# Patient Record
Sex: FEMALE | Race: WHITE | NOT HISPANIC OR LATINO | URBAN - METROPOLITAN AREA
[De-identification: names, ages, dates, MRNs, and addresses within clinical notes are randomized per-mention and may not be internally consistent; named-entity substitution may affect disease eponyms.]

---

## 2017-01-16 ENCOUNTER — EMERGENCY (EMERGENCY)
Facility: HOSPITAL | Age: 82
LOS: 0 days | Discharge: HOME | End: 2017-01-17

## 2017-06-27 DIAGNOSIS — Z88.1 ALLERGY STATUS TO OTHER ANTIBIOTIC AGENTS STATUS: ICD-10-CM

## 2017-06-27 DIAGNOSIS — Z88.0 ALLERGY STATUS TO PENICILLIN: ICD-10-CM

## 2017-06-27 DIAGNOSIS — R10.31 RIGHT LOWER QUADRANT PAIN: ICD-10-CM

## 2017-06-27 DIAGNOSIS — Z88.8 ALLERGY STATUS TO OTHER DRUGS, MEDICAMENTS AND BIOLOGICAL SUBSTANCES STATUS: ICD-10-CM

## 2017-06-27 DIAGNOSIS — Z88.6 ALLERGY STATUS TO ANALGESIC AGENT: ICD-10-CM

## 2017-06-27 DIAGNOSIS — K59.00 CONSTIPATION, UNSPECIFIED: ICD-10-CM

## 2017-06-27 DIAGNOSIS — I10 ESSENTIAL (PRIMARY) HYPERTENSION: ICD-10-CM

## 2017-06-27 DIAGNOSIS — Z88.5 ALLERGY STATUS TO NARCOTIC AGENT: ICD-10-CM

## 2017-07-31 ENCOUNTER — INPATIENT (INPATIENT)
Facility: HOSPITAL | Age: 82
LOS: 1 days | Discharge: HOME | End: 2017-08-02
Attending: INTERNAL MEDICINE | Admitting: INTERNAL MEDICINE

## 2017-07-31 DIAGNOSIS — K56.609 UNSPECIFIED INTESTINAL OBSTRUCTION, UNSPECIFIED AS TO PARTIAL VERSUS COMPLETE OBSTRUCTION: ICD-10-CM

## 2017-07-31 DIAGNOSIS — R11.2 NAUSEA WITH VOMITING, UNSPECIFIED: ICD-10-CM

## 2017-07-31 DIAGNOSIS — R10.9 UNSPECIFIED ABDOMINAL PAIN: ICD-10-CM

## 2017-07-31 DIAGNOSIS — I10 ESSENTIAL (PRIMARY) HYPERTENSION: ICD-10-CM

## 2017-07-31 DIAGNOSIS — K57.92 DIVERTICULITIS OF INTESTINE, PART UNSPECIFIED, WITHOUT PERFORATION OR ABSCESS WITHOUT BLEEDING: ICD-10-CM

## 2017-08-04 DIAGNOSIS — K44.9 DIAPHRAGMATIC HERNIA WITHOUT OBSTRUCTION OR GANGRENE: ICD-10-CM

## 2017-08-04 DIAGNOSIS — K29.70 GASTRITIS, UNSPECIFIED, WITHOUT BLEEDING: ICD-10-CM

## 2017-08-04 DIAGNOSIS — I10 ESSENTIAL (PRIMARY) HYPERTENSION: ICD-10-CM

## 2017-08-04 DIAGNOSIS — R10.9 UNSPECIFIED ABDOMINAL PAIN: ICD-10-CM

## 2018-04-02 ENCOUNTER — EMERGENCY (EMERGENCY)
Facility: HOSPITAL | Age: 83
LOS: 0 days | Discharge: HOME | End: 2018-04-02
Attending: EMERGENCY MEDICINE

## 2018-04-02 VITALS
TEMPERATURE: 98 F | SYSTOLIC BLOOD PRESSURE: 177 MMHG | DIASTOLIC BLOOD PRESSURE: 84 MMHG | HEART RATE: 78 BPM | RESPIRATION RATE: 18 BRPM | OXYGEN SATURATION: 95 %

## 2018-04-02 VITALS
WEIGHT: 113.98 LBS | HEIGHT: 60 IN | RESPIRATION RATE: 18 BRPM | HEART RATE: 76 BPM | DIASTOLIC BLOOD PRESSURE: 58 MMHG | SYSTOLIC BLOOD PRESSURE: 123 MMHG | TEMPERATURE: 98 F | OXYGEN SATURATION: 96 %

## 2018-04-02 DIAGNOSIS — R10.9 UNSPECIFIED ABDOMINAL PAIN: ICD-10-CM

## 2018-04-02 DIAGNOSIS — Z88.0 ALLERGY STATUS TO PENICILLIN: ICD-10-CM

## 2018-04-02 DIAGNOSIS — Z88.1 ALLERGY STATUS TO OTHER ANTIBIOTIC AGENTS STATUS: ICD-10-CM

## 2018-04-02 DIAGNOSIS — Z88.5 ALLERGY STATUS TO NARCOTIC AGENT: ICD-10-CM

## 2018-04-02 DIAGNOSIS — I10 ESSENTIAL (PRIMARY) HYPERTENSION: ICD-10-CM

## 2018-04-02 DIAGNOSIS — Z88.6 ALLERGY STATUS TO ANALGESIC AGENT: ICD-10-CM

## 2018-04-02 DIAGNOSIS — R11.10 VOMITING, UNSPECIFIED: ICD-10-CM

## 2018-04-02 DIAGNOSIS — K59.00 CONSTIPATION, UNSPECIFIED: ICD-10-CM

## 2018-04-02 DIAGNOSIS — Z79.899 OTHER LONG TERM (CURRENT) DRUG THERAPY: ICD-10-CM

## 2018-04-02 DIAGNOSIS — Z88.8 ALLERGY STATUS TO OTHER DRUGS, MEDICAMENTS AND BIOLOGICAL SUBSTANCES: ICD-10-CM

## 2018-04-02 LAB
ALBUMIN SERPL ELPH-MCNC: 4.2 G/DL — SIGNIFICANT CHANGE UP (ref 3.5–5.2)
ALP SERPL-CCNC: 191 U/L — HIGH (ref 30–115)
ALT FLD-CCNC: 17 U/L — SIGNIFICANT CHANGE UP (ref 0–41)
ANION GAP SERPL CALC-SCNC: 20 MMOL/L — HIGH (ref 7–14)
APTT BLD: 28.6 SEC — SIGNIFICANT CHANGE UP (ref 27–39.2)
AST SERPL-CCNC: 33 U/L — SIGNIFICANT CHANGE UP (ref 0–41)
BASE EXCESS BLDV CALC-SCNC: -1.3 MMOL/L — SIGNIFICANT CHANGE UP (ref -2–2)
BASOPHILS # BLD AUTO: 0.05 K/UL — SIGNIFICANT CHANGE UP (ref 0–0.2)
BASOPHILS NFR BLD AUTO: 0.4 % — SIGNIFICANT CHANGE UP (ref 0–1)
BILIRUB DIRECT SERPL-MCNC: <0.2 MG/DL — SIGNIFICANT CHANGE UP (ref 0–0.2)
BILIRUB INDIRECT FLD-MCNC: >0.5 MG/DL — SIGNIFICANT CHANGE UP (ref 0.2–1.2)
BILIRUB SERPL-MCNC: 0.7 MG/DL — SIGNIFICANT CHANGE UP (ref 0.2–1.2)
BUN SERPL-MCNC: 29 MG/DL — HIGH (ref 10–20)
CA-I SERPL-SCNC: 1.22 MMOL/L — SIGNIFICANT CHANGE UP (ref 1.12–1.3)
CALCIUM SERPL-MCNC: 10.6 MG/DL — HIGH (ref 8.5–10.1)
CHLORIDE SERPL-SCNC: 98 MMOL/L — SIGNIFICANT CHANGE UP (ref 98–110)
CK SERPL-CCNC: 61 U/L — SIGNIFICANT CHANGE UP (ref 0–225)
CO2 SERPL-SCNC: 21 MMOL/L — SIGNIFICANT CHANGE UP (ref 17–32)
CREAT SERPL-MCNC: 1.4 MG/DL — SIGNIFICANT CHANGE UP (ref 0.7–1.5)
EOSINOPHIL # BLD AUTO: 0.06 K/UL — SIGNIFICANT CHANGE UP (ref 0–0.7)
EOSINOPHIL NFR BLD AUTO: 0.5 % — SIGNIFICANT CHANGE UP (ref 0–8)
GAS PNL BLDV: 142 MMOL/L — SIGNIFICANT CHANGE UP (ref 136–145)
GAS PNL BLDV: SIGNIFICANT CHANGE UP
GLUCOSE SERPL-MCNC: 176 MG/DL — HIGH (ref 70–99)
HCO3 BLDV-SCNC: 24 MMOL/L — SIGNIFICANT CHANGE UP (ref 22–29)
HCT VFR BLD CALC: 47.4 % — HIGH (ref 37–47)
HCT VFR BLDA CALC: 49.4 % — HIGH (ref 34–44)
HGB BLD CALC-MCNC: 16.1 G/DL — SIGNIFICANT CHANGE UP (ref 14–18)
HGB BLD-MCNC: 15.9 G/DL — SIGNIFICANT CHANGE UP (ref 12–16)
HOROWITZ INDEX BLDV+IHG-RTO: 21 — SIGNIFICANT CHANGE UP
IMM GRANULOCYTES NFR BLD AUTO: 0.3 % — SIGNIFICANT CHANGE UP (ref 0.1–0.3)
INR BLD: 1.04 RATIO — SIGNIFICANT CHANGE UP (ref 0.65–1.3)
LACTATE BLDV-MCNC: 1.9 MMOL/L — HIGH (ref 0.5–1.6)
LACTATE SERPL-SCNC: 3.6 MMOL/L — HIGH (ref 0.5–2.2)
LIDOCAIN IGE QN: 31 U/L — SIGNIFICANT CHANGE UP (ref 7–60)
LYMPHOCYTES # BLD AUTO: 1.3 K/UL — SIGNIFICANT CHANGE UP (ref 1.2–3.4)
LYMPHOCYTES # BLD AUTO: 11.6 % — LOW (ref 20.5–51.1)
MCHC RBC-ENTMCNC: 27 PG — SIGNIFICANT CHANGE UP (ref 27–31)
MCHC RBC-ENTMCNC: 33.5 G/DL — SIGNIFICANT CHANGE UP (ref 32–37)
MCV RBC AUTO: 80.5 FL — LOW (ref 81–99)
MONOCYTES # BLD AUTO: 0.49 K/UL — SIGNIFICANT CHANGE UP (ref 0.1–0.6)
MONOCYTES NFR BLD AUTO: 4.4 % — SIGNIFICANT CHANGE UP (ref 1.7–9.3)
NEUTROPHILS # BLD AUTO: 9.27 K/UL — HIGH (ref 1.4–6.5)
NEUTROPHILS NFR BLD AUTO: 82.8 % — HIGH (ref 42.2–75.2)
NRBC # BLD: 0 /100 WBCS — SIGNIFICANT CHANGE UP (ref 0–0)
PCO2 BLDV: 42 MMHG — SIGNIFICANT CHANGE UP (ref 41–51)
PH BLDV: 7.37 — SIGNIFICANT CHANGE UP (ref 7.26–7.43)
PLATELET # BLD AUTO: 213 K/UL — SIGNIFICANT CHANGE UP (ref 130–400)
PO2 BLDV: 29 MMHG — SIGNIFICANT CHANGE UP (ref 20–40)
POTASSIUM BLDV-SCNC: 3.4 MMOL/L — SIGNIFICANT CHANGE UP (ref 3.3–5.6)
POTASSIUM SERPL-MCNC: 4.5 MMOL/L — SIGNIFICANT CHANGE UP (ref 3.5–5)
POTASSIUM SERPL-SCNC: 4.5 MMOL/L — SIGNIFICANT CHANGE UP (ref 3.5–5)
PROT SERPL-MCNC: 6.8 G/DL — SIGNIFICANT CHANGE UP (ref 6–8)
PROTHROM AB SERPL-ACNC: 11.3 SEC — SIGNIFICANT CHANGE UP (ref 9.95–12.87)
RBC # BLD: 5.89 M/UL — HIGH (ref 4.2–5.4)
RBC # FLD: 14 % — SIGNIFICANT CHANGE UP (ref 11.5–14.5)
SAO2 % BLDV: 54 % — SIGNIFICANT CHANGE UP
SODIUM SERPL-SCNC: 139 MMOL/L — SIGNIFICANT CHANGE UP (ref 135–146)
TROPONIN T SERPL-MCNC: <0.01 NG/ML — SIGNIFICANT CHANGE UP
WBC # BLD: 11.2 K/UL — HIGH (ref 4.8–10.8)
WBC # FLD AUTO: 11.2 K/UL — HIGH (ref 4.8–10.8)

## 2018-04-02 RX ORDER — SODIUM CHLORIDE 9 MG/ML
1000 INJECTION INTRAMUSCULAR; INTRAVENOUS; SUBCUTANEOUS
Qty: 0 | Refills: 0 | Status: DISCONTINUED | OUTPATIENT
Start: 2018-04-02 | End: 2018-04-02

## 2018-04-02 RX ORDER — MORPHINE SULFATE 50 MG/1
4 CAPSULE, EXTENDED RELEASE ORAL ONCE
Qty: 0 | Refills: 0 | Status: DISCONTINUED | OUTPATIENT
Start: 2018-04-02 | End: 2018-04-02

## 2018-04-02 RX ORDER — SODIUM CHLORIDE 9 MG/ML
1000 INJECTION INTRAMUSCULAR; INTRAVENOUS; SUBCUTANEOUS ONCE
Qty: 0 | Refills: 0 | Status: COMPLETED | OUTPATIENT
Start: 2018-04-02 | End: 2018-04-02

## 2018-04-02 RX ORDER — FAMOTIDINE 10 MG/ML
20 INJECTION INTRAVENOUS ONCE
Qty: 0 | Refills: 0 | Status: COMPLETED | OUTPATIENT
Start: 2018-04-02 | End: 2018-04-02

## 2018-04-02 RX ORDER — SODIUM CHLORIDE 9 MG/ML
3 INJECTION INTRAMUSCULAR; INTRAVENOUS; SUBCUTANEOUS ONCE
Qty: 0 | Refills: 0 | Status: COMPLETED | OUTPATIENT
Start: 2018-04-02 | End: 2018-04-02

## 2018-04-02 RX ORDER — ONDANSETRON 8 MG/1
4 TABLET, FILM COATED ORAL ONCE
Qty: 0 | Refills: 0 | Status: COMPLETED | OUTPATIENT
Start: 2018-04-02 | End: 2018-04-02

## 2018-04-02 RX ADMIN — ONDANSETRON 4 MILLIGRAM(S): 8 TABLET, FILM COATED ORAL at 14:02

## 2018-04-02 RX ADMIN — MORPHINE SULFATE 4 MILLIGRAM(S): 50 CAPSULE, EXTENDED RELEASE ORAL at 14:02

## 2018-04-02 RX ADMIN — MORPHINE SULFATE 4 MILLIGRAM(S): 50 CAPSULE, EXTENDED RELEASE ORAL at 17:06

## 2018-04-02 RX ADMIN — FAMOTIDINE 20 MILLIGRAM(S): 10 INJECTION INTRAVENOUS at 14:14

## 2018-04-02 RX ADMIN — SODIUM CHLORIDE 125 MILLILITER(S): 9 INJECTION INTRAMUSCULAR; INTRAVENOUS; SUBCUTANEOUS at 14:01

## 2018-04-02 RX ADMIN — SODIUM CHLORIDE 3 MILLILITER(S): 9 INJECTION INTRAMUSCULAR; INTRAVENOUS; SUBCUTANEOUS at 14:04

## 2018-04-02 RX ADMIN — SODIUM CHLORIDE 1000 MILLILITER(S): 9 INJECTION INTRAMUSCULAR; INTRAVENOUS; SUBCUTANEOUS at 18:45

## 2018-04-02 NOTE — ED PROVIDER NOTE - MEDICAL DECISION MAKING DETAILS
Results reviewed and d/w patient and family in detail.  Pt is feeling much better. Lactic acid improved with hydration.   No further vomiting in ED.  Daughter spoke with Dr Amanda mathis who prescribed Glycerin suppository and Mag citrate.   Shared decision making among pt, spouse and children and everyone is comfortable taking pt home.  Pt prefers to go home and will report any worsening symptoms or concerns to family. She will return to ED if needed.  Otherwise will follow up with her GI Dr and PMD.  Pt will drink plenty of fluids post CT with contrast.  They verbalize understanding of p;an

## 2018-04-02 NOTE — ED PROVIDER NOTE - OBJECTIVE STATEMENT
89 y/o female BIBA c/o "I have abdominal pain and I have been vomiting since this morning. I have been constipated for a couple days. I took milk of magnesia." no fever/ chills/ weakness/ urinary symptoms

## 2018-04-02 NOTE — ED PROVIDER NOTE - PROGRESS NOTE DETAILS
GFR noted, Pt elderly with abd pain and vomiting, Lactic acid noted , will obtain CT scan with IV contrast.  Benefits out weigh risks Patient feeling better. patient and family want to go home. Daughter states has magnesium citrate and laxatives. Will follow-up with SIRIA Patel

## 2018-04-02 NOTE — ED PROVIDER NOTE - ATTENDING CONTRIBUTION TO CARE
89 yo F pmhx noted presents with family with c/o abd pain today and vomiting since this am.  Spouse states that she has been retching.  Last BM 2-3 days ago, no fever or chills.  On exam pt is retching, AA O x 3, Lungs CTA b/L, abd isosft + BS, diffusely tender, no rebound no guarding, no edema

## 2019-09-15 ENCOUNTER — INPATIENT (INPATIENT)
Facility: HOSPITAL | Age: 84
LOS: 1 days | Discharge: HOME HEALTH | End: 2019-09-17
Attending: INTERNAL MEDICINE | Admitting: INTERNAL MEDICINE
Payer: MEDICARE

## 2019-09-15 VITALS
TEMPERATURE: 98 F | OXYGEN SATURATION: 99 % | RESPIRATION RATE: 18 BRPM | HEART RATE: 80 BPM | HEIGHT: 62 IN | DIASTOLIC BLOOD PRESSURE: 63 MMHG | WEIGHT: 111.99 LBS | SYSTOLIC BLOOD PRESSURE: 144 MMHG

## 2019-09-15 DIAGNOSIS — G45.9 TRANSIENT CEREBRAL ISCHEMIC ATTACK, UNSPECIFIED: ICD-10-CM

## 2019-09-15 DIAGNOSIS — K57.92 DIVERTICULITIS OF INTESTINE, PART UNSPECIFIED, WITHOUT PERFORATION OR ABSCESS WITHOUT BLEEDING: ICD-10-CM

## 2019-09-15 DIAGNOSIS — I10 ESSENTIAL (PRIMARY) HYPERTENSION: ICD-10-CM

## 2019-09-15 DIAGNOSIS — D64.9 ANEMIA, UNSPECIFIED: ICD-10-CM

## 2019-09-15 PROBLEM — K59.00 CONSTIPATION, UNSPECIFIED: Chronic | Status: ACTIVE | Noted: 2018-04-02

## 2019-09-15 LAB
ALBUMIN SERPL ELPH-MCNC: 4.1 G/DL — SIGNIFICANT CHANGE UP (ref 3.5–5.2)
ALP SERPL-CCNC: 66 U/L — SIGNIFICANT CHANGE UP (ref 30–115)
ALT FLD-CCNC: 10 U/L — SIGNIFICANT CHANGE UP (ref 0–41)
ANION GAP SERPL CALC-SCNC: 13 MMOL/L — SIGNIFICANT CHANGE UP (ref 7–14)
AST SERPL-CCNC: 26 U/L — SIGNIFICANT CHANGE UP (ref 0–41)
BASOPHILS # BLD AUTO: 0.02 K/UL — SIGNIFICANT CHANGE UP (ref 0–0.2)
BASOPHILS NFR BLD AUTO: 0.4 % — SIGNIFICANT CHANGE UP (ref 0–1)
BILIRUB SERPL-MCNC: 3 MG/DL — HIGH (ref 0.2–1.2)
BLD GP AB SCN SERPL QL: SIGNIFICANT CHANGE UP
BUN SERPL-MCNC: 31 MG/DL — HIGH (ref 10–20)
CALCIUM SERPL-MCNC: 9.9 MG/DL — SIGNIFICANT CHANGE UP (ref 8.5–10.1)
CHLORIDE SERPL-SCNC: 107 MMOL/L — SIGNIFICANT CHANGE UP (ref 98–110)
CO2 SERPL-SCNC: 21 MMOL/L — SIGNIFICANT CHANGE UP (ref 17–32)
CREAT SERPL-MCNC: 1.4 MG/DL — SIGNIFICANT CHANGE UP (ref 0.7–1.5)
EOSINOPHIL # BLD AUTO: 0.13 K/UL — SIGNIFICANT CHANGE UP (ref 0–0.7)
EOSINOPHIL NFR BLD AUTO: 2.5 % — SIGNIFICANT CHANGE UP (ref 0–8)
GLUCOSE SERPL-MCNC: 134 MG/DL — HIGH (ref 70–99)
HCT VFR BLD CALC: 18.3 % — LOW (ref 37–47)
HGB BLD-MCNC: 5.5 G/DL — CRITICAL LOW (ref 12–16)
IMM GRANULOCYTES NFR BLD AUTO: 0.6 % — HIGH (ref 0.1–0.3)
LYMPHOCYTES # BLD AUTO: 1.08 K/UL — LOW (ref 1.2–3.4)
LYMPHOCYTES # BLD AUTO: 20.7 % — SIGNIFICANT CHANGE UP (ref 20.5–51.1)
MAGNESIUM SERPL-MCNC: 2.3 MG/DL — SIGNIFICANT CHANGE UP (ref 1.8–2.4)
MCHC RBC-ENTMCNC: 30.1 G/DL — LOW (ref 32–37)
MCHC RBC-ENTMCNC: 39 PG — HIGH (ref 27–31)
MCV RBC AUTO: 129.8 FL — HIGH (ref 81–99)
MONOCYTES # BLD AUTO: 0.31 K/UL — SIGNIFICANT CHANGE UP (ref 0.1–0.6)
MONOCYTES NFR BLD AUTO: 5.9 % — SIGNIFICANT CHANGE UP (ref 1.7–9.3)
NEUTROPHILS # BLD AUTO: 3.66 K/UL — SIGNIFICANT CHANGE UP (ref 1.4–6.5)
NEUTROPHILS NFR BLD AUTO: 69.9 % — SIGNIFICANT CHANGE UP (ref 42.2–75.2)
NRBC # BLD: 0 /100 WBCS — SIGNIFICANT CHANGE UP (ref 0–0)
PLATELET # BLD AUTO: 176 K/UL — SIGNIFICANT CHANGE UP (ref 130–400)
POTASSIUM SERPL-MCNC: 3.8 MMOL/L — SIGNIFICANT CHANGE UP (ref 3.5–5)
POTASSIUM SERPL-SCNC: 3.8 MMOL/L — SIGNIFICANT CHANGE UP (ref 3.5–5)
PROT SERPL-MCNC: 6 G/DL — SIGNIFICANT CHANGE UP (ref 6–8)
RBC # BLD: 1.41 M/UL — LOW (ref 4.2–5.4)
RBC # FLD: 18.5 % — HIGH (ref 11.5–14.5)
SODIUM SERPL-SCNC: 141 MMOL/L — SIGNIFICANT CHANGE UP (ref 135–146)
TROPONIN T SERPL-MCNC: <0.01 NG/ML — SIGNIFICANT CHANGE UP
WBC # BLD: 5.23 K/UL — SIGNIFICANT CHANGE UP (ref 4.8–10.8)
WBC # FLD AUTO: 5.23 K/UL — SIGNIFICANT CHANGE UP (ref 4.8–10.8)

## 2019-09-15 PROCEDURE — 70450 CT HEAD/BRAIN W/O DYE: CPT | Mod: 26

## 2019-09-15 PROCEDURE — 99285 EMERGENCY DEPT VISIT HI MDM: CPT

## 2019-09-15 RX ORDER — HYDROCHLOROTHIAZIDE 25 MG
25 TABLET ORAL DAILY
Refills: 0 | Status: DISCONTINUED | OUTPATIENT
Start: 2019-09-15 | End: 2019-09-17

## 2019-09-15 RX ORDER — NIFEDIPINE 30 MG
30 TABLET, EXTENDED RELEASE 24 HR ORAL DAILY
Refills: 0 | Status: DISCONTINUED | OUTPATIENT
Start: 2019-09-15 | End: 2019-09-17

## 2019-09-15 RX ORDER — SENNA PLUS 8.6 MG/1
2 TABLET ORAL AT BEDTIME
Refills: 0 | Status: DISCONTINUED | OUTPATIENT
Start: 2019-09-15 | End: 2019-09-17

## 2019-09-15 RX ORDER — SODIUM CHLORIDE 9 MG/ML
1000 INJECTION INTRAMUSCULAR; INTRAVENOUS; SUBCUTANEOUS
Refills: 0 | Status: DISCONTINUED | OUTPATIENT
Start: 2019-09-15 | End: 2019-09-17

## 2019-09-15 RX ORDER — DOCUSATE SODIUM 100 MG
100 CAPSULE ORAL
Refills: 0 | Status: DISCONTINUED | OUTPATIENT
Start: 2019-09-15 | End: 2019-09-17

## 2019-09-15 RX ORDER — ONDANSETRON 8 MG/1
4 TABLET, FILM COATED ORAL EVERY 6 HOURS
Refills: 0 | Status: DISCONTINUED | OUTPATIENT
Start: 2019-09-15 | End: 2019-09-17

## 2019-09-15 RX ORDER — SODIUM CHLORIDE 9 MG/ML
1000 INJECTION INTRAMUSCULAR; INTRAVENOUS; SUBCUTANEOUS ONCE
Refills: 0 | Status: COMPLETED | OUTPATIENT
Start: 2019-09-15 | End: 2019-09-15

## 2019-09-15 RX ADMIN — SODIUM CHLORIDE 1000 MILLILITER(S): 9 INJECTION INTRAMUSCULAR; INTRAVENOUS; SUBCUTANEOUS at 15:12

## 2019-09-15 NOTE — ED PROVIDER NOTE - OBJECTIVE STATEMENT
89 year old female with pmhx of HTN, HLD, and diverticulitis, presents with unsteady ambulation x 3 days. Pt feels off balance while walking. Pt denies room spinning sensation, LOC, HA, visual changes, weakness, CP, SOB, abdominal pain, or N/V/D. no recent fall or trauma.

## 2019-09-15 NOTE — H&P ADULT - NSHPLABSRESULTS_GEN_ALL_CORE
5.5    5.23  )-----------( 176      ( 15 Sep 2019 15:20 )             18.3     09-15    141  |  107  |  31<H>  ----------------------------<  134<H>  3.8   |  21  |  1.4    Ca    9.9      15 Sep 2019 15:20  Mg     2.3     09-15    TPro  6.0  /  Alb  4.1  /  TBili  3.0<H>  /  DBili  x   /  AST  26  /  ALT  10  /  AlkPhos  66  09-15      CAPILLARY BLOOD GLUCOSE        CARDIAC MARKERS ( 15 Sep 2019 15:20 )  x     / <0.01 ng/mL / x     / x     / x

## 2019-09-15 NOTE — ED PROVIDER NOTE - PROGRESS NOTE DETAILS
discussed Hb results with pt, no history of anemia, dark or bloody stools. consent form signed and scanned in.. will admit for anemia work up HPI-As noted above, interviewed the patient myself & agreed w/ findings, additionally: 89yo F h/o HTN,. HLD and Diverticulitis presents feeling "off balance" for the last 3 days stating that whe feel "wobbly" with walking. Pt denies any KATZ, nausea, vomiting. ROS-As noted above, additionally: (+): off balance (-): fever, chills, n/v, cp, sob, palpitations, diaphoresis, cough, ha/dizziness, neck pain, abd pain, diarrhea, constipation, melena/brbpr, urinary symptoms, numbness/tingling, edema, calf pain/swelling/erythema, sick contacts, recent travel, trauma or rash. VS-I have reviewed the initial VS.  PE-As noted above, additionally: Gen:a&o, nad Eyes:perrl, eomi Ent:moist membranes, nl voice, (-) stridor Neck:from, supple, trachea midline, (-) c-spine tender/step-offs/lymphadenopathy/meningismus Cv: s1,s2, rrr, (-) murmur/JVD Chest:ctab, speaking full sentences, (-) wheezing/rales/rhonchi/ retractions, Abd:soft, nl BS, (-)tender, (-)distended/guarding/rebound/CVA tenderness Ext:FROM on all ext, (2+) pulses, (-) edema Integumentary appeared pale, warm and dry, (-)rash Neuro: Oriented x3, CN 2-12 grossly intact motor/sensory LABS/IMAGING-pending  . Reviewed labs Hgb 5.7 likely etiology of gait instability, obtain consent and admit  MDM- 89yo F h/o HTN,. HLD and Diverticulitis presents feeling "off balance" for the last 3 days stating that whe feel "wobbly" with walking.Vitals wnl. Physical- unremarkable. Previous records obtained and reviewed, noted to have _. EKG ordered, documented above. For imaging we ordered a CXR, and my preliminary read did not reveal, a ptx, infiltrates, or free air. Labs ordered and noted to be within normal limits. We treated the patient with _. We also consulted _. After the workup the decision was made admit/discharge the patient. Extensive discussion had with the patient/family. HPI-As noted above, interviewed the patient myself & agreed w/ findings, additionally: 89yo F h/o HTN,. HLD and Diverticulitis presents feeling "off balance" for the last 3 days stating that whe feel "wobbly" with walking. Pt denies any KATZ, nausea, vomiting. ROS-As noted above, additionally: (+): off balance (-): fever, chills, n/v, cp, sob, palpitations, diaphoresis, cough, ha/dizziness, neck pain, abd pain, diarrhea, constipation, melena/brbpr, urinary symptoms, numbness/tingling, edema, calf pain/swelling/erythema, sick contacts, recent travel, trauma or rash. VS-I have reviewed the initial VS.  PE-As noted above, additionally: Gen:a&o, nad Eyes:perrl, eomi Ent:moist membranes, nl voice, (-) stridor Neck:from, supple, trachea midline, (-) c-spine tender/step-offs/lymphadenopathy/meningismus Cv: s1,s2, rrr, (-) murmur/JVD Chest:ctab, speaking full sentences, (-) wheezing/rales/rhonchi/ retractions, Abd:soft, nl BS, (-)tender, (-)distended/guarding/rebound/CVA tenderness Ext:FROM on all ext, (2+) pulses, (-) edema Integumentary appeared pale, warm and dry, (-)rash Neuro: Oriented x3, CN 2-12 grossly intact motor/sensory, pt on gait noted to have gait instability.  LABS/IMAGING-pending dr yadav and medicine PA aware

## 2019-09-15 NOTE — ED PROVIDER NOTE - CLINICAL SUMMARY MEDICAL DECISION MAKING FREE TEXT BOX
89yo F h/o HTN,. HLD and Diverticulitis presents feeling "off balance" for the last 3 days stating that they feel "wobbly" with walking. Vitals wnl. Physical- unstable gait. Previous records obtained and reviewed, noted to have htn. EKG ordered, documented above. Due to the concerned of an intracranial etiology, the decision was made to obtain a CTH- negative for ICH. Reviewed labs Hgb 5.7 likely etiology of gait instability, obtain consent and admit. We treated the patient with 2 Units PRBC. No current evidence of GI Bleed.   After the workup the decision was made admit the patient for transfusion, GI LOTT and Neuro LOTT. Extensive discussion had with the patient/family.

## 2019-09-15 NOTE — H&P ADULT - PROBLEM SELECTOR PLAN 1
-will transfuse 2 units PRBCs  -continue to trend CBC  -continue NPO w/ gentle IV fluid hydration for now  -consider GI c/s  -continue to monitor for s/sx of bleeding

## 2019-09-15 NOTE — H&P ADULT - HISTORY OF PRESENT ILLNESS
89yo F w/ hx of TIA, HTN, diverticulitis, p/w gait imbalance and found w/ acute anemia. Pt lives w/ , reports she began experiencing unsteady gait approximately 5 days PTA. Pt also states she has been experiencing intermittent "throbbing" in her b/l temples for the last several days. Pt dropped a plate at home this AM but denies any gross unilateral weakness. Also denies any recent falls, LOC, numbness, tingling, difficulty speaking, or other associated sx. Pt presented to ED for these complaints and found w/ decreased Hgb (to 5.5). Reports mild abdominal pain yesterday (relieved w/ BM) but denies any melena, hematochezia, hematuria, n/v/d, fevers, chills, or other associated sx. KEN by ED staff revealed brown stool (see other notes). NCHCT w/ no acute pathology.

## 2019-09-15 NOTE — H&P ADULT - NSHPPHYSICALEXAM_GEN_ALL_CORE
PHYSICAL EXAM:    General: elderly, AAOx3, NAD    HEENT: NCAT, no JVD    Thorax: CTA b/l    Heart: normal S1/S2, rrr, no m/r/g    GI: BS normoactive, s/nt/nd    Extremities: no c/c/e, wwp, 2+ b/l UE/LE distal pulses    Neurologic: no gross focal deficits

## 2019-09-15 NOTE — H&P ADULT - PROBLEM SELECTOR PLAN 2
-currently w/ no gross focal deficits  -consider neuro c/s and PT evaluation  -consider brain MRI  -continue serial neuro checks

## 2019-09-15 NOTE — ED ADULT NURSE NOTE - NSIMPLEMENTINTERV_GEN_ALL_ED
Implemented All Fall with Harm Risk Interventions:  [place] to call system. Call bell, personal items and telephone within reach. Instruct patient to call for assistance. Room bathroom lighting operational. Non-slip footwear when patient is off stretcher. Physically safe environment: no spills, clutter or unnecessary equipment. Stretcher in lowest position, wheels locked, appropriate side rails in place. Provide visual cue, wrist band, yellow gown, etc. Monitor gait and stability. Monitor for mental status changes and reorient to person, place, and time. Review medications for side effects contributing to fall risk. Reinforce activity limits and safety measures with patient and family. Provide visual clues: red socks.

## 2019-09-15 NOTE — H&P ADULT - NSHPSOCIALHISTORY_GEN_ALL_CORE
, retired, lives w/ . Drinks small glass of liqueur (anisette) "occasionally". Denies any prior tobacco or illicit drug use.

## 2019-09-15 NOTE — ED PROVIDER NOTE - PHYSICAL EXAMINATION
CONST: Well appearing in NAD  EYES: PERRL, EOMI, Sclera and conjunctiva clear.   ENT: No nasal discharge. TM's clear B/L without drainage. Oropharynx normal appearing  NECK: Non-tender, no meningeal signs. normal ROM. supple   CARD: Normal S1 S2; Normal rate and rhythm  RESP: Equal BS B/L, No wheezes, rhonchi or rales. No distress  GI: Soft, non-tender, non-distended. no cva tenderness. normal BS  RECTAL: brown stool, no blood noted  MS: Normal ROM in all extremities. No midline spinal tenderness. pulses 2 +. no calf tenderness or swelling  SKIN: Warm, dry, no acute rashes. Good turgor  NEURO: A&Ox3, No focal deficits. Strength 5/5 with no sensory deficits. Finger to nose intact. Negative pronator drift

## 2019-09-15 NOTE — H&P ADULT - NSICDXPASTMEDICALHX_GEN_ALL_CORE_FT
PAST MEDICAL HISTORY:  Constipation     Diverticulitis     Hypertension     TIA (transient ischemic attack)

## 2019-09-16 LAB
ALBUMIN SERPL ELPH-MCNC: 3.9 G/DL — SIGNIFICANT CHANGE UP (ref 3.5–5.2)
ALLERGY+IMMUNOLOGY DIAG STUDY NOTE: SIGNIFICANT CHANGE UP
ALP SERPL-CCNC: 62 U/L — SIGNIFICANT CHANGE UP (ref 30–115)
ALT FLD-CCNC: 9 U/L — SIGNIFICANT CHANGE UP (ref 0–41)
ANION GAP SERPL CALC-SCNC: 11 MMOL/L — SIGNIFICANT CHANGE UP (ref 7–14)
APTT BLD: 28.3 SEC — SIGNIFICANT CHANGE UP (ref 27–39.2)
AST SERPL-CCNC: 20 U/L — SIGNIFICANT CHANGE UP (ref 0–41)
BASOPHILS # BLD AUTO: 0.01 K/UL — SIGNIFICANT CHANGE UP (ref 0–0.2)
BASOPHILS # BLD AUTO: 0.03 K/UL — SIGNIFICANT CHANGE UP (ref 0–0.2)
BASOPHILS NFR BLD AUTO: 0.2 % — SIGNIFICANT CHANGE UP (ref 0–1)
BASOPHILS NFR BLD AUTO: 0.6 % — SIGNIFICANT CHANGE UP (ref 0–1)
BILIRUB DIRECT SERPL-MCNC: 0.5 MG/DL — HIGH (ref 0–0.2)
BILIRUB INDIRECT FLD-MCNC: 3 MG/DL — HIGH (ref 0.2–1.2)
BILIRUB SERPL-MCNC: 3 MG/DL — HIGH (ref 0.2–1.2)
BILIRUB SERPL-MCNC: 3.5 MG/DL — HIGH (ref 0.2–1.2)
BUN SERPL-MCNC: 28 MG/DL — HIGH (ref 10–20)
CALCIUM SERPL-MCNC: 9.4 MG/DL — SIGNIFICANT CHANGE UP (ref 8.5–10.1)
CHLORIDE SERPL-SCNC: 110 MMOL/L — SIGNIFICANT CHANGE UP (ref 98–110)
CO2 SERPL-SCNC: 21 MMOL/L — SIGNIFICANT CHANGE UP (ref 17–32)
CREAT SERPL-MCNC: 1.3 MG/DL — SIGNIFICANT CHANGE UP (ref 0.7–1.5)
DAT IGG-SP REAG RBC-IMP: ABNORMAL
DIR ANTIGLOB POLYSPECIFIC INTERPRETATION: ABNORMAL
EOSINOPHIL # BLD AUTO: 0.21 K/UL — SIGNIFICANT CHANGE UP (ref 0–0.7)
EOSINOPHIL # BLD AUTO: 0.23 K/UL — SIGNIFICANT CHANGE UP (ref 0–0.7)
EOSINOPHIL NFR BLD AUTO: 4.4 % — SIGNIFICANT CHANGE UP (ref 0–8)
EOSINOPHIL NFR BLD AUTO: 5 % — SIGNIFICANT CHANGE UP (ref 0–8)
GLUCOSE SERPL-MCNC: 89 MG/DL — SIGNIFICANT CHANGE UP (ref 70–99)
HCT VFR BLD CALC: 16.4 % — LOW (ref 37–47)
HCT VFR BLD CALC: 26.3 % — LOW (ref 37–47)
HGB BLD-MCNC: 4.8 G/DL — CRITICAL LOW (ref 12–16)
HGB BLD-MCNC: 8.4 G/DL — LOW (ref 12–16)
IAT COMP-SP REAG SERPL QL: SIGNIFICANT CHANGE UP
IMM GRANULOCYTES NFR BLD AUTO: 0.7 % — HIGH (ref 0.1–0.3)
IMM GRANULOCYTES NFR BLD AUTO: 0.8 % — HIGH (ref 0.1–0.3)
INR BLD: 1.13 RATIO — SIGNIFICANT CHANGE UP (ref 0.65–1.3)
LDH SERPL L TO P-CCNC: 415 — HIGH (ref 50–242)
LYMPHOCYTES # BLD AUTO: 1.03 K/UL — LOW (ref 1.2–3.4)
LYMPHOCYTES # BLD AUTO: 1.12 K/UL — LOW (ref 1.2–3.4)
LYMPHOCYTES # BLD AUTO: 21.5 % — SIGNIFICANT CHANGE UP (ref 20.5–51.1)
LYMPHOCYTES # BLD AUTO: 24.4 % — SIGNIFICANT CHANGE UP (ref 20.5–51.1)
MCHC RBC-ENTMCNC: 29.3 G/DL — LOW (ref 32–37)
MCHC RBC-ENTMCNC: 31.9 G/DL — LOW (ref 32–37)
MCHC RBC-ENTMCNC: 35 PG — HIGH (ref 27–31)
MCHC RBC-ENTMCNC: 38.1 PG — HIGH (ref 27–31)
MCV RBC AUTO: 109.6 FL — HIGH (ref 81–99)
MCV RBC AUTO: 130.2 FL — HIGH (ref 81–99)
MONOCYTES # BLD AUTO: 0.29 K/UL — SIGNIFICANT CHANGE UP (ref 0.1–0.6)
MONOCYTES # BLD AUTO: 0.3 K/UL — SIGNIFICANT CHANGE UP (ref 0.1–0.6)
MONOCYTES NFR BLD AUTO: 5.6 % — SIGNIFICANT CHANGE UP (ref 1.7–9.3)
MONOCYTES NFR BLD AUTO: 7.1 % — SIGNIFICANT CHANGE UP (ref 1.7–9.3)
NEUTROPHILS # BLD AUTO: 2.64 K/UL — SIGNIFICANT CHANGE UP (ref 1.4–6.5)
NEUTROPHILS # BLD AUTO: 3.49 K/UL — SIGNIFICANT CHANGE UP (ref 1.4–6.5)
NEUTROPHILS NFR BLD AUTO: 62.6 % — SIGNIFICANT CHANGE UP (ref 42.2–75.2)
NEUTROPHILS NFR BLD AUTO: 67.1 % — SIGNIFICANT CHANGE UP (ref 42.2–75.2)
NRBC # BLD: 0 /100 WBCS — SIGNIFICANT CHANGE UP (ref 0–0)
NRBC # BLD: 0 /100 WBCS — SIGNIFICANT CHANGE UP (ref 0–0)
PLATELET # BLD AUTO: 137 K/UL — SIGNIFICANT CHANGE UP (ref 130–400)
PLATELET # BLD AUTO: 152 K/UL — SIGNIFICANT CHANGE UP (ref 130–400)
POTASSIUM SERPL-MCNC: 3.8 MMOL/L — SIGNIFICANT CHANGE UP (ref 3.5–5)
POTASSIUM SERPL-SCNC: 3.8 MMOL/L — SIGNIFICANT CHANGE UP (ref 3.5–5)
PROT SERPL-MCNC: 5.4 G/DL — LOW (ref 6–8)
PROTHROM AB SERPL-ACNC: 13 SEC — HIGH (ref 9.95–12.87)
RBC # BLD: 1.26 M/UL — LOW (ref 4.2–5.4)
RBC # BLD: 2.39 M/UL — LOW (ref 4.2–5.4)
RBC # BLD: 2.4 M/UL — LOW (ref 4.2–5.4)
RBC # FLD: 16.5 % — HIGH (ref 11.5–14.5)
RBC # FLD: SIGNIFICANT CHANGE UP % (ref 11.5–14.5)
RETICS #: 386.7 K/UL — HIGH (ref 25–125)
RETICS/RBC NFR: 16.2 % — HIGH (ref 0.5–1.5)
SODIUM SERPL-SCNC: 142 MMOL/L — SIGNIFICANT CHANGE UP (ref 135–146)
WBC # BLD: 4.22 K/UL — LOW (ref 4.8–10.8)
WBC # BLD: 5.2 K/UL — SIGNIFICANT CHANGE UP (ref 4.8–10.8)
WBC # FLD AUTO: 4.22 K/UL — LOW (ref 4.8–10.8)
WBC # FLD AUTO: 5.2 K/UL — SIGNIFICANT CHANGE UP (ref 4.8–10.8)

## 2019-09-16 PROCEDURE — 99222 1ST HOSP IP/OBS MODERATE 55: CPT

## 2019-09-16 RX ADMIN — Medication 25 MILLIGRAM(S): at 07:23

## 2019-09-16 RX ADMIN — Medication 100 MILLIGRAM(S): at 17:16

## 2019-09-16 RX ADMIN — Medication 30 MILLIGRAM(S): at 07:23

## 2019-09-16 RX ADMIN — SENNA PLUS 2 TABLET(S): 8.6 TABLET ORAL at 21:22

## 2019-09-16 RX ADMIN — Medication 100 MILLIGRAM(S): at 07:22

## 2019-09-16 RX ADMIN — SODIUM CHLORIDE 75 MILLILITER(S): 9 INJECTION INTRAMUSCULAR; INTRAVENOUS; SUBCUTANEOUS at 16:51

## 2019-09-16 NOTE — CONSULT NOTE ADULT - ASSESSMENT
IMPRESSION: Rehab of 89 y/o  f  rehab  for  debility  gd    PRECAUTIONS: [  ] Cardiac  [  ] Respiratory  [  ] Seizures [  ] Contact Isolation  [  ] Droplet Isolation  [ FALL ] Other    Weight Bearing Status:     RECOMMENDATION:    Out of Bed to Chair     DVT/Decubiti Prophylaxis    REHAB PLAN:     [  xx ] Bedside P/T 3-5 times a week   [   ]   Bedside O/T  2-3 times a week             [   ] No Rehab Therapy Indicated                   [   ]  Speech Therapy   Conditioning/ROM                                    ADL  Bed Mobility                                               Conditioning/ROM  Transfers                                                     Bed Mobility  Sitting /Standing Balance                         Transfers                                        Gait Training                                               Sitting/Standing Balance  Stair Training [   ]Applicable                    Home equipment Eval                                                                        Splinting  [   ] Only      GOALS:   ADL   [ x ]   Independent                    Transfers  [x   ] Independent                          Ambulation  [x   ] Independent     [   x ] With device                            [ x  ]  CG                                                         [   x]  CG                                                                  [   ] CG                            [    ] Min A                                                   [   ] Min A                                                              [   ] Min  A          DISCHARGE PLAN:   [   ]  Good candidate for Intensive Rehabilitation/Hospital based-4A [facility]                                             Will tolerate 3hrs Intensive Rehab Daily                                       [    ]  Short Term Rehab in Skilled Nursing Facility                                       [ xx  ]  Home with Outpatient or VN services d/w  ptn  and dtr  both  agree                                           [    ]  Possible Candidate for Intensive Hospital based Rehab

## 2019-09-16 NOTE — CONSULT NOTE ADULT - SUBJECTIVE AND OBJECTIVE BOX
pt seen/examined.  records reviewed    89yo F is seen for  acute anemia. Pt lives w/ , reports she began experiencing unsteady gait approximately 5 days PTA. Pt also states she has been experiencing intermittent "throbbing" in her b/l temples for the last several days. Pt dropped a plate at home this AM but denies any gross unilateral weakness. Also denies any recent falls, LOC, numbness, tingling, difficulty speaking, or other associated sx. Pt presented to ED for these complaints and found w/ decreased Hgb (to 5.5). Reports mild abdominal pain yesterday (relieved w/ BM) but denies any melena, hematochezia, hematuria, n/v/d, fevers, chills, or other associated sx. KEN by ED staff revealed brown stool (see other notes). NCHCT w/ no acute pathology    PMH:  TIA, HTN, diverticulitis, p/w gait imbalance     EXAM  NAD  VSS  PEPITO  RRR  CTA B/L  no organomegaly  no edema

## 2019-09-16 NOTE — CONSULT NOTE ADULT - SUBJECTIVE AND OBJECTIVE BOX
Chief Complaint: Patient is a 89y old  Female who presents with a chief complaint of gait imbalance, anemia (16 Sep 2019 10:55)      HPI:  89yo F w/ hx of TIA, HTN, diverticulitis, p/w gait imbalance and found w/ acute anemia. Pt lives w/ , reports she began experiencing unsteady gait approximately 5 days PTA. Pt also states she has been experiencing intermittent "throbbing" in her b/l temples for the last several days. Pt dropped a plate at home this AM but denies any gross unilateral weakness. Also denies any recent falls, LOC, numbness, tingling, difficulty speaking, or other associated sx. Pt presented to ED for these complaints and found w/ decreased Hgb (to 5.5). Reports mild abdominal pain yesterday (relieved w/ BM) but denies any melena, hematochezia, hematuria, n/v/d, fevers, chills, or other associated sx. KEN by ED staff revealed brown stool (see other notes). NCHCT w/ no acute pathology. (15 Sep 2019 18:48)      Medications:  docusate sodium 100 milliGRAM(s) Oral two times a day  hydrochlorothiazide 25 milliGRAM(s) Oral daily  NIFEdipine XL 30 milliGRAM(s) Oral daily  ondansetron Injectable 4 milliGRAM(s) IV Push every 6 hours PRN  senna 2 Tablet(s) Oral at bedtime  sodium chloride 0.9%. 1000 milliLiter(s) IV Continuous <Continuous>      PMHX/PSHX:  TIA (transient ischemic attack)  Diverticulitis  Constipation  Hypertension  No significant past surgical history      Family history:      Social History:     Allergies:  aspirin (Unknown)  Cipro (Unknown)  codeine (Unknown)  Diovan (Unknown)  Flagyl (Unknown)  Librax (Unknown)  metronidazole (Unknown)  penicillin (Unknown)  Prevacid (Unknown)  trimethobenzamide (Unknown)      PHYSICAL EXAM:   Vital Signs:  Vital Signs Last 24 Hrs  T(C): 35.7 (16 Sep 2019 09:30), Max: 36.8 (15 Sep 2019 14:28)  T(F): 96.2 (16 Sep 2019 09:30), Max: 98.2 (15 Sep 2019 14:28)  HR: 79 (16 Sep 2019 09:30) (70 - 80)  BP: 113/72 (16 Sep 2019 09:30) (113/72 - 145/89)  BP(mean): --  RR: 18 (16 Sep 2019 09:30) (18 - 18)  SpO2: 98% (15 Sep 2019 17:49) (98% - 99%)  Daily Height in cm: 152.4 (15 Sep 2019 20:19)    Daily     T(C): 35.7 (09-16-19 @ 09:30), Max: 36.8 (09-15-19 @ 14:28)  HR: 79 (09-16-19 @ 09:30) (70 - 80)  BP: 113/72 (09-16-19 @ 09:30) (113/72 - 145/89)  RR: 18 (09-16-19 @ 09:30) (18 - 18)  SpO2: 98% (09-15-19 @ 17:49) (98% - 99%)    GENERAL:  Appears stated age, well-groomed, well-nourished, no distress  HEENT:  Conjunctivae clear and pink, no thyromegaly, nodules, adenopathy, no JVD, sclera -anicteric  CHEST:  Full & symmetric excursion, no increased effort, breath sounds clear  HEART:  Regular rhythm, S1, S2, no murmur/rub/S3/S4, no abdominal bruit, no edema  ABDOMEN:  Soft, non-tender, non-distended, normoactive bowel sounds,  no masses ,no hepato-splenomegaly, no signs of chronic liver disease  EXTEREMITIES:  no cyanosis,clubbing or edema  SKIN:  No rash/erythema/ecchymoses/petechiae/wounds/abscess/warm/dry  NEURO:  Alert, oriented, no asterixis, no tremor, no encephalopathy    LABS:                        4.8    4.22  )-----------( 137      ( 16 Sep 2019 06:12 )             16.4     09-16    142  |  110  |  28<H>  ----------------------------<  89  3.8   |  21  |  1.3    Ca    9.4      16 Sep 2019 06:12  Mg     2.3     09-15    TPro  5.4<L>  /  Alb  3.9  /  TBili  3.0<H>  /  DBili  x   /  AST  20  /  ALT  9   /  AlkPhos  62  09-16    LIVER FUNCTIONS - ( 16 Sep 2019 06:12 )  Alb: 3.9 g/dL / Pro: 5.4 g/dL / ALK PHOS: 62 U/L / ALT: 9 U/L / AST: 20 U/L / GGT: x           PT/INR - ( 16 Sep 2019 06:12 )   PT: 13.00 sec;   INR: 1.13 ratio         PTT - ( 16 Sep 2019 06:12 )  PTT:28.3 sec        Imaging:      Assessment and Plan:

## 2019-09-16 NOTE — PHYSICAL THERAPY INITIAL EVALUATION ADULT - SPECIFY REASON(S)
Chart reviewed. Patient hemoglobin / hematocrit is 4.8 / 16.4. Not appropriate for PT at this time. Will follow up and complete PT IE when indicated.

## 2019-09-16 NOTE — CONSULT NOTE ADULT - ASSESSMENT
89 yo with macrocytic anemia  guaiac negative  plan  replace b12 and folate  no gi intervention at this time

## 2019-09-16 NOTE — CONSULT NOTE ADULT - SUBJECTIVE AND OBJECTIVE BOX
HPI: 89 y o F w/ hx of TIA, HTN, diverticulitis, p/w gait imbalance and found w/ acute anemia. Pt lives w/ , reports she began experiencing unsteady gait approximately 5 days PTA. Pt also states she has been experiencing intermittent "throbbing" in her b/l temples for the last several days. Pt dropped a plate at home this AM but denies any gross unilateral weakness. Also denies any recent falls, LOC, numbness, tingling, difficulty speaking, or other associated sx. Pt presented to ED for these complaints and found w/ decreased Hgb (to 5.5). Reports mild abdominal pain yesterday (relieved w/ BM) but denies any melena, hematochezia, hematuria, n/v/d, fevers, chills, or other associated sx. KEN by ED staff revealed brown stool (see other notes). NCHCT w/ no acute pathology. ptn  is  sp  bld  tf  2  units  .    PTN  REFERRED TO ACUTE  REHAB  FOR  EVAL AND  TX   PAST MEDICAL & SURGICAL HISTORY:  TIA (transient ischemic attack)  Diverticulitis  Constipation  Hypertension  No significant past surgical history      Hospital Course:    TODAY'S SUBJECTIVE & REVIEW OF SYMPTOMS:     Constitutional WNL   Cardio WNL   Resp WNL   GI WNL  Heme WNL  Endo WNL  Skin WNL  MSK WNL  Neuro WNL  Cognitive WNL  Psych WNL      MEDICATIONS  (STANDING):  docusate sodium 100 milliGRAM(s) Oral two times a day  hydrochlorothiazide 25 milliGRAM(s) Oral daily  NIFEdipine XL 30 milliGRAM(s) Oral daily  senna 2 Tablet(s) Oral at bedtime  sodium chloride 0.9%. 1000 milliLiter(s) (75 mL/Hr) IV Continuous <Continuous>    MEDICATIONS  (PRN):  ondansetron Injectable 4 milliGRAM(s) IV Push every 6 hours PRN Nausea      FAMILY HISTORY:      Allergies    aspirin (Unknown)  Cipro (Unknown)  codeine (Unknown)  Diovan (Unknown)  Flagyl (Unknown)  Librax (Unknown)  metronidazole (Unknown)  penicillin (Unknown)  Prevacid (Unknown)  trimethobenzamide (Unknown)    Intolerances        SOCIAL HISTORY:    [  ] Etoh  [  ] Smoking  [  ] Substance abuse     Home Environment:  [ x ] Home Alone  [ x ] Lives with Family  [  ] Home Health Aid    Dwelling:  [  ] Apartment  [x  ] Private House  [  ] Adult Home  [  ] Skilled Nursing Facility      [  ] Short Term  [  ] Long Term  [ x ] Stairs       Elevator [  ]    FUNCTIONAL STATUS PTA: (Check all that apply)  Ambulation: [ x  ]Independent    [  ] Dependent     [  ] Non-Ambulatory  Assistive Device: [  ] SA Cane  [  ]  Q Cane  [  ] Walker  [  ]  Wheelchair  ADL : [ x ] Independent  [  ]  Dependent       Vital Signs Last 24 Hrs  T(C): 35.7 (16 Sep 2019 14:39), Max: 36.7 (15 Sep 2019 18:38)  T(F): 96.3 (16 Sep 2019 14:39), Max: 98 (15 Sep 2019 18:38)  HR: 77 (16 Sep 2019 14:39) (70 - 79)  BP: 134/67 (16 Sep 2019 14:39) (113/72 - 145/89)  BP(mean): --  RR: 18 (16 Sep 2019 14:39) (18 - 18)  SpO2: 98% (15 Sep 2019 17:49) (98% - 98%)      PHYSICAL EXAM: Alert & Oriented X3  GENERAL: NAD, well-groomed, well-developed  HEAD:  Atraumatic, Normocephalic  EYES: EOMI, PERRLA, conjunctiva and sclera clear  NECK: Supple, No JVD, Normal thyroid  CHEST/LUNG: Clear to percussion bilaterally; No rales, rhonchi, wheezing, or rubs  HEART: Regular rate and rhythm; No murmurs, rubs, or gallops  ABDOMEN: Soft, Nontender, Nondistended; Bowel sounds present  EXTREMITIES:  2+ Peripheral Pulses, No clubbing, cyanosis, or edema    NERVOUS SYSTEM:  Cranial Nerves 2-12 intact [ x ] Abnormal  [  ]  ROM: WFL all extremities [  x]  Abnormal [  ]  Motor Strength: WFL all extremities  [x  ]  Abnormal [  ]  Sensation: intact to light touch [ x ] Abnormal [  ]  Reflexes: Symmetric [ x ]  Abnormal [  ]    FUNCTIONAL STATUS:  Bed Mobility: Independent [  ]  Supervision [  ]  Needs Assistance [  ]  N/A [ x ]  Transfers: Independent [  ]  Supervision [  ]  Needs Assistance [  ]  N/A [ x ]   Ambulation: Independent [  ]  Supervision [  ]  Needs Assistance [x  ]  N/A [  ]  ADL: Independent [  ] Requires Assistance [  ] N/A [x  ]  SEE PT/OT IE NOTES    LABS:                        4.8    4.22  )-----------( 137      ( 16 Sep 2019 06:12 )             16.4     09-16    142  |  110  |  28<H>  ----------------------------<  89  3.8   |  21  |  1.3    Ca    9.4      16 Sep 2019 06:12  Mg     2.3     09-15    TPro  5.4<L>  /  Alb  3.9  /  TBili  3.0<H>  /  DBili  x   /  AST  20  /  ALT  9   /  AlkPhos  62  09-16    PT/INR - ( 16 Sep 2019 06:12 )   PT: 13.00 sec;   INR: 1.13 ratio         PTT - ( 16 Sep 2019 06:12 )  PTT:28.3 sec      RADIOLOGY & ADDITIONAL STUDIES:    Assesment:

## 2019-09-16 NOTE — CONSULT NOTE ADULT - ATTENDING COMMENTS
IMPRESSION:   89 yo woman with acute onset of severe sympotmatic macrocytic anemia and leukopenia. Suspect B12/Folate deficiency.    obtain STAT!  - B12 level  folate level  MMA, homocystine  retics, Direct and indirect bilirubin, LDH, haptoglobin, zacarias test   iron, tibc, ferritin,   intrinsic factor ab  parietal cell ab  peripheral blood smear    the management will depend on the diagnosis  only after labs are obtained, start replacement of b12 and folate:  B12 1000mcg IM daily x5-7, f/b weekly x5-7, monthly for life  folate ORAL daily 1mg  will follow results

## 2019-09-16 NOTE — CONSULT NOTE ADULT - SUBJECTIVE AND OBJECTIVE BOX
Neurology Consultation note    Name  RITA RAMOS    HPI:  89yo F w/ hx of TIA, HTN, diverticulitis, p/w gait imbalance and found w/ acute anemia. Pt lives w/ , reports she began experiencing unsteady gait approximately 5 days PTA. Pt also states she has been experiencing intermittent "throbbing" in her b/l temples for the last several days. Pt dropped a plate at home this AM but denies any gross unilateral weakness. Also denies any recent falls, LOC, numbness, tingling, difficulty speaking, or other associated sx. Pt presented to ED for these complaints and found w/ decreased Hgb (to 5.5). Reports mild abdominal pain yesterday (relieved w/ BM) but denies any melena, hematochezia, hematuria, n/v/d, fevers, chills, or other associated sx. KEN by ED staff revealed brown stool (see other notes). NCHCT w/ no acute pathology. (15 Sep 2019 18:48)      Interval History:    patient is a 89 yo female with hx as above who presents with unsteady gait. patient profoundly anemic on adm  denies vertigo  states she needed support to walk due to generalized but non focal weakness  she does report a mild r sided ha as well but is now asymptomatic      Vital Signs Last 24 Hrs  T(C): 35.7 (16 Sep 2019 09:30), Max: 36.8 (15 Sep 2019 14:28)  T(F): 96.2 (16 Sep 2019 09:30), Max: 98.2 (15 Sep 2019 14:28)  HR: 79 (16 Sep 2019 09:30) (70 - 80)  BP: 113/72 (16 Sep 2019 09:30) (113/72 - 145/89)  BP(mean): --  RR: 18 (16 Sep 2019 09:30) (18 - 18)  SpO2: 98% (15 Sep 2019 17:49) (98% - 99%)    Neurological Exam:   Mental status: Awake, alert and oriented x3.  Recent and remote memory intact.  Naming, repetition and comprehension intact.  Attention/concentration intact.  No dysarthria, no aphasia.  Fund of knowledge appropriate.    Cranial nerves: Pupils equally round and reactive to light, visual fields full, no nystagmus, extraocular muscles intact, V1 through V3 intact bilaterally and symmetric, face symmetric, hearing intact to finger rub, palate elevation symmetric, tongue was midline.  Motor:  MRC grading 5/5 b/l UE/LE.   strength 5/5.  Normal tone and bulk.  No abnormal movements.    Sensation: Intact to light touch, proprioception, and pinprick.   Coordination: No dysmetria on finger-to-nose and heel-to-shin.  No dysdiadokinesia.  Reflexes: 2+ in bilateral UE/LE, downgoing toes bilaterally. (-) Lindsay.      Medications  docusate sodium 100 milliGRAM(s) Oral two times a day  hydrochlorothiazide 25 milliGRAM(s) Oral daily  NIFEdipine XL 30 milliGRAM(s) Oral daily  ondansetron Injectable 4 milliGRAM(s) IV Push every 6 hours PRN  senna 2 Tablet(s) Oral at bedtime  sodium chloride 0.9%. 1000 milliLiter(s) IV Continuous <Continuous>      Lab  09-16    142  |  110  |  28<H>  ----------------------------<  89  3.8   |  21  |  1.3    Ca    9.4      16 Sep 2019 06:12  Mg     2.3     09-15    TPro  5.4<L>  /  Alb  3.9  /  TBili  3.0<H>  /  DBili  x   /  AST  20  /  ALT  9   /  AlkPhos  62  09-16                          4.8    4.22  )-----------( 137      ( 16 Sep 2019 06:12 )             16.4     LIVER FUNCTIONS - ( 16 Sep 2019 06:12 )  Alb: 3.9 g/dL / Pro: 5.4 g/dL / ALK PHOS: 62 U/L / ALT: 9 U/L / AST: 20 U/L / GGT: x             2.3        Radiology  CT Head No Cont:   EXAM:  CT BRAIN            PROCEDURE DATE:  09/15/2019            INTERPRETATION:  Clinical History / Reason for exam: Dizziness.    TECHNIQUE: Contiguous axial CT images were obtained from the base of the   skull to the vertex without administration of intravenous contrast.   Coronal and sagittal reformatted images were constructed.    COMPARISON:None      FINDINGS:    The ventricles and cortical sulci are appropriate for the patient's   stated age.    Gray-white matter differentiation is preserved.    There are scattered patchy hypodensities throughout the hemispheric white   matter which are nonspecific and without mass effect, compatible with   chronic microvascular ischemic changes.    There is no evidence for intracranial hemorrhage, significant   space-occupying process, or recent territorial infarction.    Small left frontal lipoma (series 2/10).The calvarium is intact.   Visualized paranasal sinuses and mastoids are well-aerated.      IMPRESSION:    No CT evidence for acute intracranial pathology.               Assessment:  89 yo female with unsteady gait in setting of profound anemia  non focal exam at present  ha was reported mild and now is asymptomatic   Plan:  do not suspect neuro etiology   generalized weakness 2/2 anemia likely contributed to gait imbalance  if unsteadiness persists after correction of anemia, will reassess  no further w/u at this time

## 2019-09-17 ENCOUNTER — TRANSCRIPTION ENCOUNTER (OUTPATIENT)
Age: 84
End: 2019-09-17

## 2019-09-17 VITALS
DIASTOLIC BLOOD PRESSURE: 63 MMHG | HEART RATE: 63 BPM | SYSTOLIC BLOOD PRESSURE: 139 MMHG | RESPIRATION RATE: 18 BRPM | TEMPERATURE: 97 F

## 2019-09-17 LAB
ANION GAP SERPL CALC-SCNC: 11 MMOL/L — SIGNIFICANT CHANGE UP (ref 7–14)
BILIRUB DIRECT SERPL-MCNC: 0.5 MG/DL — HIGH (ref 0–0.2)
BILIRUB INDIRECT FLD-MCNC: 2.4 MG/DL — HIGH (ref 0.2–1.2)
BILIRUB SERPL-MCNC: 2.9 MG/DL — HIGH (ref 0.2–1.2)
BUN SERPL-MCNC: 19 MG/DL — SIGNIFICANT CHANGE UP (ref 10–20)
CALCIUM SERPL-MCNC: 9.4 MG/DL — SIGNIFICANT CHANGE UP (ref 8.5–10.1)
CHLORIDE SERPL-SCNC: 110 MMOL/L — SIGNIFICANT CHANGE UP (ref 98–110)
CO2 SERPL-SCNC: 21 MMOL/L — SIGNIFICANT CHANGE UP (ref 17–32)
CREAT SERPL-MCNC: 1.1 MG/DL — SIGNIFICANT CHANGE UP (ref 0.7–1.5)
DAT IGG-SP REAG RBC-IMP: ABNORMAL
DIR ANTIGLOB POLYSPECIFIC INTERPRETATION: ABNORMAL
FERRITIN SERPL-MCNC: 361 NG/ML — HIGH (ref 15–150)
FERRITIN SERPL-MCNC: 371 NG/ML — HIGH (ref 15–150)
FOLATE SERPL-MCNC: 4.9 NG/ML — SIGNIFICANT CHANGE UP
FOLATE SERPL-MCNC: 8.3 NG/ML — SIGNIFICANT CHANGE UP
GLUCOSE SERPL-MCNC: 95 MG/DL — SIGNIFICANT CHANGE UP (ref 70–99)
HAPTOGLOB SERPL-MCNC: <20 MG/DL — LOW (ref 34–200)
HAPTOGLOB SERPL-MCNC: <20 MG/DL — LOW (ref 34–200)
HCT VFR BLD CALC: 24.4 % — LOW (ref 37–47)
HCYS SERPL-MCNC: 23.7 UMOL/L — HIGH
HCYS SERPL-MCNC: 27.5 UMOL/L — HIGH
HGB BLD-MCNC: 7.9 G/DL — LOW (ref 12–16)
IAT COMP-SP REAG SERPL QL: SIGNIFICANT CHANGE UP
IF BLOCK AB SER-ACNC: SIGNIFICANT CHANGE UP
IRON SATN MFR SERPL: 154 UG/DL — SIGNIFICANT CHANGE UP (ref 30–160)
IRON SATN MFR SERPL: 42 % — SIGNIFICANT CHANGE UP (ref 15–50)
IRON SATN MFR SERPL: 59 % — HIGH (ref 14–50)
IRON SATN MFR SERPL: 96 UG/DL — SIGNIFICANT CHANGE UP (ref 35–150)
LDH SERPL L TO P-CCNC: 355 — HIGH (ref 50–242)
MCHC RBC-ENTMCNC: 32.4 G/DL — SIGNIFICANT CHANGE UP (ref 32–37)
MCHC RBC-ENTMCNC: 35 PG — HIGH (ref 27–31)
MCV RBC AUTO: 108 FL — HIGH (ref 81–99)
NRBC # BLD: 0 /100 WBCS — SIGNIFICANT CHANGE UP (ref 0–0)
PCA AB SER-ACNC: SIGNIFICANT CHANGE UP
PLATELET # BLD AUTO: 147 K/UL — SIGNIFICANT CHANGE UP (ref 130–400)
POTASSIUM SERPL-MCNC: 3.7 MMOL/L — SIGNIFICANT CHANGE UP (ref 3.5–5)
POTASSIUM SERPL-SCNC: 3.7 MMOL/L — SIGNIFICANT CHANGE UP (ref 3.5–5)
RBC # BLD: 2.26 M/UL — LOW (ref 4.2–5.4)
RBC # BLD: 2.26 M/UL — LOW (ref 4.2–5.4)
RBC # FLD: SIGNIFICANT CHANGE UP % (ref 11.5–14.5)
RETICS #: 372.4 K/UL — HIGH (ref 25–125)
RETICS/RBC NFR: 16.5 % — HIGH (ref 0.5–1.5)
SODIUM SERPL-SCNC: 142 MMOL/L — SIGNIFICANT CHANGE UP (ref 135–146)
TIBC SERPL-MCNC: 228 UG/DL — SIGNIFICANT CHANGE UP (ref 220–430)
TIBC SERPL-MCNC: 259 UG/DL — SIGNIFICANT CHANGE UP (ref 220–430)
UIBC SERPL-MCNC: 105 UG/DL — LOW (ref 110–370)
UIBC SERPL-MCNC: 132 UG/DL — SIGNIFICANT CHANGE UP (ref 110–370)
VIT B12 SERPL-MCNC: 160 PG/ML — LOW (ref 232–1245)
VIT B12 SERPL-MCNC: 195 PG/ML — LOW (ref 232–1245)
WBC # BLD: 4.37 K/UL — LOW (ref 4.8–10.8)
WBC # FLD AUTO: 4.37 K/UL — LOW (ref 4.8–10.8)

## 2019-09-17 PROCEDURE — 99231 SBSQ HOSP IP/OBS SF/LOW 25: CPT

## 2019-09-17 RX ORDER — PREGABALIN 225 MG/1
1000 CAPSULE ORAL
Qty: 10000 | Refills: 0
Start: 2019-09-17 | End: 2019-10-26

## 2019-09-17 RX ORDER — PREGABALIN 225 MG/1
1000 CAPSULE ORAL DAILY
Refills: 0 | Status: DISCONTINUED | OUTPATIENT
Start: 2019-09-17 | End: 2019-09-17

## 2019-09-17 RX ORDER — PREGABALIN 225 MG/1
1000 CAPSULE ORAL
Qty: 14 | Refills: 0
Start: 2019-09-17 | End: 2019-09-30

## 2019-09-17 RX ADMIN — Medication 30 MILLIGRAM(S): at 05:36

## 2019-09-17 RX ADMIN — Medication 100 MILLIGRAM(S): at 05:36

## 2019-09-17 RX ADMIN — SODIUM CHLORIDE 75 MILLILITER(S): 9 INJECTION INTRAMUSCULAR; INTRAVENOUS; SUBCUTANEOUS at 04:32

## 2019-09-17 RX ADMIN — Medication 25 MILLIGRAM(S): at 05:36

## 2019-09-17 RX ADMIN — PREGABALIN 1000 MICROGRAM(S): 225 CAPSULE ORAL at 11:47

## 2019-09-17 NOTE — DISCHARGE NOTE NURSING/CASE MANAGEMENT/SOCIAL WORK - NSDCPEPTSTRK_GEN_ALL_CORE
Risk factors for stroke/Stroke support groups for patients, families, and friends/Need for follow up after discharge/Stroke education booklet/Stroke warning signs and symptoms/Signs and symptoms of stroke/Prescribed medications/Call 911 for stroke

## 2019-09-17 NOTE — PHYSICAL THERAPY INITIAL EVALUATION ADULT - MANUAL MUSCLE TESTING RESULTS, REHAB EVAL
[facility], 4TH FLR  [address] [facility] [id number]      2019  Nhung Mckeon : 1963 MRN: [id number]      Time Session Began: 12:30PM Time Session Ended: 1:30PM    Session Type: 86303 (group psychotherapy)    Others Present: other group members present    Intervention: Cognitive Behavioral Supportive    Suicide/Homicide/Violence Ideation: No     Medications:  We do not review medications during group therapy sessions.    Patient/Family Education Provided: Yes Patient/Family Displays Understanding: Yes      Chief complaint: Bipolar II Disorder, Generalized Anxiety Disorder  Pt has a long and documented history of having both episodes of major depression as well as episodes of hypomania. She typically has depressed episodes much more than hypomania.   Pt experiences chronic, excessive anxiety and worry occurring more days than not, finds it difficult to control this worry, and it causes significant distress and impairment in daily functioning.  This anxiety is associated with feelings of restlessness, being easily fatigued, difficulty concentrating, irritability, muscle tension, and sleep disturbances.    DATA (D): Pt present for group therapy session.   Depression, anxiety, irritability, concentration, sleep, and sleep have all been poor; depression and anxiety have been very high.***     ***    ACTION (A): The group discussed ***    the specific stressors verbalized by group members.  Discussed where group members utilized appropriate therapeutic strategies or could have used strategies.      boundary setting and the difficulties that arise when boundaries are not set or are violated. Discussed healthy boundaries versus (vs) unhealthy boundaries, and implementing healthy boundary setting in their own lives.    safety and the importance of feeling safe physically, emotionally, and spiritually.    specific distress tolerance  skills and how to implement in their lives.    emotional regulation skills and how to implement in their lives.    and practiced mindfulness skills.    the PTSD symptoms of hypervigilance and how it affects their lives.    how prior traumas can lead to affect personality development and the development of mental health symptoms.    strategies to improve chronic illness management.    identifying losses associated with chronic illnesses and identifying healthy coping strategies for coping with those losses.    identifying maladaptive thoughts and behaviors which lead to/exacerbate depression and/or anxiety. Discussed creating healthy challenges to those thoughts, and healthy alternate behaviors.    Discussed use of therapeutic strategies including CBT, DBT skills, boundary setting, and self-care.    RESULT (R):   Patient was alert, oriented, and goal-directed.***    Pt was receptive to feedback from others, and was appropriate in the group setting. ***    Pt engaged in some***frequent cross talking and had to be asked to stop a few times. Pt did not appear to be doing so on purpose*** and was agreeable and apologized for doing so.     Pt continues to have difficulties at times in being able to appropriately articulate her thoughts/story well enough for others to be able to fully understand what she is trying to convey. Pt was able to respond to clarifying questions.      Pt continues to have difficulties in completing and/or articulating full thoughts.  She will start conveying information, but often does not have the words or ability to fully verbalize those thoughts appropriately so others can fully understand.     Pt will change topics or time frame without giving appropriate verbal or non-verbal cues to others.     Pt's thought process is tangential and circumstantial at times.    Pt had difficulties remaining alert due to fatigue, but was otherwise oriented and goal-directed.    Pt was fairly quiet during most  of the group. Pt interjected a few times and appeared appropriately engaged.    Pt engaged in some***frequent cross talking and had to be asked to stop a few times. She did not appear to be doing so on purpose, and was agreeable and apologized for doing so.     Pt allowed other group members to talk at the beginning of group, but once it was her turn she dominated the conversation.     Pt interjected frequently and tended to dominate the conversation. Pt had difficulties allowing others to fully verbalize their information/story without interjecting and changing the focus of the conversation.  Pt was receptive to being asked to allow others to talk more, and did not appear to be trying to dominate the conversation on purpose.    Pt was receptive to feedback from others.    Pt was otherwise*** appropriate in the group setting.     Pt was not receptive to signals that the group was over, and continued to talk and try to keep others after group.***      PLAN (P): Continue group therapy.    Need for Community Resources Assessed: Yes    Resources Needed: No       Diagnosis: ***  1. Bipolar II disorder (CMS/[facility])    2. KULWANT (generalized anxiety disorder)        Treatment Plan: Unchanged    Discharge Plan: Strategies Discussed to Maintain Gains    Next Appointment: *** week***    Rain Sanchez, PHD       bilateral lower extremity strength 4/5 throughout, assessed in sitting

## 2019-09-17 NOTE — DISCHARGE NOTE PROVIDER - NSDCFUADDAPPT_GEN_ALL_CORE_FT
F/u with PMD as soon as she picks up the Vitamin b12 vials  F/u with Hematologist in 1 month F/u with PMD in 1-2 weeks   F/u with Hematologist in 1 month

## 2019-09-17 NOTE — PROGRESS NOTE ADULT - SUBJECTIVE AND OBJECTIVE BOX
89yo F w/ hx of TIA, HTN, diverticulitis, p/w gait imbalance and found w/ acute anemia. Pt lives w/ , reports she began experiencing unsteady gait approximately 5 days PTA. Pt also states she has been experiencing intermittent "throbbing" in her b/l temples for the last several days. Pt dropped a plate at home this AM but denies any gross unilateral weakness. Also denies any recent falls, LOC, numbness, tingling, difficulty speaking, or other associated sx. Pt presented to ED for these complaints and found w/ decreased Hgb (to 5.5). Reports mild abdominal pain yesterday (relieved w/ BM) but denies any melena, hematochezia, hematuria, n/v/d, fevers, chills, or other associated sx. KEN by ED staff revealed brown stool (see other notes). NCHCT w/ no acute pathology.     PAST MEDICAL & SURGICAL HISTORY:  TIA (transient ischemic attack)  Diverticulitis  Constipation  Hypertension  No significant past surgical history    MEDICATIONS  (STANDING):  docusate sodium 100 milliGRAM(s) Oral two times a day  hydrochlorothiazide 25 milliGRAM(s) Oral daily  NIFEdipine XL 30 milliGRAM(s) Oral daily  senna 2 Tablet(s) Oral at bedtime  sodium chloride 0.9%. 1000 milliLiter(s) (75 mL/Hr) IV Continuous <Continuous>    MEDICATIONS  (PRN):  ondansetron Injectable 4 milliGRAM(s) IV Push every 6 hours PRN Nausea    Vital Signs Last 24 Hrs  T(C): 35.9 (17 Sep 2019 05:20), Max: 36.4 (16 Sep 2019 21:37)  T(F): 96.6 (17 Sep 2019 05:20), Max: 97.5 (16 Sep 2019 21:37)  HR: 65 (17 Sep 2019 05:20) (65 - 79)  BP: 123/58 (17 Sep 2019 05:20) (113/72 - 140/62)  BP(mean): --  RR: 18 (17 Sep 2019 05:20) (18 - 18)  SpO2: 95% (16 Sep 2019 14:39) (95% - 95%)    PHYSICAL EXAM:    General: elderly, AAOx3, NAD  HEENT: NCAT, no JVD  Thorax: CTA b/l  Heart: normal S1/S2, rrr, no m/r/g  GI: BS normoactive, s/nt/nd  Extremities: no c/c/e, wwp, 2+ b/l UE/LE distal pulses  Neurologic: no gross focal deficits                          8.4    5.20  )-----------( 152      ( 16 Sep 2019 19:00 )             26.3     09-16    142  |  110  |  28<H>  ----------------------------<  89  3.8   |  21  |  1.3    Ca    9.4      16 Sep 2019 06:12  Mg     2.3     09-15    TPro  x   /  Alb  x   /  TBili  3.5<H>  /  DBili  0.5<H>  /  AST  x   /  ALT  x   /  AlkPhos  x   09-16                       EXAM:  CT BRAIN            PROCEDURE DATE:  09/15/2019            INTERPRETATION:  Clinical History / Reason for exam: Dizziness.    TECHNIQUE: Contiguous axial CT images were obtained from the base of the   skull to the vertex without administration of intravenous contrast.   Coronal and sagittal reformatted images were constructed.    COMPARISON:None      FINDINGS:    The ventricles and cortical sulci are appropriate for the patient's   stated age.    Gray-white matter differentiation is preserved.    There are scattered patchy hypodensities throughout the hemispheric white   matter which are nonspecific and without mass effect, compatible with   chronic microvascular ischemic changes.    There is no evidence for intracranial hemorrhage, significant   space-occupying process, or recent territorial infarction.    Small left frontal lipoma (series 2/10).The calvarium is intact.   Visualized paranasal sinuses and mastoids are well-aerated.      IMPRESSION:    No CT evidence for acute intracranial pathology.               NAYA MELENDEZ M.D., RESIDENT RADIOLOGIST  This document has been electronically signed.  DASHAWN MATHIAS M.D., ATTENDING RADIOLOGIST  This document has been electronically signed. Sep 15 2019  7:33PM
89yo F w/ hx of TIA, HTN, diverticulitis, p/w gait imbalance and found w/ acute anemia. Pt lives w/ , reports she began experiencing unsteady gait approximately 5 days PTA. Pt also states she has been experiencing intermittent "throbbing" in her b/l temples for the last several days. Pt dropped a plate at home this AM but denies any gross unilateral weakness. Also denies any recent falls, LOC, numbness, tingling, difficulty speaking, or other associated sx. Pt presented to ED for these complaints and found w/ decreased Hgb (to 5.5). Reports mild abdominal pain yesterday (relieved w/ BM) but denies any melena, hematochezia, hematuria, n/v/d, fevers, chills, or other associated sx. KEN by ED staff revealed brown stool (see other notes). NCHCT w/ no acute pathology.     PAST MEDICAL & SURGICAL HISTORY:  TIA (transient ischemic attack)  Diverticulitis  Constipation  Hypertension  No significant past surgical history    MEDICATIONS  (STANDING):  docusate sodium 100 milliGRAM(s) Oral two times a day  hydrochlorothiazide 25 milliGRAM(s) Oral daily  NIFEdipine XL 30 milliGRAM(s) Oral daily  senna 2 Tablet(s) Oral at bedtime  sodium chloride 0.9%. 1000 milliLiter(s) (75 mL/Hr) IV Continuous <Continuous>    MEDICATIONS  (PRN):  ondansetron Injectable 4 milliGRAM(s) IV Push every 6 hours PRN Nausea    Vital Signs Last 24 Hrs  T(C): 35.8 (16 Sep 2019 05:31), Max: 36.8 (15 Sep 2019 14:28)  T(F): 96.5 (16 Sep 2019 05:31), Max: 98.2 (15 Sep 2019 14:28)  HR: 75 (16 Sep 2019 05:31) (70 - 80)  BP: 124/61 (16 Sep 2019 05:31) (124/61 - 145/89)  BP(mean): --  RR: 18 (16 Sep 2019 05:31) (18 - 18)  SpO2: 98% (15 Sep 2019 17:49) (98% - 99%)    PHYSICAL EXAM:    General: elderly, AAOx3, NAD  HEENT: NCAT, no JVD  Thorax: CTA b/l  Heart: normal S1/S2, rrr, no m/r/g  GI: BS normoactive, s/nt/nd  Extremities: no c/c/e, wwp, 2+ b/l UE/LE distal pulses  Neurologic: no gross focal deficits                          5.5    5.23  )-----------( 176      ( 15 Sep 2019 15:20 )             18.3   09-15    141  |  107  |  31<H>  ----------------------------<  134<H>  3.8   |  21  |  1.4    Ca    9.9      15 Sep 2019 15:20  Mg     2.3     09-15    TPro  6.0  /  Alb  4.1  /  TBili  3.0<H>  /  DBili  x   /  AST  26  /  ALT  10  /  AlkPhos  66  09-15    EXAM:  CT BRAIN            PROCEDURE DATE:  09/15/2019            INTERPRETATION:  Clinical History / Reason for exam: Dizziness.    TECHNIQUE: Contiguous axial CT images were obtained from the base of the   skull to the vertex without administration of intravenous contrast.   Coronal and sagittal reformatted images were constructed.    COMPARISON:None      FINDINGS:    The ventricles and cortical sulci are appropriate for the patient's   stated age.    Gray-white matter differentiation is preserved.    There are scattered patchy hypodensities throughout the hemispheric white   matter which are nonspecific and without mass effect, compatible with   chronic microvascular ischemic changes.    There is no evidence for intracranial hemorrhage, significant   space-occupying process, or recent territorial infarction.    Small left frontal lipoma (series 2/10).The calvarium is intact.   Visualized paranasal sinuses and mastoids are well-aerated.      IMPRESSION:    No CT evidence for acute intracranial pathology.               NAYA MELENDEZ M.D., RESIDENT RADIOLOGIST  This document has been electronically signed.  DASHAWN MATHIAS M.D., ATTENDING RADIOLOGIST  This document has been electronically signed. Sep 15 2019  7:33PM

## 2019-09-17 NOTE — PROGRESS NOTE ADULT - ASSESSMENT
89 y/o female presents with symptomatic anemia     # Anemia - high MCV ;     - negative gauiac   - will get gI / Hematalogy   - hx of diveticulosus   - transfuse to h/h > 7/21     # Dizziness hx of TIA     - probable secondary to anemia   - ct no acute bleed   - neurology evaluation     # HTN     - continue medications       disposition - probable home - pt pmr evualtioa   advance directives cpr
89 y/o female presents with symptomatic anemia     # Anemia - high MCV ; B12 deficiency    - negative gauiac   - gi and hematology  - replace b12    - hx of diveticulosus   - transfuse to h/h > 7/21     # Dizziness hx of TIA     - probable secondary to anemia   - ct no acute bleed   - neurology evaluation     # HTN     - continue medications       disposition - probable home - pt pmr evaluation   advance directives cpr

## 2019-09-17 NOTE — DISCHARGE NOTE PROVIDER - CARE PROVIDER_API CALL
Gary Santillan)  Internal Medicine  [address], [address]  [place] [address]  Phone: [phone]  Fax: [phone]  Follow Up Time: 1-3 days    Socrates Oconnor ()  Hematology; Internal Medicine; Medical Oncology  [address] [place] [address]  Phone: [phone]  Fax: [phone]  Follow Up Time: 1 month

## 2019-09-17 NOTE — PHYSICAL THERAPY INITIAL EVALUATION ADULT - GENERAL OBSERVATIONS, REHAB EVAL
9:20 - 9:43. Chart reviewed. Patient available to be seen for physical therapy, confirmed with nurse. Patient encountered already out of bed in chair. Denies pain at rest, would like to walk with PT now.

## 2019-09-17 NOTE — PROGRESS NOTE ADULT - ATTENDING COMMENTS
pt seen/examined  records reviewed    exam  nad  sanjeev posada  rrr  cta b/l   no organomegaly    anemia due to b12 deficiency  follow my recommendations on b12/folate replacement and schedule with me outpatient in one month  gi eval

## 2019-09-17 NOTE — PHYSICAL THERAPY INITIAL EVALUATION ADULT - GAIT DEVIATIONS NOTED, PT EVAL
decreased weight-shifting ability/narrow base of support/increased time in double stance/decreased travis/decreased step length

## 2019-09-17 NOTE — DISCHARGE NOTE PROVIDER - PROVIDER TOKENS
PROVIDER:[TOKEN:[[id number]],FOLLOWUP:[1-3 days]],PROVIDER:[TOKEN:[[id number]],FOLLOWUP:[1 month]]

## 2019-09-18 LAB
IF BLOCK AB SER-ACNC: SIGNIFICANT CHANGE UP
PCA AB SER-ACNC: SIGNIFICANT CHANGE UP

## 2019-09-18 PROCEDURE — 86077 PHYS BLOOD BANK SERV XMATCH: CPT

## 2019-09-19 DIAGNOSIS — Z88.0 ALLERGY STATUS TO PENICILLIN: ICD-10-CM

## 2019-09-19 DIAGNOSIS — Z88.5 ALLERGY STATUS TO NARCOTIC AGENT: ICD-10-CM

## 2019-09-19 DIAGNOSIS — Z86.73 PERSONAL HISTORY OF TRANSIENT ISCHEMIC ATTACK (TIA), AND CEREBRAL INFARCTION WITHOUT RESIDUAL DEFICITS: ICD-10-CM

## 2019-09-19 DIAGNOSIS — Z88.6 ALLERGY STATUS TO ANALGESIC AGENT: ICD-10-CM

## 2019-09-19 DIAGNOSIS — D51.3 OTHER DIETARY VITAMIN B12 DEFICIENCY ANEMIA: ICD-10-CM

## 2019-09-19 DIAGNOSIS — I10 ESSENTIAL (PRIMARY) HYPERTENSION: ICD-10-CM

## 2019-09-19 DIAGNOSIS — Z88.1 ALLERGY STATUS TO OTHER ANTIBIOTIC AGENTS STATUS: ICD-10-CM

## 2019-09-19 DIAGNOSIS — K59.00 CONSTIPATION, UNSPECIFIED: ICD-10-CM

## 2019-09-19 DIAGNOSIS — R26.81 UNSTEADINESS ON FEET: ICD-10-CM

## 2019-09-19 DIAGNOSIS — D72.819 DECREASED WHITE BLOOD CELL COUNT, UNSPECIFIED: ICD-10-CM

## 2019-09-19 DIAGNOSIS — E78.5 HYPERLIPIDEMIA, UNSPECIFIED: ICD-10-CM

## 2019-09-20 LAB
METHYLMALONATE SERPL-SCNC: 305 NMOL/L — SIGNIFICANT CHANGE UP (ref 0–378)
METHYLMALONATE SERPL-SCNC: 376 NMOL/L — SIGNIFICANT CHANGE UP (ref 0–378)

## 2019-10-04 ENCOUNTER — INPATIENT (INPATIENT)
Facility: HOSPITAL | Age: 84
LOS: 7 days | Discharge: HOME HEALTH | End: 2019-10-12
Attending: INTERNAL MEDICINE | Admitting: INTERNAL MEDICINE
Payer: MEDICARE

## 2019-10-04 VITALS
RESPIRATION RATE: 18 BRPM | OXYGEN SATURATION: 98 % | HEART RATE: 82 BPM | SYSTOLIC BLOOD PRESSURE: 138 MMHG | DIASTOLIC BLOOD PRESSURE: 58 MMHG | TEMPERATURE: 97 F

## 2019-10-04 LAB
ALBUMIN SERPL ELPH-MCNC: 3.9 G/DL — SIGNIFICANT CHANGE UP (ref 3.5–5.2)
ALP SERPL-CCNC: 66 U/L — SIGNIFICANT CHANGE UP (ref 30–115)
ALT FLD-CCNC: 10 U/L — SIGNIFICANT CHANGE UP (ref 0–41)
ANION GAP SERPL CALC-SCNC: 11 MMOL/L — SIGNIFICANT CHANGE UP (ref 7–14)
AST SERPL-CCNC: 44 U/L — HIGH (ref 0–41)
BASOPHILS # BLD AUTO: 0.01 K/UL — SIGNIFICANT CHANGE UP (ref 0–0.2)
BASOPHILS NFR BLD AUTO: 0.2 % — SIGNIFICANT CHANGE UP (ref 0–1)
BILIRUB SERPL-MCNC: 7.8 MG/DL — HIGH (ref 0.2–1.2)
BLD GP AB SCN SERPL QL: SIGNIFICANT CHANGE UP
BUN SERPL-MCNC: 29 MG/DL — HIGH (ref 10–20)
CALCIUM SERPL-MCNC: 9.5 MG/DL — SIGNIFICANT CHANGE UP (ref 8.5–10.1)
CHLORIDE SERPL-SCNC: 108 MMOL/L — SIGNIFICANT CHANGE UP (ref 98–110)
CO2 SERPL-SCNC: 21 MMOL/L — SIGNIFICANT CHANGE UP (ref 17–32)
CREAT SERPL-MCNC: 1.1 MG/DL — SIGNIFICANT CHANGE UP (ref 0.7–1.5)
DAT IGG-SP REAG RBC-IMP: ABNORMAL
DIR ANTIGLOB POLYSPECIFIC INTERPRETATION: ABNORMAL
EOSINOPHIL # BLD AUTO: 0.02 K/UL — SIGNIFICANT CHANGE UP (ref 0–0.7)
EOSINOPHIL NFR BLD AUTO: 0.3 % — SIGNIFICANT CHANGE UP (ref 0–8)
GLUCOSE SERPL-MCNC: 143 MG/DL — HIGH (ref 70–99)
HCT VFR BLD CALC: 16.9 % — LOW (ref 37–47)
HGB BLD-MCNC: 4.7 G/DL — CRITICAL LOW (ref 12–16)
IAT COMP-SP REAG SERPL QL: SIGNIFICANT CHANGE UP
IMM GRANULOCYTES NFR BLD AUTO: 1 % — HIGH (ref 0.1–0.3)
LACTATE SERPL-SCNC: 2.5 MMOL/L — HIGH (ref 0.5–2.2)
LIDOCAIN IGE QN: 19 U/L — SIGNIFICANT CHANGE UP (ref 7–60)
LYMPHOCYTES # BLD AUTO: 0.93 K/UL — LOW (ref 1.2–3.4)
LYMPHOCYTES # BLD AUTO: 15.8 % — LOW (ref 20.5–51.1)
MCHC RBC-ENTMCNC: 27.8 G/DL — LOW (ref 32–37)
MCHC RBC-ENTMCNC: 37 PG — HIGH (ref 27–31)
MCV RBC AUTO: 133.1 FL — HIGH (ref 81–99)
MONOCYTES # BLD AUTO: 0.46 K/UL — SIGNIFICANT CHANGE UP (ref 0.1–0.6)
MONOCYTES NFR BLD AUTO: 7.8 % — SIGNIFICANT CHANGE UP (ref 1.7–9.3)
NEUTROPHILS # BLD AUTO: 4.4 K/UL — SIGNIFICANT CHANGE UP (ref 1.4–6.5)
NEUTROPHILS NFR BLD AUTO: 74.9 % — SIGNIFICANT CHANGE UP (ref 42.2–75.2)
NRBC # BLD: 0 /100 WBCS — SIGNIFICANT CHANGE UP (ref 0–0)
PLATELET # BLD AUTO: 146 K/UL — SIGNIFICANT CHANGE UP (ref 130–400)
POTASSIUM SERPL-MCNC: 4.9 MMOL/L — SIGNIFICANT CHANGE UP (ref 3.5–5)
POTASSIUM SERPL-SCNC: 4.9 MMOL/L — SIGNIFICANT CHANGE UP (ref 3.5–5)
PROT SERPL-MCNC: 5.5 G/DL — LOW (ref 6–8)
RBC # BLD: 1.27 M/UL — LOW (ref 4.2–5.4)
RBC # FLD: 18.4 % — HIGH (ref 11.5–14.5)
SODIUM SERPL-SCNC: 140 MMOL/L — SIGNIFICANT CHANGE UP (ref 135–146)
WBC # BLD: 5.88 K/UL — SIGNIFICANT CHANGE UP (ref 4.8–10.8)
WBC # FLD AUTO: 5.88 K/UL — SIGNIFICANT CHANGE UP (ref 4.8–10.8)

## 2019-10-04 PROCEDURE — 74177 CT ABD & PELVIS W/CONTRAST: CPT | Mod: 26

## 2019-10-04 PROCEDURE — 99285 EMERGENCY DEPT VISIT HI MDM: CPT

## 2019-10-04 NOTE — ED PROVIDER NOTE - PHYSICAL EXAMINATION
CONSTITUTIONAL: Well-developed; well-nourished; in no acute distress.   SKIN: warm, dry  HEAD: Normocephalic; atraumatic.  EYES: no conjunctival injection.   ENT: No nasal discharge; airway clear.  NECK: Supple; non tender.  CARD: S1, S2 normal; no murmurs, gallops, or rubs. Regular rate and rhythm.   RESP: No wheezes, rales or rhonchi.  ABD: Soft, LLQ, RLQ tenderness to palpation nd  EXT: Normal ROM.  No clubbing, cyanosis or edema.   NEURO: Alert, oriented, grossly unremarkable  PSYCH: Cooperative, appropriate.

## 2019-10-04 NOTE — ED PROVIDER NOTE - NS ED ROS FT
Eyes:  No visual changes, eye pain or discharge.  ENMT:  No hearing changes, pain, no sore throat or runny nose, no difficulty swallowing  Cardiac:  No chest pain, SOB or edema. No chest pain with exertion.  Respiratory:  No cough or respiratory distress. No hemoptysis. No history of asthma or RAD.  GI:  +abdominal pain +nausea  :  No dysuria, frequency or burning.  MS:  No myalgia, muscle weakness, joint pain or back pain.  Neuro:  No headache or weakness.  No LOC.  Skin:  No skin rash.   Endocrine: No history of thyroid disease or diabetes.

## 2019-10-04 NOTE — ED PROVIDER NOTE - CARE PLAN
Principal Discharge DX:	Abdominal pain Principal Discharge DX:	Abdominal pain  Secondary Diagnosis:	Anemia Principal Discharge DX:	Severe anemia  Secondary Diagnosis:	Painless jaundice

## 2019-10-04 NOTE — ED PROVIDER NOTE - OBJECTIVE STATEMENT
89y F pmh HTN, diverticulosis, anemia presenting with LLQ abdominal pain x1 day. Acute onset. Intermittent. Normal bowel movements. Pain is associated with nausea no vomiting. No f/c. No melena, hematochezia, hemoptysis. No cough. Pain radiates to RLQ.

## 2019-10-04 NOTE — ED PROVIDER NOTE - CLINICAL SUMMARY MEDICAL DECISION MAKING FREE TEXT BOX
89y F pmh HTN, diverticulosis, anemia presenting with LLQ abdominal pain x1 day. VS wnl. Pt on physical noted to be jaundiced, with minimal abd pain. Due to the concern for hemolysis, pancreatic ca, we decided to order labs, and obtain a ct abd pelvis. Review of labs noted to have an anemia @ 4.5- consent obtained PRBC 2 units ordered. Due to the concern for malignancy, we admitted to the pt pending US, GI eval, and heme eval.

## 2019-10-04 NOTE — ED ADULT NURSE NOTE - INTERVENTIONS DEFINITIONS
Non-slip footwear when patient is off stretcher/Physically safe environment: no spills, clutter or unnecessary equipment/Instruct patient to call for assistance/[place] to call system/Review medications for side effects contributing to fall risk/Call bell, personal items and telephone within reach/Room bathroom lighting operational/Stretcher in lowest position, wheels locked, appropriate side rails in place

## 2019-10-04 NOTE — ED PROVIDER NOTE - ATTENDING CONTRIBUTION TO CARE
89 year old female, pmhx HTN, diverticulosis, anemia, presenting with 1 day of LLQ abdominal pain described as sharp, radiating to RLQ, no palliative or provocative factors, moderate severity and intermittent in nature associated with nausea but no vomiting. Denies having this before. Otherwise denies fevers, headache, vision changes, weakness/numbness, confusion, URI symptoms, neck pain, chest pain, back pain, dyspnea, cough, palpitations, vomiting, diarrhea, constipation, blood in stool/dark stools, urinary symptoms, vaginal bleeding/discharge, leg swelling, rash, recent travel or sick contacts.    Vital Signs: I have reviewed the initial vital signs.  Constitutional: NAD, well-nourished, appears stated age, no acute distress.  HEENT: Airway patent, moist MM, no erythema/swelling/deformity of oral structures. EOMI, PERRLA.  CV: regular rate, regular rhythm, well-perfused extremities, 2+ b/l DP and radial pulses equal.  Lungs: BCTA, no increased WOB.  ABD: (+) bilateral lower quadrant tenderness, no guarding or rebound, no pulsatile mass, no hernias.   MSK: Neck supple, nontender, nl ROM, no stepoff. Chest nontender. Back nontender in TLS spine or to b/l bony structures or flanks. Ext nontender, nl rom, no deformity.   INTEG: Skin warm, dry, no rash.  NEURO: A&Ox3, normal strength, nl sensation throughout, normal speech.   PSYCH: Calm, cooperative, normal affect and interaction.    Will obtain labs, IVF, Patient will require a CT given tenderness on exam. Will transfer north since [facility] site does not have CT available at this time.

## 2019-10-04 NOTE — ED PROVIDER NOTE - PROGRESS NOTE DETAILS
Pt received from [facility]. 89 yo F with PMHx of diverticulitis (last flare ~2 mo ago), HTN, anemia who presents with intermittent, sharp, mild/moderate, nonradiating LLQ pain since today. No fever, chills, nausea, vomiting, hematochezia, melena. Had workup at [facility] showing hgb 4.7 (hx of anemia 2/2 vit B12 deficiency), lactate 2.5. Ordered CT abd, 2u pRBC. Blood transfusion consent form signed and placed in scanner. Pt declined pain meds at this time. Pt received from [facility]. 89 yo F with PMHx of diverticulitis (last flare ~2 mo ago), HTN, anemia who presents with intermittent, sharp, mild/moderate, nonradiating LLQ pain since today. No fever, chills, nausea, vomiting, hematochezia, melena. Had workup at [facility] showing hgb 4.7 (hx of anemia 2/2 vit B12 deficiency), lactate 2.5. On exam, has LLQ tenderness to palpation and conjunctival pallor. Hemodynamically stable. Ordered CT abd, 2u pRBC. Blood transfusion consent form signed and placed in scanner. Pt declined pain meds at this time.

## 2019-10-04 NOTE — ED ADULT NURSE NOTE - ED STAT RN HANDOFF DETAILS
teport given transferred to [facility] via ambulace stretcher in stable conditiion

## 2019-10-05 PROBLEM — G45.9 TRANSIENT CEREBRAL ISCHEMIC ATTACK, UNSPECIFIED: Chronic | Status: ACTIVE | Noted: 2019-09-15

## 2019-10-05 PROBLEM — K57.92 DIVERTICULITIS OF INTESTINE, PART UNSPECIFIED, WITHOUT PERFORATION OR ABSCESS WITHOUT BLEEDING: Chronic | Status: ACTIVE | Noted: 2019-09-15

## 2019-10-05 LAB
ALBUMIN SERPL ELPH-MCNC: 4.1 G/DL — SIGNIFICANT CHANGE UP (ref 3.5–5.2)
ALP SERPL-CCNC: 72 U/L — SIGNIFICANT CHANGE UP (ref 30–115)
ALT FLD-CCNC: 11 U/L — SIGNIFICANT CHANGE UP (ref 0–41)
ANION GAP SERPL CALC-SCNC: 17 MMOL/L — HIGH (ref 7–14)
APPEARANCE UR: CLEAR — SIGNIFICANT CHANGE UP
APTT BLD: 27.5 SEC — SIGNIFICANT CHANGE UP (ref 27–39.2)
AST SERPL-CCNC: 51 U/L — HIGH (ref 0–41)
BASOPHILS # BLD AUTO: 0.02 K/UL — SIGNIFICANT CHANGE UP (ref 0–0.2)
BASOPHILS NFR BLD AUTO: 0.3 % — SIGNIFICANT CHANGE UP (ref 0–1)
BILIRUB DIRECT SERPL-MCNC: 1.1 MG/DL — HIGH (ref 0–0.2)
BILIRUB INDIRECT FLD-MCNC: 5.8 MG/DL — HIGH (ref 0.2–1.2)
BILIRUB INDIRECT FLD-MCNC: 5.8 MG/DL — HIGH (ref 0.2–1.2)
BILIRUB SERPL-MCNC: 6.9 MG/DL — HIGH (ref 0.2–1.2)
BILIRUB SERPL-MCNC: 6.9 MG/DL — HIGH (ref 0.2–1.2)
BILIRUB UR-MCNC: NEGATIVE — SIGNIFICANT CHANGE UP
BUN SERPL-MCNC: 29 MG/DL — HIGH (ref 10–20)
CALCIUM SERPL-MCNC: 9.6 MG/DL — SIGNIFICANT CHANGE UP (ref 8.5–10.1)
CHLORIDE SERPL-SCNC: 103 MMOL/L — SIGNIFICANT CHANGE UP (ref 98–110)
CO2 SERPL-SCNC: 17 MMOL/L — SIGNIFICANT CHANGE UP (ref 17–32)
COLOR SPEC: YELLOW — SIGNIFICANT CHANGE UP
CREAT SERPL-MCNC: 1.3 MG/DL — SIGNIFICANT CHANGE UP (ref 0.7–1.5)
DIFF PNL FLD: ABNORMAL
EOSINOPHIL # BLD AUTO: 0 K/UL — SIGNIFICANT CHANGE UP (ref 0–0.7)
EOSINOPHIL # BLD AUTO: 0.01 K/UL — SIGNIFICANT CHANGE UP (ref 0–0.7)
EOSINOPHIL # BLD AUTO: 0.01 K/UL — SIGNIFICANT CHANGE UP (ref 0–0.7)
EOSINOPHIL NFR BLD AUTO: 0 % — SIGNIFICANT CHANGE UP (ref 0–8)
EOSINOPHIL NFR BLD AUTO: 0.1 % — SIGNIFICANT CHANGE UP (ref 0–8)
EOSINOPHIL NFR BLD AUTO: 0.2 % — SIGNIFICANT CHANGE UP (ref 0–8)
GGT SERPL-CCNC: 9 U/L — SIGNIFICANT CHANGE UP (ref 1–40)
GLUCOSE SERPL-MCNC: 158 MG/DL — HIGH (ref 70–99)
GLUCOSE UR QL: NEGATIVE — SIGNIFICANT CHANGE UP
HAPTOGLOB SERPL-MCNC: <20 MG/DL — LOW (ref 34–200)
HAV IGM SER-ACNC: SIGNIFICANT CHANGE UP
HBV CORE AB SER-ACNC: SIGNIFICANT CHANGE UP
HBV CORE IGM SER-ACNC: SIGNIFICANT CHANGE UP
HBV CORE IGM SER-ACNC: SIGNIFICANT CHANGE UP
HBV SURFACE AB SER-ACNC: SIGNIFICANT CHANGE UP
HBV SURFACE AG SER-ACNC: SIGNIFICANT CHANGE UP
HBV SURFACE AG SER-ACNC: SIGNIFICANT CHANGE UP
HCT VFR BLD CALC: 21.3 % — LOW (ref 37–47)
HCT VFR BLD CALC: 22.3 % — LOW (ref 37–47)
HCT VFR BLD CALC: 22.5 % — LOW (ref 37–47)
HCV AB S/CO SERPL IA: 0.07 S/CO — SIGNIFICANT CHANGE UP (ref 0–0.99)
HCV AB SERPL-IMP: SIGNIFICANT CHANGE UP
HGB BLD-MCNC: 6.2 G/DL — CRITICAL LOW (ref 12–16)
HGB BLD-MCNC: 6.6 G/DL — CRITICAL LOW (ref 12–16)
HGB BLD-MCNC: 6.7 G/DL — CRITICAL LOW (ref 12–16)
IMM GRANULOCYTES NFR BLD AUTO: 1.2 % — HIGH (ref 0.1–0.3)
IMM GRANULOCYTES NFR BLD AUTO: 1.5 % — HIGH (ref 0.1–0.3)
IMM GRANULOCYTES NFR BLD AUTO: 1.6 % — HIGH (ref 0.1–0.3)
KETONES UR-MCNC: NEGATIVE — SIGNIFICANT CHANGE UP
LDH SERPL L TO P-CCNC: 784 — HIGH (ref 50–242)
LDH SERPL L TO P-CCNC: 927 — HIGH (ref 50–242)
LDH SERPL L TO P-CCNC: 938 — HIGH (ref 50–242)
LEUKOCYTE ESTERASE UR-ACNC: ABNORMAL
LYMPHOCYTES # BLD AUTO: 0.74 K/UL — LOW (ref 1.2–3.4)
LYMPHOCYTES # BLD AUTO: 0.86 K/UL — LOW (ref 1.2–3.4)
LYMPHOCYTES # BLD AUTO: 0.98 K/UL — LOW (ref 1.2–3.4)
LYMPHOCYTES # BLD AUTO: 11.9 % — LOW (ref 20.5–51.1)
LYMPHOCYTES # BLD AUTO: 13.5 % — LOW (ref 20.5–51.1)
LYMPHOCYTES # BLD AUTO: 14.3 % — LOW (ref 20.5–51.1)
MCHC RBC-ENTMCNC: 29.1 G/DL — LOW (ref 32–37)
MCHC RBC-ENTMCNC: 29.3 G/DL — LOW (ref 32–37)
MCHC RBC-ENTMCNC: 30 G/DL — LOW (ref 32–37)
MCHC RBC-ENTMCNC: 35 PG — HIGH (ref 27–31)
MCHC RBC-ENTMCNC: 35.5 PG — HIGH (ref 27–31)
MCHC RBC-ENTMCNC: 36 PG — HIGH (ref 27–31)
MCV RBC AUTO: 119.9 FL — HIGH (ref 81–99)
MCV RBC AUTO: 120.3 FL — HIGH (ref 81–99)
MCV RBC AUTO: 121 FL — HIGH (ref 81–99)
MONOCYTES # BLD AUTO: 0.13 K/UL — SIGNIFICANT CHANGE UP (ref 0.1–0.6)
MONOCYTES # BLD AUTO: 0.26 K/UL — SIGNIFICANT CHANGE UP (ref 0.1–0.6)
MONOCYTES # BLD AUTO: 0.32 K/UL — SIGNIFICANT CHANGE UP (ref 0.1–0.6)
MONOCYTES NFR BLD AUTO: 2.1 % — SIGNIFICANT CHANGE UP (ref 1.7–9.3)
MONOCYTES NFR BLD AUTO: 3.6 % — SIGNIFICANT CHANGE UP (ref 1.7–9.3)
MONOCYTES NFR BLD AUTO: 5.3 % — SIGNIFICANT CHANGE UP (ref 1.7–9.3)
NEUTROPHILS # BLD AUTO: 4.71 K/UL — SIGNIFICANT CHANGE UP (ref 1.4–6.5)
NEUTROPHILS # BLD AUTO: 5.2 K/UL — SIGNIFICANT CHANGE UP (ref 1.4–6.5)
NEUTROPHILS # BLD AUTO: 5.89 K/UL — SIGNIFICANT CHANGE UP (ref 1.4–6.5)
NEUTROPHILS NFR BLD AUTO: 78.6 % — HIGH (ref 42.2–75.2)
NEUTROPHILS NFR BLD AUTO: 81.3 % — HIGH (ref 42.2–75.2)
NEUTROPHILS NFR BLD AUTO: 83.9 % — HIGH (ref 42.2–75.2)
NITRITE UR-MCNC: NEGATIVE — SIGNIFICANT CHANGE UP
NRBC # BLD: 0 /100 WBCS — SIGNIFICANT CHANGE UP (ref 0–0)
PH UR: 7 — SIGNIFICANT CHANGE UP (ref 5–8)
PLATELET # BLD AUTO: 148 K/UL — SIGNIFICANT CHANGE UP (ref 130–400)
PLATELET # BLD AUTO: 148 K/UL — SIGNIFICANT CHANGE UP (ref 130–400)
PLATELET # BLD AUTO: 154 K/UL — SIGNIFICANT CHANGE UP (ref 130–400)
POTASSIUM SERPL-MCNC: 4.4 MMOL/L — SIGNIFICANT CHANGE UP (ref 3.5–5)
POTASSIUM SERPL-SCNC: 4.4 MMOL/L — SIGNIFICANT CHANGE UP (ref 3.5–5)
PROT SERPL-MCNC: 5.8 G/DL — LOW (ref 6–8)
PROT UR-MCNC: ABNORMAL
RBC # BLD: 1.77 M/UL — LOW (ref 4.2–5.4)
RBC # BLD: 1.86 M/UL — LOW (ref 4.2–5.4)
RBC # FLD: 23 % — HIGH (ref 11.5–14.5)
RBC # FLD: 23.1 % — HIGH (ref 11.5–14.5)
RBC # FLD: 23.3 % — HIGH (ref 11.5–14.5)
RETICS #: 461.1 K/UL — HIGH (ref 25–125)
RETICS/RBC NFR: 24.8 % — HIGH (ref 0.5–1.5)
SODIUM SERPL-SCNC: 137 MMOL/L — SIGNIFICANT CHANGE UP (ref 135–146)
SP GR SPEC: 1.03 — HIGH (ref 1.01–1.02)
UROBILINOGEN FLD QL: ABNORMAL
WBC # BLD: 6 K/UL — SIGNIFICANT CHANGE UP (ref 4.8–10.8)
WBC # BLD: 6.2 K/UL — SIGNIFICANT CHANGE UP (ref 4.8–10.8)
WBC # BLD: 7.25 K/UL — SIGNIFICANT CHANGE UP (ref 4.8–10.8)
WBC # FLD AUTO: 6 K/UL — SIGNIFICANT CHANGE UP (ref 4.8–10.8)
WBC # FLD AUTO: 6.2 K/UL — SIGNIFICANT CHANGE UP (ref 4.8–10.8)
WBC # FLD AUTO: 7.25 K/UL — SIGNIFICANT CHANGE UP (ref 4.8–10.8)

## 2019-10-05 PROCEDURE — 99222 1ST HOSP IP/OBS MODERATE 55: CPT

## 2019-10-05 PROCEDURE — 76705 ECHO EXAM OF ABDOMEN: CPT | Mod: 26

## 2019-10-05 RX ORDER — TRIAMTERENE/HYDROCHLOROTHIAZID 75 MG-50MG
1 TABLET ORAL DAILY
Refills: 0 | Status: DISCONTINUED | OUTPATIENT
Start: 2019-10-05 | End: 2019-10-12

## 2019-10-05 RX ORDER — PREGABALIN 225 MG/1
1000 CAPSULE ORAL DAILY
Refills: 0 | Status: DISCONTINUED | OUTPATIENT
Start: 2019-10-06 | End: 2019-10-12

## 2019-10-05 RX ORDER — CHLORHEXIDINE GLUCONATE 213 G/1000ML
1 SOLUTION TOPICAL
Refills: 0 | Status: DISCONTINUED | OUTPATIENT
Start: 2019-10-05 | End: 2019-10-12

## 2019-10-05 RX ORDER — ENOXAPARIN SODIUM 100 MG/ML
30 INJECTION SUBCUTANEOUS DAILY
Refills: 0 | Status: DISCONTINUED | OUTPATIENT
Start: 2019-10-05 | End: 2019-10-07

## 2019-10-05 RX ORDER — FOLIC ACID 0.8 MG
1 TABLET ORAL DAILY
Refills: 0 | Status: DISCONTINUED | OUTPATIENT
Start: 2019-10-05 | End: 2019-10-12

## 2019-10-05 RX ORDER — NIFEDIPINE 30 MG
30 TABLET, EXTENDED RELEASE 24 HR ORAL DAILY
Refills: 0 | Status: DISCONTINUED | OUTPATIENT
Start: 2019-10-05 | End: 2019-10-12

## 2019-10-05 RX ADMIN — Medication 1 MILLIGRAM(S): at 13:01

## 2019-10-05 RX ADMIN — Medication 30 MILLIGRAM(S): at 06:02

## 2019-10-05 RX ADMIN — Medication 50 MILLIGRAM(S): at 06:02

## 2019-10-05 RX ADMIN — Medication 1 CAPSULE(S): at 06:03

## 2019-10-05 NOTE — H&P ADULT - NSHPLABSRESULTS_GEN_ALL_CORE
4.7    5.88  )-----------( 146      ( 04 Oct 2019 18:20 )             16.9       10-04    140  |  108  |  29<H>  ----------------------------<  143<H>  4.9   |  21  |  1.1    Ca    9.5      04 Oct 2019 18:20    TPro  x   /  Alb  x   /  TBili  x   /  DBili  1.1<H>  /  AST  x   /  ALT  x   /  AlkPhos  x   10-05                      Lactate Trend  10-04 @ 18:20 Lactate:2.5             CAPILLARY BLOOD GLUCOSE            CT Abdomen and Pelvis w/ IV Cont (10.04.19 @ 22:30) >    No CT evidence of acute intra-abdominal pathology.    Partially imaged right-sided pleural effusion.    Additional Findings/Recommendations After Attending Radiologist Review:    Mild splenomegaly, new.    CT Abdomen and Pelvis w/ IV Cont (10.04.19 @ 22:30) >      No CT evidence of acute intra-abdominal pathology.    Partially imaged right-sided pleural effusion.    Additional Findings/Recommendations After Attending Radiologist Review:    Mild splenomegaly, new.

## 2019-10-05 NOTE — PROGRESS NOTE ADULT - ASSESSMENT
89 year old female with history of HTN, diverticulosis, and B12 deficiency presents with anemia.     # Hemolytic anemia  - follow repeat labs   - Hepatitis panel   - prednisone 1 mg/ kg/ day   - folic acid daily   - continue / transfuse to > 7   - peripheral smear   - hematology evaluation noted     # HTN   - c/w home medications     # Diverticulosis   - avoid constipation,     # DASH diet   # full code

## 2019-10-05 NOTE — ED ADULT NURSE REASSESSMENT NOTE - NS ED NURSE REASSESS COMMENT FT1
MD at the bedside recommended to restart transfusion. patient to received 1st and 2nd transfusion plus steroids at 5 am.

## 2019-10-05 NOTE — H&P ADULT - HISTORY OF PRESENT ILLNESS
89 year old female with history of HTN, diverticulosis, and B12 deficiency presents with anemia.   patient has been having weakness, loss of appetite, headache worsening for 5 days.   last month she was admitted to [facility] for similar presentation, found to be anemia, received transfusion and discharged on B12 injections.   was doing relatively well until 5 days when she stared to have similar symptoms again with jaundice this time.   denies chest pain, nausea, vomiting. reports on/off LLQ for the last month.   Blood work from last admission showed hemolytic picture ( low haptoglobin, elevated LDH .. )   In ED patient was hemodynamically stable, received one unit and had unremarkable CT abdomin

## 2019-10-05 NOTE — H&P ADULT - ASSESSMENT
89 year old female with history of HTN, diverticulosis, and B12 deficiency presents with anemia.     # Hemolytic anemia  - repeat labs in AM ( reticulocytes, LDH, haptoglobin, bili, CBC )   - Hepatitis panel   - prednisone 1 mg/ kg/ day   - folic acid daily   - s/p 1 PRBC, will order another PRBC  - peripheral smear   - hematology evaluation     # HTN   - c/w home medications     # Diverticulosis   - avoid constipation,     # DASH diet   # full code

## 2019-10-05 NOTE — H&P ADULT - NSHPPHYSICALEXAM_GEN_ALL_CORE
GENERAL: jaundice, looks tired   HEAD:  Atraumatic, Normocephalic  ENT: Moist mucous membranes  CHEST/LUNG: Clear to auscultation bilaterally;   HEART: Regular rate and rhythm; No murmurs, rubs, or gallops  ABDOMEN: Soft, Nontender, Nondistended.   EXTREMITIES:  brisk capillary refill. No clubbing, cyanosis, or edema  NERVOUS SYSTEM:  Alert & Oriented X3, speech clear. No deficits   MSK: limited due to patient condition

## 2019-10-05 NOTE — CONSULT NOTE ADULT - SUBJECTIVE AND OBJECTIVE BOX
Patient is a 89y old  Female who presents with a chief complaint of anemia (05 Oct 2019 02:06)      HPI:  89 year old female with history of HTN, diverticulosis, and B12 deficiency presents with anemia.   patient has been having weakness, loss of appetite, headache worsening for 5 days.   last month she was admitted to [facility] for similar presentation, found to be anemia, received transfusion and discharged on B12 injections.   was doing relatively well until 5 days when she stared to have similar symptoms again with jaundice this time.   denies chest pain, nausea, vomiting. reports on/off LLQ for the last month.   Blood work from last admission showed hemolytic picture ( low haptoglobin, elevated LDH .. )   In ED patient was hemodynamically stable, received one unit and had unremarkable CT abdomin (05 Oct 2019 02:06)         PAST MEDICAL & SURGICAL HISTORY:  TIA (transient ischemic attack)  Diverticulitis  Constipation  Hypertension  No significant past surgical history      SOCIAL HISTORY:    FAMILY HISTORY:  No family history of cancer    Allergies    aspirin (Unknown)  Cipro (Unknown)  codeine (Unknown)  dicyclomine (Unknown)  Diovan (Unknown)  Flagyl (Unknown)  Librax (Unknown)  metronidazole (Unknown)  penicillin (Unknown)  Prevacid (Unknown)  trimethobenzamide (Unknown)    Intolerances      ROS:  Negative except for:        Height (cm): 147.3 (10-05-19 @ 04:45)  Weight (kg): 51.8 (10-05-19 @ 04:45)  BMI (kg/m2): 23.9 (10-05-19 @ 04:45)  BSA (m2): 1.44 (10-05-19 @ 04:45)      HOME MEDICATIONS:  NIFEdipine 30 mg oral tablet, extended release: 1 tab(s) orally once a day (15 Sep 2019 14:31)  ondansetron 8 mg oral tablet: 1 tab(s) orally 3 times a day, As Needed (15 Sep 2019 14:31)  triamterene-hydrochlorothiazide 37.5 mg- 25 mg oral capsule: 1 cap(s) orally once a day (15 Sep 2019 14:31)      Vital Signs Last 24 Hrs  T(C): 35.9 (05 Oct 2019 04:45), Max: 36.8 (05 Oct 2019 01:45)  T(F): 96.6 (05 Oct 2019 04:45), Max: 98.2 (05 Oct 2019 01:45)  HR: 84 (05 Oct 2019 04:45) (82 - 99)  BP: 148/65 (05 Oct 2019 04:45) (113/53 - 148/67)  BP(mean): --  RR: 20 (05 Oct 2019 04:45) (18 - 20)  SpO2: 92% (05 Oct 2019 05:03) (92% - 98%)    PHYSICAL EXAM  General: adult in NAD  HEENT: clear oropharynx, anicteric sclera, pink conjunctiva  Neck: supple  CV: normal S1/S2 with no murmur rubs or gallops  Lungs: positive air movement b/l ant lungs,clear to auscultation, no wheezes, no rales  Abdomen: soft non-tender non-distended, no hepatosplenomegaly  Ext: no clubbing cyanosis or edema  Skin: no rashes and no petechiae  Neuro: alert and oriented X 4, no focal deficits    MEDICATIONS  (STANDING):  chlorhexidine 4% Liquid 1 Application(s) Topical <User Schedule>  folic acid  Oral Tab/Cap - Peds 1 milliGRAM(s) Oral daily  NIFEdipine XL 30 milliGRAM(s) Oral daily  predniSONE   Tablet 50 milliGRAM(s) Oral daily  triamterene 37.5 mG/hydrochlorothiazide 25 mG Capsule 1 Capsule(s) Oral daily    MEDICATIONS  (PRN):      LABS:                          4.7    5.88  )-----------( 146      ( 04 Oct 2019 18:20 )             16.9         Mean Cell Volume : 133.1 fL  Mean Cell Hemoglobin : 37.0 pg  Mean Cell Hemoglobin Concentration : 27.8 g/dL  Auto Neutrophil # : 4.40 K/uL  Auto Lymphocyte # : 0.93 K/uL  Auto Monocyte # : 0.46 K/uL  Auto Eosinophil # : 0.02 K/uL  Auto Basophil # : 0.01 K/uL  Auto Neutrophil % : 74.9 %  Auto Lymphocyte % : 15.8 %  Auto Monocyte % : 7.8 %  Auto Eosinophil % : 0.3 %  Auto Basophil % : 0.2 %      Serial CBC's  10-04 @ 18:20  Hct-16.9 / Hgb-4.7 / Plat-146 / RBC-1.27 / WBC-5.88      10-04    140  |  108  |  29<H>  ----------------------------<  143<H>  4.9   |  21  |  1.1    Ca    9.5      04 Oct 2019 18:20    TPro  x   /  Alb  x   /  TBili  x   /  DBili  1.1<H>  /  AST  x   /  ALT  x   /  AlkPhos  x   10-05                                  BLOOD SMEAR INTERPRETATION:       RADIOLOGY & ADDITIONAL STUDIES: Patient is a 89y old  Female who presents with a chief complaint of anemia (05 Oct 2019 02:06)      HPI:  89 year old female with history of HTN, diverticulosis, and B12 deficiency presents with anemia.   patient has been having weakness, loss of appetite, headache worsening for 5 days.   last month she was admitted to [facility] for similar presentation, found to be anemia, received transfusion and discharged on B12 injections.   was doing relatively well until 5 days when she stared to have similar symptoms again with jaundice this time.   denies chest pain, nausea, vomiting. reports on/off LLQ for the last month.   Blood work from last admission showed hemolytic picture ( low haptoglobin, elevated LDH .. )   In ED patient was hemodynamically stable, received one unit and had unremarkable CT abdomin (05 Oct 2019 02:06)         PAST MEDICAL & SURGICAL HISTORY:  TIA (transient ischemic attack)  Diverticulitis  Constipation  Hypertension  No significant past surgical history          FAMILY HISTORY:  No family history of cancer    Allergies    aspirin (Unknown)  Cipro (Unknown)  codeine (Unknown)  dicyclomine (Unknown)  Diovan (Unknown)  Flagyl (Unknown)  Librax (Unknown)  metronidazole (Unknown)  penicillin (Unknown)  Prevacid (Unknown)  trimethobenzamide (Unknown)    Intolerances    Height (cm): 147.3 (10-05-19 @ 04:45)  Weight (kg): 51.8 (10-05-19 @ 04:45)  BMI (kg/m2): 23.9 (10-05-19 @ 04:45)  BSA (m2): 1.44 (10-05-19 @ 04:45)      HOME MEDICATIONS:  NIFEdipine 30 mg oral tablet, extended release: 1 tab(s) orally once a day (15 Sep 2019 14:31)  ondansetron 8 mg oral tablet: 1 tab(s) orally 3 times a day, As Needed (15 Sep 2019 14:31)  triamterene-hydrochlorothiazide 37.5 mg- 25 mg oral capsule: 1 cap(s) orally once a day (15 Sep 2019 14:31)      Vital Signs Last 24 Hrs  T(C): 35.9 (05 Oct 2019 04:45), Max: 36.8 (05 Oct 2019 01:45)  T(F): 96.6 (05 Oct 2019 04:45), Max: 98.2 (05 Oct 2019 01:45)  HR: 84 (05 Oct 2019 04:45) (82 - 99)  BP: 148/65 (05 Oct 2019 04:45) (113/53 - 148/67)  BP(mean): --  RR: 20 (05 Oct 2019 04:45) (18 - 20)  SpO2: 92% (05 Oct 2019 05:03) (92% - 98%)    PHYSICAL EXAM  General: adult in NAD  HEENT: clear oropharynx, icteric sclera, pale conjunctiva  Neck: supple  CV: normal S1/S2   Lungs: positive air movement b/l   Abdomen: soft non-tender non-distended  Ext: no clubbing cyanosis or edema  Skin: no rashes and no petechiae  Neuro: alert and oriented X 4, no focal deficits    MEDICATIONS  (STANDING):  chlorhexidine 4% Liquid 1 Application(s) Topical <User Schedule>  folic acid  Oral Tab/Cap - Peds 1 milliGRAM(s) Oral daily  NIFEdipine XL 30 milliGRAM(s) Oral daily  predniSONE   Tablet 50 milliGRAM(s) Oral daily  triamterene 37.5 mG/hydrochlorothiazide 25 mG Capsule 1 Capsule(s) Oral daily    MEDICATIONS  (PRN):      LABS:                          4.7    5.88  )-----------( 146      ( 04 Oct 2019 18:20 )             16.9         Mean Cell Volume : 133.1 fL  Mean Cell Hemoglobin : 37.0 pg  Mean Cell Hemoglobin Concentration : 27.8 g/dL  Auto Neutrophil # : 4.40 K/uL  Auto Lymphocyte # : 0.93 K/uL  Auto Monocyte # : 0.46 K/uL  Auto Eosinophil # : 0.02 K/uL  Auto Basophil # : 0.01 K/uL  Auto Neutrophil % : 74.9 %  Auto Lymphocyte % : 15.8 %  Auto Monocyte % : 7.8 %  Auto Eosinophil % : 0.3 %  Auto Basophil % : 0.2 %      Serial CBC's  10-04 @ 18:20  Hct-16.9 / Hgb-4.7 / Plat-146 / RBC-1.27 / WBC-5.88      10-04    140  |  108  |  29<H>  ----------------------------<  143<H>  4.9   |  21  |  1.1    Ca    9.5      04 Oct 2019 18:20    TPro  x   /  Alb  x   /  TBili  x   /  DBili  1.1<H>  /  AST  x   /  ALT  x   /  AlkPhos  x   10-05      RADIOLOGY & ADDITIONAL STUDIES:  < from: US Abdomen Limited (10.05.19 @ 01:41) >  IMPRESSION:    Essentially unremarkable right upper quadrant ultrasound.  Nonvisualization of the pancreas.        < end of copied text >    < from: CT Abdomen and Pelvis w/ IV Cont (10.04.19 @ 22:30) >    IMPRESSION:     No CT evidence of acute intra-abdominal pathology.    Partially imaged right-sided pleural effusion.    Additional Findings/Recommendations After Attending Radiologist Review:      < end of copied text >

## 2019-10-05 NOTE — CONSULT NOTE ADULT - ATTENDING COMMENTS
She was seen and ermined. Her daughter was at bedside. She was started on Prednisone today she is not even sure if she wants to take it since she has allergies and that she is 89 years old. I explained that we can treat her condition and put it into remission. She agreed to continue with steroids but was not intrusted in Rituxan at this point, I explained that this is fine and we can save this for second line, but data is now suggesting  to use it first line. Her CT abd/plvis has no evidence of malignancy. She has no B symptoms.    Plan  #AIHA zacarias was positive to polyspecific IgG ( +4 on manual SHELLY)  and she was not on any new meds.  -her B12 was low but MMA was normal and retic is high thus hemolysis is not likely due to ineffective erythropoiesis and she did not respond to B12 injections.  -continue with Prednisone 1 mg/kg (50 mg started today) she is not sure if she even wants this, we had a long talk, daughter tried as well.  -c/w folic acid 1mg daily  and can use Vitamin B12 2000 mcg SL to prevent worsening deficiency can recheck B12 as well to see how much progress she made with IM  -check CBC, Retic, LDH , Creatinine and Bili daily, 80% of patients respond to steroids within 1-2 weeks, she can go home once hgb is going up ideally if staying over 8 and trending up  -keep hgb >6 mg/dl, she responds to transfusion  =her MCV is elevated due to Reticulocytosis ( large cells)   -check hep B serology in case we need Rituxan ( she is not intrusted at this moment)  -check Flow cytometry peripheral to r/o secondary causes, check for Lymphoproliferative disease  hold of CT chest for now      DVT: PPX. Patient with AIHA have in increase in DVT risk, we will start Lovenox 30 mg CS daily will switch to hep SC if GFR goes under 20

## 2019-10-05 NOTE — ED ADULT NURSE REASSESSMENT NOTE - NS ED NURSE REASSESS COMMENT FT1
1st unit of prbc started. VS wnl. patient tolerating procedure well. patient with bed assigned to the floor, RN to call and give report to inpatient RN  plan of care reviewed with patient and relative.

## 2019-10-05 NOTE — PROGRESS NOTE ADULT - SUBJECTIVE AND OBJECTIVE BOX
89 year old female with history of HTN, diverticulosis, and B12 deficiency presents with anemia.   patient has been having weakness, loss of appetite, headache worsening for 5 days.   last month she was admitted to [facility] for similar presentation, found to be anemia, received transfusion and discharged on B12 injections.   was doing relatively well until 5 days when she stared to have similar symptoms again with jaundice this time.   denies chest pain, nausea, vomiting. reports on/off LLQ for the last month.   Blood work from last admission showed hemolytic picture ( low haptoglobin, elevated LDH .. )   In ED patient was hemodynamically stable, received one unit and had unremarkable CT abdomin     PAST MEDICAL & SURGICAL HISTORY:  TIA (transient ischemic attack)  Diverticulitis  Constipation  Hypertension  No significant past surgical history    MEDICATIONS  (STANDING):  chlorhexidine 4% Liquid 1 Application(s) Topical <User Schedule>  folic acid  Oral Tab/Cap - Peds 1 milliGRAM(s) Oral daily  NIFEdipine XL 30 milliGRAM(s) Oral daily  predniSONE   Tablet 50 milliGRAM(s) Oral daily  triamterene 37.5 mG/hydrochlorothiazide 25 mG Capsule 1 Capsule(s) Oral daily    MEDICATIONS  (PRN):    Vital Signs Last 24 Hrs  T(C): 35.9 (05 Oct 2019 04:45), Max: 36.8 (05 Oct 2019 01:45)  T(F): 96.6 (05 Oct 2019 04:45), Max: 98.2 (05 Oct 2019 01:45)  HR: 84 (05 Oct 2019 04:45) (82 - 99)  BP: 148/65 (05 Oct 2019 04:45) (113/53 - 148/67)  BP(mean): --  RR: 20 (05 Oct 2019 04:45) (18 - 20)  SpO2: 92% (05 Oct 2019 05:03) (92% - 98%)    Physical Exam:    GENERAL: jaundice, thin elderly female   HEAD:  Atraumatic, Normocephalic  ENT: Moist mucous membranes  CHEST/LUNG: Clear to auscultation bilaterally;   HEART: Regular rate and rhythm; No murmurs, rubs, or gallops  ABDOMEN: Soft, Nontender, Nondistended.   EXTREMITIES:  brisk capillary refill. No clubbing, cyanosis, or edema  NERVOUS SYSTEM:  Alert & Oriented X3, speech clear. No deficits                           4.7    5.88  )-----------( 146      ( 04 Oct 2019 18:20 )             16.9   10-04    140  |  108  |  29<H>  ----------------------------<  143<H>  4.9   |  21  |  1.1    Ca    9.5      04 Oct 2019 18:20    TPro  x   /  Alb  x   /  TBili  x   /  DBili  1.1<H>  /  AST  x   /  ALT  x   /  AlkPhos  x   10-05    Bilirubin Direct, Serum (10.05.19 @ 01:00)    Bilirubin Direct, Serum: 1.1 mg/dL    Gamma Glutamyl Transferase, Serum (10.05.19 @ 01:00)    Gamma Glutamyl Transferase, Serum: 9 U/L    Lactate Dehydrogenase, Serum (10.05.19 @ 01:00)    Lactate Dehydrogenase, Serum: 784    Direct Lorna Profile (10.04.19 @ 20:10)    Dir Antiglob Polyspecific Interpretation: POS: 10/04/2019 23:17  PATRICK  TYPE:(C=Critical, N=Notification, A=Abnormal) C  TESTS: _DAT  DATE/TIME CALLED: _[id number]  CALLED TO: _Dr Lewis  READ BACK (2 Patient Identifiers)(Y/N): _Y  READ BACK VALUES (Y/N): _Y  CALLED BY: _AT    Bilirubin Total, Serum: 7.8 mg/dL (10.04.19 @ 18:20)       EXAM:  US ABDOMEN LIMITED            PROCEDURE DATE:  10/05/2019            INTERPRETATION:  CLINICAL HISTORY: Jaundice.    COMPARISON: None.    PROCEDURE: Ultrasound of the right upper quadrant was performed.    FINDINGS:    LIVER:  Normal in contour and echogenicity measuring 14.3 cm in length,   containing multiple cysts the largest measuring up to approximately 2.2 x   2.4 x 2.3 cm.    GALLBLADDER/BILIARY TREE:  No evidence of cholelithiasis. No wall   thickening or pericholecystic fluid.  Negative sonographic Cameron's sign.   No intrahepatic biliary ductal dilatation. The common bile duct measures   7 mm, which is normal or age.     PANCREAS: Obscured by overlying bowel gas.    KIDNEY:  Right kidney measures 9.2 cm in length, with a mid pole cyst   measuring up to 0.8 cm. No hydronephrosis, calculi or solid mass.    AORTA/IVC:  Visualized proximal portions unremarkable.    ASCITES:  None.    IMPRESSION:    Essentially unremarkable right upper quadrant ultrasound.  Nonvisualization of the pancreas.              ARUN LOTT M.D., RESIDENT RADIOLOGIST  This document has been electronically signed.  CHRISTINA LEES M.D., ATTENDING RADIOLOGIST  This document has been electronically signed. Oct  5 2019  7:17AM        EXAM:  CT ABDOMEN AND PELVIS IC            PROCEDURE DATE:  10/04/2019            INTERPRETATION:  CLINICAL STATEMENT: Left lower quadrant abdominal pain.      TECHNIQUE: Contiguous axial CT images were obtained from the lower chest   to the pubic symphysis following administration of 100cc Optiray 320   intravenous contrast.  Oral contrast was not administered.  Reformatted   images in the coronal and sagittal planes were acquired.    COMPARISON CT: CT of the abdomen and pelvis dated 4/2/2018.      FINDINGS:    LOWER CHEST: Partially imaged right sided pleural effusion. Bilateral   subsegmental and dependent atelectasis also seen.    HEPATOBILIARY: Right hepatic cyst and additional subcentimeter hepatic   hypodensities, too small to further characterize.    SPLEEN: Unremarkable.    PANCREAS: Unchanged pancreatic tail hypodense lesion, likely a side   branch IPMN.    ADRENAL GLANDS: Unremarkable.    KIDNEYS: Symmetric renal enhancement. No hydronephrosis. Bilateral renal   cysts and additional subcentimeter bilateral hypodensities, too small to   further characterize.    ABDOMINOPELVIC NODES: No lymphadenopathy.    PELVIC ORGANS: Unremarkable.    PERITONEUM/MESENTERY/BOWEL: Sigmoid diverticulosis without definitive   evidence of diverticulitis. No bowel obstruction or pneumoperitoneum.   Large hiatal hernia.    BONES/SOFT TISSUES: No acute osseous abnormality. Degenerative changes of   the spine. Small fat-containing umbilical hernia and bilateral inguinal   hernias.    OTHER: Aortic atherosclerosis.      IMPRESSION:     No CT evidence of acute intra-abdominal pathology.    Partially imaged right-sided pleural effusion.    Additional Findings/Recommendations After Attending Radiologist Review:    Mild splenomegaly, new.              ARUN LOTT M.D., RESIDENT RADIOLOGIST  This document has been electronically signed.  IRON DUARTE M.D., ATTENDING RADIOLOGIST  This document has been electronically signed. Oct  4 2019 11:47PM

## 2019-10-05 NOTE — ED ADULT NURSE REASSESSMENT NOTE - NS ED NURSE REASSESS COMMENT FT1
MD Baljinder Ritchie at the bedside. MD wants transfusion to be stopped as per Hem Consult.  Patient's daughter at the bedside made aware of Hem recommendations. no s/s of transfusion reaction. VS WNL.

## 2019-10-05 NOTE — CONSULT NOTE ADULT - ASSESSMENT
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 Keep hb >6    Start prednisone 1mg/kg daily with folic acid daily    Discussed rituximab as first line treatment along with steroids for prevention of future recurrences - patient is reluctant to try rituximab given her extensive history of allergic reactions . Check hepatitis panel in case she changes her mind  - Will send flow cytometry to rule out underlying Lymphoproliferative disease  - Monitor Hb level daily . Response is expected between 1-3 weeks with a target hb level of 10. Patient can be discharged home when a steady increase of hemoglobin occurs.       # Low Vitamin B12 level with normal MMA : on vitamin b12 IM twice weekly at home     Switch to daily sublingual vitamin b12    # DVT PPx : can use lovenox since patient has autoimmune hemolytic anemia and not bleeding.                      There is high risk of VTE with AIHA    Case discussed with attending

## 2019-10-05 NOTE — ED ADULT NURSE REASSESSMENT NOTE - NS ED NURSE REASSESS COMMENT FT1
1st unit of PRBC completed. patient tolerated transfusion well. no s/s of transfusion reaction. VS wnl.

## 2019-10-05 NOTE — PROGRESS NOTE ADULT - SUBJECTIVE AND OBJECTIVE BOX
LENGTH OF HOSPITAL STAY: 1d    CHIEF COMPLAINT:   Patient is a 89y old  Female who presents with a chief complaint of anemia (05 Oct 2019 08:20)      HISTORY OF PRESENTING ILLNESS:    HPI:  89 year old female with history of HTN, diverticulosis, and B12 deficiency presents with anemia.   patient has been having weakness, loss of appetite, headache worsening for 5 days.   last month she was admitted to [facility] for similar presentation, found to be anemia, received transfusion and discharged on B12 injections.   was doing relatively well until 5 days when she stared to have similar symptoms again with jaundice this time.   denies chest pain, nausea, vomiting. reports on/off LLQ for the last month.   Blood work from last admission showed hemolytic picture ( low haptoglobin, elevated LDH .. )   In ED patient was hemodynamically stable, received one unit and had unremarkable CT abdomin (05 Oct 2019 02:06)    PAST MEDICAL & SURGICAL HISTORY  PAST MEDICAL & SURGICAL HISTORY:  TIA (transient ischemic attack)  Diverticulitis  Constipation  Hypertension  No significant past surgical history    SOCIAL HISTORY:    ALLERGIES:  aspirin (Unknown)  Cipro (Unknown)  codeine (Unknown)  dicyclomine (Unknown)  Diovan (Unknown)  Flagyl (Unknown)  Librax (Unknown)  metronidazole (Unknown)  penicillin (Unknown)  Prevacid (Unknown)  trimethobenzamide (Unknown)    MEDICATIONS:  STANDING MEDICATIONS  chlorhexidine 4% Liquid 1 Application(s) Topical <User Schedule>  folic acid  Oral Tab/Cap - Peds 1 milliGRAM(s) Oral daily  NIFEdipine XL 30 milliGRAM(s) Oral daily  predniSONE   Tablet 50 milliGRAM(s) Oral daily  triamterene 37.5 mG/hydrochlorothiazide 25 mG Capsule 1 Capsule(s) Oral daily    PRN MEDICATIONS    VITALS:   T(F): 96.6  HR: 84  BP: 148/65  RR: 20  SpO2: 92%    LABS:                        6.7    7.25  )-----------( 148      ( 05 Oct 2019 09:13 )             22.3     10-05    137  |  103  |  29<H>  ----------------------------<  158<H>  4.4   |  17  |  1.3    Ca    9.6      05 Oct 2019 09:13    TPro  5.8<L>  /  Alb  4.1  /  TBili  6.9<H>  /  DBili  1.1<H>  /  AST  51<H>  /  ALT  11  /  AlkPhos  72  10-    PTT - ( 05 Oct 2019 09:13 )  PTT:27.5 sec  Urinalysis Basic - ( 05 Oct 2019 11:40 )    Color: Yellow / Appearance: Clear / S.030 / pH: x  Gluc: x / Ketone: Negative  / Bili: Negative / Urobili: 3 mg/dL   Blood: x / Protein: 30 mg/dL / Nitrite: Negative   Leuk Esterase: Small / RBC: 5 /HPF / WBC 10 /HPF   Sq Epi: x / Non Sq Epi: 1 /HPF / Bacteria: Negative        Lactate, Blood: 2.5 mmol/L <H> (10-04-19 @ 18:20)          RADIOLOGY:    PHYSICAL EXAM:  GEN: No acute distress  HEENT: AT, NC, PERRLA  LUNGS: Clear to auscultation bilaterally   HEART: S1/S2 present. RRR.   ABD: Soft, non-tender, non-distended. Bowel sounds present  EXT: edema, cyanosis, clubbing absent  NEURO: AAOX3, loss of proprioception on negative

## 2019-10-05 NOTE — PROGRESS NOTE ADULT - ASSESSMENT
89 year old female with history of HTN, diverticulosis, and B12 deficiency presents with anemia.     # Hemolytic anemia  - Hb- 6.7 mg/dl ( wont transfuse since it will make the anemia worse- discussed with attending)  - Hepatitis panel pending  - prednisone 1 mg/ kg/ day   - folic acid daily   - peripheral smear   - hematology evaluation noted   - LDH raised (trend) , haptoglobin decreased, hyperbilirubinemia ( indirect)   - consult for oncology placed  - Sent message to Dr. Amezcua about patient, will wait for her further recommendations  - Vit B12 levels ordered, patient has been getting Vit B 12 shots since her d/c from hospital, plus the corrected retic count is also sufficient    # HTN   - c/w home medications     # Diverticulosis   - avoid constipation,     # DASH diet   # full code

## 2019-10-06 LAB
ALBUMIN SERPL ELPH-MCNC: 4 G/DL — SIGNIFICANT CHANGE UP (ref 3.5–5.2)
ALP SERPL-CCNC: 65 U/L — SIGNIFICANT CHANGE UP (ref 30–115)
ALT FLD-CCNC: 12 U/L — SIGNIFICANT CHANGE UP (ref 0–41)
ANION GAP SERPL CALC-SCNC: 13 MMOL/L — SIGNIFICANT CHANGE UP (ref 7–14)
AST SERPL-CCNC: 46 U/L — HIGH (ref 0–41)
BASOPHILS # BLD AUTO: 0.02 K/UL — SIGNIFICANT CHANGE UP (ref 0–0.2)
BASOPHILS # BLD AUTO: 0.02 K/UL — SIGNIFICANT CHANGE UP (ref 0–0.2)
BASOPHILS NFR BLD AUTO: 0.3 % — SIGNIFICANT CHANGE UP (ref 0–1)
BASOPHILS NFR BLD AUTO: 0.3 % — SIGNIFICANT CHANGE UP (ref 0–1)
BILIRUB DIRECT SERPL-MCNC: 0.8 MG/DL — HIGH (ref 0–0.2)
BILIRUB INDIRECT FLD-MCNC: 3.5 MG/DL — HIGH (ref 0.2–1.2)
BILIRUB SERPL-MCNC: 4.3 MG/DL — HIGH (ref 0.2–1.2)
BUN SERPL-MCNC: 31 MG/DL — HIGH (ref 10–20)
CALCIUM SERPL-MCNC: 9.5 MG/DL — SIGNIFICANT CHANGE UP (ref 8.5–10.1)
CHLORIDE SERPL-SCNC: 106 MMOL/L — SIGNIFICANT CHANGE UP (ref 98–110)
CO2 SERPL-SCNC: 19 MMOL/L — SIGNIFICANT CHANGE UP (ref 17–32)
CREAT SERPL-MCNC: 1.3 MG/DL — SIGNIFICANT CHANGE UP (ref 0.7–1.5)
EOSINOPHIL # BLD AUTO: 0.01 K/UL — SIGNIFICANT CHANGE UP (ref 0–0.7)
EOSINOPHIL # BLD AUTO: 0.01 K/UL — SIGNIFICANT CHANGE UP (ref 0–0.7)
EOSINOPHIL NFR BLD AUTO: 0.2 % — SIGNIFICANT CHANGE UP (ref 0–8)
EOSINOPHIL NFR BLD AUTO: 0.2 % — SIGNIFICANT CHANGE UP (ref 0–8)
GLUCOSE SERPL-MCNC: 142 MG/DL — HIGH (ref 70–99)
HAPTOGLOB SERPL-MCNC: <20 MG/DL — LOW (ref 34–200)
HCT VFR BLD CALC: 19.7 % — LOW (ref 37–47)
HCT VFR BLD CALC: 26.4 % — LOW (ref 37–47)
HGB BLD-MCNC: 5.9 G/DL — CRITICAL LOW (ref 12–16)
HGB BLD-MCNC: 8 G/DL — LOW (ref 12–16)
IMM GRANULOCYTES NFR BLD AUTO: 1.2 % — HIGH (ref 0.1–0.3)
IMM GRANULOCYTES NFR BLD AUTO: 2.4 % — HIGH (ref 0.1–0.3)
LYMPHOCYTES # BLD AUTO: 0.5 K/UL — LOW (ref 1.2–3.4)
LYMPHOCYTES # BLD AUTO: 1.2 K/UL — SIGNIFICANT CHANGE UP (ref 1.2–3.4)
LYMPHOCYTES # BLD AUTO: 20.3 % — LOW (ref 20.5–51.1)
LYMPHOCYTES # BLD AUTO: 7.9 % — LOW (ref 20.5–51.1)
MAGNESIUM SERPL-MCNC: 2.3 MG/DL — SIGNIFICANT CHANGE UP (ref 1.8–2.4)
MCHC RBC-ENTMCNC: 29.9 G/DL — LOW (ref 32–37)
MCHC RBC-ENTMCNC: 30.3 G/DL — LOW (ref 32–37)
MCHC RBC-ENTMCNC: 33.2 PG — HIGH (ref 27–31)
MCHC RBC-ENTMCNC: 36.2 PG — HIGH (ref 27–31)
MCV RBC AUTO: 109.5 FL — HIGH (ref 81–99)
MCV RBC AUTO: 120.9 FL — HIGH (ref 81–99)
MONOCYTES # BLD AUTO: 0.2 K/UL — SIGNIFICANT CHANGE UP (ref 0.1–0.6)
MONOCYTES # BLD AUTO: 0.49 K/UL — SIGNIFICANT CHANGE UP (ref 0.1–0.6)
MONOCYTES NFR BLD AUTO: 3.2 % — SIGNIFICANT CHANGE UP (ref 1.7–9.3)
MONOCYTES NFR BLD AUTO: 8.3 % — SIGNIFICANT CHANGE UP (ref 1.7–9.3)
NEUTROPHILS # BLD AUTO: 4.13 K/UL — SIGNIFICANT CHANGE UP (ref 1.4–6.5)
NEUTROPHILS # BLD AUTO: 5.42 K/UL — SIGNIFICANT CHANGE UP (ref 1.4–6.5)
NEUTROPHILS NFR BLD AUTO: 69.7 % — SIGNIFICANT CHANGE UP (ref 42.2–75.2)
NEUTROPHILS NFR BLD AUTO: 86 % — HIGH (ref 42.2–75.2)
NRBC # BLD: 0 /100 WBCS — SIGNIFICANT CHANGE UP (ref 0–0)
NRBC # BLD: 0 /100 WBCS — SIGNIFICANT CHANGE UP (ref 0–0)
PLATELET # BLD AUTO: 146 K/UL — SIGNIFICANT CHANGE UP (ref 130–400)
PLATELET # BLD AUTO: 146 K/UL — SIGNIFICANT CHANGE UP (ref 130–400)
POTASSIUM SERPL-MCNC: 4.5 MMOL/L — SIGNIFICANT CHANGE UP (ref 3.5–5)
POTASSIUM SERPL-SCNC: 4.5 MMOL/L — SIGNIFICANT CHANGE UP (ref 3.5–5)
PROT SERPL-MCNC: 5.7 G/DL — LOW (ref 6–8)
RBC # BLD: 1.63 M/UL — LOW (ref 4.2–5.4)
RBC # BLD: 2.41 M/UL — LOW (ref 4.2–5.4)
RBC # BLD: 2.41 M/UL — LOW (ref 4.2–5.4)
RBC # FLD: 23.7 % — HIGH (ref 11.5–14.5)
RBC # FLD: 27.2 % — HIGH (ref 11.5–14.5)
RETICS #: 477.2 K/UL — HIGH (ref 25–125)
RETICS/RBC NFR: 19.8 % — HIGH (ref 0.5–1.5)
SODIUM SERPL-SCNC: 138 MMOL/L — SIGNIFICANT CHANGE UP (ref 135–146)
WBC # BLD: 5.92 K/UL — SIGNIFICANT CHANGE UP (ref 4.8–10.8)
WBC # BLD: 6.3 K/UL — SIGNIFICANT CHANGE UP (ref 4.8–10.8)
WBC # FLD AUTO: 5.92 K/UL — SIGNIFICANT CHANGE UP (ref 4.8–10.8)
WBC # FLD AUTO: 6.3 K/UL — SIGNIFICANT CHANGE UP (ref 4.8–10.8)

## 2019-10-06 PROCEDURE — 99232 SBSQ HOSP IP/OBS MODERATE 35: CPT

## 2019-10-06 PROCEDURE — 88189 FLOWCYTOMETRY/READ 16 & >: CPT

## 2019-10-06 RX ADMIN — Medication 1 MILLIGRAM(S): at 13:35

## 2019-10-06 RX ADMIN — Medication 50 MILLIGRAM(S): at 07:15

## 2019-10-06 RX ADMIN — PREGABALIN 1000 MICROGRAM(S): 225 CAPSULE ORAL at 13:35

## 2019-10-06 RX ADMIN — Medication 30 MILLIGRAM(S): at 07:15

## 2019-10-06 RX ADMIN — CHLORHEXIDINE GLUCONATE 1 APPLICATION(S): 213 SOLUTION TOPICAL at 07:15

## 2019-10-06 RX ADMIN — Medication 1 CAPSULE(S): at 07:17

## 2019-10-06 RX ADMIN — ENOXAPARIN SODIUM 30 MILLIGRAM(S): 100 INJECTION SUBCUTANEOUS at 13:35

## 2019-10-06 NOTE — PROGRESS NOTE ADULT - ATTENDING COMMENTS
Her hgb wend down bellow 6. She had a PRBC.    #AIHA   Please check CBC only daily,    Please transfuse blood slowly over 3-4 hours . Would oreder one unit at at time and re-evaluate unless she is unstable     She is doing well on prednisone 50 mg so will increase to 75 mg ( 1.5 mg/gk daily) starting tomorrow, today is day 2 of steroids. She is not intrusted in Rituxan for now, we spoke about this again.  C/w folic acid and B12 SL.  May take 1-2 weeks to see a response.     Check CBC, Reitc, LDH, CMP daily. Keep hgb >6 gm/dl    DVT ppx Lovenox    Monitor sugars

## 2019-10-06 NOTE — PROGRESS NOTE ADULT - ASSESSMENT
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 Keep hb >6. Overnight hb 5.9 , 1 PRBC transfused    Start prednisone 1mg/kg daily with folic acid daily    Discussed rituximab as first line treatment along with steroids for prevention of future recurrences - patient is reluctant to try rituximab given her extensive history of allergic reactions . Hepatitis panel negative   - Will send flow cytometry to rule out underlying Lymphoproliferative disease  - Monitor Hb level daily . Response is expected between 1-3 weeks with a target hb level of 10. Patient can be discharged home when a steady increase of hemoglobin occurs.       # Low Vitamin B12 level with normal MMA : on vitamin b12 IM twice weekly at home     Switched to daily sublingual vitamin b12    # DVT PPx : can use lovenox since patient has autoimmune hemolytic anemia and not bleeding.                      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 Keep hb >6. Overnight hb 5.9 , 1 PRBC transfused    Start prednisone 1mg/kg daily with folic acid daily    Discussed rituximab as first line treatment along with steroids for prevention of future recurrences - patient is reluctant to try rituximab given her extensive history of allergic reactions . Hepatitis panel negative   - Will send flow cytometry to rule out underlying Lymphoproliferative disease  - Monitor Hb level daily . Response is expected between 1-3 weeks with a target hb level of 10. Patient can be discharged home when a steady increase of hemoglobin occurs.       # Low Vitamin B12 level with normal MMA : on vitamin b12 IM twice weekly at home     Switched to daily sublingual vitamin b12. will recheck vitamin B12  level to evaulate response to IM treatment    # DVT PPx : can use lovenox since patient has autoimmune hemolytic anemia and not bleeding.                      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 Keep hb >6. Overnight hb 5.9 , 1 PRBC transfused    Started on prednisone 1mg/kg daily with folic acid daily    Discussed rituximab as first line treatment along with steroids for prevention of future recurrences - patient is reluctant to try rituximab given her extensive history of allergic reactions . Hepatitis panel negative   - Will send flow cytometry to rule out underlying Lymphoproliferative disease  - Monitor Hb level daily . Response is expected between 1-3 weeks with a target hb level of 10. Patient can be discharged home when a steady increase of hemoglobin occurs.       # Low Vitamin B12 level with normal MMA : on vitamin b12 IM twice weekly at home     Switched to daily PO vitamin b12. will recheck vitamin B12  level to evaulate response to IM treatment    # DVT PPx : can use lovenox since patient has autoimmune hemolytic anemia and not bleeding.                      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 Keep hb >6. Overnight hb 5.9 , 1 PRBC transfused    Started on prednisone 1mg/kg daily with folic acid daily. will increase to prednisone 1.5mg/kg daily    Discussed rituximab as first line treatment along with steroids for prevention of future recurrences - patient is reluctant to try rituximab given her extensive history of allergic reactions . Hepatitis panel negative   - Will send flow cytometry to rule out underlying Lymphoproliferative disease today  - Monitor Hb level daily . Response is expected between 1-2 weeks with a target hb level of 10. Patient can be discharged home when a steady increase of hemoglobin occurs.       # Low Vitamin B12 level with normal MMA : on vitamin b12 IM twice weekly at home     Switched to daily PO vitamin b12. will recheck vitamin B12  level to evaluateresponse to IM treatment    # DVT PPx : can use lovenox since patient has autoimmune hemolytic anemia and not bleeding.                      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 Keep hb >6. Overnight hb 5.9 , 1 PRBC transfused    Started on prednisone 1mg/kg daily with folic acid daily. will increase to prednisone 1.5mg/kg daily    Discussed rituximab as first line treatment along with steroids for prevention of future recurrences - patient is reluctant to try rituximab given her extensive history of allergic reactions . Hepatitis panel negative   - Will send flow cytometry to rule out underlying Lymphoproliferative disease today  - Monitor Hb level daily . Response is expected between 1-2 weeks with a target hb level of 10. Patient can be discharged home when a steady increase of hemoglobin occurs.       # Low Vitamin B12 level with normal MMA : on vitamin b12 IM twice weekly at home     Switched to daily PO vitamin b12. will recheck vitamin B12  level to evaluateresponse to IM treatment    # DVT PPx : can use lovenox since patient has autoimmune hemolytic anemia and not bleeding.                      There is high risk of VTE with AIHA    Labs once daily , including cbcd, bmp/hepatic panel , LDH and retic count  Transfuse to keep hb >6 . Run transfusion very slowly to avoid overt hemolysis  Case discussed with attending

## 2019-10-06 NOTE — PROGRESS NOTE ADULT - ASSESSMENT
89 year old female with history of HTN, diverticulosis, and B12 deficiency presents with anemia.         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 Keep hb >6. Overnight hb 5.9 , 1 PRBC transfused    Start prednisone 1mg/kg daily with folic acid daily    Discussed rituximab as first line treatment along with steroids for prevention of future recurrences - patient is reluctant to try rituximab given her extensive history of allergic reactions . Hepatitis panel negative   - Will send flow cytometry to rule out underlying Lymphoproliferative disease  - Monitor Hb level daily . Response is expected between 1-3 weeks with a target hb level of 10. Patient can be discharged home when a steady increase of hemoglobin occurs.       # Low Vitamin B12 level with normal MMA : on vitamin b12 IM twice weekly at home     Switched to daily sublingual vitamin b12. will recheck vitamin B12  level to evaulate response to IM treatment    # HTN   - c/w home medications     # Diverticulosis   - avoid constipation,     # DASH diet   # full code

## 2019-10-06 NOTE — PROGRESS NOTE ADULT - SUBJECTIVE AND OBJECTIVE BOX
Patient is a 89y old  Female who presents with a chief complaint of anemia (05 Oct 2019 12:50)      Subjective:      Vital Signs Last 24 Hrs  T(C): 35.6 (06 Oct 2019 04:46), Max: 36.2 (05 Oct 2019 19:50)  T(F): 96 (06 Oct 2019 04:46), Max: 97.2 (05 Oct 2019 19:50)  HR: 71 (06 Oct 2019 04:46) (71 - 83)  BP: 167/80 (06 Oct 2019 04:46) (127/56 - 167/80)  BP(mean): --  RR: 20 (06 Oct 2019 04:46) (20 - 20)  SpO2: 97% (06 Oct 2019 00:49) (97% - 97%)    PHYSICAL EXAM  General: adult in NAD  HEENT: clear oropharynx, anicteric sclera, pink conjunctiva  Neck: supple  CV: normal S1/S2 with no murmur rubs or gallops  Lungs: positive air movement b/l ant lungs,clear to auscultation, no wheezes, no rales  Abdomen: soft non-tender non-distended, no hepatosplenomegaly  Ext: no clubbing cyanosis or edema  Skin: no rashes and no petechiae  Neuro: alert and oriented X 4, no focal deficits    MEDICATIONS  (STANDING):  chlorhexidine 4% Liquid 1 Application(s) Topical <User Schedule>  cyanocobalamin 1000 MICROGram(s) Oral daily  enoxaparin Injectable 30 milliGRAM(s) SubCutaneous daily  folic acid  Oral Tab/Cap - Peds 1 milliGRAM(s) Oral daily  NIFEdipine XL 30 milliGRAM(s) Oral daily  predniSONE   Tablet 50 milliGRAM(s) Oral daily  triamterene 37.5 mG/hydrochlorothiazide 25 mG Capsule 1 Capsule(s) Oral daily    MEDICATIONS  (PRN):      LABS:                          5.9    5.92  )-----------( 146      ( 06 Oct 2019 00:57 )             19.7         Mean Cell Volume : 120.9 fL  Mean Cell Hemoglobin : 36.2 pg  Mean Cell Hemoglobin Concentration : 29.9 g/dL  Auto Neutrophil # : 4.13 K/uL  Auto Lymphocyte # : 1.20 K/uL  Auto Monocyte # : 0.49 K/uL  Auto Eosinophil # : 0.01 K/uL  Auto Basophil # : 0.02 K/uL  Auto Neutrophil % : 69.7 %  Auto Lymphocyte % : 20.3 %  Auto Monocyte % : 8.3 %  Auto Eosinophil % : 0.2 %  Auto Basophil % : 0.3 %      Serial CBC's  10-06 @ 00:57  Hct-19.7 / Hgb-5.9 / Plat-146 / RBC-1.63 / WBC-5.92  Serial CBC's  10-05 @ 17:26  Hct-21.3 / Hgb-6.2 / Plat-154 / RBC-1.77 / WBC-6.00  Serial CBC's  10-05 @ 11:59  Hct-22.5 / Hgb-6.6 / Plat-148 / RBC-1.86 / WBC-6.20  Serial CBC's  10-05 @ 09:13  Hct-22.3 / Hgb-6.7 / Plat-148 / RBC-1.86 / WBC-7.25  Serial CBC's  10-04 @ 18:20  Hct-16.9 / Hgb-4.7 / Plat-146 / RBC-1.27 / WBC-5.88      10-05    137  |  103  |  29<H>  ----------------------------<  158<H>  4.4   |  17  |  1.3    Ca    9.6      05 Oct 2019 09:13    TPro  5.8<L>  /  Alb  4.1  /  TBili  6.9<H>  /  DBili  1.1<H>  /  AST  51<H>  /  ALT  11  /  AlkPhos  72  10-05      PTT - ( 05 Oct 2019 09:13 )  PTT:27.5 sec    Reticulocyte Percent: 24.8 % (10-05 @ 09:13)              Urinalysis Basic - ( 05 Oct 2019 11:40 )    Color: Yellow / Appearance: Clear / S.030 / pH: x  Gluc: x / Ketone: Negative  / Bili: Negative / Urobili: 3 mg/dL   Blood: x / Protein: 30 mg/dL / Nitrite: Negative   Leuk Esterase: Small / RBC: 5 /HPF / WBC 10 /HPF   Sq Epi: x / Non Sq Epi: 1 /HPF / Bacteria: Negative      RADIOLOGY & ADDITIONAL STUDIES:  < from: US Abdomen Limited (10.05.19 @ 01:41) >  IMPRESSION:    Essentially unremarkable right upper quadrant ultrasound.  Nonvisualization of the pancreas.      < end of copied text >    < from: CT Abdomen and Pelvis w/ IV Cont (10.04.19 @ 22:30) >    IMPRESSION:     No CT evidence of acute intra-abdominal pathology.    Partially imaged right-sided pleural effusion.    Additional Findings/Recommendations After Attending Radiologist Review:      < end of copied text > Patient is a 89y old  Female who presents with a chief complaint of anemia (05 Oct 2019 12:50)      Subjective: The patient has no complaints. Her hemoglobin dropped to 5.9 , and she received 1 PRBC overnight      Vital Signs Last 24 Hrs  T(C): 35.6 (06 Oct 2019 04:46), Max: 36.2 (05 Oct 2019 19:50)  T(F): 96 (06 Oct 2019 04:46), Max: 97.2 (05 Oct 2019 19:50)  HR: 71 (06 Oct 2019 04:46) (71 - 83)  BP: 167/80 (06 Oct 2019 04:46) (127/56 - 167/80)  BP(mean): --  RR: 20 (06 Oct 2019 04:46) (20 - 20)  SpO2: 97% (06 Oct 2019 00:49) (97% - 97%)    PHYSICAL EXAM  General: adult in NAD  HEENT: clear oropharynx, icteric sclera, pale conjunctiva  Neck: supple  CV: normal S1/S2   Lungs: positive air movement b/l   Abdomen: soft non-tender  Ext: no clubbing cyanosis or edema  Skin: no rashes and no petechiae  Neuro: alert and oriented X 4, no focal deficits    MEDICATIONS  (STANDING):  chlorhexidine 4% Liquid 1 Application(s) Topical <User Schedule>  cyanocobalamin 1000 MICROGram(s) Oral daily  enoxaparin Injectable 30 milliGRAM(s) SubCutaneous daily  folic acid  Oral Tab/Cap - Peds 1 milliGRAM(s) Oral daily  NIFEdipine XL 30 milliGRAM(s) Oral daily  predniSONE   Tablet 50 milliGRAM(s) Oral daily  triamterene 37.5 mG/hydrochlorothiazide 25 mG Capsule 1 Capsule(s) Oral daily    MEDICATIONS  (PRN):      LABS:                          5.9    5.92  )-----------( 146      ( 06 Oct 2019 00:57 )             19.7         Mean Cell Volume : 120.9 fL  Mean Cell Hemoglobin : 36.2 pg  Mean Cell Hemoglobin Concentration : 29.9 g/dL  Auto Neutrophil # : 4.13 K/uL  Auto Lymphocyte # : 1.20 K/uL  Auto Monocyte # : 0.49 K/uL  Auto Eosinophil # : 0.01 K/uL  Auto Basophil # : 0.02 K/uL  Auto Neutrophil % : 69.7 %  Auto Lymphocyte % : 20.3 %  Auto Monocyte % : 8.3 %  Auto Eosinophil % : 0.2 %  Auto Basophil % : 0.3 %      Serial CBC's  10-06 @ 00:57  Hct-19.7 / Hgb-5.9 / Plat-146 / RBC-1.63 / WBC-5.92  Serial CBC's  10-05 @ 17:26  Hct-21.3 / Hgb-6.2 / Plat-154 / RBC-1.77 / WBC-6.00  Serial CBC's  10-05 @ 11:59  Hct-22.5 / Hgb-6.6 / Plat-148 / RBC-1.86 / WBC-6.20  Serial CBC's  10-05 @ 09:13  Hct-22.3 / Hgb-6.7 / Plat-148 / RBC-1.86 / WBC-7.25  Serial CBC's  10-04 @ 18:20  Hct-16.9 / Hgb-4.7 / Plat-146 / RBC-1.27 / WBC-5.88      10-05    137  |  103  |  29<H>  ----------------------------<  158<H>  4.4   |  17  |  1.3    Ca    9.6      05 Oct 2019 09:13    TPro  5.8<L>  /  Alb  4.1  /  TBili  6.9<H>  /  DBili  1.1<H>  /  AST  51<H>  /  ALT  11  /  AlkPhos  72  10-05      PTT - ( 05 Oct 2019 09:13 )  PTT:27.5 sec    Reticulocyte Percent: 24.8 % (10-05 @ 09:13)              Urinalysis Basic - ( 05 Oct 2019 11:40 )    Color: Yellow / Appearance: Clear / S.030 / pH: x  Gluc: x / Ketone: Negative  / Bili: Negative / Urobili: 3 mg/dL   Blood: x / Protein: 30 mg/dL / Nitrite: Negative   Leuk Esterase: Small / RBC: 5 /HPF / WBC 10 /HPF   Sq Epi: x / Non Sq Epi: 1 /HPF / Bacteria: Negative      RADIOLOGY & ADDITIONAL STUDIES:  < from: US Abdomen Limited (10.05.19 @ 01:41) >  IMPRESSION:    Essentially unremarkable right upper quadrant ultrasound.  Nonvisualization of the pancreas.      < end of copied text >    < from: CT Abdomen and Pelvis w/ IV Cont (10.04.19 @ 22:30) >    IMPRESSION:     No CT evidence of acute intra-abdominal pathology.    Partially imaged right-sided pleural effusion.    Additional Findings/Recommendations After Attending Radiologist Review:      < end of copied text >

## 2019-10-06 NOTE — PROGRESS NOTE ADULT - SUBJECTIVE AND OBJECTIVE BOX
RITA RAMOS 89y Female  MRN#: [id number]         SUBJECTIVE  Patient is a 89y old Female who presents with a chief complaint of anemia (06 Oct 2019 08:45)  Currently admitted to medicine with the primary diagnosis of Severe anemia    reports no abdominal or chest pain    OBJECTIVE  PAST MEDICAL & SURGICAL HISTORY  TIA (transient ischemic attack)  Diverticulitis  Constipation  Hypertension  No significant past surgical history    ALLERGIES:  aspirin (Unknown)  Cipro (Unknown)  codeine (Unknown)  dicyclomine (Unknown)  Diovan (Unknown)  Flagyl (Unknown)  Librax (Unknown)  metronidazole (Unknown)  penicillin (Unknown)  Prevacid (Unknown)  trimethobenzamide (Unknown)    MEDICATIONS:  STANDING MEDICATIONS  chlorhexidine 4% Liquid 1 Application(s) Topical <User Schedule>  cyanocobalamin 1000 MICROGram(s) Oral daily  enoxaparin Injectable 30 milliGRAM(s) SubCutaneous daily  folic acid  Oral Tab/Cap - Peds 1 milliGRAM(s) Oral daily  NIFEdipine XL 30 milliGRAM(s) Oral daily  predniSONE   Tablet 50 milliGRAM(s) Oral daily  triamterene 37.5 mG/hydrochlorothiazide 25 mG Capsule 1 Capsule(s) Oral daily    PRN MEDICATIONS      VITAL SIGNS: Last 24 Hours  T(C): 36.6 (06 Oct 2019 08:00), Max: 36.6 (06 Oct 2019 08:00)  T(F): 97.8 (06 Oct 2019 08:00), Max: 97.8 (06 Oct 2019 08:00)  HR: 80 (06 Oct 2019 08:00) (71 - 83)  BP: 138/78 (06 Oct 2019 08:00) (127/56 - 167/80)  BP(mean): --  RR: 18 (06 Oct 2019 08:00) (18 - 20)  SpO2: 97% (06 Oct 2019 00:49) (97% - 97%)    LABS:                        5.9    5.92  )-----------( 146      ( 06 Oct 2019 00:57 )             19.7     10-05    137  |  103  |  29<H>  ----------------------------<  158<H>  4.4   |  17  |  1.3    Ca    9.6      05 Oct 2019 09:13    TPro  5.8<L>  /  Alb  4.1  /  TBili  6.9<H>  /  DBili  1.1<H>  /  AST  51<H>  /  ALT  11  /  AlkPhos  72  10-05    PTT - ( 05 Oct 2019 09:13 )  PTT:27.5 sec  Urinalysis Basic - ( 05 Oct 2019 11:40 )    Color: Yellow / Appearance: Clear / S.030 / pH: x  Gluc: x / Ketone: Negative  / Bili: Negative / Urobili: 3 mg/dL   Blood: x / Protein: 30 mg/dL / Nitrite: Negative   Leuk Esterase: Small / RBC: 5 /HPF / WBC 10 /HPF   Sq Epi: x / Non Sq Epi: 1 /HPF / Bacteria: Negative                RADIOLOGY:      PHYSICAL EXAM:  GEN: No acute distress  HEENT: AT, NC, PERRLA  LUNGS: Clear to auscultation bilaterally   HEART: S1/S2 present. RRR.   ABD: Soft, non-tender, non-distended. Bowel sounds present  EXT: edema, cyanosis, clubbing absent  NEURO: AAOX3, loss of proprioception on negative      ASSESSMENT & PLAN    89 year old female with history of HTN, diverticulosis, and B12 deficiency presents with anemia.     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 Keep hb >6. Overnight hb 5.9 , 1 PRBC transfused. f/u labs today    Started on prednisone 1mg/kg daily with folic acid daily    Oncology discussed rituximab as first line treatment along with steroids for prevention of future recurrences - patient is reluctant to try rituximab given her extensive history of allergic reactions . Hepatitis panel negative   - Will send flow cytometry to rule out underlying Lymphoproliferative disease  - Monitor Hb level daily . Response is expected between 1-3 weeks with a target hb level of 10. Patient can be discharged home when a steady increase of hemoglobin occurs.       # Low Vitamin B12 level with normal MMA : on vitamin b12 IM twice weekly at home     Switched to daily PO vitamin b12. will recheck vitamin B12  level to evaulate response to IM treatment    # DVT PPx : can use lovenox since patient has autoimmune hemolytic anemia and not bleeding.                      There is high risk of VTE with AIHA    Labs once daily , including cbcd, bmp/hepatic panel , LDH and retic count  # HTN   - c/w home medications     # Diverticulosis   - avoid constipation,     # DASH diet   # full code

## 2019-10-07 ENCOUNTER — TRANSCRIPTION ENCOUNTER (OUTPATIENT)
Age: 84
End: 2019-10-07

## 2019-10-07 LAB
ALBUMIN SERPL ELPH-MCNC: 3.9 G/DL — SIGNIFICANT CHANGE UP (ref 3.5–5.2)
ALP SERPL-CCNC: 63 U/L — SIGNIFICANT CHANGE UP (ref 30–115)
ALT FLD-CCNC: 12 U/L — SIGNIFICANT CHANGE UP (ref 0–41)
ANION GAP SERPL CALC-SCNC: 14 MMOL/L — SIGNIFICANT CHANGE UP (ref 7–14)
AST SERPL-CCNC: 34 U/L — SIGNIFICANT CHANGE UP (ref 0–41)
BASOPHILS # BLD AUTO: 0.01 K/UL — SIGNIFICANT CHANGE UP (ref 0–0.2)
BASOPHILS NFR BLD AUTO: 0.2 % — SIGNIFICANT CHANGE UP (ref 0–1)
BILIRUB SERPL-MCNC: 4.1 MG/DL — HIGH (ref 0.2–1.2)
BUN SERPL-MCNC: 32 MG/DL — HIGH (ref 10–20)
CALCIUM SERPL-MCNC: 9.6 MG/DL — SIGNIFICANT CHANGE UP (ref 8.5–10.1)
CHLORIDE SERPL-SCNC: 106 MMOL/L — SIGNIFICANT CHANGE UP (ref 98–110)
CO2 SERPL-SCNC: 21 MMOL/L — SIGNIFICANT CHANGE UP (ref 17–32)
CREAT SERPL-MCNC: 1.3 MG/DL — SIGNIFICANT CHANGE UP (ref 0.7–1.5)
EOSINOPHIL # BLD AUTO: 0.05 K/UL — SIGNIFICANT CHANGE UP (ref 0–0.7)
EOSINOPHIL NFR BLD AUTO: 0.9 % — SIGNIFICANT CHANGE UP (ref 0–8)
GLUCOSE SERPL-MCNC: 91 MG/DL — SIGNIFICANT CHANGE UP (ref 70–99)
HCT VFR BLD CALC: 27.6 % — LOW (ref 37–47)
HCV RNA FLD QL NAA+PROBE: SIGNIFICANT CHANGE UP
HCV RNA SPEC QL PROBE+SIG AMP: SIGNIFICANT CHANGE UP
HGB BLD-MCNC: 8.3 G/DL — LOW (ref 12–16)
IMM GRANULOCYTES NFR BLD AUTO: 1.4 % — HIGH (ref 0.1–0.3)
LDH SERPL L TO P-CCNC: 884 — HIGH (ref 50–242)
LYMPHOCYTES # BLD AUTO: 1 K/UL — LOW (ref 1.2–3.4)
LYMPHOCYTES # BLD AUTO: 17 % — LOW (ref 20.5–51.1)
MAGNESIUM SERPL-MCNC: 2.4 MG/DL — SIGNIFICANT CHANGE UP (ref 1.8–2.4)
MCHC RBC-ENTMCNC: 30.1 G/DL — LOW (ref 32–37)
MCHC RBC-ENTMCNC: 33.2 PG — HIGH (ref 27–31)
MCV RBC AUTO: 110.4 FL — HIGH (ref 81–99)
MONOCYTES # BLD AUTO: 0.45 K/UL — SIGNIFICANT CHANGE UP (ref 0.1–0.6)
MONOCYTES NFR BLD AUTO: 7.7 % — SIGNIFICANT CHANGE UP (ref 1.7–9.3)
NEUTROPHILS # BLD AUTO: 4.28 K/UL — SIGNIFICANT CHANGE UP (ref 1.4–6.5)
NEUTROPHILS NFR BLD AUTO: 72.8 % — SIGNIFICANT CHANGE UP (ref 42.2–75.2)
NRBC # BLD: 0 /100 WBCS — SIGNIFICANT CHANGE UP (ref 0–0)
PLATELET # BLD AUTO: 133 K/UL — SIGNIFICANT CHANGE UP (ref 130–400)
POTASSIUM SERPL-MCNC: 4.4 MMOL/L — SIGNIFICANT CHANGE UP (ref 3.5–5)
POTASSIUM SERPL-SCNC: 4.4 MMOL/L — SIGNIFICANT CHANGE UP (ref 3.5–5)
PROT SERPL-MCNC: 5.7 G/DL — LOW (ref 6–8)
RBC # BLD: 2.5 M/UL — LOW (ref 4.2–5.4)
RBC # BLD: 2.5 M/UL — LOW (ref 4.2–5.4)
RBC # FLD: 27 % — HIGH (ref 11.5–14.5)
RETICS #: 540.5 K/UL — HIGH (ref 25–125)
RETICS/RBC NFR: 21.6 % — HIGH (ref 0.5–1.5)
SODIUM SERPL-SCNC: 141 MMOL/L — SIGNIFICANT CHANGE UP (ref 135–146)
VIT B12 SERPL-MCNC: 1424 PG/ML — HIGH (ref 232–1245)
WBC # BLD: 5.87 K/UL — SIGNIFICANT CHANGE UP (ref 4.8–10.8)
WBC # FLD AUTO: 5.87 K/UL — SIGNIFICANT CHANGE UP (ref 4.8–10.8)

## 2019-10-07 RX ORDER — ENOXAPARIN SODIUM 100 MG/ML
40 INJECTION SUBCUTANEOUS DAILY
Refills: 0 | Status: DISCONTINUED | OUTPATIENT
Start: 2019-10-07 | End: 2019-10-09

## 2019-10-07 RX ORDER — PANTOPRAZOLE SODIUM 20 MG/1
40 TABLET, DELAYED RELEASE ORAL
Refills: 0 | Status: DISCONTINUED | OUTPATIENT
Start: 2019-10-07 | End: 2019-10-07

## 2019-10-07 RX ADMIN — Medication 80 MILLIGRAM(S): at 06:45

## 2019-10-07 RX ADMIN — Medication 1 MILLIGRAM(S): at 11:17

## 2019-10-07 RX ADMIN — Medication 30 MILLIGRAM(S): at 06:45

## 2019-10-07 RX ADMIN — ENOXAPARIN SODIUM 40 MILLIGRAM(S): 100 INJECTION SUBCUTANEOUS at 11:21

## 2019-10-07 RX ADMIN — CHLORHEXIDINE GLUCONATE 1 APPLICATION(S): 213 SOLUTION TOPICAL at 06:46

## 2019-10-07 RX ADMIN — Medication 1 CAPSULE(S): at 06:47

## 2019-10-07 RX ADMIN — PREGABALIN 1000 MICROGRAM(S): 225 CAPSULE ORAL at 11:17

## 2019-10-07 NOTE — PROGRESS NOTE ADULT - SUBJECTIVE AND OBJECTIVE BOX
**this is a preliminary note and will be edited**     Hospital Day:  3d    Subjective:    Patient is a 89y old  Female who presents with a chief complaint of anemia (06 Oct 2019 10:52)      There were no acute overnight events. The patient was seen and examined at the bedside. No complaints. Denies fever, chills, headache, dizziness, chest pain, palpitations, shortness of breath, abdominal pain, nausea, vomiting, diarrhea.    Past Medical Hx:   TIA (transient ischemic attack)  Diverticulitis  Constipation  Hypertension    Past Sx:  No significant past surgical history    Allergies:  aspirin (Unknown)  Cipro (Unknown)  codeine (Unknown)  dicyclomine (Unknown)  Diovan (Unknown)  Flagyl (Unknown)  Librax (Unknown)  metronidazole (Unknown)  penicillin (Unknown)  Prevacid (Unknown)  trimethobenzamide (Unknown)    Current Meds:   Standng Meds:  chlorhexidine 4% Liquid 1 Application(s) Topical <User Schedule>  cyanocobalamin 1000 MICROGram(s) Oral daily  enoxaparin Injectable 30 milliGRAM(s) SubCutaneous daily  folic acid  Oral Tab/Cap - Peds 1 milliGRAM(s) Oral daily  NIFEdipine XL 30 milliGRAM(s) Oral daily  predniSONE   Tablet 80 milliGRAM(s) Oral daily  triamterene 37.5 mG/hydrochlorothiazide 25 mG Capsule 1 Capsule(s) Oral daily    PRN Meds:    HOME MEDICATIONS:  NIFEdipine 30 mg oral tablet, extended release: 1 tab(s) orally once a day  ondansetron 8 mg oral tablet: 1 tab(s) orally 3 times a day, As Needed  triamterene-hydrochlorothiazide 37.5 mg- 25 mg oral capsule: 1 cap(s) orally once a day      Vital Signs:   T(F): 95.6 (10-07-19 @ 04:46), Max: 97.8 (10-06-19 @ 08:00)  HR: 63 (10-07-19 @ 04:46) (63 - 80)  BP: 146/68 (10-07-19 @ 04:46) (126/94 - 149/66)  RR: 20 (10-07-19 @ 04:46) (18 - 20)  SpO2: 96% (10-06-19 @ 22:24) (96% - 96%)        Physical Exam:   General: Patient resting comfortably in bed, NAD  HEENT: NCAT, conjunctiva clear, sclera white, PEERLA, EOMI, moist mucous membranes, normal orophaynx  Neck: No masses, no JVD, no cervical lymphadenopathy  Respiratory: good inspiratory effort; lungs clear to auscultation bilaterally  CV: Normal rate, regular rhythm, normal S1/S2, no rubs, gallops, murmurs  GI: abdomen soft, non-tender, non-distended, no masses, normal bowel sounds  Extremities: Well-perfused, no clubbing, no cyanosis, no lower extremity edema bilaterally  Skin: warm and dry  Neurology: AAOx3, mentating appropriately, follows commands, nonfocal    Labs:                         8.0    6.30  )-----------( 146      ( 06 Oct 2019 11:08 )             26.4     Neutophil% 86.0, Lymphocyte% 7.9, Monocyte% 3.2, Bands% 2.4 10-06-19 @ 11:08    06 Oct 2019 11:08    138    |  106    |  31     ----------------------------<  142    4.5     |  19     |  1.3      Ca    9.5        06 Oct 2019 11:08  Mg     2.3       06 Oct 2019 11:08    TPro  5.7    /  Alb  4.0    /  TBili  4.3    /  DBili  0.8    /  AST  46     /  ALT  12     /  AlkPhos  65     06 Oct 2019 11:08        Amylase --, Lipase 19, 10-04-19 @ 18:20              Urinalysis Basic - ( 05 Oct 2019 11:40 )    Color: Yellow / Appearance: Clear / S.030 / pH: x  Gluc: x / Ketone: Negative  / Bili: Negative / Urobili: 3 mg/dL   Blood: x / Protein: 30 mg/dL / Nitrite: Negative   Leuk Esterase: Small / RBC: 5 /HPF / WBC 10 /HPF   Sq Epi: x / Non Sq Epi: 1 /HPF / Bacteria: Negative            Radiology: Hospital Day:  3d    Subjective:    Patient is a 89y old  Female who presents with a chief complaint of anemia (06 Oct 2019 10:52)    There were no acute overnight events. The patient was seen and examined at the bedside. No complaints. Denies fever, chills, headache, dizziness, chest pain, palpitations, shortness of breath, abdominal pain, nausea, vomiting, diarrhea.    Past Medical Hx:   TIA (transient ischemic attack)  Diverticulitis  Constipation  Hypertension    Past Sx:  No significant past surgical history    Allergies:  aspirin (Unknown)  Cipro (Unknown)  codeine (Unknown)  dicyclomine (Unknown)  Diovan (Unknown)  Flagyl (Unknown)  Librax (Unknown)  metronidazole (Unknown)  penicillin (Unknown)  Prevacid (Unknown)  trimethobenzamide (Unknown)    Current Meds:   Stand Meds:  chlorhexidine 4% Liquid 1 Application(s) Topical <User Schedule>  cyanocobalamin 1000 MICROGram(s) Oral daily  enoxaparin Injectable 30 milliGRAM(s) SubCutaneous daily  folic acid  Oral Tab/Cap - Peds 1 milliGRAM(s) Oral daily  NIFEdipine XL 30 milliGRAM(s) Oral daily  predniSONE   Tablet 80 milliGRAM(s) Oral daily  triamterene 37.5 mG/hydrochlorothiazide 25 mG Capsule 1 Capsule(s) Oral daily    PRN Meds:    HOME MEDICATIONS:  NIFEdipine 30 mg oral tablet, extended release: 1 tab(s) orally once a day  ondansetron 8 mg oral tablet: 1 tab(s) orally 3 times a day, As Needed  triamterene-hydrochlorothiazide 37.5 mg- 25 mg oral capsule: 1 cap(s) orally once a day      Vital Signs:   T(F): 95.6 (10-07-19 @ 04:46), Max: 97.8 (10-06-19 @ 08:00)  HR: 63 (10-07-19 @ 04:46) (63 - 80)  BP: 146/68 (10-07-19 @ 04:46) (126/94 - 149/66)  RR: 20 (10-07-19 @ 04:46) (18 - 20)  SpO2: 96% (10-06-19 @ 22:24) (96% - 96%)        Physical Exam:   General: Patient resting comfortably in bed, NAD  HEENT: NCAT, conjunctiva clear, sclera white, PEERLA, EOMI, moist mucous membranes, normal orophaynx  Neck: No masses, no JVD, no cervical lymphadenopathy  Respiratory: good inspiratory effort; lungs clear to auscultation bilaterally  CV: Normal rate, regular rhythm, normal S1/S2, no rubs, gallops, murmurs  GI: abdomen soft, non-tender, non-distended, no masses, normal bowel sounds  Extremities: Well-perfused, no clubbing, no cyanosis, no lower extremity edema bilaterally  Skin: warm and dry  Neurology: AAOx3, mentating appropriately, follows commands, nonfocal    Labs:                         8.0    6.30  )-----------( 146      ( 06 Oct 2019 11:08 )             26.4     Neutophil% 86.0, Lymphocyte% 7.9, Monocyte% 3.2, Bands% 2.4 10-06-19 @ 11:08    06 Oct 2019 11:08    138    |  106    |  31     ----------------------------<  142    4.5     |  19     |  1.3      Ca    9.5        06 Oct 2019 11:08  Mg     2.3       06 Oct 2019 11:08    TPro  5.7    /  Alb  4.0    /  TBili  4.3    /  DBili  0.8    /  AST  46     /  ALT  12     /  AlkPhos  65     06 Oct 2019 11:08        Amylase --, Lipase 19, 10-04-19 @ 18:20              Urinalysis Basic - ( 05 Oct 2019 11:40 )    Color: Yellow / Appearance: Clear / S.030 / pH: x  Gluc: x / Ketone: Negative  / Bili: Negative / Urobili: 3 mg/dL   Blood: x / Protein: 30 mg/dL / Nitrite: Negative   Leuk Esterase: Small / RBC: 5 /HPF / WBC 10 /HPF   Sq Epi: x / Non Sq Epi: 1 /HPF / Bacteria: Negative

## 2019-10-07 NOTE — PROGRESS NOTE ADULT - ASSESSMENT
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 Keep hb >6.    Started on prednisone 1mg/kg daily with folic acid daily.   Increased to prednisone 1.5mg/kg daily- on prednisone 80mg now - Today is day 3 of steroids     Discussed rituximab as first line treatment along with steroids for prevention of future recurrences - patient is reluctant to try rituximab given her extensive history of allergic reactions . Hepatitis panel negative     f/u  flow cytometry to rule out underlying Lymphoproliferative disease   - Monitor Hb level daily .   Response is expected between 1-2 weeks with a target hb level of 10. Patient can be discharged home when a steady increase of hemoglobin occurs.       # Low Vitamin B12 level with normal MMA : on vitamin b12 IM twice weekly at home     Switched to daily PO vitamin b12.   will recheck vitamin B12  level to evaluate response to IM treatment    # DVT PPx : can use lovenox since patient has autoimmune hemolytic anemia and not bleeding.                      There is high risk of VTE with AIHA    Labs once daily , including cbc, bmp/hepatic panel , LDH and retic count  Transfuse to keep hb >6 . Run transfusion very slowly to avoid overt hemolysis

## 2019-10-07 NOTE — DIETITIAN INITIAL EVALUATION ADULT. - PHYSICAL APPEARANCE
BMI 23.9 IBW: 43.2 kg UBW: 114# Denies unintentional weight loss. BS 19 skin intact. Observed moderate muscle wasting to temporalis however suspect this is age-related./well nourished

## 2019-10-07 NOTE — DIETITIAN INITIAL EVALUATION ADULT. - ENERGY NEEDS
estimated calorie needs: 990  - 1073 kcals/day (MSJ x 1.2 - 1.3 AF)  estimated protein needs: 52 - 64 gms/day (1.0 - 1.2 gm/kg)  estimated fluid needs: 1ml/kcal or per LIP

## 2019-10-07 NOTE — PROGRESS NOTE ADULT - SUBJECTIVE AND OBJECTIVE BOX
89 year old female with history of HTN, diverticulosis, and B12 deficiency presents with anemia.   patient has been having weakness, loss of appetite, headache worsening for 5 days.   last month she was admitted to [facility] for similar presentation, found to be anemic, received transfusion and discharged on B12 injections.   was doing relatively well until 5 days when she stared to have similar symptoms and noted to  be jaundiced. The pt denies chest pain, nausea, vomiting but  reports on/off LLQ for the last month.   Blood work from last admission showed hemolytic picture ( low haptoglobin, elevated LDH .. )   In ED patient was hemodynamically stable, received one unit and had unremarkable CT abdomin.  The pt is being admitted for hemolytic anemia.     PAST MEDICAL & SURGICAL HISTORY:     HTN, ASHD  TIA (transient ischemic attack)  Diverticulosis, Diverticulitis  Chronic Constipation  OA, DDD, DJD, kyphosis, Osteoporosis  No significant past surgical history        Vital Signs Last 24 Hrs    T(F): 96   HR: 71   BP: 167/80     RR: 20   SpO2: 97%    Physical Exam:    GENERAL: jaundice, thin elderly female   HEAD:  Atraumatic, Normocephalic  ENT: Moist mucous membranes  CHEST/LUNG: Clear to auscultation bilaterally;   HEART: Regular rate and rhythm; No murmurs, rubs, or gallops  ABDOMEN: Soft, Nontender, Nondistended.   EXTREMITIES:  brisk capillary refill. No clubbing, cyanosis, or edema  NERVOUS SYSTEM:  Alert & Oriented X3, speech clear. No deficits                           5.9    5.92  )-----------( 146      ( 06 Oct 2019 00:57 )             19.7     10-05    137  |  103  |  29<H>  ----------------------------<  158<H>  4.4   |  17  |  1.3    Ca    9.6      05 Oct 2019 09:13    TPro  5.8<L>  /  Alb  4.1  /  TBili  6.9<H>  /  DBili  1.1<H>  /  AST  51<H>  /  ALT  11  /  AlkPhos  72  10-05                             Bilirubin Direct, Serum (10.05.19 @ 01:00)    Bilirubin Direct, Serum: 1.1 mg/dL    Gamma Glutamyl Transferase, Serum (10.05.19 @ 01:00)    Gamma Glutamyl Transferase, Serum: 9 U/L    Lactate Dehydrogenase, Serum (10.05.19 @ 01:00)    Lactate Dehydrogenase, Serum: 784    Direct Lorna Profile (10.04.19 @ 20:10)    Dir Antiglob Polyspecific Interpretation: POS: 10/04/2019 23:17  AVILLAFRAN  TYPE:(C=Critical, N=Notification, A=Abnormal) C  TESTS: _DAT  DATE/TIME CALLED: _[id number]  CALLED TO: _Dr Lewis  READ BACK (2 Patient Identifiers)(Y/N): _Y  READ BACK VALUES (Y/N): _Y  CALLED BY: _AT    Bilirubin Total, Serum: 7.8 mg/dL (10.04.19 @ 18:20)       EXAM:  US ABDOMEN LIMITED            PROCEDURE DATE:  10/05/2019            INTERPRETATION:  CLINICAL HISTORY: Jaundice.    COMPARISON: None.    PROCEDURE: Ultrasound of the right upper quadrant was performed.    FINDINGS:    LIVER:  Normal in contour and echogenicity measuring 14.3 cm in length,   containing multiple cysts the largest measuring up to approximately 2.2 x   2.4 x 2.3 cm.    GALLBLADDER/BILIARY TREE:  No evidence of cholelithiasis. No wall   thickening or pericholecystic fluid.  Negative sonographic Cameron's sign.   No intrahepatic biliary ductal dilatation. The common bile duct measures   7 mm, which is normal or age.     PANCREAS: Obscured by overlying bowel gas.    KIDNEY:  Right kidney measures 9.2 cm in length, with a mid pole cyst   measuring up to 0.8 cm. No hydronephrosis, calculi or solid mass.    AORTA/IVC:  Visualized proximal portions unremarkable.    ASCITES:  None.    IMPRESSION:    Essentially unremarkable right upper quadrant ultrasound.  Nonvisualization of the pancreas.              ARUN LOTT M.D., RESIDENT RADIOLOGIST  This document has been electronically signed.  CHRISTINA LEES M.D., ATTENDING RADIOLOGIST  This document has been electronically signed. Oct  5 2019  7:17AM        EXAM:  CT ABDOMEN AND PELVIS IC            PROCEDURE DATE:  10/04/2019            INTERPRETATION:  CLINICAL STATEMENT: Left lower quadrant abdominal pain.      TECHNIQUE: Contiguous axial CT images were obtained from the lower chest   to the pubic symphysis following administration of 100cc Optiray 320   intravenous contrast.  Oral contrast was not administered.  Reformatted   images in the coronal and sagittal planes were acquired.    COMPARISON CT: CT of the abdomen and pelvis dated 4/2/2018.      FINDINGS:    LOWER CHEST: Partially imaged right sided pleural effusion. Bilateral   subsegmental and dependent atelectasis also seen.    HEPATOBILIARY: Right hepatic cyst and additional subcentimeter hepatic   hypodensities, too small to further characterize.    SPLEEN: Unremarkable.    PANCREAS: Unchanged pancreatic tail hypodense lesion, likely a side   branch IPMN.    ADRENAL GLANDS: Unremarkable.    KIDNEYS: Symmetric renal enhancement. No hydronephrosis. Bilateral renal   cysts and additional subcentimeter bilateral hypodensities, too small to   further characterize.    ABDOMINOPELVIC NODES: No lymphadenopathy.    PELVIC ORGANS: Unremarkable.    PERITONEUM/MESENTERY/BOWEL: Sigmoid diverticulosis without definitive   evidence of diverticulitis. No bowel obstruction or pneumoperitoneum.   Large hiatal hernia.    BONES/SOFT TISSUES: No acute osseous abnormality. Degenerative changes of   the spine. Small fat-containing umbilical hernia and bilateral inguinal   hernias.    OTHER: Aortic atherosclerosis.      IMPRESSION:     No CT evidence of acute intra-abdominal pathology.    Partially imaged right-sided pleural effusion.    Additional Findings/Recommendations After Attending Radiologist Review:    Mild splenomegaly, new.              ARUN LOTT M.D., RESIDENT RADIOLOGIST  This document has been electronically signed.  IRON DUARTE M.D., ATTENDING RADIOLOGIST  This document has been electronically signed. Oct  4 2019 11:47PM 89 year old female with history of HTN, diverticulosis, and B12 deficiency presents with anemia.   patient has been having weakness, loss of appetite, headache worsening for 5 days.   last month she was admitted to [facility] for similar presentation, found to be anemic, received transfusion and discharged on B12 injections.   was doing relatively well until 5 days when she stared to have similar symptoms and noted to  be jaundiced. The pt denies chest pain, nausea, vomiting but  reports on/off LLQ for the last month.   Blood work from last admission showed hemolytic picture ( low haptoglobin, elevated LDH .. )   In ED patient was hemodynamically stable, received one unit and had unremarkable CT abdomin.  The pt is being admitted for hemolytic anemia.     PAST MEDICAL & SURGICAL HISTORY:     HTN, ASHD  TIA (transient ischemic attack)  Diverticulosis, Diverticulitis  Chronic Constipation  OA, DDD, DJD, kyphosis, Osteoporosis  No significant past surgical history    Meds;    enoxaparin 40mg q 24  pantoprazole 40mg q 24  prednisone 80mg q 24  vit B12 1000mc po q 24  folic acid 1mg q 24  nifedipine XL 30gmg q24  triamterene-HCTZ 37.5/25mg q 24        Vital Signs Last 24 Hrs    T(F): 96.3   HR: 74   BP: 124/56    RR: 19   SpO2: 97%    Physical Exam:    GENERAL: jaundice, thin elderly female   HEAD:  Atraumatic, Normocephalic  ENT: Moist mucous membranes  CHEST/LUNG: Clear to auscultation bilaterally;   HEART: Regular rate and rhythm; No murmurs, rubs, or gallops  ABDOMEN: Soft, Nontender, Nondistended.   EXTREMITIES:  brisk capillary refill. No clubbing, cyanosis, or edema  NERVOUS SYSTEM:  Alert & Oriented X3, speech clear. No deficits                           8.3    5.8  )-----------( 133     , retic %  21.6, absolute retic 540             27       141  |  106  |  32  ----------------------------<  91  4.1   |  21  |  1.3    Ca    9.6          TPro  5.8,   /  Alb  4.1, 3.9  /  TBili  6.9, 4.1 /  DBili  1.1<H>  /  AST  51<H>  /  ALT  11  /  AlkPhos  72  10-05                             Bilirubin Direct, Serum (10.05.19 @ 01:00)    Bilirubin Direct, Serum: 1.1 mg/dL    Gamma Glutamyl Transferase, Serum (10.05.19 @ 01:00)    Gamma Glutamyl Transferase, Serum: 9 U/L    Lactate Dehydrogenase, Serum (10.05.19 @ 01:00)    Lactate Dehydrogenase, Serum: 784    Direct Lorna Profile (10.04.19 @ 20:10)    Dir Antiglob Polyspecific Interpretation: POS: 10/04/2019 23:17  AVILLAFRAN  T    Bilirubin Total, Serum: 7.8 mg/dL (10.04.19 @ 18:20)       EXAM:  US ABDOMEN LIMITED            PROCEDURE DATE:  10/05/2019            INTERPRETATION:  CLINICAL HISTORY: Jaundice.    COMPARISON: None.    PROCEDURE: Ultrasound of the right upper quadrant was performed.    FINDINGS:    LIVER:  Normal in contour and echogenicity measuring 14.3 cm in length,   containing multiple cysts the largest measuring up to approximately 2.2 x   2.4 x 2.3 cm.    GALLBLADDER/BILIARY TREE:  No evidence of cholelithiasis. No wall   thickening or pericholecystic fluid.  Negative sonographic Cameron's sign.   No intrahepatic biliary ductal dilatation. The common bile duct measures   7 mm, which is normal or age.     PANCREAS: Obscured by overlying bowel gas.    KIDNEY:  Right kidney measures 9.2 cm in length, with a mid pole cyst   measuring up to 0.8 cm. No hydronephrosis, calculi or solid mass.    AORTA/IVC:  Visualized proximal portions unremarkable.    ASCITES:  None.    IMPRESSION:    Essentially unremarkable right upper quadrant ultrasound.  Nonvisualization of the pancreas.              ARUN LOTT M.D., RESIDENT RADIOLOGIST  This document has been electronically signed.  CHRISTINA LEES M.D., ATTENDING RADIOLOGIST  This document has been electronically signed. Oct  5 2019  7:17AM        EXAM:  CT ABDOMEN AND PELVIS IC            PROCEDURE DATE:  10/04/2019            INTERPRETATION:  CLINICAL STATEMENT: Left lower quadrant abdominal pain.      TECHNIQUE: Contiguous axial CT images were obtained from the lower chest   to the pubic symphysis following administration of 100cc Optiray 320   intravenous contrast.  Oral contrast was not administered.  Reformatted   images in the coronal and sagittal planes were acquired.    COMPARISON CT: CT of the abdomen and pelvis dated 4/2/2018.      FINDINGS:    LOWER CHEST: Partially imaged right sided pleural effusion. Bilateral   subsegmental and dependent atelectasis also seen.    HEPATOBILIARY: Right hepatic cyst and additional subcentimeter hepatic   hypodensities, too small to further characterize.    SPLEEN: Unremarkable.    PANCREAS: Unchanged pancreatic tail hypodense lesion, likely a side   branch IPMN.    ADRENAL GLANDS: Unremarkable.    KIDNEYS: Symmetric renal enhancement. No hydronephrosis. Bilateral renal   cysts and additional subcentimeter bilateral hypodensities, too small to   further characterize.    ABDOMINOPELVIC NODES: No lymphadenopathy.    PELVIC ORGANS: Unremarkable.    PERITONEUM/MESENTERY/BOWEL: Sigmoid diverticulosis without definitive   evidence of diverticulitis. No bowel obstruction or pneumoperitoneum.   Large hiatal hernia.    BONES/SOFT TISSUES: No acute osseous abnormality. Degenerative changes of   the spine. Small fat-containing umbilical hernia and bilateral inguinal   hernias.    OTHER: Aortic atherosclerosis.      IMPRESSION:     No CT evidence of acute intra-abdominal pathology.    Partially imaged right-sided pleural effusion.    Additional Findings/Recommendations After Attending Radiologist Review:    Mild splenomegaly, new.              ARUN LOTT M.D., RESIDENT RADIOLOGIST  This document has been electronically signed.  IRON DUARTE M.D., ATTENDING RADIOLOGIST  This document has been electronically signed. Oct  4 2019 11:47PM

## 2019-10-07 NOTE — PROGRESS NOTE ADULT - SUBJECTIVE AND OBJECTIVE BOX
Patient is a 89y old  Female who presents with a chief complaint of anemia (07 Oct 2019 06:50)      Subjective:      Vital Signs Last 24 Hrs  T(C): 35.3 (07 Oct 2019 04:46), Max: 36.3 (06 Oct 2019 20:02)  T(F): 95.6 (07 Oct 2019 04:46), Max: 97.4 (06 Oct 2019 20:02)  HR: 63 (07 Oct 2019 04:46) (63 - 71)  BP: 146/68 (07 Oct 2019 04:46) (126/94 - 149/66)  BP(mean): --  RR: 20 (07 Oct 2019 04:46) (20 - 20)  SpO2: 96% (06 Oct 2019 22:24) (96% - 96%)    PHYSICAL EXAM  General: adult in NAD  HEENT: clear oropharynx, icteric sclera, pale conjunctiva  Neck: supple  CV: normal S1/S2   Lungs: positive air movement b/l   Abdomen: soft non-tender  Ext: no clubbing cyanosis or edema  Skin: no rashes and no petechiae  Neuro: alert and oriented X 4, no focal deficits    MEDICATIONS  (STANDING):  chlorhexidine 4% Liquid 1 Application(s) Topical <User Schedule>  cyanocobalamin 1000 MICROGram(s) Oral daily  enoxaparin Injectable 30 milliGRAM(s) SubCutaneous daily  folic acid  Oral Tab/Cap - Peds 1 milliGRAM(s) Oral daily  NIFEdipine XL 30 milliGRAM(s) Oral daily  predniSONE   Tablet 80 milliGRAM(s) Oral daily  triamterene 37.5 mG/hydrochlorothiazide 25 mG Capsule 1 Capsule(s) Oral daily    MEDICATIONS  (PRN):      LABS:                          8.3    5.87  )-----------( 133      ( 07 Oct 2019 06:18 )             27.6         Mean Cell Volume : 110.4 fL  Mean Cell Hemoglobin : 33.2 pg  Mean Cell Hemoglobin Concentration : 30.1 g/dL  Auto Neutrophil # : 4.28 K/uL  Auto Lymphocyte # : 1.00 K/uL  Auto Monocyte # : 0.45 K/uL  Auto Eosinophil # : 0.05 K/uL  Auto Basophil # : 0.01 K/uL  Auto Neutrophil % : 72.8 %  Auto Lymphocyte % : 17.0 %  Auto Monocyte % : 7.7 %  Auto Eosinophil % : 0.9 %  Auto Basophil % : 0.2 %      Serial CBC's  10-07 @ 06:18  Hct-27.6 / Hgb-8.3 / Plat-133 / RBC-2.50 / WBC-5.87  Serial CBC's  10-06 @ 11:08  Hct-26.4 / Hgb-8.0 / Plat-146 / RBC-2.41 / WBC-6.30  Serial CBC's  10-06 @ 00:57  Hct-19.7 / Hgb-5.9 / Plat-146 / RBC-1.63 / WBC-5.92  Serial CBC's  10-05 @ 17:26  Hct-21.3 / Hgb-6.2 / Plat-154 / RBC-1.77 / WBC-6.00  Serial CBC's  10-05 @ 11:59  Hct-22.5 / Hgb-6.6 / Plat-148 / RBC-1.86 / WBC-6.20  Serial CBC's  10-05 @ 09:13  Hct-22.3 / Hgb-6.7 / Plat-148 / RBC-1.86 / WBC-7.25  Serial CBC's  10-04 @ 18:20  Hct-16.9 / Hgb-4.7 / Plat-146 / RBC-1.27 / WBC-5.88      10-07    141  |  106  |  32<H>  ----------------------------<  91  4.4   |  21  |  1.3    Ca    9.6      07 Oct 2019 06:18  Mg     2.4     10-07    TPro  5.7<L>  /  Alb  3.9  /  TBili  4.1<H>  /  DBili  x   /  AST  34  /  ALT  12  /  AlkPhos  63  10-07      PTT - ( 05 Oct 2019 09:13 )  PTT:27.5 sec    Reticulocyte Percent: 21.6 % (10-07 @ 06:18)  Reticulocyte Percent: 19.8 % (10-06 @ 11:08)  Reticulocyte Percent: 24.8 % (10-05 @ 09:13)              Urinalysis Basic - ( 05 Oct 2019 11:40 )    Color: Yellow / Appearance: Clear / S.030 / pH: x  Gluc: x / Ketone: Negative  / Bili: Negative / Urobili: 3 mg/dL   Blood: x / Protein: 30 mg/dL / Nitrite: Negative   Leuk Esterase: Small / RBC: 5 /HPF / WBC 10 /HPF   Sq Epi: x / Non Sq Epi: 1 /HPF / Bacteria: Negative                BLOOD SMEAR INTERPRETATION:       RADIOLOGY & ADDITIONAL STUDIES: Patient is a 89y old  Female who presents with a chief complaint of anemia (07 Oct 2019 06:50)      Subjective: She is dong well. She had no complaints. She wants to go home.       Vital Signs Last 24 Hrs  T(C): 35.3 (07 Oct 2019 04:46), Max: 36.3 (06 Oct 2019 20:02)  T(F): 95.6 (07 Oct 2019 04:46), Max: 97.4 (06 Oct 2019 20:02)  HR: 63 (07 Oct 2019 04:46) (63 - 71)  BP: 146/68 (07 Oct 2019 04:46) (126/94 - 149/66)  BP(mean): --  RR: 20 (07 Oct 2019 04:46) (20 - 20)  SpO2: 96% (06 Oct 2019 22:24) (96% - 96%)    PHYSICAL EXAM  General: adult in NAD  HEENT: clear oropharynx, icteric sclera, pale conjunctiva  Neck: supple  CV: normal S1/S2   Lungs: positive air movement b/l   Abdomen: soft non-tender  Ext: no clubbing cyanosis or edema  Skin: no rashes and no petechiae  Neuro: alert and oriented X 4, no focal deficits    MEDICATIONS  (STANDING):  chlorhexidine 4% Liquid 1 Application(s) Topical <User Schedule>  cyanocobalamin 1000 MICROGram(s) Oral daily  enoxaparin Injectable 30 milliGRAM(s) SubCutaneous daily  folic acid  Oral Tab/Cap - Peds 1 milliGRAM(s) Oral daily  NIFEdipine XL 30 milliGRAM(s) Oral daily  predniSONE   Tablet 80 milliGRAM(s) Oral daily  triamterene 37.5 mG/hydrochlorothiazide 25 mG Capsule 1 Capsule(s) Oral daily    MEDICATIONS  (PRN):      LABS:                          8.3    5.87  )-----------( 133      ( 07 Oct 2019 06:18 )             27.6         Mean Cell Volume : 110.4 fL  Mean Cell Hemoglobin : 33.2 pg  Mean Cell Hemoglobin Concentration : 30.1 g/dL  Auto Neutrophil # : 4.28 K/uL  Auto Lymphocyte # : 1.00 K/uL  Auto Monocyte # : 0.45 K/uL  Auto Eosinophil # : 0.05 K/uL  Auto Basophil # : 0.01 K/uL  Auto Neutrophil % : 72.8 %  Auto Lymphocyte % : 17.0 %  Auto Monocyte % : 7.7 %  Auto Eosinophil % : 0.9 %  Auto Basophil % : 0.2 %      Serial CBC's  10-07 @ 06:18  Hct-27.6 / Hgb-8.3 / Plat-133 / RBC-2.50 / WBC-5.87  Serial CBC's  10-06 @ 11:08  Hct-26.4 / Hgb-8.0 / Plat-146 / RBC-2.41 / WBC-6.30  Serial CBC's  10-06 @ 00:57  Hct-19.7 / Hgb-5.9 / Plat-146 / RBC-1.63 / WBC-5.92  Serial CBC's  10-05 @ 17:26  Hct-21.3 / Hgb-6.2 / Plat-154 / RBC-1.77 / WBC-6.00  Serial CBC's  10-05 @ 11:59  Hct-22.5 / Hgb-6.6 / Plat-148 / RBC-1.86 / WBC-6.20  Serial CBC's  10-05 @ 09:13  Hct-22.3 / Hgb-6.7 / Plat-148 / RBC-1.86 / WBC-7.25  Serial CBC's  10-04 @ 18:20  Hct-16.9 / Hgb-4.7 / Plat-146 / RBC-1.27 / WBC-5.88      10-07    141  |  106  |  32<H>  ----------------------------<  91  4.4   |  21  |  1.3    Ca    9.6      07 Oct 2019 06:18  Mg     2.4     10-07    TPro  5.7<L>  /  Alb  3.9  /  TBili  4.1<H>  /  DBili  x   /  AST  34  /  ALT  12  /  AlkPhos  63  10-07      PTT - ( 05 Oct 2019 09:13 )  PTT:27.5 sec    Reticulocyte Percent: 21.6 % (10-07 @ 06:18)  Reticulocyte Percent: 19.8 % (10-06 @ 11:08)  Reticulocyte Percent: 24.8 % (10-05 @ 09:13)              Urinalysis Basic - ( 05 Oct 2019 11:40 )    Color: Yellow / Appearance: Clear / S.030 / pH: x  Gluc: x / Ketone: Negative  / Bili: Negative / Urobili: 3 mg/dL   Blood: x / Protein: 30 mg/dL / Nitrite: Negative   Leuk Esterase: Small / RBC: 5 /HPF / WBC 10 /HPF   Sq Epi: x / Non Sq Epi: 1 /HPF / Bacteria: Negative                BLOOD SMEAR INTERPRETATION:       RADIOLOGY & ADDITIONAL STUDIES:

## 2019-10-07 NOTE — PROGRESS NOTE ADULT - ASSESSMENT
**this is a preliminary note and will be edited**     89 year old female with history of HTN, diverticulosis, and B12 deficiency presents with anemia.     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 Keep hb >6. Overnight hb 5.9 , 1 PRBC transfused. f/u labs today    Started on prednisone 1mg/kg daily with folic acid daily    Oncology discussed rituximab as first line treatment along with steroids for prevention of future recurrences - patient is reluctant to try rituximab given her extensive history of allergic reactions . Hepatitis panel negative   - Will send flow cytometry to rule out underlying Lymphoproliferative disease  - Monitor Hb level daily . Response is expected between 1-3 weeks with a target hb level of 10. Patient can be discharged home when a steady increase of hemoglobin occurs.       # Low Vitamin B12 level with normal MMA : on vitamin b12 IM twice weekly at home     Switched to daily PO vitamin b12. will recheck vitamin B12  level to evaulate response to IM treatment    # DVT PPx : can use lovenox since patient has autoimmune hemolytic anemia and not bleeding.                      There is high risk of VTE with AIHA    Labs once daily , including cbcd, bmp/hepatic panel , LDH and retic count  # HTN   - c/w home medications     # Diverticulosis   - avoid constipation,     # DASH diet   # full code 89 year old female with history of HTN, diverticulosis, and B12 deficiency presents with anemia.     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 f/u labs today  - Started on prednisone 1mg/kg daily with folic acid daily  - Oncology discussed rituximab as first line treatment along with steroids for prevention of future recurrences - patient is reluctant to try rituximab given her extensive history of allergic reactions .   - Hepatitis panel negative   - Will send flow cytometry to rule out underlying Lymphoproliferative disease  - Monitor Hb level daily . Response is expected between 1-3 weeks with a target hb level of 10. Patient can be discharged home when a steady increase of hemoglobin occurs.  - Labs once daily , including cbc,, bmp/hepatic panel, LDH and retic count    # Low Vitamin B12 level with normal MMA : on vitamin b12 IM twice weekly at home  - continue PO B12 (1000mcg daily)  - vitamin B12 level (10/7): 1424    # HTN   - c/w home medications     # Diverticulosis   - avoid constipation    # DVT PPx : can use lovenox since patient has autoimmune hemolytic anemia and not bleeding. There is high risk of VTE with AIHA  # DASH diet  # full code 89 year old female with history of HTN, diverticulosis, and B12 deficiency presents with anemia.     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 post-transfusion Hb: 8.0  - Hb 8.3 today  - Prednisone increased to 1.5 mg/kg daily (80mg daily) with folic acid  - Oncology discussed rituximab as first line treatment along with steroids for prevention of future recurrences - patient is reluctant to try rituximab given her extensive history of allergic reactions .   - Hepatitis panel negative   - Flow cytometry sent; will follow up results  - Monitor Hb level daily . Response is expected between 1-3 weeks with a target hb level of 10. Patient can be discharged home when a steady increase of hemoglobin occurs.  - Transfuse to keep Hb>6; transfuse slowly to avoid hemolysis  - Labs once daily , including cbc, bmp/hepatic panel, LDH and retic count    # Low Vitamin B12 level with normal MMA : on vitamin b12 IM twice weekly at home  - continue PO B12 (1000mcg daily)  - vitamin B12 level (10/7): 1424    # HTN   - c/w home medications     # Diverticulosis   - avoid constipation    #DVT PPx : can use lovenox since patient has autoimmune hemolytic anemia and not bleeding. There is high risk of VTE with AIHA  #GI PPx: on hold due to lansoprazole allergy (patient breaks out into hives)  #DASH diet  #full code

## 2019-10-07 NOTE — DIETITIAN INITIAL EVALUATION ADULT. - OTHER INFO
Pt adm for anemia. No folate or vitamin B12 deficiency in light of high retic count. s/p  2 PRBCs. Low Vitamin B12 level with normal MMA - on supplementation. Diverticulosis.

## 2019-10-07 NOTE — DIETITIAN INITIAL EVALUATION ADULT. - ADD RECOMMEND
Consider Dysphagia 2 mechanical soft, thin liquids, DASH/TLC, high fiber diet. Consider Dysphagia 2 mechanical soft, thin liquids, DASH/TLC, high fiber diet. Spoke w/ MD Diaz

## 2019-10-07 NOTE — PROGRESS NOTE ADULT - ASSESSMENT
89 year old female with history of HTN, ASHD,  diverticulosis, and B12 deficiency presents with hemolytic anemia and jaundice.         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 Keep hb >6. Overnight hb 5.9 , 1 PRBC transfused    Start prednisone 1mg/kg daily with folic acid daily    Discussed rituximab as first line treatment along with steroids for prevention of future recurrences - patient is reluctant to try rituximab given her extensive history of allergic reactions . Hepatitis panel negative   - Will send flow cytometry to rule out underlying Lymphoproliferative disease  - Monitor Hb level daily . Response is expected between 1-3 weeks with a target hb level of 10. Patient can be discharged home when a steady increase of hemoglobin occurs.       # Low Vitamin B12 level with normal MMA : on vitamin b12 IM twice weekly at home     Switched to daily sublingual vitamin b12. will recheck vitamin B12  level to evaulate response to IM treatment    # HTN   - c/w home medications     # Diverticulosis   - avoid constipation,     # DASH diet   # full code 89 year old female with history of HTN, ASHD,  diverticulosis, and B12 deficiency presents with hemolytic anemia and jaundice.         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 Hepatitis panel negative   - Will send flow cytometry to rule out underlying Lymphoproliferative disease  - Monitor Hb level daily . Response is expected between 1-3 weeks with a target hb level of 10. Patient can be discharged home when a steady increase of hemoglobin occurs.         # HTN, ASHD  - c/w home medications     # Diverticulosis   - avoid constipation,     # DASH diet   # full code

## 2019-10-07 NOTE — DIETITIAN INITIAL EVALUATION ADULT. - FACTORS AFF FOOD INTAKE
Pt reports she is not a big eater, 3 meals a day. Denies poor po days before adm. Pt followed regular diet and cooked for herself. Pt is edentulous - she lost her dentures. Pt did not eat chicken on tray because it was too tough. Agreeable to softer diet texture, declines nutrition supplements. Pt reports she ate 2 pancakes this morning. Pt c/o constipation - LBM 10/5.

## 2019-10-07 NOTE — DISCHARGE NOTE NURSING/CASE MANAGEMENT/SOCIAL WORK - PATIENT PORTAL LINK FT
You can access the FollowMyHealth Patient Portal offered by [facility] by registering at the following website: [email or web address]. By joining [employer]’s FollowMyHealth portal, you will also be able to view your health information using other applications (apps) compatible with our system.

## 2019-10-07 NOTE — PROGRESS NOTE ADULT - ATTENDING COMMENTS
She is doing well. Her HgB was 8.3 today. I spoke with her son today in AM and later at bedside.    He tells me once she gets home he does not thinlk she will take any She is doing well. Her HgB was 8.3 today. I spoke with her son today in AM and later at bedside.    He tells me once she gets home he does not think she will take any medication. She never does. We spoke about Rituxan at least to start it an I can finish it on follow up. He will speak to her to see if she agrees.     SHE IS NOT DISCHARGE READY : Her CBC needs to be going up for 2-3 days at least , and we must see a consistent fall in her LDH,  and bili than I will clear her for discharge and we have to make sure she will take her steroids, maybe she will need VNS at home to come daily or Family to see her take her pills.    Her repeat B12 came back over 1000.    #AIHA   Please check CBC only daily,    Please transfuse blood slowly over 3-4 hours if needed. Would oreder one unit at at time and re-evaluate unless she is unstable     She is doing well on prednisone 50 mg so now on 80 mg ( 1.5 mg/gk daily) , today is day 3 of steroids..  C/w folic acid and B12 SL.  May take 1-2 weeks to see a response.  If does not tolerate 80 mg daily will decrease back to 50mg, as soon as I see response will also decrease down to 50 mg.     Can give Pepcid for PUD PPX but Steroids alone is not high risk for PUD.      We will try to convince her to start Rituxan, it will be 375mg/m2 weekly for 4 weeks, we can start inpatient and I will finish as outpatient.  If does not take steroids at home maybe at least Rituxan will help but alone not a treatment modality.     Check CBC, Reitc, LDH, CMP daily. Keep hgb >6 gm/dl    DVT ppx Lovenox    Monitor sugars .     NOT DISCHARGE READY!!

## 2019-10-08 LAB
ALBUMIN SERPL ELPH-MCNC: 3.8 G/DL — SIGNIFICANT CHANGE UP (ref 3.5–5.2)
ALP SERPL-CCNC: 59 U/L — SIGNIFICANT CHANGE UP (ref 30–115)
ALT FLD-CCNC: 10 U/L — SIGNIFICANT CHANGE UP (ref 0–41)
ANION GAP SERPL CALC-SCNC: 13 MMOL/L — SIGNIFICANT CHANGE UP (ref 7–14)
AST SERPL-CCNC: 20 U/L — SIGNIFICANT CHANGE UP (ref 0–41)
BASOPHILS # BLD AUTO: 0.01 K/UL — SIGNIFICANT CHANGE UP (ref 0–0.2)
BASOPHILS NFR BLD AUTO: 0.2 % — SIGNIFICANT CHANGE UP (ref 0–1)
BILIRUB SERPL-MCNC: 2.4 MG/DL — HIGH (ref 0.2–1.2)
BUN SERPL-MCNC: 40 MG/DL — HIGH (ref 10–20)
CALCIUM SERPL-MCNC: 9.1 MG/DL — SIGNIFICANT CHANGE UP (ref 8.5–10.1)
CHLORIDE SERPL-SCNC: 105 MMOL/L — SIGNIFICANT CHANGE UP (ref 98–110)
CO2 SERPL-SCNC: 21 MMOL/L — SIGNIFICANT CHANGE UP (ref 17–32)
CREAT SERPL-MCNC: 1.5 MG/DL — SIGNIFICANT CHANGE UP (ref 0.7–1.5)
EOSINOPHIL # BLD AUTO: 0.04 K/UL — SIGNIFICANT CHANGE UP (ref 0–0.7)
EOSINOPHIL NFR BLD AUTO: 0.7 % — SIGNIFICANT CHANGE UP (ref 0–8)
GLUCOSE SERPL-MCNC: 116 MG/DL — HIGH (ref 70–99)
HCT VFR BLD CALC: 27 % — LOW (ref 37–47)
HGB BLD-MCNC: 8 G/DL — LOW (ref 12–16)
IMM GRANULOCYTES NFR BLD AUTO: 0.9 % — HIGH (ref 0.1–0.3)
LDH SERPL L TO P-CCNC: 752 — HIGH (ref 50–242)
LYMPHOCYTES # BLD AUTO: 1.13 K/UL — LOW (ref 1.2–3.4)
LYMPHOCYTES # BLD AUTO: 21 % — SIGNIFICANT CHANGE UP (ref 20.5–51.1)
MAGNESIUM SERPL-MCNC: 2.5 MG/DL — HIGH (ref 1.8–2.4)
MCHC RBC-ENTMCNC: 29.6 G/DL — LOW (ref 32–37)
MCHC RBC-ENTMCNC: 32.8 PG — HIGH (ref 27–31)
MCV RBC AUTO: 110.7 FL — HIGH (ref 81–99)
MONOCYTES # BLD AUTO: 0.46 K/UL — SIGNIFICANT CHANGE UP (ref 0.1–0.6)
MONOCYTES NFR BLD AUTO: 8.6 % — SIGNIFICANT CHANGE UP (ref 1.7–9.3)
NEUTROPHILS # BLD AUTO: 3.68 K/UL — SIGNIFICANT CHANGE UP (ref 1.4–6.5)
NEUTROPHILS NFR BLD AUTO: 68.6 % — SIGNIFICANT CHANGE UP (ref 42.2–75.2)
NRBC # BLD: 0 /100 WBCS — SIGNIFICANT CHANGE UP (ref 0–0)
PLATELET # BLD AUTO: 143 K/UL — SIGNIFICANT CHANGE UP (ref 130–400)
POTASSIUM SERPL-MCNC: 4.5 MMOL/L — SIGNIFICANT CHANGE UP (ref 3.5–5)
POTASSIUM SERPL-SCNC: 4.5 MMOL/L — SIGNIFICANT CHANGE UP (ref 3.5–5)
PROT SERPL-MCNC: 5.5 G/DL — LOW (ref 6–8)
RBC # BLD: 2.44 M/UL — LOW (ref 4.2–5.4)
RBC # BLD: 2.44 M/UL — LOW (ref 4.2–5.4)
RBC # FLD: 24.8 % — HIGH (ref 11.5–14.5)
RETICS #: 454.1 K/UL — HIGH (ref 25–125)
RETICS/RBC NFR: 18.6 % — HIGH (ref 0.5–1.5)
SODIUM SERPL-SCNC: 139 MMOL/L — SIGNIFICANT CHANGE UP (ref 135–146)
WBC # BLD: 5.37 K/UL — SIGNIFICANT CHANGE UP (ref 4.8–10.8)
WBC # FLD AUTO: 5.37 K/UL — SIGNIFICANT CHANGE UP (ref 4.8–10.8)

## 2019-10-08 PROCEDURE — 99232 SBSQ HOSP IP/OBS MODERATE 35: CPT

## 2019-10-08 RX ORDER — FAMOTIDINE 10 MG/ML
20 INJECTION INTRAVENOUS
Refills: 0 | Status: DISCONTINUED | OUTPATIENT
Start: 2019-10-08 | End: 2019-10-08

## 2019-10-08 RX ORDER — FAMOTIDINE 10 MG/ML
20 INJECTION INTRAVENOUS DAILY
Refills: 0 | Status: DISCONTINUED | OUTPATIENT
Start: 2019-10-08 | End: 2019-10-12

## 2019-10-08 RX ADMIN — Medication 30 MILLIGRAM(S): at 05:29

## 2019-10-08 RX ADMIN — FAMOTIDINE 20 MILLIGRAM(S): 10 INJECTION INTRAVENOUS at 12:37

## 2019-10-08 RX ADMIN — ENOXAPARIN SODIUM 40 MILLIGRAM(S): 100 INJECTION SUBCUTANEOUS at 12:37

## 2019-10-08 RX ADMIN — Medication 1 MILLIGRAM(S): at 12:37

## 2019-10-08 RX ADMIN — Medication 80 MILLIGRAM(S): at 05:29

## 2019-10-08 RX ADMIN — PREGABALIN 1000 MICROGRAM(S): 225 CAPSULE ORAL at 12:37

## 2019-10-08 RX ADMIN — CHLORHEXIDINE GLUCONATE 1 APPLICATION(S): 213 SOLUTION TOPICAL at 05:40

## 2019-10-08 RX ADMIN — Medication 1 CAPSULE(S): at 05:29

## 2019-10-08 NOTE — PROGRESS NOTE ADULT - SUBJECTIVE AND OBJECTIVE BOX
Patient is a 89y old  Female who presents with a chief complaint of anemia (08 Oct 2019 07:17)      Subjective: no new events      Vital Signs Last 24 Hrs  T(C): 36.1 (08 Oct 2019 04:44), Max: 36.5 (07 Oct 2019 14:15)  T(F): 96.9 (08 Oct 2019 04:44), Max: 97.7 (07 Oct 2019 14:15)  HR: 70 (08 Oct 2019 04:44) (70 - 74)  BP: 136/65 (08 Oct 2019 04:44) (121/78 - 136/65)  BP(mean): --  RR: 18 (08 Oct 2019 04:44) (18 - 20)  SpO2: 97% (07 Oct 2019 20:02) (97% - 97%)    PHYSICAL EXAM  General: adult in NAD, elderly and thin built   Neck: supple  CV: normal S1/S2 with no murmur rubs or gallops  Lungs: positive air movement b/l ant lungs,clear to auscultation, no wheezes, no rales  Abdomen: soft non-tender  Ext: no edema  Neuro: alert and oriented X 4    MEDICATIONS  (STANDING):  chlorhexidine 4% Liquid 1 Application(s) Topical <User Schedule>  cyanocobalamin 1000 MICROGram(s) Oral daily  enoxaparin Injectable 40 milliGRAM(s) SubCutaneous daily  famotidine    Tablet 20 milliGRAM(s) Oral two times a day  folic acid  Oral Tab/Cap - Peds 1 milliGRAM(s) Oral daily  NIFEdipine XL 30 milliGRAM(s) Oral daily  predniSONE   Tablet 80 milliGRAM(s) Oral daily  triamterene 37.5 mG/hydrochlorothiazide 25 mG Capsule 1 Capsule(s) Oral daily    MEDICATIONS  (PRN):      LABS:                          8.0    5.37  )-----------( 143      ( 08 Oct 2019 06:53 )             27.0         Mean Cell Volume : 110.7 fL  Mean Cell Hemoglobin : 32.8 pg  Mean Cell Hemoglobin Concentration : 29.6 g/dL  Auto Neutrophil # : 3.68 K/uL  Auto Lymphocyte # : 1.13 K/uL  Auto Monocyte # : 0.46 K/uL  Auto Eosinophil # : 0.04 K/uL  Auto Basophil # : 0.01 K/uL  Auto Neutrophil % : 68.6 %  Auto Lymphocyte % : 21.0 %  Auto Monocyte % : 8.6 %  Auto Eosinophil % : 0.7 %  Auto Basophil % : 0.2 %      Serial CBC's  10-08 @ 06:53  Hct-27.0 / Hgb-8.0 / Plat-143 / RBC-2.44 / WBC-5.37  Serial CBC's  10-07 @ 06:18  Hct-27.6 / Hgb-8.3 / Plat-133 / RBC-2.50 / WBC-5.87  Serial CBC's  10-06 @ 11:08  Hct-26.4 / Hgb-8.0 / Plat-146 / RBC-2.41 / WBC-6.30  Serial CBC's  10-06 @ 00:57  Hct-19.7 / Hgb-5.9 / Plat-146 / RBC-1.63 / WBC-5.92  Serial CBC's  10-05 @ 17:26  Hct-21.3 / Hgb-6.2 / Plat-154 / RBC-1.77 / WBC-6.00  Serial CBC's  10-05 @ 11:59  Hct-22.5 / Hgb-6.6 / Plat-148 / RBC-1.86 / WBC-6.20  Serial CBC's  10-05 @ 09:13  Hct-22.3 / Hgb-6.7 / Plat-148 / RBC-1.86 / WBC-7.25  Serial CBC's  10-04 @ 18:20  Hct-16.9 / Hgb-4.7 / Plat-146 / RBC-1.27 / WBC-5.88      10-08    139  |  105  |  40<H>  ----------------------------<  116<H>  4.5   |  21  |  1.5    Ca    9.1      08 Oct 2019 06:53  Mg     2.5     10-08    TPro  5.5<L>  /  Alb  3.8  /  TBili  2.4<H>  /  DBili  x   /  AST  20  /  ALT  10  /  AlkPhos  59  10-08          Reticulocyte Percent: 18.6 % (10-08 @ 06:53)  Reticulocyte Percent: 21.6 % (10-07 @ 06:18)  Vitamin B12, Serum: 1424 pg/mL (10-07 @ 06:18)  Reticulocyte Percent: 19.8 % (10-06 @ 11:08)  Reticulocyte Percent: 24.8 % (10-05 @ 09:13)                          BLOOD SMEAR INTERPRETATION:       RADIOLOGY & ADDITIONAL STUDIES:

## 2019-10-08 NOTE — PROGRESS NOTE ADULT - ASSESSMENT
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 Keep hb >6.    Started on prednisone 1mg/kg daily with folic acid daily.   Increased to prednisone 1.5mg/kg daily- on prednisone 80mg now - Today is day 4 of steroids and she is tolerating well     Discussed rituximab as first line treatment along with steroids for prevention of future recurrences -Patient is more receptive to it now, but wants a day to think about it  I spoke to the son Mr Epstein as well, and he is going to convince his mom    f/u  flow cytometry to rule out underlying Lymphoproliferative disease   - Monitor Hb level daily - today is 8   Response is expected between 1-2 weeks with a target hb level of 10.  She is not discharge ready yet. Need to monitor CBC daily to make sure she does not drop again  We should see consistent fall in her LDH     # Low Vitamin B12 level with normal MMA : on vitamin b12 IM twice weekly at home     Switched to daily PO vitamin b12.   Repeat [id number]    # DVT PPx : can use lovenox since patient has autoimmune hemolytic anemia and not bleeding.                      There is high risk of VTE with AIHA    Labs once daily , including cbc, bmp/hepatic panel , LDH and retic count  Transfuse to keep hb >6 . Run transfusion very slowly to avoid overt hemolysis

## 2019-10-08 NOTE — PROGRESS NOTE ADULT - SUBJECTIVE AND OBJECTIVE BOX
**this is a preliminary note and will be edited**     Hospital Day:  4d    Subjective:    Patient is a 89y old  Female who presents with a chief complaint of anemia, autoimmune hemolytic anemia. spPRBC transfusion, steroid tx (07 Oct 2019 23:32)      There were no acute overnight events. The patient was seen and examined at the bedside. No complaints. Denies fever, chills, headache, dizziness, chest pain, palpitations, shortness of breath, abdominal pain, nausea, vomiting, diarrhea.    Past Medical Hx:   TIA (transient ischemic attack)  Diverticulitis  Constipation  Hypertension    Past Sx:  No significant past surgical history    Allergies:  aspirin (Unknown)  Cipro (Unknown)  codeine (Unknown)  dicyclomine (Unknown)  Diovan (Unknown)  Flagyl (Unknown)  Librax (Unknown)  metronidazole (Unknown)  penicillin (Unknown)  Prevacid (Unknown)  trimethobenzamide (Unknown)    Current Meds:   Standng Meds:  chlorhexidine 4% Liquid 1 Application(s) Topical <User Schedule>  cyanocobalamin 1000 MICROGram(s) Oral daily  enoxaparin Injectable 40 milliGRAM(s) SubCutaneous daily  folic acid  Oral Tab/Cap - Peds 1 milliGRAM(s) Oral daily  NIFEdipine XL 30 milliGRAM(s) Oral daily  predniSONE   Tablet 80 milliGRAM(s) Oral daily  triamterene 37.5 mG/hydrochlorothiazide 25 mG Capsule 1 Capsule(s) Oral daily    PRN Meds:    HOME MEDICATIONS:  NIFEdipine 30 mg oral tablet, extended release: 1 tab(s) orally once a day  ondansetron 8 mg oral tablet: 1 tab(s) orally 3 times a day, As Needed  triamterene-hydrochlorothiazide 37.5 mg- 25 mg oral capsule: 1 cap(s) orally once a day      Vital Signs:   T(F): 96.9 (10-08-19 @ 04:44), Max: 97.7 (10-07-19 @ 14:15)  HR: 70 (10-08-19 @ 04:44) (70 - 74)  BP: 136/65 (10-08-19 @ 04:44) (121/78 - 136/65)  RR: 18 (10-08-19 @ 04:44) (18 - 20)  SpO2: 97% (10-07-19 @ 20:02) (97% - 97%)        Physical Exam:   General: Patient resting comfortably in bed, NAD  HEENT: NCAT, conjunctiva clear, sclera white, PEERLA, EOMI, moist mucous membranes, normal orophaynx  Neck: No masses, no JVD, no cervical lymphadenopathy  Respiratory: good inspiratory effort; lungs clear to auscultation bilaterally  CV: Normal rate, regular rhythm, normal S1/S2, no rubs, gallops, murmurs  GI: abdomen soft, non-tender, non-distended, no masses, normal bowel sounds  Extremities: Well-perfused, no clubbing, no cyanosis, no lower extremity edema bilaterally  Skin: warm and dry  Neurology: AAOx3, mentating appropriately, follows commands, nonfocal    Labs:                         8.3    5.87  )-----------( 133      ( 07 Oct 2019 06:18 )             27.6       07 Oct 2019 06:18    141    |  106    |  32     ----------------------------<  91     4.4     |  21     |  1.3      Ca    9.6        07 Oct 2019 06:18  Mg     2.4       07 Oct 2019 06:18    TPro  5.7    /  Alb  3.9    /  TBili  4.1    /  DBili  x      /  AST  34     /  ALT  12     /  AlkPhos  63     07 Oct 2019 06:18        Amylase --, Lipase 19, 10 @ 18:20              Urinalysis Basic - ( 05 Oct 2019 11:40 )    Color: Yellow / Appearance: Clear / S.030 / pH: x  Gluc: x / Ketone: Negative  / Bili: Negative / Urobili: 3 mg/dL   Blood: x / Protein: 30 mg/dL / Nitrite: Negative   Leuk Esterase: Small / RBC: 5 /HPF / WBC 10 /HPF   Sq Epi: x / Non Sq Epi: 1 /HPF / Bacteria: Negative Hospital Day:  4d    Subjective:    Patient is a 89y old  Female who presents with a chief complaint of anemia, autoimmune hemolytic anemia. spPRBC transfusion, steroid tx (07 Oct 2019 23:32)    There were no acute overnight events. The patient was seen and examined at the bedside. No complaints. Denies fever, chills, headache, dizziness, chest pain, palpitations, shortness of breath, abdominal pain, nausea, vomiting, diarrhea.    Past Medical Hx:   TIA (transient ischemic attack)  Diverticulitis  Constipation  Hypertension    Past Sx:  No significant past surgical history    Allergies:  aspirin (Unknown)  Cipro (Unknown)  codeine (Unknown)  dicyclomine (Unknown)  Diovan (Unknown)  Flagyl (Unknown)  Librax (Unknown)  metronidazole (Unknown)  penicillin (Unknown)  Prevacid (Unknown)  trimethobenzamide (Unknown)    Current Meds:   Standng Meds:  chlorhexidine 4% Liquid 1 Application(s) Topical <User Schedule>  cyanocobalamin 1000 MICROGram(s) Oral daily  enoxaparin Injectable 40 milliGRAM(s) SubCutaneous daily  folic acid  Oral Tab/Cap - Peds 1 milliGRAM(s) Oral daily  NIFEdipine XL 30 milliGRAM(s) Oral daily  predniSONE   Tablet 80 milliGRAM(s) Oral daily  triamterene 37.5 mG/hydrochlorothiazide 25 mG Capsule 1 Capsule(s) Oral daily    PRN Meds:    HOME MEDICATIONS:  NIFEdipine 30 mg oral tablet, extended release: 1 tab(s) orally once a day  ondansetron 8 mg oral tablet: 1 tab(s) orally 3 times a day, As Needed  triamterene-hydrochlorothiazide 37.5 mg- 25 mg oral capsule: 1 cap(s) orally once a day      Vital Signs:   T(F): 96.9 (10-08-19 @ 04:44), Max: 97.7 (10-07-19 @ 14:15)  HR: 70 (10-08-19 @ 04:44) (70 - 74)  BP: 136/65 (10-08-19 @ 04:44) (121/78 - 136/65)  RR: 18 (10-08-19 @ 04:44) (18 - 20)  SpO2: 97% (10-07-19 @ 20:02) (97% - 97%)        Physical Exam:   General: Patient resting comfortably in bed, NAD  HEENT: NCAT, conjunctiva clear, sclera white, PEERLA, EOMI, moist mucous membranes, normal orophaynx  Neck: No masses, no JVD, no cervical lymphadenopathy  Respiratory: good inspiratory effort; lungs clear to auscultation bilaterally  CV: Normal rate, regular rhythm, normal S1/S2, no rubs, gallops, murmurs  GI: abdomen soft, non-tender, non-distended, no masses, normal bowel sounds  Extremities: Well-perfused, no clubbing, no cyanosis, no lower extremity edema bilaterally  Skin: warm and dry  Neurology: AAOx3, mentating appropriately, follows commands, nonfocal    Labs:                         8.3    5.87  )-----------( 133      ( 07 Oct 2019 06:18 )             27.6       07 Oct 2019 06:18    141    |  106    |  32     ----------------------------<  91     4.4     |  21     |  1.3      Ca    9.6        07 Oct 2019 06:18  Mg     2.4       07 Oct 2019 06:18    TPro  5.7    /  Alb  3.9    /  TBili  4.1    /  DBili  x      /  AST  34     /  ALT  12     /  AlkPhos  63     07 Oct 2019 06:18        Amylase --, Lipase 19, 10-04-19 @ 18:20              Urinalysis Basic - ( 05 Oct 2019 11:40 )    Color: Yellow / Appearance: Clear / S.030 / pH: x  Gluc: x / Ketone: Negative  / Bili: Negative / Urobili: 3 mg/dL   Blood: x / Protein: 30 mg/dL / Nitrite: Negative   Leuk Esterase: Small / RBC: 5 /HPF / WBC 10 /HPF   Sq Epi: x / Non Sq Epi: 1 /HPF / Bacteria: Negative

## 2019-10-08 NOTE — PROGRESS NOTE ADULT - SUBJECTIVE AND OBJECTIVE BOX
89 year old female with history of HTN, diverticulosis, and B12 deficiency presents with anemia. The   patient has been having weakness, loss of appetite, headache worsening for 5 days PTA.    Last month she was admitted to [facility] for similar presentation, found to be anemic, received transfusion and discharged on B12 injections.   Pt was doing relatively well until 5 days when she stared to have similar symptoms and noted to  be jaundiced. The pt denies chest pain, nausea, vomiting but  reports on/off LLQ for the last month.   Blood work from last admission showed hemolytic picture ( low haptoglobin, elevated LDH .. )   In ED patient was hemodynamically stable, received one unit and had unremarkable CT abdomin.  The pt is being admitted for hemolytic anemia.     PAST MEDICAL & SURGICAL HISTORY:     HTN, ASHD  TIA (transient ischemic attack)  Chronic anemia  CKD  Diverticulosis, Diverticulitis  Chronic Constipation  OA, DDD, DJD, kyphosis, Osteoporosis  No significant past surgical history    Meds;    enoxaparin 40mg q 24  pantoprazole 40mg q 24  prednisone 80mg q 24  vit B12 1000mc po q 24  folic acid 1mg q 24  nifedipine XL 30gmg q24  triamterene-HCTZ 37.5/25mg q 24        Vital Signs Last 24 Hrs    T(F): 97.5   HR: 71   BP: 126/59    RR: 18  SpO2: 97%    Physical Exam:    GENERAL: thin, frail, elderly WF, sitting OOB in chair, verbal, comfortable  HEAD:  Atraumatic, Normocephalic, Sclerae less jaundiced  ENT: Moist mucous membranes  CHEST/LUNG: Clear to auscultation bilaterally;   HEART: Regular rate and rhythm; No murmurs, rubs, or gallops  ABDOMEN: Soft, Nontender, Nondistended.   EXTREMITIES:  brisk capillary refill. No clubbing, cyanosis, or edema  NERVOUS SYSTEM:  Alert & Oriented X3, speech clear. No deficits                           8    5  )-----------( 143     , retic %  21.6, absolute retic 540             27       139  |  105  |  40  ----------------------------<  116  4.5   |  21  |  1.5  GFR 30  Ca    9.6          TPro  5.8,   /  Alb  4.1, 3.9  /  TBili  6.9, 4.1, 2.4 /  DBili  1.1<H>  /  AST  51<H>  /  ALT  11  /  AlkPhos  72  10-05                             Bilirubin Direct, Serum (10.05.19 @ 01:00)    Bilirubin Direct, Serum: 1.1 mg/dL    Gamma Glutamyl Transferase, Serum (10.05.19 @ 01:00)    Gamma Glutamyl Transferase, Serum: 9 U/L    Lactate Dehydrogenase, Serum (10.05.19 @ 01:00)    Lactate Dehydrogenase, Serum: 784    Direct Lorna Profile (10.04.19 @ 20:10)    Dir Antiglob Polyspecific Interpretation: POS: 10/04/2019 23:17  AVILLAFRAN  T    Bilirubin Total, Serum: 7.8 mg/dL (10.04.19 @ 18:20)       EXAM:  US ABDOMEN LIMITED            PROCEDURE DATE:  10/05/2019            INTERPRETATION:  CLINICAL HISTORY: Jaundice.    COMPARISON: None.    PROCEDURE: Ultrasound of the right upper quadrant was performed.    FINDINGS:    LIVER:  Normal in contour and echogenicity measuring 14.3 cm in length,   containing multiple cysts the largest measuring up to approximately 2.2 x   2.4 x 2.3 cm.    GALLBLADDER/BILIARY TREE:  No evidence of cholelithiasis. No wall   thickening or pericholecystic fluid.  Negative sonographic Cameron's sign.   No intrahepatic biliary ductal dilatation. The common bile duct measures   7 mm, which is normal or age.     PANCREAS: Obscured by overlying bowel gas.    KIDNEY:  Right kidney measures 9.2 cm in length, with a mid pole cyst   measuring up to 0.8 cm. No hydronephrosis, calculi or solid mass.    AORTA/IVC:  Visualized proximal portions unremarkable.    ASCITES:  None.    IMPRESSION:    Essentially unremarkable right upper quadrant ultrasound.  Nonvisualization of the pancreas.              ARUN LOTT M.D., RESIDENT RADIOLOGIST  This document has been electronically signed.  CHRISTINA LEES M.D., ATTENDING RADIOLOGIST  This document has been electronically signed. Oct  5 2019  7:17AM        EXAM:  CT ABDOMEN AND PELVIS IC            PROCEDURE DATE:  10/04/2019            INTERPRETATION:  CLINICAL STATEMENT: Left lower quadrant abdominal pain.      TECHNIQUE: Contiguous axial CT images were obtained from the lower chest   to the pubic symphysis following administration of 100cc Optiray 320   intravenous contrast.  Oral contrast was not administered.  Reformatted   images in the coronal and sagittal planes were acquired.    COMPARISON CT: CT of the abdomen and pelvis dated 4/2/2018.      FINDINGS:    LOWER CHEST: Partially imaged right sided pleural effusion. Bilateral   subsegmental and dependent atelectasis also seen.    HEPATOBILIARY: Right hepatic cyst and additional subcentimeter hepatic   hypodensities, too small to further characterize.    SPLEEN: Unremarkable.    PANCREAS: Unchanged pancreatic tail hypodense lesion, likely a side   branch IPMN.    ADRENAL GLANDS: Unremarkable.    KIDNEYS: Symmetric renal enhancement. No hydronephrosis. Bilateral renal   cysts and additional subcentimeter bilateral hypodensities, too small to   further characterize.    ABDOMINOPELVIC NODES: No lymphadenopathy.    PELVIC ORGANS: Unremarkable.    PERITONEUM/MESENTERY/BOWEL: Sigmoid diverticulosis without definitive   evidence of diverticulitis. No bowel obstruction or pneumoperitoneum.   Large hiatal hernia.    BONES/SOFT TISSUES: No acute osseous abnormality. Degenerative changes of   the spine. Small fat-containing umbilical hernia and bilateral inguinal   hernias.    OTHER: Aortic atherosclerosis.      IMPRESSION:     No CT evidence of acute intra-abdominal pathology.    Partially imaged right-sided pleural effusion.    Additional Findings/Recommendations After Attending Radiologist Review:    Mild splenomegaly, new.              ARUN LOTT M.D., RESIDENT RADIOLOGIST  This document has been electronically signed.  IRON DUARTE M.D., ATTENDING RADIOLOGIST  This document has been electronically signed. Oct  4 2019 11:47PM

## 2019-10-08 NOTE — PROGRESS NOTE ADULT - ASSESSMENT
**this is a preliminary note and will be edited**     89 year old female with history of HTN, diverticulosis, and B12 deficiency presents with anemia. 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 post-transfusion Hb: 8.0  - Hb 8.3 today  - Prednisone increased to 1.5 mg/kg daily (80mg daily) with folic acid  - Oncology discussed rituximab as first line treatment along with steroids for prevention of future recurrences - patient is reluctant to try rituximab given her extensive history of allergic reactions .   - Hepatitis panel negative   - Flow cytometry sent; will follow up results  - Monitor Hb level daily . Response is expected between 1-3 weeks with a target hb level of 10. Patient can be discharged home when a steady increase of hemoglobin occurs.  - Transfuse to keep Hb>6; transfuse slowly to avoid hemolysis  - Labs once daily , including cbc, bmp/hepatic panel, LDH and retic count    # Low Vitamin B12 level with normal MMA : on vitamin b12 IM twice weekly at home  - continue PO B12 (1000mcg daily)  - vitamin B12 level (10/7): 1424    # HTN   - c/w home medications     # Diverticulosis   - avoid constipation    #DVT PPx : can use lovenox since patient has autoimmune hemolytic anemia and not bleeding. There is high risk of VTE with AIHA  #GI PPx: on hold due to lansoprazole allergy (patient breaks out into hives)  #DASH diet  #full code 89 year old female with history of HTN, diverticulosis, and B12 deficiency presents with anemia. 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 post-transfusion Hb: 8.0  - Hb 8.0 today  - Prednisone increased to 1.5 mg/kg daily (80mg daily) with folic acid  - Oncology discussed rituximab as first line treatment along with steroids for prevention of future recurrences - patient is reluctant to try rituximab given her extensive history of allergic reactions .   - Hepatitis panel negative   - Flow cytometry sent; will follow up results  - Monitor Hb level daily . Response is expected between 1-3 weeks with a target hb level of 10. Patient can be discharged home when a steady increase of hemoglobin occurs.  - Per heme/onc, patient will need monitoring for the next 3-4 days before she can be safely discharged, as her Hb could still drop.  - Transfuse to keep Hb>6; transfuse slowly to avoid hemolysis  - Labs once daily , including cbc, bmp/hepatic panel, LDH and retic count    # Low Vitamin B12 level with normal MMA : on vitamin b12 IM twice weekly at home  - continue PO B12 (1000mcg daily)  - vitamin B12 level (10/7): 1424    # HTN   - c/w home medications     # Diverticulosis   - avoid constipation    #DVT PPx : can use lovenox since patient has autoimmune hemolytic anemia and not bleeding. There is high risk of VTE with AIHA  #GI PPx: on hold due to lansoprazole allergy (patient breaks out into hives)  #DASH diet  #full code 89 year old female with history of HTN, diverticulosis, and B12 deficiency presents with anemia. 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 post-transfusion Hb: 8.0  - Hb 8.0 today  - Prednisone increased to 1.5 mg/kg daily (80mg daily) with folic acid  - Oncology discussed rituximab as first line treatment along with steroids for prevention of future recurrences - patient is reluctant to try rituximab given her extensive history of allergic reactions .   - Hepatitis panel negative   - Flow cytometry sent; will follow up results  - Monitor Hb level daily . Response is expected between 1-3 weeks with a target hb level of 10. Patient can be discharged home when a steady increase of hemoglobin occurs.  - Per heme/onc, patient will need monitoring for the next 3-4 days before she can be safely discharged, as her Hb could still drop.  - Transfuse to keep Hb>6; transfuse slowly to avoid hemolysis  - Labs once daily , including cbc, bmp/hepatic panel, LDH and retic count    # Low Vitamin B12 level with normal MMA : on vitamin b12 IM twice weekly at home  - continue PO B12 (1000mcg daily)  - vitamin B12 level (10/7): 1424    # HTN   - c/w home medications     # Diverticulosis   - avoid constipation    #DVT PPx : can use lovenox since patient has autoimmune hemolytic anemia and not bleeding. There is high risk of VTE with AIHA  #GI PPx: Pepcid; Pt has lansoprazole allergy (patient breaks out into hives)  #DASH diet  #full code

## 2019-10-08 NOTE — PHYSICAL THERAPY INITIAL EVALUATION ADULT - GENERAL OBSERVATIONS, REHAB EVAL
Chart reviewed. Pt seen semi-willson in bed. Pt agreeable for transfers and ambulation. + IV lock in place.

## 2019-10-08 NOTE — PROGRESS NOTE ADULT - ATTENDING COMMENTS
She is doing well. She is tolerating steroids well. Flow Cytometry is negative.  Her hgb is satble, LDH and bili is coming down.    She is declining Rituxan for now, still wants to think about it. Hep B is negative.       #AIHA   Please check CBC only daily,    Please transfuse blood slowly over 3-4 hours if needed. Would orderer one unit at at time and re-evaluate unless she is unstable     She is doing well on prednisone 50 mg so now on 80 mg ( 1.5 mg/gk daily) , today is day 4 of steroids..  C/w folic acid and B12 SL.  May take 1-2 weeks to see a response.  If does not tolerate 80 mg daily will decrease back to 50mg, as soon as I see response will also decrease down to 50 mg. If HgG remains stable, LDH and bili keep falling would decrease down to 50 mg tomorrow please.      On  Pepcid for PUD PPX but Steroids alone is not high risk for PUD.      We will try to convince her to start Rituxan, it will be 375mg/m2 weekly for 4 weeks, we can start inpatient and I will finish as outpatient.  If does not take steroids at home maybe at least Rituxan will help but alone not a treatment modality.     Check CBC, Reitc, LDH, CMP daily. Keep hgb >6 gm/dl    DVT ppx Lovenox    Monitor sugars .     NOT DISCHARGE READY!! Maybe by Friday if numbers are going in the right direction.

## 2019-10-08 NOTE — PROGRESS NOTE ADULT - ASSESSMENT
89 year old female with history of HTN, ASHD,  diverticulosis, and B12 deficiency presents with hemolytic anemia and jaundice. Symptomatic for gen weakness and SOB.        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 Response is expected between 1-3 weeks with a target hb level of 10. Patient can be discharged home when a steady increase of hemoglobin occurs.         # HTN, ASHD  - c/w home medications     # Diverticulosis   - avoid constipation,     # DASH diet   # full code

## 2019-10-09 LAB
ALBUMIN SERPL ELPH-MCNC: 4 G/DL — SIGNIFICANT CHANGE UP (ref 3.5–5.2)
ALBUMIN SERPL ELPH-MCNC: 4.1 G/DL — SIGNIFICANT CHANGE UP (ref 3.5–5.2)
ALP SERPL-CCNC: 63 U/L — SIGNIFICANT CHANGE UP (ref 30–115)
ALP SERPL-CCNC: 63 U/L — SIGNIFICANT CHANGE UP (ref 30–115)
ALT FLD-CCNC: 11 U/L — SIGNIFICANT CHANGE UP (ref 0–41)
ALT FLD-CCNC: 11 U/L — SIGNIFICANT CHANGE UP (ref 0–41)
ANION GAP SERPL CALC-SCNC: 13 MMOL/L — SIGNIFICANT CHANGE UP (ref 7–14)
AST SERPL-CCNC: 18 U/L — SIGNIFICANT CHANGE UP (ref 0–41)
AST SERPL-CCNC: 19 U/L — SIGNIFICANT CHANGE UP (ref 0–41)
BASOPHILS # BLD AUTO: 0.01 K/UL — SIGNIFICANT CHANGE UP (ref 0–0.2)
BASOPHILS NFR BLD AUTO: 0.2 % — SIGNIFICANT CHANGE UP (ref 0–1)
BILIRUB DIRECT SERPL-MCNC: 0.5 MG/DL — HIGH (ref 0–0.2)
BILIRUB INDIRECT FLD-MCNC: 1.3 MG/DL — HIGH (ref 0.2–1.2)
BILIRUB SERPL-MCNC: 1.8 MG/DL — HIGH (ref 0.2–1.2)
BILIRUB SERPL-MCNC: 1.8 MG/DL — HIGH (ref 0.2–1.2)
BUN SERPL-MCNC: 46 MG/DL — HIGH (ref 10–20)
CALCIUM SERPL-MCNC: 9.6 MG/DL — SIGNIFICANT CHANGE UP (ref 8.5–10.1)
CHLORIDE SERPL-SCNC: 106 MMOL/L — SIGNIFICANT CHANGE UP (ref 98–110)
CO2 SERPL-SCNC: 21 MMOL/L — SIGNIFICANT CHANGE UP (ref 17–32)
CREAT SERPL-MCNC: 1.4 MG/DL — SIGNIFICANT CHANGE UP (ref 0.7–1.5)
EOSINOPHIL # BLD AUTO: 0.01 K/UL — SIGNIFICANT CHANGE UP (ref 0–0.7)
EOSINOPHIL NFR BLD AUTO: 0.2 % — SIGNIFICANT CHANGE UP (ref 0–8)
GLUCOSE SERPL-MCNC: 105 MG/DL — HIGH (ref 70–99)
HCT VFR BLD CALC: 28.1 % — LOW (ref 37–47)
HGB BLD-MCNC: 8.6 G/DL — LOW (ref 12–16)
IMM GRANULOCYTES NFR BLD AUTO: 0.8 % — HIGH (ref 0.1–0.3)
LDH SERPL L TO P-CCNC: 736 — HIGH (ref 50–242)
LYMPHOCYTES # BLD AUTO: 1.05 K/UL — LOW (ref 1.2–3.4)
LYMPHOCYTES # BLD AUTO: 17 % — LOW (ref 20.5–51.1)
MAGNESIUM SERPL-MCNC: 2.8 MG/DL — HIGH (ref 1.8–2.4)
MCHC RBC-ENTMCNC: 30.6 G/DL — LOW (ref 32–37)
MCHC RBC-ENTMCNC: 34.1 PG — HIGH (ref 27–31)
MCV RBC AUTO: 111.5 FL — HIGH (ref 81–99)
MONOCYTES # BLD AUTO: 0.42 K/UL — SIGNIFICANT CHANGE UP (ref 0.1–0.6)
MONOCYTES NFR BLD AUTO: 6.8 % — SIGNIFICANT CHANGE UP (ref 1.7–9.3)
NEUTROPHILS # BLD AUTO: 4.62 K/UL — SIGNIFICANT CHANGE UP (ref 1.4–6.5)
NEUTROPHILS NFR BLD AUTO: 75 % — SIGNIFICANT CHANGE UP (ref 42.2–75.2)
NRBC # BLD: 0 /100 WBCS — SIGNIFICANT CHANGE UP (ref 0–0)
PLATELET # BLD AUTO: 148 K/UL — SIGNIFICANT CHANGE UP (ref 130–400)
POTASSIUM SERPL-MCNC: 4.8 MMOL/L — SIGNIFICANT CHANGE UP (ref 3.5–5)
POTASSIUM SERPL-SCNC: 4.8 MMOL/L — SIGNIFICANT CHANGE UP (ref 3.5–5)
PROT SERPL-MCNC: 5.8 G/DL — LOW (ref 6–8)
PROT SERPL-MCNC: 6 G/DL — SIGNIFICANT CHANGE UP (ref 6–8)
RBC # BLD: 2.52 M/UL — LOW (ref 4.2–5.4)
RBC # BLD: 2.52 M/UL — LOW (ref 4.2–5.4)
RBC # FLD: 22.5 % — HIGH (ref 11.5–14.5)
RETICS #: 418.8 K/UL — HIGH (ref 25–125)
RETICS/RBC NFR: 16.6 % — HIGH (ref 0.5–1.5)
SODIUM SERPL-SCNC: 140 MMOL/L — SIGNIFICANT CHANGE UP (ref 135–146)
WBC # BLD: 6.16 K/UL — SIGNIFICANT CHANGE UP (ref 4.8–10.8)
WBC # FLD AUTO: 6.16 K/UL — SIGNIFICANT CHANGE UP (ref 4.8–10.8)

## 2019-10-09 PROCEDURE — 99231 SBSQ HOSP IP/OBS SF/LOW 25: CPT

## 2019-10-09 RX ORDER — HEPARIN SODIUM 5000 [USP'U]/ML
5000 INJECTION INTRAVENOUS; SUBCUTANEOUS EVERY 8 HOURS
Refills: 0 | Status: DISCONTINUED | OUTPATIENT
Start: 2019-10-09 | End: 2019-10-12

## 2019-10-09 RX ADMIN — HEPARIN SODIUM 5000 UNIT(S): 5000 INJECTION INTRAVENOUS; SUBCUTANEOUS at 14:16

## 2019-10-09 RX ADMIN — FAMOTIDINE 20 MILLIGRAM(S): 10 INJECTION INTRAVENOUS at 14:16

## 2019-10-09 RX ADMIN — Medication 30 MILLIGRAM(S): at 06:06

## 2019-10-09 RX ADMIN — Medication 1 CAPSULE(S): at 06:09

## 2019-10-09 RX ADMIN — Medication 80 MILLIGRAM(S): at 06:05

## 2019-10-09 RX ADMIN — Medication 1 MILLIGRAM(S): at 14:16

## 2019-10-09 RX ADMIN — PREGABALIN 1000 MICROGRAM(S): 225 CAPSULE ORAL at 14:16

## 2019-10-09 RX ADMIN — HEPARIN SODIUM 5000 UNIT(S): 5000 INJECTION INTRAVENOUS; SUBCUTANEOUS at 21:06

## 2019-10-09 NOTE — PROGRESS NOTE ADULT - SUBJECTIVE AND OBJECTIVE BOX
**this is a preliminary note and will be edited**     Hospital Day:  5d    Subjective:    Patient is a 89y old  Female who presents with a chief complaint of Hemolytic anemia, jaundice, SOB and gen weakness (08 Oct 2019 23:03)      There were no acute overnight events. The patient was seen and examined at the bedside. No complaints. Denies fever, chills, headache, dizziness, chest pain, palpitations, shortness of breath, abdominal pain, nausea, vomiting, diarrhea.    Past Medical Hx:   TIA (transient ischemic attack)  Diverticulitis  Constipation  Hypertension    Past Sx:  No significant past surgical history    Allergies:  aspirin (Unknown)  Cipro (Unknown)  codeine (Unknown)  dicyclomine (Unknown)  Diovan (Unknown)  Flagyl (Unknown)  Librax (Unknown)  metronidazole (Unknown)  penicillin (Unknown)  Prevacid (Unknown)  trimethobenzamide (Unknown)    Current Meds:   Stand Meds:  chlorhexidine 4% Liquid 1 Application(s) Topical <User Schedule>  cyanocobalamin 1000 MICROGram(s) Oral daily  enoxaparin Injectable 40 milliGRAM(s) SubCutaneous daily  famotidine    Tablet 20 milliGRAM(s) Oral daily  folic acid  Oral Tab/Cap - Peds 1 milliGRAM(s) Oral daily  NIFEdipine XL 30 milliGRAM(s) Oral daily  predniSONE   Tablet 80 milliGRAM(s) Oral daily  triamterene 37.5 mG/hydrochlorothiazide 25 mG Capsule 1 Capsule(s) Oral daily    PRN Meds:    HOME MEDICATIONS:  NIFEdipine 30 mg oral tablet, extended release: 1 tab(s) orally once a day  ondansetron 8 mg oral tablet: 1 tab(s) orally 3 times a day, As Needed  triamterene-hydrochlorothiazide 37.5 mg- 25 mg oral capsule: 1 cap(s) orally once a day      Vital Signs:   T(F): 97 (10-09-19 @ 04:44), Max: 97.5 (10-08-19 @ 21:00)  HR: 65 (10-09-19 @ 04:44) (65 - 71)  BP: 120/59 (10-09-19 @ 04:44) (120/59 - 144/64)  RR: 20 (10-09-19 @ 04:44) (18 - 20)  SpO2: --        Physical Exam:   General: Patient resting comfortably in bed, NAD  HEENT: NCAT, conjunctiva clear, sclera white, PEERLA, EOMI, moist mucous membranes, normal orophaynx  Neck: No masses, no JVD, no cervical lymphadenopathy  Respiratory: good inspiratory effort; lungs clear to auscultation bilaterally  CV: Normal rate, regular rhythm, normal S1/S2, no rubs, gallops, murmurs  GI: abdomen soft, non-tender, non-distended, no masses, normal bowel sounds  Extremities: Well-perfused, no clubbing, no cyanosis, no lower extremity edema bilaterally  Skin: warm and dry  Neurology: AAOx3, mentating appropriately, follows commands, nonfocal    Labs:                         8.0    5.37  )-----------( 143      ( 08 Oct 2019 06:53 )             27.0       08 Oct 2019 06:53    139    |  105    |  40     ----------------------------<  116    4.5     |  21     |  1.5      Ca    9.1        08 Oct 2019 06:53  Mg     2.5       08 Oct 2019 06:53    TPro  5.5    /  Alb  3.8    /  TBili  2.4    /  DBili  x      /  AST  20     /  ALT  10     /  AlkPhos  59     08 Oct 2019 06:53        Amylase --, Lipase 19, 10-04-19 @ 18:20              Urinalysis Basic - ( 05 Oct 2019 11:40 )    Color: Yellow / Appearance: Clear / S.030 / pH: x  Gluc: x / Ketone: Negative  / Bili: Negative / Urobili: 3 mg/dL   Blood: x / Protein: 30 mg/dL / Nitrite: Negative   Leuk Esterase: Small / RBC: 5 /HPF / WBC 10 /HPF   Sq Epi: x / Non Sq Epi: 1 /HPF / Bacteria: Negative Hospital Day:  5d    Subjective:    Patient is a 89y old  Female who presents with a chief complaint of Hemolytic anemia, jaundice, SOB and gen weakness (08 Oct 2019 23:03)    There were no acute overnight events. The patient was seen and examined at the bedside. Patient is feeling well with no complaints. Denies fever, chills, headache, dizziness, chest pain, palpitations, shortness of breath, abdominal pain, nausea, vomiting, diarrhea.    Past Medical Hx:   TIA (transient ischemic attack)  Diverticulitis  Constipation  Hypertension    Past Sx:  No significant past surgical history    Allergies:  aspirin (Unknown)  Cipro (Unknown)  codeine (Unknown)  dicyclomine (Unknown)  Diovan (Unknown)  Flagyl (Unknown)  Librax (Unknown)  metronidazole (Unknown)  penicillin (Unknown)  Prevacid (Unknown)  trimethobenzamide (Unknown)    Current Meds:   Standng Meds:  chlorhexidine 4% Liquid 1 Application(s) Topical <User Schedule>  cyanocobalamin 1000 MICROGram(s) Oral daily  enoxaparin Injectable 40 milliGRAM(s) SubCutaneous daily  famotidine    Tablet 20 milliGRAM(s) Oral daily  folic acid  Oral Tab/Cap - Peds 1 milliGRAM(s) Oral daily  NIFEdipine XL 30 milliGRAM(s) Oral daily  predniSONE   Tablet 80 milliGRAM(s) Oral daily  triamterene 37.5 mG/hydrochlorothiazide 25 mG Capsule 1 Capsule(s) Oral daily    PRN Meds:    HOME MEDICATIONS:  NIFEdipine 30 mg oral tablet, extended release: 1 tab(s) orally once a day  ondansetron 8 mg oral tablet: 1 tab(s) orally 3 times a day, As Needed  triamterene-hydrochlorothiazide 37.5 mg- 25 mg oral capsule: 1 cap(s) orally once a day      Vital Signs:   T(F): 97 (10-09-19 @ 04:44), Max: 97.5 (10-08-19 @ 21:00)  HR: 65 (10-09-19 @ 04:44) (65 - 71)  BP: 120/59 (10-09-19 @ 04:44) (120/59 - 144/64)  RR: 20 (10-09-19 @ 04:44) (18 - 20)  SpO2: --        Physical Exam:   General: Patient resting comfortably in bed, NAD  HEENT: NCAT, conjunctiva clear, sclera white, PEERLA, EOMI, moist mucous membranes, normal orophaynx  Neck: No masses, no JVD, no cervical lymphadenopathy  Respiratory: good inspiratory effort; lungs clear to auscultation bilaterally  CV: Normal rate, regular rhythm, normal S1/S2, no rubs, gallops, murmurs  GI: abdomen soft, non-tender, non-distended, no masses, normal bowel sounds  Extremities: Well-perfused, no clubbing, no cyanosis, no lower extremity edema bilaterally  Skin: warm and dry  Neurology: AAOx3, mentating appropriately, follows commands, nonfocal    Labs:                         8.0    5.37  )-----------( 143      ( 08 Oct 2019 06:53 )             27.0       08 Oct 2019 06:53    139    |  105    |  40     ----------------------------<  116    4.5     |  21     |  1.5      Ca    9.1        08 Oct 2019 06:53  Mg     2.5       08 Oct 2019 06:53    TPro  5.5    /  Alb  3.8    /  TBili  2.4    /  DBili  x      /  AST  20     /  ALT  10     /  AlkPhos  59     08 Oct 2019 06:53        Amylase --, Lipase 19, 10-19 @ 18:20              Urinalysis Basic - ( 05 Oct 2019 11:40 )    Color: Yellow / Appearance: Clear / S.030 / pH: x  Gluc: x / Ketone: Negative  / Bili: Negative / Urobili: 3 mg/dL   Blood: x / Protein: 30 mg/dL / Nitrite: Negative   Leuk Esterase: Small / RBC: 5 /HPF / WBC 10 /HPF   Sq Epi: x / Non Sq Epi: 1 /HPF / Bacteria: Negative

## 2019-10-09 NOTE — PROGRESS NOTE ADULT - ASSESSMENT
**this is a preliminary note and will be edited**     89 year old female with history of HTN, diverticulosis, and B12 deficiency presents with anemia. 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 post-transfusion Hb: 8.0  - Hb 8.3 today  - Prednisone increased to 1.5 mg/kg daily (80mg daily) with folic acid  - Oncology discussed rituximab as first line treatment along with steroids for prevention of future recurrences - patient is reluctant to try rituximab given her extensive history of allergic reactions .   - Hepatitis panel negative   - Flow cytometry sent; will follow up results  - Monitor Hb level daily . Response is expected between 1-3 weeks with a target hb level of 10. Patient can be discharged home when a steady increase of hemoglobin occurs.  - Transfuse to keep Hb>6; transfuse slowly to avoid hemolysis  - Labs once daily , including cbc, bmp/hepatic panel, LDH and retic count    # Low Vitamin B12 level with normal MMA : on vitamin b12 IM twice weekly at home  - continue PO B12 (1000mcg daily)  - vitamin B12 level (10/7): 1424    # HTN   - c/w home medications     # Diverticulosis   - avoid constipation    #DVT PPx : can use lovenox since patient has autoimmune hemolytic anemia and not bleeding. There is high risk of VTE with AIHA  #GI PPx: on hold due to lansoprazole allergy (patient breaks out into hives)  #DASH diet  #full code **this is a preliminary note and will be edited**     89 year old female with history of HTN, diverticulosis, and B12 deficiency presents with anemia. 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 post-transfusion Hb: 8.0  - Hb 8.0 today  - Prednisone increased to 1.5 mg/kg daily (80mg daily) with folic acid  - Oncology discussed rituximab as first line treatment along with steroids for prevention of future recurrences - patient is reluctant to try rituximab given her extensive history of allergic reactions .   - Hepatitis panel negative   - Flow cytometry sent; will follow up results  - Monitor Hb level daily . Response is expected between 1-3 weeks with a target hb level of 10. Patient can be discharged home when a steady increase of hemoglobin occurs.  - Per heme/onc, patient will need monitoring for the next 3-4 days before she can be safely discharged, as her Hb could still drop.  - Transfuse to keep Hb>6; transfuse slowly to avoid hemolysis  - Labs once daily , including cbc, bmp/hepatic panel, LDH and retic count    # Low Vitamin B12 level with normal MMA : on vitamin b12 IM twice weekly at home  - continue PO B12 (1000mcg daily)  - vitamin B12 level (10/7): 1424    # HTN   - c/w home medications     # Diverticulosis   - avoid constipation    #DVT PPx : can use lovenox since patient has autoimmune hemolytic anemia and not bleeding. There is high risk of VTE with AIHA  #GI PPx: Pepcid; Pt has lansoprazole allergy (patient breaks out into hives)  #DASH diet  #full code 89 year old female with history of HTN, diverticulosis, and B12 deficiency presents with anemia. 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 post-transfusion Hb: 8.0  - Hb 8.6 today; has now been stable for 4 days  - Prednisone decreased to 1 mg/kg daily (50mg daily) from 1.5mg/kg daily; now day 5 of steroids; tolerating well  - folic acid daily  - Oncology discussed rituximab as first line treatment along with steroids for prevention of future recurrences - patient is still considering, but has indicated that she may take it tomorrow   - Hepatitis panel negative   - Flow cytometry normal  - Monitor Hb level daily . Response is expected between 1-3 weeks with a target hb level of 10. Patient can be discharged home when a steady increase of hemoglobin occurs.  - Per heme/onc, patient will need monitoring for the next 2-3 days before she can be safely discharged, as her Hb could still drop.  - Transfuse to keep Hb>6; transfuse slowly to avoid hemolysis  - Labs once daily, including cbc, bmp/hepatic panel, LDH and retic count    # Low Vitamin B12 level with normal MMA : on vitamin b12 IM twice weekly at home  - continue PO B12 (1000mcg daily)  - vitamin B12 level (10/7): 1424    # HTN   - c/w home medications     # Diverticulosis   - avoid constipation    #DVT PPx : can use lovenox since patient has autoimmune hemolytic anemia and not bleeding. There is high risk of VTE with AIHA  #GI PPx: Pepcid; Pt has lansoprazole allergy (patient breaks out into hives)  #DASH diet  #full code

## 2019-10-09 NOTE — PROGRESS NOTE ADULT - SUBJECTIVE AND OBJECTIVE BOX
Patient is a 89y old  Female who presents with a chief complaint of anemia (09 Oct 2019 07:14)      Subjective:  No new complaints. She is still undecided about rituxan    Vital Signs Last 24 Hrs  T(C): 36.1 (09 Oct 2019 04:44), Max: 36.4 (08 Oct 2019 21:00)  T(F): 97 (09 Oct 2019 04:44), Max: 97.5 (08 Oct 2019 21:00)  HR: 65 (09 Oct 2019 04:44) (65 - 71)  BP: 120/59 (09 Oct 2019 04:44) (120/59 - 144/64)  BP(mean): --  RR: 20 (09 Oct 2019 04:44) (18 - 20)  SpO2: --    PHYSICAL EXAM  General: adult in NAD  HEENT: clear oropharynx, anicteric sclera, pink conjunctiva  Neck: supple  CV: normal S1/S2 with no murmur rubs or gallops  Lungs: positive air movement b/l ant lungs,clear to auscultation, no wheezes, no rales  Abdomen: soft non-tender  Ext: no clubbing cyanosis or edema  Skin: no rashes and no petechiae  Neuro: alert and oriented X 4, no focal deficits    MEDICATIONS  (STANDING):  chlorhexidine 4% Liquid 1 Application(s) Topical <User Schedule>  cyanocobalamin 1000 MICROGram(s) Oral daily  famotidine    Tablet 20 milliGRAM(s) Oral daily  folic acid  Oral Tab/Cap - Peds 1 milliGRAM(s) Oral daily  heparin  Injectable 5000 Unit(s) SubCutaneous every 8 hours  NIFEdipine XL 30 milliGRAM(s) Oral daily  predniSONE   Tablet 50 milliGRAM(s) Oral daily  triamterene 37.5 mG/hydrochlorothiazide 25 mG Capsule 1 Capsule(s) Oral daily    MEDICATIONS  (PRN):      LABS:                          8.6    6.16  )-----------( 148      ( 09 Oct 2019 06:24 )             28.1         Mean Cell Volume : 111.5 fL  Mean Cell Hemoglobin : 34.1 pg  Mean Cell Hemoglobin Concentration : 30.6 g/dL  Auto Neutrophil # : 4.62 K/uL  Auto Lymphocyte # : 1.05 K/uL  Auto Monocyte # : 0.42 K/uL  Auto Eosinophil # : 0.01 K/uL  Auto Basophil # : 0.01 K/uL  Auto Neutrophil % : 75.0 %  Auto Lymphocyte % : 17.0 %  Auto Monocyte % : 6.8 %  Auto Eosinophil % : 0.2 %  Auto Basophil % : 0.2 %      Serial CBC's  10-09 @ 06:24  Hct-28.1 / Hgb-8.6 / Plat-148 / RBC-2.52 / WBC-6.16  Serial CBC's  10-08 @ 06:53  Hct-27.0 / Hgb-8.0 / Plat-143 / RBC-2.44 / WBC-5.37  Serial CBC's  10-07 @ 06:18  Hct-27.6 / Hgb-8.3 / Plat-133 / RBC-2.50 / WBC-5.87  Serial CBC's  10-06 @ 11:08  Hct-26.4 / Hgb-8.0 / Plat-146 / RBC-2.41 / WBC-6.30  Serial CBC's  10-06 @ 00:57  Hct-19.7 / Hgb-5.9 / Plat-146 / RBC-1.63 / WBC-5.92  Serial CBC's  10-05 @ 17:26  Hct-21.3 / Hgb-6.2 / Plat-154 / RBC-1.77 / WBC-6.00  Serial CBC's  10-05 @ 11:59  Hct-22.5 / Hgb-6.6 / Plat-148 / RBC-1.86 / WBC-6.20      10-09    140  |  106  |  46<H>  ----------------------------<  105<H>  4.8   |  21  |  1.4    Ca    9.6      09 Oct 2019 06:24  Mg     2.8     10-09    TPro  5.8<L>  /  Alb  4.1  /  TBili  1.8<H>  /  DBili  0.5<H>  /  AST  18  /  ALT  11  /  AlkPhos  63  10-09          Reticulocyte Percent: 16.6 % (10-09 @ 06:24)  Reticulocyte Percent: 18.6 % (10-08 @ 06:53)  Reticulocyte Percent: 21.6 % (10-07 @ 06:18)  Vitamin B12, Serum: 1424 pg/mL (10-07 @ 06:18)  Reticulocyte Percent: 19.8 % (10-06 @ 11:08)  Reticulocyte Percent: 24.8 % (10-05 @ 09:13)                          BLOOD SMEAR INTERPRETATION:       RADIOLOGY & ADDITIONAL STUDIES:

## 2019-10-09 NOTE — PROGRESS NOTE ADULT - SUBJECTIVE AND OBJECTIVE BOX
89 year old female with history of HTN, diverticulosis, and B12 deficiency presents with anemia. The   patient has been having weakness, loss of appetite, headache worsening for 5 days PTA.    Last month she was admitted to [facility] for similar presentation, found to be anemic, received transfusion and discharged on B12 injections.   Pt was doing relatively well until 5 days when she stared to have similar symptoms and noted to  be jaundiced. The pt denies chest pain, nausea, vomiting but  reports on/off LLQ for the last month.   Blood work from last admission showed hemolytic pattern( low haptoglobin, elevated LDH .. )   In ED patient was hemodynamically stable, received one unit and had unremarkable CT abdomin.  The pt is being admitted for hemolytic anemia.     PAST MEDICAL & SURGICAL HISTORY:     HTN, ASHD  TIA (transient ischemic attack)  Chronic anemia  CKD  Diverticulosis, Diverticulitis  Chronic Constipation  OA, DDD, DJD, kyphosis, Osteoporosis  No significant past surgical history    Meds;    enoxaparin 40mg q 24  pantoprazole 40mg q 24  prednisone 80mg q 24, dec to 50mg q 24  vit B12 1000mc po q 24  folic acid 1mg q 24  nifedipine XL 30gmg q24  triamterene-HCTZ 37.5/25mg q 24    Overnight events:  pt clinically improved, jaundice and sallow complexion improved, pt v talkative and more energetic    Vital Signs Last 24 Hrs    T(F): 97.9  HR: 71   BP: 114/56    RR: 18  SpO2: 97%    Physical Exam:    GENERAL: thin, frail, elderly WF, chronically ill looking but in NAD  HEAD:  Atraumatic, Normocephalic, Sclerae less jaundiced  ENT: Moist mucous membranes  CHEST/LUNG: Clear to auscultation bilaterally;   HEART: Regular rate and rhythm; No murmurs, rubs, or gallops  ABDOMEN: Soft, Nontender, Nondistended.   EXTREMITIES:  brisk capillary refill. No clubbing, cyanosis, or edema  NERVOUS SYSTEM:  Alert & Oriented X3, speech clear. No deficits                         8.6             6  )-----------( 148     , retic %  21.6, absolute retic 540                      28       140  |  106  |  46  ----------------------------<  105  4.8   |  21  |  1.4  GFR 30, 33  Ca    9.6          TPro  5.8,   /  Alb  4.1, 3.9  /  TBili  6.9, 4.1, 2.4 /  DBili  1.1<H>  /  AST  51<H>  /  ALT  11  /  AlkPhos  72  10-05     LDH   938,  884,  736                        Bilirubin Direct, Serum (10.05.19 @ 01:00)    Bilirubin Direct, Serum: 1.1 mg/dL    Gamma Glutamyl Transferase, Serum (10.05.19 @ 01:00)    Gamma Glutamyl Transferase, Serum: 9 U/L    Lactate Dehydrogenase, Serum (10.05.19 @ 01:00)    Lactate Dehydrogenase, Serum: 784    Direct Lorna Profile (10.04.19 @ 20:10)    Dir Antiglob Polyspecific Interpretation: POS: 10/04/2019 23:17  AVILLAFRAN  T    Bilirubin Total, Serum: 7.8 mg/dL (10.04.19 @ 18:20)       EXAM:  US ABDOMEN LIMITED            PROCEDURE DATE:  10/05/2019            INTERPRETATION:  CLINICAL HISTORY: Jaundice.    COMPARISON: None.    PROCEDURE: Ultrasound of the right upper quadrant was performed.    FINDINGS:    LIVER:  Normal in contour and echogenicity measuring 14.3 cm in length,   containing multiple cysts the largest measuring up to approximately 2.2 x   2.4 x 2.3 cm.    GALLBLADDER/BILIARY TREE:  No evidence of cholelithiasis. No wall   thickening or pericholecystic fluid.  Negative sonographic Cameron's sign.   No intrahepatic biliary ductal dilatation. The common bile duct measures   7 mm, which is normal or age.     PANCREAS: Obscured by overlying bowel gas.    KIDNEY:  Right kidney measures 9.2 cm in length, with a mid pole cyst   measuring up to 0.8 cm. No hydronephrosis, calculi or solid mass.    AORTA/IVC:  Visualized proximal portions unremarkable.    ASCITES:  None.    IMPRESSION:    Essentially unremarkable right upper quadrant ultrasound.  Nonvisualization of the pancreas.              ARUN LOTT M.D., RESIDENT RADIOLOGIST  This document has been electronically signed.  CHRISTINA LEES M.D., ATTENDING RADIOLOGIST  This document has been electronically signed. Oct  5 2019  7:17AM        EXAM:  CT ABDOMEN AND PELVIS IC            PROCEDURE DATE:  10/04/2019            INTERPRETATION:  CLINICAL STATEMENT: Left lower quadrant abdominal pain.      TECHNIQUE: Contiguous axial CT images were obtained from the lower chest   to the pubic symphysis following administration of 100cc Optiray 320   intravenous contrast.  Oral contrast was not administered.  Reformatted   images in the coronal and sagittal planes were acquired.    COMPARISON CT: CT of the abdomen and pelvis dated 4/2/2018.      FINDINGS:    LOWER CHEST: Partially imaged right sided pleural effusion. Bilateral   subsegmental and dependent atelectasis also seen.    HEPATOBILIARY: Right hepatic cyst and additional subcentimeter hepatic   hypodensities, too small to further characterize.    SPLEEN: Unremarkable.    PANCREAS: Unchanged pancreatic tail hypodense lesion, likely a side   branch IPMN.    ADRENAL GLANDS: Unremarkable.    KIDNEYS: Symmetric renal enhancement. No hydronephrosis. Bilateral renal   cysts and additional subcentimeter bilateral hypodensities, too small to   further characterize.    ABDOMINOPELVIC NODES: No lymphadenopathy.    PELVIC ORGANS: Unremarkable.    PERITONEUM/MESENTERY/BOWEL: Sigmoid diverticulosis without definitive   evidence of diverticulitis. No bowel obstruction or pneumoperitoneum.   Large hiatal hernia.    BONES/SOFT TISSUES: No acute osseous abnormality. Degenerative changes of   the spine. Small fat-containing umbilical hernia and bilateral inguinal   hernias.    OTHER: Aortic atherosclerosis.      IMPRESSION:     No CT evidence of acute intra-abdominal pathology.    Partially imaged right-sided pleural effusion.    Additional Findings/Recommendations After Attending Radiologist Review:    Mild splenomegaly, new.              ARUN LOTT M.D., RESIDENT RADIOLOGIST  This document has been electronically signed.  IRON DUARTE M.D., ATTENDING RADIOLOGIST  This document has been electronically signed. Oct  4 2019 11:47PM

## 2019-10-09 NOTE — PROGRESS NOTE ADULT - ASSESSMENT
89 year old female with history of HTN, ASHD,  diverticulosis, and B12 deficiency presents with hemolytic anemia ( H 4.6)  and jaundice. Symptomatic for gen weakness and SOB.        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 Response is expected between 1-3 weeks with a target hb level of 10. Patient can be discharged home when a steady increase of hemoglobin occurse  -over all improvement of  clinical status and lab work with H/H 8.628, LDH trending down      # HTN, ASHD  - c/w home medications     # Diverticulosis   - avoid constipation,     # DASH diet   # full code

## 2019-10-09 NOTE — PROGRESS NOTE ADULT - ASSESSMENT
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 Keep hb >6.    Started on prednisone 1mg/kg daily with folic acid daily.   Increased to prednisone 1.5mg/kg daily- on prednisone 80mg now - Today is day 5 of steroids and she is tolerating well- Given her HB is stable. will decrease to 50mg PO QD     Discussed rituximab as first line treatment along with steroids for prevention of future recurrences -She is still undecided I spoke to the son Mr Epstein as well, and he is going to convince his mom    f/u  flow cytometry to rule out underlying Lymphoproliferative disease   - Monitor Hb level daily - today is 8   Response is expected between 1-2 weeks with a target hb level of 10.  She is not discharge ready yet. Need to monitor CBC daily to make sure she does not drop again  We should see consistent fall in her LDH     # Low Vitamin B12 level with normal MMA : on vitamin b12 IM twice weekly at home     Switched to daily PO vitamin b12.   Repeat [id number]    # DVT PPx : can use lovenox since patient has autoimmune hemolytic anemia and not bleeding.                      There is high risk of VTE with AIHA    Labs once daily , including cbc, bmp/hepatic panel , LDH and retic count  Transfuse to keep hb >6 . 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 Keep hb >6.    Started on prednisone 1mg/kg daily with folic acid daily.   Increased to prednisone 1.5mg/kg daily- on prednisone 80mg now - Today is day 5 of steroids and she is tolerating well- Given her HB is stable. will decrease to 50mg PO QD     Discussed rituximab as first line treatment along with steroids for prevention of future recurrences -She is still undecided about it. She is saying she will take it tomorrow     f/u  flow cytometry- normal   - Monitor Hb level daily - today is 8.6- stable for the last 4 days     Response is expected between 1-2 weeks with a target hb level of 10.  She is not discharge ready yet. Need to monitor CBC daily to make sure she does not drop again  We should see consistent fall in her LDH - LDH is slowly trending down     # Low Vitamin B12 level with normal MMA : on vitamin b12 IM twice weekly at home     Switched to daily PO vitamin b12.   Repeat [id number]    # DVT PPx : can use lovenox since patient has autoimmune hemolytic anemia and not bleeding.                      There is high risk of VTE with AIHA    Labs once daily , including cbc, bmp/hepatic panel , LDH and retic count  Transfuse to keep hb >6 . Run transfusion very slowly to avoid overt hemolysis

## 2019-10-09 NOTE — PROGRESS NOTE ADULT - ATTENDING COMMENTS
Hb improving, LDH downtrending, decrease Prednisone to 50mg daily.   Patient reports she is agreeable for Rituximab tomorrow.   Continue with PO folic acid, DVT ppx.

## 2019-10-10 LAB
ALBUMIN SERPL ELPH-MCNC: 3.8 G/DL — SIGNIFICANT CHANGE UP (ref 3.5–5.2)
ALP SERPL-CCNC: 61 U/L — SIGNIFICANT CHANGE UP (ref 30–115)
ALT FLD-CCNC: 17 U/L — SIGNIFICANT CHANGE UP (ref 0–41)
ANION GAP SERPL CALC-SCNC: 13 MMOL/L — SIGNIFICANT CHANGE UP (ref 7–14)
AST SERPL-CCNC: 25 U/L — SIGNIFICANT CHANGE UP (ref 0–41)
BASOPHILS # BLD AUTO: 0.01 K/UL — SIGNIFICANT CHANGE UP (ref 0–0.2)
BASOPHILS NFR BLD AUTO: 0.1 % — SIGNIFICANT CHANGE UP (ref 0–1)
BILIRUB DIRECT SERPL-MCNC: 0.4 MG/DL — HIGH (ref 0–0.2)
BILIRUB INDIRECT FLD-MCNC: 1.1 MG/DL — SIGNIFICANT CHANGE UP (ref 0.2–1.2)
BILIRUB SERPL-MCNC: 1.5 MG/DL — HIGH (ref 0.2–1.2)
BUN SERPL-MCNC: 50 MG/DL — HIGH (ref 10–20)
CALCIUM SERPL-MCNC: 9.6 MG/DL — SIGNIFICANT CHANGE UP (ref 8.5–10.1)
CHLORIDE SERPL-SCNC: 107 MMOL/L — SIGNIFICANT CHANGE UP (ref 98–110)
CO2 SERPL-SCNC: 20 MMOL/L — SIGNIFICANT CHANGE UP (ref 17–32)
CREAT SERPL-MCNC: 1.6 MG/DL — HIGH (ref 0.7–1.5)
EOSINOPHIL # BLD AUTO: 0.03 K/UL — SIGNIFICANT CHANGE UP (ref 0–0.7)
EOSINOPHIL NFR BLD AUTO: 0.4 % — SIGNIFICANT CHANGE UP (ref 0–8)
GLUCOSE SERPL-MCNC: 109 MG/DL — HIGH (ref 70–99)
HCT VFR BLD CALC: 27.4 % — LOW (ref 37–47)
HGB BLD-MCNC: 8.4 G/DL — LOW (ref 12–16)
IMM GRANULOCYTES NFR BLD AUTO: 0.7 % — HIGH (ref 0.1–0.3)
LDH SERPL L TO P-CCNC: 617 — HIGH (ref 50–242)
LYMPHOCYTES # BLD AUTO: 1.24 K/UL — SIGNIFICANT CHANGE UP (ref 1.2–3.4)
LYMPHOCYTES # BLD AUTO: 17.2 % — LOW (ref 20.5–51.1)
MAGNESIUM SERPL-MCNC: 2.6 MG/DL — HIGH (ref 1.8–2.4)
MCHC RBC-ENTMCNC: 30.7 G/DL — LOW (ref 32–37)
MCHC RBC-ENTMCNC: 33.6 PG — HIGH (ref 27–31)
MCV RBC AUTO: 109.6 FL — HIGH (ref 81–99)
MONOCYTES # BLD AUTO: 0.56 K/UL — SIGNIFICANT CHANGE UP (ref 0.1–0.6)
MONOCYTES NFR BLD AUTO: 7.8 % — SIGNIFICANT CHANGE UP (ref 1.7–9.3)
NEUTROPHILS # BLD AUTO: 5.32 K/UL — SIGNIFICANT CHANGE UP (ref 1.4–6.5)
NEUTROPHILS NFR BLD AUTO: 73.8 % — SIGNIFICANT CHANGE UP (ref 42.2–75.2)
NRBC # BLD: 0 /100 WBCS — SIGNIFICANT CHANGE UP (ref 0–0)
PLATELET # BLD AUTO: 157 K/UL — SIGNIFICANT CHANGE UP (ref 130–400)
POTASSIUM SERPL-MCNC: 4.3 MMOL/L — SIGNIFICANT CHANGE UP (ref 3.5–5)
POTASSIUM SERPL-SCNC: 4.3 MMOL/L — SIGNIFICANT CHANGE UP (ref 3.5–5)
PROT SERPL-MCNC: 5.5 G/DL — LOW (ref 6–8)
RBC # BLD: 2.5 M/UL — LOW (ref 4.2–5.4)
RBC # BLD: 2.5 M/UL — LOW (ref 4.2–5.4)
RBC # FLD: 20.2 % — HIGH (ref 11.5–14.5)
RETICS #: 402.5 K/UL — HIGH (ref 25–125)
RETICS/RBC NFR: 16.1 % — HIGH (ref 0.5–1.5)
SODIUM SERPL-SCNC: 140 MMOL/L — SIGNIFICANT CHANGE UP (ref 135–146)
WBC # BLD: 7.21 K/UL — SIGNIFICANT CHANGE UP (ref 4.8–10.8)
WBC # FLD AUTO: 7.21 K/UL — SIGNIFICANT CHANGE UP (ref 4.8–10.8)

## 2019-10-10 PROCEDURE — 86077 PHYS BLOOD BANK SERV XMATCH: CPT

## 2019-10-10 PROCEDURE — 99232 SBSQ HOSP IP/OBS MODERATE 35: CPT

## 2019-10-10 RX ORDER — DIPHENHYDRAMINE HCL 50 MG
25 CAPSULE ORAL ONCE
Refills: 0 | Status: COMPLETED | OUTPATIENT
Start: 2019-10-10 | End: 2019-10-10

## 2019-10-10 RX ORDER — RITUXIMAB 10 MG/ML
525 INJECTION, SOLUTION INTRAVENOUS ONCE
Refills: 0 | Status: COMPLETED | OUTPATIENT
Start: 2019-10-10 | End: 2019-10-10

## 2019-10-10 RX ORDER — HYDROCORTISONE 20 MG
100 TABLET ORAL ONCE
Refills: 0 | Status: COMPLETED | OUTPATIENT
Start: 2019-10-10 | End: 2019-10-10

## 2019-10-10 RX ORDER — ACETAMINOPHEN 500 MG
650 TABLET ORAL ONCE
Refills: 0 | Status: COMPLETED | OUTPATIENT
Start: 2019-10-10 | End: 2019-10-10

## 2019-10-10 RX ADMIN — Medication 100 MILLIGRAM(S): at 12:00

## 2019-10-10 RX ADMIN — CHLORHEXIDINE GLUCONATE 1 APPLICATION(S): 213 SOLUTION TOPICAL at 06:04

## 2019-10-10 RX ADMIN — Medication 650 MILLIGRAM(S): at 12:00

## 2019-10-10 RX ADMIN — Medication 1 MILLIGRAM(S): at 12:01

## 2019-10-10 RX ADMIN — HEPARIN SODIUM 5000 UNIT(S): 5000 INJECTION INTRAVENOUS; SUBCUTANEOUS at 14:57

## 2019-10-10 RX ADMIN — FAMOTIDINE 20 MILLIGRAM(S): 10 INJECTION INTRAVENOUS at 12:01

## 2019-10-10 RX ADMIN — Medication 650 MILLIGRAM(S): at 13:02

## 2019-10-10 RX ADMIN — Medication 1 CAPSULE(S): at 05:15

## 2019-10-10 RX ADMIN — Medication 30 MILLIGRAM(S): at 05:15

## 2019-10-10 RX ADMIN — Medication 50 MILLIGRAM(S): at 05:14

## 2019-10-10 RX ADMIN — RITUXIMAB 87.5 MILLIGRAM(S): 10 INJECTION, SOLUTION INTRAVENOUS at 12:48

## 2019-10-10 RX ADMIN — HEPARIN SODIUM 5000 UNIT(S): 5000 INJECTION INTRAVENOUS; SUBCUTANEOUS at 22:49

## 2019-10-10 RX ADMIN — Medication 25 MILLIGRAM(S): at 12:01

## 2019-10-10 RX ADMIN — HEPARIN SODIUM 5000 UNIT(S): 5000 INJECTION INTRAVENOUS; SUBCUTANEOUS at 05:14

## 2019-10-10 NOTE — PROGRESS NOTE ADULT - ATTENDING COMMENTS
She is doing well. She is in a wheel chair. Her HgB is stable bili and LDH is coming down. Her daughter was at bedside and also as concerned that she may not take her prednisone. She agreed to start Rituxan tody.      She had no complaints. Stated  she felt well.     Her Creatine is coming up.     #AIHA  responding to steroids   Please check CBC only daily, transfuse if hgb is less than 6 gm/dl    Please transfuse blood slowly over 3-4 hours if needed. Would orderer one unit at at time and re-evaluate unless she is unstable     Her Hgb is stable and hemolysis panel is improving thus will decrease steroid to 50 mg Daily. She is on day 6 of steroids. Will start further steroid taper once HgB is >10 gm/dl    On  Pepcid for PUD PPX but Steroids alone is not high risk for PUD.      Will start Rituxan, it will be 375mg/m2 weekly for 4 weeks, we can start inpatient and I will finish as outpatient.  If does not take steroids at home maybe at least Rituxan will help but alone not a treatment modality.     Check CBC, Reitc, LDH, CMP daily. Keep hgb >6 gm/dl    DVT ppx Lovenox    Monitor sugars .       Dispo: If Hemolysis panel is better, does well with Rituxan, and Hgb is stable she can go home tomorrow on 50 mg of prednisone daily. Will arrange follow up for blood work and to see me next week. She may need some fluids. She is on on Hep Sc for DVT ppx which is fine    Prior to discharge please makes sure her creatinine is going in the right direction the BUN maybe from steroids,

## 2019-10-10 NOTE — PROGRESS NOTE ADULT - ASSESSMENT
89 year old female with history of HTN, diverticulosis, and B12 deficiency presents with anemia. 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 post-transfusion Hb: 8.0  - Hb 8.6 today; has now been stable for 4 days  - Prednisone decreased to 1 mg/kg daily (50mg daily) from 1.5mg/kg daily; now day 5 of steroids; tolerating well  - folic acid daily  - Oncology discussed rituximab as first line treatment along with steroids for prevention of future recurrences - patient is still considering, but has indicated that she may take it tomorrow   - Hepatitis panel negative   - Flow cytometry normal  - Monitor Hb level daily . Response is expected between 1-3 weeks with a target hb level of 10. Patient can be discharged home when a steady increase of hemoglobin occurs.  - Per heme/onc, patient will need monitoring for the next 2-3 days before she can be safely discharged, as her Hb could still drop.  - Transfuse to keep Hb>6; transfuse slowly to avoid hemolysis  - Labs once daily, including cbc, bmp/hepatic panel, LDH and retic count    # Low Vitamin B12 level with normal MMA : on vitamin b12 IM twice weekly at home  - continue PO B12 (1000mcg daily)  - vitamin B12 level (10/7): 1424    # HTN   - c/w home medications     # Diverticulosis   - avoid constipation    #DVT PPx : can use lovenox since patient has autoimmune hemolytic anemia and not bleeding. There is high risk of VTE with AIHA  #GI PPx: Pepcid; Pt has lansoprazole allergy (patient breaks out into hives)  #DASH diet  #full code 89 year old female with history of HTN, diverticulosis, and B12 deficiency presents with anemia. 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 post-transfusion Hb: 8.0  - Hb 8.4 today; has now been stable for 5 days  - continue prednisone 1 mg/kg daily (50mg daily); today is day 6 of steroids  - folic acid daily  - Patient agreed to rituximab treatment; heme/onc will start first infusion today; will receive once per week for 4 doses  - Hepatitis panel negative   - Flow cytometry normal  - Monitor Hb level daily . Response is expected between 1-3 weeks with a target hb level of 10. Patient can be discharged home when a steady increase of hemoglobin occurs.  - Per heme/onc, patient will need monitoring for the next few days before she can be safely discharged, as her Hb could still drop.  - Transfuse to keep Hb>6; transfuse slowly to avoid hemolysis  - Labs once daily, including cbc, bmp/hepatic panel, LDH and retic count    # Low Vitamin B12 level with normal MMA  - continue PO B12 (1000mcg daily)  - vitamin B12 level (10/7): 1424    # HTN   - c/w home medications     # Diverticulosis   - avoid constipation    #DVT PPx : can use lovenox since patient has autoimmune hemolytic anemia and not bleeding. There is high risk of VTE with AIHA  #GI PPx: Pepcid; Pt has lansoprazole allergy (patient breaks out into hives)  #DASH diet  #full code

## 2019-10-10 NOTE — PROGRESS NOTE ADULT - SUBJECTIVE AND OBJECTIVE BOX
89 year old female with history of HTN, diverticulosis, and B12 deficiency presents with anemia. The   patient has been having weakness, loss of appetite, headache worsening for 5 days PTA.    Last month she was admitted to [facility] for similar presentation, found to be anemic, received transfusion and discharged on B12 injections.   Pt was doing relatively well until 5 days when she stared to have similar symptoms and noted to  be jaundiced. The pt denies chest pain, nausea, vomiting but  reports on/off LLQ for the last month.   Blood work from last admission showed hemolytic pattern with low haptoglobin, elevated LDH.   In ED patient was hemodynamically stable, received one unit and had unremarkable CT abdomen.  The pt is being admitted for hemolytic anemia, PRBC transfusions,  IV steroids and RTX infusion    PAST MEDICAL & SURGICAL HISTORY:     HTN, ASHD  TIA (transient ischemic attack)  Chronic anemia  CKD  Diverticulosis, Diverticulitis  Chronic Constipation  OA, DDD, DJD, kyphosis, Osteoporosis  No significant past surgical history    Meds;    enoxaparin 40mg q 24  pantoprazole 40mg q 24  prednisone 80mg q 24, dec to 50mg q 24  vit B12 1000mc po q 24  folic acid 1mg q 24  nifedipine XL 30gmg q24  triamterene-HCTZ 37.5/25mg q 24    Overnight events:  pt clinically improved, jaundice and sallow complexion much improved, much more energetic, for RTX infusion today    Vital Signs Last 24 Hrs    T(F): 95.2  HR: 69  BP: 125/68    RR: 18  SpO2: 98%    Physical Exam:    GENERAL: thin, frail, elderly WF, chronically ill looking but in NAD, more energetic  HEAD:  Atraumatic, Normocephalic, Sclerae much less icteric  ENT: Moist mucous membranes  CHEST/LUNG: Clear to auscultation bilaterally;   HEART: Regular rate and rhythm; No murmurs, rubs, or gallops  ABDOMEN: Soft, Nontender, Nondistended.   EXTREMITIES:  brisk capillary refill. No clubbing, cyanosis, or edema  NERVOUS SYSTEM:  Alert & Oriented X3, speech clear. No deficits                         8.4             2.5 )-----------( 148     , retic %  21.6, absolute retic 540                      27.4       140  |  107  |  50  ----------------------------<  109  4.3  |  20  |  1.6  GFR 30, 33, 32  Ca    9.6          TPro  5.8,   /  Alb  4.1, 3.9  /  TBili  6.9, 4.1, 2.4,  1.5 /  DBili  1.1, 0.4  /I Bili d1.1  AST  51<H>  /  ALT  11  /  AlkPhos  72  10-05     LDH   938,  884,  736,  617                       Bilirubin Direct, Serum (10.05.19 @ 01:00)    Bilirubin Direct, Serum: 1.1 mg/dL    Gamma Glutamyl Transferase, Serum (10.05.19 @ 01:00)    Gamma Glutamyl Transferase, Serum: 9 U/L    Lactate Dehydrogenase, Serum (10.05.19 @ 01:00)    Lactate Dehydrogenase, Serum: 784    Direct Lorna Profile (10.04.19 @ 20:10)    Dir Antiglob Polyspecific Interpretation: POS: 10/04/2019 23:17  AVILLAFRAN  T    Bilirubin Total, Serum: 7.8 mg/dL (10.04.19 @ 18:20)      EXAM:  US ABDOMEN LIMITED            INTERPRETATION:  CLINICAL HISTORY: Jaundice.    COMPARISON: None.    PROCEDURE: Ultrasound of the right upper quadrant was performed.    FINDINGS:    LIVER:  Normal in contour and echogenicity measuring 14.3 cm in length,   containing multiple cysts the largest measuring up to approximately 2.2 x   2.4 x 2.3 cm.    GALLBLADDER/BILIARY TREE:  No evidence of cholelithiasis. No wall   thickening or pericholecystic fluid.  Negative sonographic Cameron's sign.   No intrahepatic biliary ductal dilatation. The common bile duct measures   7 mm, which is normal or age.     PANCREAS: Obscured by overlying bowel gas.    KIDNEY:  Right kidney measures 9.2 cm in length, with a mid pole cyst   measuring up to 0.8 cm. No hydronephrosis, calculi or solid mass.    AORTA/IVC:  Visualized proximal portions unremarkable.    ASCITES:  None.    IMPRESSION:    Essentially unremarkable right upper quadrant ultrasound.  Nonvisualization of the pancreas.          EXAM:  CT ABDOMEN AND PELVIS IC            PROCEDURE DATE:  10/04/2019            INTERPRETATION:  CLINICAL STATEMENT: Left lower quadrant abdominal pain.      TECHNIQUE: Contiguous axial CT images were obtained from the lower chest   to the pubic symphysis following administration of 100cc Optiray 320   intravenous contrast.  Oral contrast was not administered.  Reformatted   images in the coronal and sagittal planes were acquired.    COMPARISON CT: CT of the abdomen and pelvis dated 4/2/2018.      FINDINGS:    LOWER CHEST: Partially imaged right sided pleural effusion. Bilateral   subsegmental and dependent atelectasis also seen.    HEPATOBILIARY: Right hepatic cyst and additional subcentimeter hepatic   hypodensities, too small to further characterize.    SPLEEN: Unremarkable.    PANCREAS: Unchanged pancreatic tail hypodense lesion, likely a side   branch IPMN.    ADRENAL GLANDS: Unremarkable.    KIDNEYS: Symmetric renal enhancement. No hydronephrosis. Bilateral renal   cysts and additional subcentimeter bilateral hypodensities, too small to   further characterize.    ABDOMINOPELVIC NODES: No lymphadenopathy.    PELVIC ORGANS: Unremarkable.    PERITONEUM/MESENTERY/BOWEL: Sigmoid diverticulosis without definitive   evidence of diverticulitis. No bowel obstruction or pneumoperitoneum.   Large hiatal hernia.    BONES/SOFT TISSUES: No acute osseous abnormality. Degenerative changes of   the spine. Small fat-containing umbilical hernia and bilateral inguinal   hernias.    OTHER: Aortic atherosclerosis.      IMPRESSION:     No CT evidence of acute intra-abdominal pathology.    Partially imaged right-sided pleural effusion.    Additional Findings/Recommendations After Attending Radiologist Review:    Mild splenomegaly, new.              ARUN LOTT M.D., RESIDENT RADIOLOGIST  This document has been electronically signed.  IRON DUARTE M.D., ATTENDING RADIOLOGIST  This document has been electronically signed. Oct  4 2019 11:47PM

## 2019-10-10 NOTE — PROGRESS NOTE ADULT - ASSESSMENT
89 year old female with history of HTN, ASHD,  diverticulosis, and B12 deficiency presents with hemolytic anemia ( H 4.6)  and jaundice. Symptomatic for gen weakness and SOB. 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 Response is expected between 1-3 weeks with a target hb level of 10. Patient can be discharged home when a steady increase of hemoglobin occurs  -over all improvement of  clinical status and lab work with H/H 8.4/ 27, LDH trending down 900s to 617      # HTN, ASHD  - c/w home medications     # Diverticulosis   - avoid constipation,     # DASH diet   # full code

## 2019-10-10 NOTE — PROGRESS NOTE ADULT - SUBJECTIVE AND OBJECTIVE BOX
Hospital Day:  6d    Subjective:    Patient is a 89y old  Female who presents with a chief complaint of Hemolytic anemia (09 Oct 2019 23:38)    There were no acute overnight events. The patient was seen and examined at the bedside. No complaints. Denies fever, chills, headache, dizziness, chest pain, palpitations, shortness of breath, abdominal pain, nausea, vomiting, diarrhea.    Past Medical Hx:   TIA (transient ischemic attack)  Diverticulitis  Constipation  Hypertension    Past Sx:  No significant past surgical history    Allergies:  aspirin (Unknown)  Cipro (Unknown)  codeine (Unknown)  dicyclomine (Unknown)  Diovan (Unknown)  Flagyl (Unknown)  Librax (Unknown)  metronidazole (Unknown)  penicillin (Unknown)  Prevacid (Unknown)  trimethobenzamide (Unknown)    Current Meds:   Stand Meds:  chlorhexidine 4% Liquid 1 Application(s) Topical <User Schedule>  cyanocobalamin 1000 MICROGram(s) Oral daily  famotidine    Tablet 20 milliGRAM(s) Oral daily  folic acid  Oral Tab/Cap - Peds 1 milliGRAM(s) Oral daily  heparin  Injectable 5000 Unit(s) SubCutaneous every 8 hours  NIFEdipine XL 30 milliGRAM(s) Oral daily  predniSONE   Tablet 50 milliGRAM(s) Oral daily  triamterene 37.5 mG/hydrochlorothiazide 25 mG Capsule 1 Capsule(s) Oral daily    PRN Meds:    HOME MEDICATIONS:  NIFEdipine 30 mg oral tablet, extended release: 1 tab(s) orally once a day  ondansetron 8 mg oral tablet: 1 tab(s) orally 3 times a day, As Needed  triamterene-hydrochlorothiazide 37.5 mg- 25 mg oral capsule: 1 cap(s) orally once a day      Vital Signs:   T(F): 95.2 (10-10-19 @ 04:27), Max: 97.9 (10-09-19 @ 20:05)  HR: 73 (10-10-19 @ 04:27) (71 - 77)  BP: 126/60 (10-10-19 @ 04:27) (114/56 - 126/60)  RR: 18 (10-10-19 @ 04:27) (18 - 19)  SpO2: 98% (10-09-19 @ 23:55) (98% - 98%)        Physical Exam:   General: Patient resting comfortably in bed, NAD  HEENT: NCAT, conjunctiva clear, sclera white, PEERLA, EOMI, moist mucous membranes, normal orophaynx  Neck: No masses, no JVD, no cervical lymphadenopathy  Respiratory: good inspiratory effort; lungs clear to auscultation bilaterally  CV: Normal rate, regular rhythm, normal S1/S2, no rubs, gallops, murmurs  GI: abdomen soft, non-tender, non-distended, no masses, normal bowel sounds  Extremities: Well-perfused, no clubbing, no cyanosis, no lower extremity edema bilaterally  Skin: warm and dry  Neurology: AAOx3, mentating appropriately, follows commands, nonfocal    Labs:                         8.6    6.16  )-----------( 148      ( 09 Oct 2019 06:24 )             28.1       09 Oct 2019 06:24    140    |  106    |  46     ----------------------------<  105    4.8     |  21     |  1.4      Ca    9.6        09 Oct 2019 06:24  Mg     2.8       09 Oct 2019 06:24    TPro  5.8    /  Alb  4.1    /  TBili  1.8    /  DBili  0.5    /  AST  18     /  ALT  11     /  AlkPhos  63     09 Oct 2019 10:45

## 2019-10-10 NOTE — CHART NOTE - NSCHARTNOTEFT_GEN_A_CORE
Registered Dietitian Follow-Up     Patient Profile Reviewed                           Yes [x]   No []     Nutrition History Previously Obtained        Yes [x]  No []       Pertinent Subjective Information:  -Pt OOB in chair at time of assessment, daughter at bedside. Pt reports increase in appetite and intake ~60-75% of meals at this time. RD discussed importance of adequate intake with initiation of rituxan and pt and daughter verbalized understanding. Report pt eating better in hospital than baseline. Pt denied offer of nutrition supplement as intake improved, RD agrees. No longer at nutritional risk, will reassess in 7 days.      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 agreeable to rituxan so will start today 1x/week for total of 4 doses   --daily cbc, bmp/hepatic panel , LDH and retic count monitoring       Diet order: Dysphagia II mechanical soft/ground, thin liquids + High Fiber, DASH/TLC      Anthropometrics:  - Ht. 147.32cm   - Wt. 51.8kg (10/5), no new wt   - BMI 23.9   - IBW     Pertinent Lab Data: (10/10/19) RBC 2.50, H/H 8.4/27.4, Mg 2.6, BUN 50, Cr 1.6, glucose 109, GFR 28     Pertinent Meds: heparin, prednisone, cyanocobalamin, folic acid, procardia     Physical Findings:  - Appearance: AAO   - GI function: ?constipation as LBM noted 10/5   - Tubes:  - Oral/Mouth cavity: edentulous requiring need for dysphagia II ground diet   - Skin: intact, no edema      Nutrition Requirements  Weight Used: 51.8kg admit wt (continued from RD initial assessment)      estimated calorie needs: 990  - 1073 kcals/day (MSJ x 1.2 - 1.3 AF)  estimated protein needs: 52 - 64 gms/day (1.0 - 1.2 gm/kg)  estimated fluid needs: 1ml/kcal or per LIP     Nutrient Intake: meeting needs with 60-75% intake      [] Previous Nutrition Diagnosis: inadequate energy intake             [] Ongoing          [x] Resolved    [x] No active nutrition diagnosis identified at this time     Nutrition Intervention: meals and snacks  Recommend:   1. Continue current diet order.   2. consider bowel regimen if pt continues with no BM.      Goal/Expected Outcome: Pt to continue to tolerate >60% meals and snacks throughout LOS. Reassess in 7 days.      Indicator/Monitoring: RD to monitor energy intake, diet order, body comp, NFPF.

## 2019-10-10 NOTE — PROGRESS NOTE ADULT - SUBJECTIVE AND OBJECTIVE BOX
Patient is a 89y old  Female who presents with a chief complaint of anemia (10 Oct 2019 07:03)      Subjective: No new complaints     Vital Signs Last 24 Hrs  T(C): 35.1 (10 Oct 2019 04:27), Max: 36.6 (09 Oct 2019 20:05)  T(F): 95.2 (10 Oct 2019 04:27), Max: 97.9 (09 Oct 2019 20:05)  HR: 73 (10 Oct 2019 04:27) (71 - 77)  BP: 126/60 (10 Oct 2019 04:27) (114/56 - 126/60)  BP(mean): --  RR: 18 (10 Oct 2019 04:27) (18 - 19)  SpO2: 98% (09 Oct 2019 23:55) (98% - 98%)    PHYSICAL EXAM  General: adult in NAD  HEENT: clear oropharynx, anicteric sclera, pink conjunctiva  Neck: supple  CV: normal S1/S2 with no murmur rubs or gallops  Lungs: positive air movement b/l ant lungs  Abdomen: soft non-tender   Ext: no edema  Neuro: alert and oriented X 4, no focal deficits    MEDICATIONS  (STANDING):  acetaminophen   Tablet .. 650 milliGRAM(s) Oral once  chlorhexidine 4% Liquid 1 Application(s) Topical <User Schedule>  cyanocobalamin 1000 MICROGram(s) Oral daily  diphenhydrAMINE 25 milliGRAM(s) Oral once  famotidine    Tablet 20 milliGRAM(s) Oral daily  folic acid  Oral Tab/Cap - Peds 1 milliGRAM(s) Oral daily  heparin  Injectable 5000 Unit(s) SubCutaneous every 8 hours  hydrocortisone sodium succinate IVPB 100 milliGRAM(s) IV Intermittent once  NIFEdipine XL 30 milliGRAM(s) Oral daily  predniSONE   Tablet 50 milliGRAM(s) Oral daily  riTUXimab IVPB (eMAR) 525 milliGRAM(s) IV Intermittent once  triamterene 37.5 mG/hydrochlorothiazide 25 mG Capsule 1 Capsule(s) Oral daily    MEDICATIONS  (PRN):      LABS:                          8.4    7.21  )-----------( 157      ( 10 Oct 2019 06:53 )             27.4         Mean Cell Volume : 109.6 fL  Mean Cell Hemoglobin : 33.6 pg  Mean Cell Hemoglobin Concentration : 30.7 g/dL  Auto Neutrophil # : 5.32 K/uL  Auto Lymphocyte # : 1.24 K/uL  Auto Monocyte # : 0.56 K/uL  Auto Eosinophil # : 0.03 K/uL  Auto Basophil # : 0.01 K/uL  Auto Neutrophil % : 73.8 %  Auto Lymphocyte % : 17.2 %  Auto Monocyte % : 7.8 %  Auto Eosinophil % : 0.4 %  Auto Basophil % : 0.1 %      Serial CBC's  10-10 @ 06:53  Hct-27.4 / Hgb-8.4 / Plat-157 / RBC-2.50 / WBC-7.21  Serial CBC's  10-09 @ 06:24  Hct-28.1 / Hgb-8.6 / Plat-148 / RBC-2.52 / WBC-6.16  Serial CBC's  10-08 @ 06:53  Hct-27.0 / Hgb-8.0 / Plat-143 / RBC-2.44 / WBC-5.37  Serial CBC's  10-07 @ 06:18  Hct-27.6 / Hgb-8.3 / Plat-133 / RBC-2.50 / WBC-5.87      10-10    140  |  107  |  50<H>  ----------------------------<  109<H>  4.3   |  20  |  1.6<H>    Ca    9.6      10 Oct 2019 06:53  Mg     2.6     10-10    TPro  5.5<L>  /  Alb  3.8  /  TBili  1.5<H>  /  DBili  0.4<H>  /  AST  25  /  ALT  17  /  AlkPhos  61  10-10          Reticulocyte Percent: 16.1 % (10-10 @ 06:53)  Reticulocyte Percent: 16.6 % (10-09 @ 06:24)  Reticulocyte Percent: 18.6 % (10-08 @ 06:53)  Reticulocyte Percent: 21.6 % (10-07 @ 06:18)  Vitamin B12, Serum: 1424 pg/mL (10-07 @ 06:18)  Reticulocyte Percent: 19.8 % (10-06 @ 11:08)  Reticulocyte Percent: 24.8 % (10-05 @ 09:13)                          BLOOD SMEAR INTERPRETATION:       RADIOLOGY & ADDITIONAL STUDIES:

## 2019-10-10 NOTE — PROGRESS NOTE ADULT - ASSESSMENT
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 Keep hb >6.    Started on prednisone 1mg/kg daily with folic acid daily.   Today is day 6 of steroids and she is tolerating well- Given her HB is stable. was decrease to 50mg PO QD     Agreed to rituxan- will give that today - it is once weekly for 4 doses total   f/u  flow cytometry- normal   LDH is going down   Response is expected between 1-2 weeks with a target hb level of 10.  She is not discharge ready yet. Need to monitor CBC daily to make sure she does not drop again  We should see consistent fall in her LDH - LDH is slowly trending down     # Low Vitamin B12 level with normal MMA : on vitamin b12 IM twice weekly at home     Switched to daily PO vitamin b12.   Repeat [id number]    # DVT PPx : can use lovenox since patient has autoimmune hemolytic anemia and not bleeding.                      There is high risk of VTE with AIHA    Labs once daily , including cbc, bmp/hepatic panel , LDH and retic count  Transfuse to keep hb >6 . Run transfusion very slowly to avoid overt hemolysis

## 2019-10-11 LAB
ALBUMIN SERPL ELPH-MCNC: 3.6 G/DL — SIGNIFICANT CHANGE UP (ref 3.5–5.2)
ALP SERPL-CCNC: 49 U/L — SIGNIFICANT CHANGE UP (ref 30–115)
ALT FLD-CCNC: 22 U/L — SIGNIFICANT CHANGE UP (ref 0–41)
ANION GAP SERPL CALC-SCNC: 11 MMOL/L — SIGNIFICANT CHANGE UP (ref 7–14)
AST SERPL-CCNC: 21 U/L — SIGNIFICANT CHANGE UP (ref 0–41)
BASOPHILS # BLD AUTO: 0.01 K/UL — SIGNIFICANT CHANGE UP (ref 0–0.2)
BASOPHILS NFR BLD AUTO: 0.2 % — SIGNIFICANT CHANGE UP (ref 0–1)
BILIRUB DIRECT SERPL-MCNC: 0.4 MG/DL — HIGH (ref 0–0.2)
BILIRUB INDIRECT FLD-MCNC: 1 MG/DL — SIGNIFICANT CHANGE UP (ref 0.2–1.2)
BILIRUB SERPL-MCNC: 1.4 MG/DL — HIGH (ref 0.2–1.2)
BUN SERPL-MCNC: 49 MG/DL — HIGH (ref 10–20)
CALCIUM SERPL-MCNC: 9.1 MG/DL — SIGNIFICANT CHANGE UP (ref 8.5–10.1)
CHLORIDE SERPL-SCNC: 104 MMOL/L — SIGNIFICANT CHANGE UP (ref 98–110)
CO2 SERPL-SCNC: 19 MMOL/L — SIGNIFICANT CHANGE UP (ref 17–32)
CREAT SERPL-MCNC: 1.6 MG/DL — HIGH (ref 0.7–1.5)
EOSINOPHIL # BLD AUTO: 0.04 K/UL — SIGNIFICANT CHANGE UP (ref 0–0.7)
EOSINOPHIL NFR BLD AUTO: 0.7 % — SIGNIFICANT CHANGE UP (ref 0–8)
GLUCOSE SERPL-MCNC: 107 MG/DL — HIGH (ref 70–99)
HCT VFR BLD CALC: 24.9 % — LOW (ref 37–47)
HGB BLD-MCNC: 7.6 G/DL — LOW (ref 12–16)
IMM GRANULOCYTES NFR BLD AUTO: 0.5 % — HIGH (ref 0.1–0.3)
LDH SERPL L TO P-CCNC: 557 — HIGH (ref 50–242)
LYMPHOCYTES # BLD AUTO: 1.02 K/UL — LOW (ref 1.2–3.4)
LYMPHOCYTES # BLD AUTO: 16.6 % — LOW (ref 20.5–51.1)
MAGNESIUM SERPL-MCNC: 2.6 MG/DL — HIGH (ref 1.8–2.4)
MCHC RBC-ENTMCNC: 30.5 G/DL — LOW (ref 32–37)
MCHC RBC-ENTMCNC: 32.9 PG — HIGH (ref 27–31)
MCV RBC AUTO: 107.8 FL — HIGH (ref 81–99)
MONOCYTES # BLD AUTO: 0.51 K/UL — SIGNIFICANT CHANGE UP (ref 0.1–0.6)
MONOCYTES NFR BLD AUTO: 8.3 % — SIGNIFICANT CHANGE UP (ref 1.7–9.3)
NEUTROPHILS # BLD AUTO: 4.54 K/UL — SIGNIFICANT CHANGE UP (ref 1.4–6.5)
NEUTROPHILS NFR BLD AUTO: 73.7 % — SIGNIFICANT CHANGE UP (ref 42.2–75.2)
NRBC # BLD: 0 /100 WBCS — SIGNIFICANT CHANGE UP (ref 0–0)
PLATELET # BLD AUTO: 132 K/UL — SIGNIFICANT CHANGE UP (ref 130–400)
POTASSIUM SERPL-MCNC: 4.3 MMOL/L — SIGNIFICANT CHANGE UP (ref 3.5–5)
POTASSIUM SERPL-SCNC: 4.3 MMOL/L — SIGNIFICANT CHANGE UP (ref 3.5–5)
PROT SERPL-MCNC: 5.2 G/DL — LOW (ref 6–8)
RBC # BLD: 2.31 M/UL — LOW (ref 4.2–5.4)
RBC # BLD: 2.31 M/UL — LOW (ref 4.2–5.4)
RBC # FLD: 18.6 % — HIGH (ref 11.5–14.5)
RETICS #: 205.4 K/UL — HIGH (ref 25–125)
RETICS/RBC NFR: 8.9 % — HIGH (ref 0.5–1.5)
SODIUM SERPL-SCNC: 134 MMOL/L — LOW (ref 135–146)
WBC # BLD: 6.15 K/UL — SIGNIFICANT CHANGE UP (ref 4.8–10.8)
WBC # FLD AUTO: 6.15 K/UL — SIGNIFICANT CHANGE UP (ref 4.8–10.8)

## 2019-10-11 PROCEDURE — 99231 SBSQ HOSP IP/OBS SF/LOW 25: CPT

## 2019-10-11 RX ADMIN — Medication 1 CAPSULE(S): at 06:00

## 2019-10-11 RX ADMIN — HEPARIN SODIUM 5000 UNIT(S): 5000 INJECTION INTRAVENOUS; SUBCUTANEOUS at 13:29

## 2019-10-11 RX ADMIN — PREGABALIN 1000 MICROGRAM(S): 225 CAPSULE ORAL at 13:28

## 2019-10-11 RX ADMIN — Medication 30 MILLIGRAM(S): at 05:59

## 2019-10-11 RX ADMIN — FAMOTIDINE 20 MILLIGRAM(S): 10 INJECTION INTRAVENOUS at 13:29

## 2019-10-11 RX ADMIN — HEPARIN SODIUM 5000 UNIT(S): 5000 INJECTION INTRAVENOUS; SUBCUTANEOUS at 21:44

## 2019-10-11 RX ADMIN — HEPARIN SODIUM 5000 UNIT(S): 5000 INJECTION INTRAVENOUS; SUBCUTANEOUS at 05:59

## 2019-10-11 RX ADMIN — Medication 50 MILLIGRAM(S): at 05:59

## 2019-10-11 RX ADMIN — Medication 1 MILLIGRAM(S): at 13:29

## 2019-10-11 NOTE — PROGRESS NOTE ADULT - SUBJECTIVE AND OBJECTIVE BOX
Patient is a 89y old  Female who presents with a chief complaint of anemia (11 Oct 2019 06:25)      Subjective: No new complaints  She is doing well   Tolerated rituxan well       Vital Signs Last 24 Hrs  T(C): 36.1 (11 Oct 2019 18:09), Max: 36.6 (11 Oct 2019 04:48)  T(F): 97 (11 Oct 2019 18:09), Max: 97.9 (11 Oct 2019 04:48)  HR: 77 (11 Oct 2019 18:09) (63 - 77)  BP: 147/63 (11 Oct 2019 18:09) (100/47 - 158/56)  BP(mean): --  RR: 18 (11 Oct 2019 18:09) (17 - 19)  SpO2: 97% (11 Oct 2019 08:00) (96% - 97%)    PHYSICAL EXAM  General: adult in NAD  HEENT: clear oropharynx, anicteric sclera, pink conjunctiva  Neck: supple  CV: normal S1/S2 with no murmur rubs or gallops  Lungs: positive air movement b/l ant lungs,clear to auscultation, no wheezes, no rales  Abdomen: soft non-tender   Ext: no clubbing cyanosis or edema  Skin: no rashes and no petechiae  Neuro: alert and oriented X 4, no focal deficits    MEDICATIONS  (STANDING):  chlorhexidine 4% Liquid 1 Application(s) Topical <User Schedule>  cyanocobalamin 1000 MICROGram(s) Oral daily  famotidine    Tablet 20 milliGRAM(s) Oral daily  folic acid  Oral Tab/Cap - Peds 1 milliGRAM(s) Oral daily  heparin  Injectable 5000 Unit(s) SubCutaneous every 8 hours  NIFEdipine XL 30 milliGRAM(s) Oral daily  predniSONE   Tablet 50 milliGRAM(s) Oral daily  triamterene 37.5 mG/hydrochlorothiazide 25 mG Capsule 1 Capsule(s) Oral daily    MEDICATIONS  (PRN):      LABS:                          7.6    6.15  )-----------( 132      ( 11 Oct 2019 07:03 )             24.9         Mean Cell Volume : 107.8 fL  Mean Cell Hemoglobin : 32.9 pg  Mean Cell Hemoglobin Concentration : 30.5 g/dL  Auto Neutrophil # : 4.54 K/uL  Auto Lymphocyte # : 1.02 K/uL  Auto Monocyte # : 0.51 K/uL  Auto Eosinophil # : 0.04 K/uL  Auto Basophil # : 0.01 K/uL  Auto Neutrophil % : 73.7 %  Auto Lymphocyte % : 16.6 %  Auto Monocyte % : 8.3 %  Auto Eosinophil % : 0.7 %  Auto Basophil % : 0.2 %      Serial CBC's  10-11 @ 07:03  Hct-24.9 / Hgb-7.6 / Plat-132 / RBC-2.31 / WBC-6.15  Serial CBC's  10-10 @ 06:53  Hct-27.4 / Hgb-8.4 / Plat-157 / RBC-2.50 / WBC-7.21  Serial CBC's  10-09 @ 06:24  Hct-28.1 / Hgb-8.6 / Plat-148 / RBC-2.52 / WBC-6.16  Serial CBC's  10-08 @ 06:53  Hct-27.0 / Hgb-8.0 / Plat-143 / RBC-2.44 / WBC-5.37      10-11    134<L>  |  104  |  49<H>  ----------------------------<  107<H>  4.3   |  19  |  1.6<H>    Ca    9.1      11 Oct 2019 07:03  Mg     2.6     10-11    TPro  5.2<L>  /  Alb  3.6  /  TBili  1.4<H>  /  DBili  0.4<H>  /  AST  21  /  ALT  22  /  AlkPhos  49  10-11          Reticulocyte Percent: 8.9 % (10-11 @ 07:03)  Reticulocyte Percent: 16.1 % (10-10 @ 06:53)  Reticulocyte Percent: 16.6 % (10-09 @ 06:24)  Reticulocyte Percent: 18.6 % (10-08 @ 06:53)  Reticulocyte Percent: 21.6 % (10-07 @ 06:18)  Vitamin B12, Serum: 1424 pg/mL (10-07 @ 06:18)  Reticulocyte Percent: 19.8 % (10-06 @ 11:08)  Reticulocyte Percent: 24.8 % (10-05 @ 09:13)                          BLOOD SMEAR INTERPRETATION:       RADIOLOGY & ADDITIONAL STUDIES:

## 2019-10-11 NOTE — PROGRESS NOTE ADULT - ASSESSMENT
89 year old female with history of HTN, diverticulosis, and B12 deficiency presents with anemia. 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 Response is expected between 1-3 weeks with a target hb level of 10. Patient can be discharged home when a steady increase of hemoglobin occurs.  - Per heme/onc, patient will need monitoring for the next few days before she can be safely discharged, as her Hb could still drop.  - Transfuse to keep Hb>6; transfuse slowly to avoid hemolysis  - Labs once daily, including cbc, bmp/hepatic panel, LDH and retic count    # Low Vitamin B12 level with normal MMA  - continue PO B12 (1000mcg daily)  - vitamin B12 level (10/7): 1424    # HTN   - c/w home medications     # Diverticulosis   - avoid constipation    #DVT PPx : can use lovenox since patient has autoimmune hemolytic anemia and not bleeding. There is high risk of VTE with AIHA  #GI PPx: Pepcid; Pt has lansoprazole allergy (patient breaks out into hives)  #DASH diet  #full code 89 year old female with history of HTN, diverticulosis, and B12 deficiency presents with anemia. 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  - continue prednisone 1 mg/kg daily (50mg daily); today is day 6 of steroids  - folic acid daily  - s/p first infusion of rituximab (10/10); will receive once per week for 3 additional doses  - Hepatitis panel negative   - Flow cytometry normal  - Monitor Hb level daily . Response is expected between 1-3 weeks with a target hb level of 10. Patient can be discharged home tomorrow as long as hemoglobin does not drop further  - Transfuse to keep Hb>6; transfuse slowly to avoid hemolysis  - Labs once daily, including cbc, bmp/hepatic panel, LDH and retic count    # Low Vitamin B12 level with normal MMA  - continue PO B12 (1000mcg daily)  - vitamin B12 level (10/7): 1424    # HTN   - c/w home medications     # Diverticulosis   - avoid constipation    #DVT PPx : can use lovenox since patient has autoimmune hemolytic anemia and not bleeding. There is high risk of VTE with AIHA  #GI PPx: Pepcid; Pt has lansoprazole allergy (patient breaks out into hives)  #DASH diet  #full code

## 2019-10-11 NOTE — PROGRESS NOTE ADULT - ATTENDING COMMENTS
S/p 1st Rituximab dose yesterday, tolerated without difficulty.   Continue with Prednisone at 50mg daily.   Daily hemolysis panel, DVT ppx.

## 2019-10-11 NOTE — PROGRESS NOTE ADULT - ASSESSMENT
89 year old female with history of HTN, ASHD,  diverticulosis, and B12 deficiency presents with hemolytic anemia ( H 4.6)  and jaundice. Symptomatic for gen weakness and SOB. During hospital stay pt was transfused, started on steroid therapy and given RTX infusion therapy 10/10.        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

## 2019-10-11 NOTE — PROGRESS NOTE ADULT - SUBJECTIVE AND OBJECTIVE BOX
Hospital Day:  7d    Subjective:    Patient is a 89y old  Female who presents with a chief complaint of Hemolytic anemia (10 Oct 2019 15:09)    There were no acute overnight events. The patient was seen and examined at the bedside. No complaints. Denies fever, chills, headache, dizziness, chest pain, palpitations, shortness of breath, abdominal pain, nausea, vomiting, diarrhea.    Past Medical Hx:   TIA (transient ischemic attack)  Diverticulitis  Constipation  Hypertension    Past Sx:  No significant past surgical history    Allergies:  aspirin (Unknown)  Cipro (Unknown)  codeine (Unknown)  dicyclomine (Unknown)  Diovan (Unknown)  Flagyl (Unknown)  Librax (Unknown)  metronidazole (Unknown)  penicillin (Unknown)  Prevacid (Unknown)  trimethobenzamide (Unknown)    Current Meds:   Stand Meds:  chlorhexidine 4% Liquid 1 Application(s) Topical <User Schedule>  cyanocobalamin 1000 MICROGram(s) Oral daily  famotidine    Tablet 20 milliGRAM(s) Oral daily  folic acid  Oral Tab/Cap - Peds 1 milliGRAM(s) Oral daily  heparin  Injectable 5000 Unit(s) SubCutaneous every 8 hours  NIFEdipine XL 30 milliGRAM(s) Oral daily  predniSONE   Tablet 50 milliGRAM(s) Oral daily  triamterene 37.5 mG/hydrochlorothiazide 25 mG Capsule 1 Capsule(s) Oral daily    PRN Meds:    HOME MEDICATIONS:  NIFEdipine 30 mg oral tablet, extended release: 1 tab(s) orally once a day  ondansetron 8 mg oral tablet: 1 tab(s) orally 3 times a day, As Needed  triamterene-hydrochlorothiazide 37.5 mg- 25 mg oral capsule: 1 cap(s) orally once a day      Vital Signs:   T(F): 97.9 (10-11-19 @ 04:48), Max: 97.9 (10-11-19 @ 04:48)  HR: 63 (10-11-19 @ 04:48) (63 - 72)  BP: 100/47 (10-11-19 @ 04:48) (100/47 - 125/68)  RR: 18 (10-11-19 @ 04:48) (18 - 18)  SpO2: 96% (10-10-19 @ 22:41) (96% - 96%)      10-10-19 @ 07:01  -  10-11-19 @ 06:25  --------------------------------------------------------  IN: 550 mL / OUT: 0 mL / NET: 550 mL        Physical Exam:   General: Patient resting comfortably in bed, NAD  HEENT: NCAT, conjunctiva clear, sclera white, PEERLA, EOMI, moist mucous membranes, normal orophaynx  Neck: No masses, no JVD, no cervical lymphadenopathy  Respiratory: good inspiratory effort; lungs clear to auscultation bilaterally  CV: Normal rate, regular rhythm, normal S1/S2, no rubs, gallops, murmurs  GI: abdomen soft, non-tender, non-distended, no masses, normal bowel sounds  Extremities: Well-perfused, no clubbing, no cyanosis, no lower extremity edema bilaterally  Skin: warm and dry  Neurology: AAOx3, mentating appropriately, follows commands, nonfocal    Labs:                         8.4    7.21  )-----------( 157      ( 10 Oct 2019 06:53 )             27.4     Neutophil% 73.8, Lymphocyte% 17.2, Monocyte% 7.8, Bands% 0.7 10-10-19 @ 06:53    10 Oct 2019 06:53    140    |  107    |  50     ----------------------------<  109    4.3     |  20     |  1.6      Ca    9.6        10 Oct 2019 06:53  Mg     2.6       10 Oct 2019 06:53    TPro  5.5    /  Alb  3.8    /  TBili  1.5    /  DBili  0.4    /  AST  25     /  ALT  17     /  AlkPhos  61     10 Oct 2019 06:53 Hospital Day:  7d    Subjective:    Patient is a 89y old  Female who presents with a chief complaint of Hemolytic anemia (10 Oct 2019 15:09)    Patient had first rituximab infusion yesterday. Tolerated well. The patient was seen and examined at the bedside. No complaints, feels well. Denies fever, chills, headache, dizziness, chest pain, palpitations, shortness of breath, abdominal pain, nausea, vomiting, diarrhea.    Past Medical Hx:   TIA (transient ischemic attack)  Diverticulitis  Constipation  Hypertension    Past Sx:  No significant past surgical history    Allergies:  aspirin (Unknown)  Cipro (Unknown)  codeine (Unknown)  dicyclomine (Unknown)  Diovan (Unknown)  Flagyl (Unknown)  Librax (Unknown)  metronidazole (Unknown)  penicillin (Unknown)  Prevacid (Unknown)  trimethobenzamide (Unknown)    Current Meds:   Standng Meds:  chlorhexidine 4% Liquid 1 Application(s) Topical <User Schedule>  cyanocobalamin 1000 MICROGram(s) Oral daily  famotidine    Tablet 20 milliGRAM(s) Oral daily  folic acid  Oral Tab/Cap - Peds 1 milliGRAM(s) Oral daily  heparin  Injectable 5000 Unit(s) SubCutaneous every 8 hours  NIFEdipine XL 30 milliGRAM(s) Oral daily  predniSONE   Tablet 50 milliGRAM(s) Oral daily  triamterene 37.5 mG/hydrochlorothiazide 25 mG Capsule 1 Capsule(s) Oral daily    PRN Meds:    HOME MEDICATIONS:  NIFEdipine 30 mg oral tablet, extended release: 1 tab(s) orally once a day  ondansetron 8 mg oral tablet: 1 tab(s) orally 3 times a day, As Needed  triamterene-hydrochlorothiazide 37.5 mg- 25 mg oral capsule: 1 cap(s) orally once a day      Vital Signs:   T(F): 97.9 (10-11-19 @ 04:48), Max: 97.9 (10-11-19 @ 04:48)  HR: 63 (10-11-19 @ 04:48) (63 - 72)  BP: 100/47 (10-11-19 @ 04:48) (100/47 - 125/68)  RR: 18 (10-11-19 @ 04:48) (18 - 18)  SpO2: 96% (10-10-19 @ 22:41) (96% - 96%)      10-10-19 @ 07:01  -  10-11-19 @ 06:25  --------------------------------------------------------  IN: 550 mL / OUT: 0 mL / NET: 550 mL        Physical Exam:   General: Patient resting comfortably in bed, NAD  HEENT: NCAT, conjunctiva clear, sclera white, PEERLA, EOMI, moist mucous membranes, normal orophaynx  Neck: No masses, no JVD, no cervical lymphadenopathy  Respiratory: good inspiratory effort; lungs clear to auscultation bilaterally  CV: Normal rate, regular rhythm, normal S1/S2, no rubs, gallops, murmurs  GI: abdomen soft, non-tender, non-distended, no masses, normal bowel sounds  Extremities: Well-perfused, no clubbing, no cyanosis, no lower extremity edema bilaterally  Skin: warm and dry  Neurology: AAOx3, mentating appropriately, follows commands, nonfocal    Labs:                         8.4    7.21  )-----------( 157      ( 10 Oct 2019 06:53 )             27.4     Neutophil% 73.8, Lymphocyte% 17.2, Monocyte% 7.8, Bands% 0.7 10-10-19 @ 06:53    10 Oct 2019 06:53    140    |  107    |  50     ----------------------------<  109    4.3     |  20     |  1.6      Ca    9.6        10 Oct 2019 06:53  Mg     2.6       10 Oct 2019 06:53    TPro  5.5    /  Alb  3.8    /  TBili  1.5    /  DBili  0.4    /  AST  25     /  ALT  17     /  AlkPhos  61     10 Oct 2019 06:53

## 2019-10-11 NOTE — PROGRESS NOTE ADULT - ASSESSMENT
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 Keep hb >6.    Started on prednisone 1mg/kg daily with folic acid daily.   Today is day 7 of steroids and she is tolerating well- Given her HB is stable. was decrease to 50mg PO QD . She c/w with steroids on DC as well until she sees us in the office   s/p 1 dose of rituxan on 10/10/19- She will receive total of 4 doses Qweekly   f/u  flow cytometry- normal   Her HB dropped slightly to 7.6 today   We should see consistent fall in her LDH - LDH is slowly trending down   She is not discharge ready yet. Need to monitor CBC daily to make sure she does not drop again        # Low Vitamin B12 level with normal MMA : on vitamin b12 IM twice weekly at home     Switched to daily PO vitamin b12.   Repeat [id number]    # DVT PPx : can use lovenox since patient has autoimmune hemolytic anemia and not bleeding.    There is high risk of VTE with AIHA    Labs once daily , including cbc, bmp/hepatic panel , LDH and retic count  Transfuse to keep hb >6 . Run transfusion very slowly to avoid overt hemolysis

## 2019-10-12 ENCOUNTER — TRANSCRIPTION ENCOUNTER (OUTPATIENT)
Age: 84
End: 2019-10-12

## 2019-10-12 VITALS — TEMPERATURE: 98 F

## 2019-10-12 LAB
ALBUMIN SERPL ELPH-MCNC: 3.6 G/DL — SIGNIFICANT CHANGE UP (ref 3.5–5.2)
ALP SERPL-CCNC: 52 U/L — SIGNIFICANT CHANGE UP (ref 30–115)
ALT FLD-CCNC: 20 U/L — SIGNIFICANT CHANGE UP (ref 0–41)
ANION GAP SERPL CALC-SCNC: 11 MMOL/L — SIGNIFICANT CHANGE UP (ref 7–14)
AST SERPL-CCNC: 19 U/L — SIGNIFICANT CHANGE UP (ref 0–41)
BASOPHILS # BLD AUTO: 0 K/UL — SIGNIFICANT CHANGE UP (ref 0–0.2)
BASOPHILS NFR BLD AUTO: 0 % — SIGNIFICANT CHANGE UP (ref 0–1)
BILIRUB DIRECT SERPL-MCNC: 0.4 MG/DL — HIGH (ref 0–0.2)
BILIRUB INDIRECT FLD-MCNC: 1 MG/DL — SIGNIFICANT CHANGE UP (ref 0.2–1.2)
BILIRUB SERPL-MCNC: 1.4 MG/DL — HIGH (ref 0.2–1.2)
BUN SERPL-MCNC: 49 MG/DL — HIGH (ref 10–20)
CALCIUM SERPL-MCNC: 9.4 MG/DL — SIGNIFICANT CHANGE UP (ref 8.5–10.1)
CHLORIDE SERPL-SCNC: 107 MMOL/L — SIGNIFICANT CHANGE UP (ref 98–110)
CO2 SERPL-SCNC: 22 MMOL/L — SIGNIFICANT CHANGE UP (ref 17–32)
CREAT SERPL-MCNC: 1.5 MG/DL — SIGNIFICANT CHANGE UP (ref 0.7–1.5)
EOSINOPHIL # BLD AUTO: 0.06 K/UL — SIGNIFICANT CHANGE UP (ref 0–0.7)
EOSINOPHIL NFR BLD AUTO: 0.9 % — SIGNIFICANT CHANGE UP (ref 0–8)
GLUCOSE SERPL-MCNC: 118 MG/DL — HIGH (ref 70–99)
HCT VFR BLD CALC: 25.9 % — LOW (ref 37–47)
HGB BLD-MCNC: 7.8 G/DL — LOW (ref 12–16)
IMM GRANULOCYTES NFR BLD AUTO: 0.6 % — HIGH (ref 0.1–0.3)
LDH SERPL L TO P-CCNC: 568 — HIGH (ref 50–242)
LYMPHOCYTES # BLD AUTO: 1.07 K/UL — LOW (ref 1.2–3.4)
LYMPHOCYTES # BLD AUTO: 16.6 % — LOW (ref 20.5–51.1)
MAGNESIUM SERPL-MCNC: 2.5 MG/DL — HIGH (ref 1.8–2.4)
MCHC RBC-ENTMCNC: 30.1 G/DL — LOW (ref 32–37)
MCHC RBC-ENTMCNC: 33.1 PG — HIGH (ref 27–31)
MCV RBC AUTO: 109.7 FL — HIGH (ref 81–99)
MONOCYTES # BLD AUTO: 0.63 K/UL — HIGH (ref 0.1–0.6)
MONOCYTES NFR BLD AUTO: 9.8 % — HIGH (ref 1.7–9.3)
NEUTROPHILS # BLD AUTO: 4.65 K/UL — SIGNIFICANT CHANGE UP (ref 1.4–6.5)
NEUTROPHILS NFR BLD AUTO: 72.1 % — SIGNIFICANT CHANGE UP (ref 42.2–75.2)
NRBC # BLD: 0 /100 WBCS — SIGNIFICANT CHANGE UP (ref 0–0)
PLATELET # BLD AUTO: 137 K/UL — SIGNIFICANT CHANGE UP (ref 130–400)
POTASSIUM SERPL-MCNC: 4.6 MMOL/L — SIGNIFICANT CHANGE UP (ref 3.5–5)
POTASSIUM SERPL-SCNC: 4.6 MMOL/L — SIGNIFICANT CHANGE UP (ref 3.5–5)
PROT SERPL-MCNC: 5.2 G/DL — LOW (ref 6–8)
RBC # BLD: 2.36 M/UL — LOW (ref 4.2–5.4)
RBC # BLD: 2.36 M/UL — LOW (ref 4.2–5.4)
RBC # FLD: 17.8 % — HIGH (ref 11.5–14.5)
RETICS #: 151.5 K/UL — HIGH (ref 25–125)
RETICS/RBC NFR: 6.4 % — HIGH (ref 0.5–1.5)
SODIUM SERPL-SCNC: 140 MMOL/L — SIGNIFICANT CHANGE UP (ref 135–146)
WBC # BLD: 6.45 K/UL — SIGNIFICANT CHANGE UP (ref 4.8–10.8)
WBC # FLD AUTO: 6.45 K/UL — SIGNIFICANT CHANGE UP (ref 4.8–10.8)

## 2019-10-12 PROCEDURE — 99238 HOSP IP/OBS DSCHRG MGMT 30/<: CPT

## 2019-10-12 RX ORDER — PREGABALIN 225 MG/1
1 CAPSULE ORAL
Qty: 30 | Refills: 0
Start: 2019-10-12 | End: 2019-11-10

## 2019-10-12 RX ORDER — FAMOTIDINE 10 MG/ML
1 INJECTION INTRAVENOUS
Qty: 30 | Refills: 0
Start: 2019-10-12 | End: 2019-11-10

## 2019-10-12 RX ORDER — FOLIC ACID 0.8 MG
1 TABLET ORAL
Qty: 30 | Refills: 0
Start: 2019-10-12 | End: 2019-11-10

## 2019-10-12 RX ADMIN — Medication 50 MILLIGRAM(S): at 05:39

## 2019-10-12 RX ADMIN — HEPARIN SODIUM 5000 UNIT(S): 5000 INJECTION INTRAVENOUS; SUBCUTANEOUS at 05:39

## 2019-10-12 RX ADMIN — HEPARIN SODIUM 5000 UNIT(S): 5000 INJECTION INTRAVENOUS; SUBCUTANEOUS at 13:18

## 2019-10-12 RX ADMIN — Medication 1 CAPSULE(S): at 06:48

## 2019-10-12 RX ADMIN — PREGABALIN 1000 MICROGRAM(S): 225 CAPSULE ORAL at 13:16

## 2019-10-12 RX ADMIN — Medication 30 MILLIGRAM(S): at 05:39

## 2019-10-12 RX ADMIN — Medication 1 MILLIGRAM(S): at 13:16

## 2019-10-12 NOTE — DISCHARGE NOTE PROVIDER - PROVIDER TOKENS
PROVIDER:[TOKEN:[[id number]],FOLLOWUP:[1 week]],PROVIDER:[TOKEN:[[id number]],FOLLOWUP:[Routine]]

## 2019-10-12 NOTE — PROGRESS NOTE ADULT - REASON FOR ADMISSION
Hemolytic anemia
anemia
anemia
Hemolytic anemia
Hemolytic anemia, jaundice, SOB and gen weakness
anemia
anemia, autoimmune hemolytic anemia. spPRBC transfusion, steroid tx

## 2019-10-12 NOTE — DISCHARGE NOTE PROVIDER - CARE PROVIDER_API CALL
Arun Orantes)  Hematology; Internal Medicine; Medical Oncology  [address] [place] [address]  Phone: [phone]  Fax: [phone]  Follow Up Time: 1 week    Gary Santillan)  Internal Medicine  [address] [place] [address], [facility] [address]  [place] [address]  Phone: [phone]  Fax: [phone]  Follow Up Time: Routine

## 2019-10-12 NOTE — CHART NOTE - NSCHARTNOTEFT_GEN_A_CORE
<<<RESIDENT DISCHARGE NOTE>>>     RITA RAMOS  MRN-[id number]    VITAL SIGNS:  T(F): 97.8 (10-12-19 @ 05:36), Max: 97.8 (10-12-19 @ 05:36)  HR: 67 (10-12-19 @ 05:11)  BP: 120/55 (10-12-19 @ 05:11)  SpO2: 97% (10-11-19 @ 21:40)      PHYSICAL EXAMINATION:  General: Patient resting comfortably in bed, NAD  HEENT: NCAT, conjunctiva clear, sclera white, PEERLA, EOMI, moist mucous membranes, normal orophaynx  Neck: No masses, no JVD, no cervical lymphadenopathy  Respiratory: good inspiratory effort; lungs clear to auscultation bilaterally  CV: Normal rate, regular rhythm, normal S1/S2, no rubs, gallops, murmurs  GI: abdomen soft, non-tender, non-distended, no masses, normal bowel sounds  Extremities: Well-perfused, no clubbing, no cyanosis, no lower extremity edema bilaterally  Skin: warm and dry  Neurology: AAOx3, mentating appropriately, follows commands, nonfocal    TEST RESULTS:                        7.8    6.45  )-----------( 137      ( 12 Oct 2019 06:04 )             25.9       10-12    140  |  107  |  49<H>  ----------------------------<  118<H>  4.6   |  22  |  1.5    Ca    9.4      12 Oct 2019 06:04  Mg     2.5     10-12    TPro  5.2<L>  /  Alb  3.6  /  TBili  1.4<H>  /  DBili  0.4<H>  /  AST  19  /  ALT  20  /  AlkPhos  52  10-12      FINAL DISCHARGE INTERVIEW:  Resident(s) Present: (Name: Ramonita Hill, RN Present: (Name: Kirsten)    DISCHARGE MEDICATION RECONCILIATION  reviewed with Attending (Name: Heena Amezcua)    DISPOSITION:   [  ] Home,    [  ] Home with Visiting Nursing Services,   [    ]  SNF/ NH,    [   ] Acute Rehab (4A),   [   ] Other (Specify:_________)

## 2019-10-12 NOTE — PROGRESS NOTE ADULT - PROVIDER SPECIALTY LIST ADULT
Heme/Onc
Internal Medicine
Heme/Onc
Internal Medicine

## 2019-10-12 NOTE — PROGRESS NOTE ADULT - SUBJECTIVE AND OBJECTIVE BOX
Patient is a 89y old  Female who presents with a chief complaint of Hemolytic anemia (11 Oct 2019 22:19)      Subjective:      Vital Signs Last 24 Hrs  T(C): 36.6 (12 Oct 2019 05:36), Max: 36.6 (12 Oct 2019 05:36)  T(F): 97.8 (12 Oct 2019 05:36), Max: 97.8 (12 Oct 2019 05:36)  HR: 67 (12 Oct 2019 05:11) (67 - 77)  BP: 120/55 (12 Oct 2019 05:11) (120/55 - 158/56)  BP(mean): --  RR: 18 (12 Oct 2019 05:11) (17 - 19)  SpO2: 97% (11 Oct 2019 21:40) (97% - 97%)    PHYSICAL EXAM  General: adult in NAD  HEENT: clear oropharynx, anicteric sclera, pink conjunctiva  Neck: supple  CV: normal S1/S2 with no murmur rubs or gallops  Lungs: positive air movement b/l ant lungs,clear to auscultation, no wheezes, no rales  Abdomen: soft non-tender non-distended, no hepatosplenomegaly  Ext: no clubbing cyanosis or edema  Skin: no rashes and no petechiae  Neuro: alert and oriented X 4, no focal deficits    MEDICATIONS  (STANDING):  chlorhexidine 4% Liquid 1 Application(s) Topical <User Schedule>  cyanocobalamin 1000 MICROGram(s) Oral daily  famotidine    Tablet 20 milliGRAM(s) Oral daily  folic acid  Oral Tab/Cap - Peds 1 milliGRAM(s) Oral daily  heparin  Injectable 5000 Unit(s) SubCutaneous every 8 hours  NIFEdipine XL 30 milliGRAM(s) Oral daily  predniSONE   Tablet 50 milliGRAM(s) Oral daily  triamterene 37.5 mG/hydrochlorothiazide 25 mG Capsule 1 Capsule(s) Oral daily    MEDICATIONS  (PRN):      LABS:                          7.6    6.15  )-----------( 132      ( 11 Oct 2019 07:03 )             24.9         Mean Cell Volume : 107.8 fL  Mean Cell Hemoglobin : 32.9 pg  Mean Cell Hemoglobin Concentration : 30.5 g/dL  Auto Neutrophil # : 4.54 K/uL  Auto Lymphocyte # : 1.02 K/uL  Auto Monocyte # : 0.51 K/uL  Auto Eosinophil # : 0.04 K/uL  Auto Basophil # : 0.01 K/uL  Auto Neutrophil % : 73.7 %  Auto Lymphocyte % : 16.6 %  Auto Monocyte % : 8.3 %  Auto Eosinophil % : 0.7 %  Auto Basophil % : 0.2 %      Serial CBC's  10-11 @ 07:03  Hct-24.9 / Hgb-7.6 / Plat-132 / RBC-2.31 / WBC-6.15  Serial CBC's  10-10 @ 06:53  Hct-27.4 / Hgb-8.4 / Plat-157 / RBC-2.50 / WBC-7.21  Serial CBC's  10-09 @ 06:24  Hct-28.1 / Hgb-8.6 / Plat-148 / RBC-2.52 / WBC-6.16      10-11    134<L>  |  104  |  49<H>  ----------------------------<  107<H>  4.3   |  19  |  1.6<H>    Ca    9.1      11 Oct 2019 07:03  Mg     2.6     10-11    TPro  5.2<L>  /  Alb  3.6  /  TBili  1.4<H>  /  DBili  0.4<H>  /  AST  21  /  ALT  22  /  AlkPhos  49  10-11          Reticulocyte Percent: 8.9 % (10-11 @ 07:03)  Reticulocyte Percent: 16.1 % (10-10 @ 06:53)  Reticulocyte Percent: 16.6 % (10-09 @ 06:24)  Reticulocyte Percent: 18.6 % (10-08 @ 06:53)  Reticulocyte Percent: 21.6 % (10-07 @ 06:18)  Vitamin B12, Serum: 1424 pg/mL (10-07 @ 06:18)  Reticulocyte Percent: 19.8 % (10-06 @ 11:08)  Reticulocyte Percent: 24.8 % (10-05 @ 09:13) Patient is a 89y old  Female who presents with a chief complaint of Hemolytic anemia (11 Oct 2019 22:19)      Subjective: Patient feels well and wants to go home today, if possible.      Vital Signs Last 24 Hrs  T(C): 36.6 (12 Oct 2019 05:36), Max: 36.6 (12 Oct 2019 05:36)  T(F): 97.8 (12 Oct 2019 05:36), Max: 97.8 (12 Oct 2019 05:36)  HR: 67 (12 Oct 2019 05:11) (67 - 77)  BP: 120/55 (12 Oct 2019 05:11) (120/55 - 158/56)  BP(mean): --  RR: 18 (12 Oct 2019 05:11) (17 - 19)  SpO2: 97% (11 Oct 2019 21:40) (97% - 97%)    PHYSICAL EXAM  General: thin elderly adult in NAD, resting comfortably in bed  HEENT: NC/AT  Neck: supple  Lungs: positive air movement b/l ant lungs  Ext: no edema  Skin: no rashes and no petechiae  Neuro: alert and oriented X 4, no focal deficits    MEDICATIONS  (STANDING):  chlorhexidine 4% Liquid 1 Application(s) Topical <User Schedule>  cyanocobalamin 1000 MICROGram(s) Oral daily  famotidine    Tablet 20 milliGRAM(s) Oral daily  folic acid  Oral Tab/Cap - Peds 1 milliGRAM(s) Oral daily  heparin  Injectable 5000 Unit(s) SubCutaneous every 8 hours  NIFEdipine XL 30 milliGRAM(s) Oral daily  predniSONE   Tablet 50 milliGRAM(s) Oral daily  triamterene 37.5 mG/hydrochlorothiazide 25 mG Capsule 1 Capsule(s) Oral daily    MEDICATIONS  (PRN):      LABS:                        7.8    6.45  )-----------( 137      ( 12 Oct 2019 06:04 )             25.9     12 Oct 2019 06:04    140    |  107    |  49     ----------------------------<  118    4.6     |  22     |  1.5      Ca    9.4        12 Oct 2019 06:04  Mg     2.5       12 Oct 2019 06:04    TPro  5.2    /  Alb  3.6    /  TBili  1.4    /  DBili  0.4    /  AST  19     /  ALT  20     /  AlkPhos  52     12 Oct 2019 06:04                              7.6    6.15  )-----------( 132      ( 11 Oct 2019 07:03 )             24.9         Mean Cell Volume : 107.8 fL  Mean Cell Hemoglobin : 32.9 pg  Mean Cell Hemoglobin Concentration : 30.5 g/dL  Auto Neutrophil # : 4.54 K/uL  Auto Lymphocyte # : 1.02 K/uL  Auto Monocyte # : 0.51 K/uL  Auto Eosinophil # : 0.04 K/uL  Auto Basophil # : 0.01 K/uL  Auto Neutrophil % : 73.7 %  Auto Lymphocyte % : 16.6 %  Auto Monocyte % : 8.3 %  Auto Eosinophil % : 0.7 %  Auto Basophil % : 0.2 %      Serial CBC's  10-11 @ 07:03  Hct-24.9 / Hgb-7.6 / Plat-132 / RBC-2.31 / WBC-6.15  Serial CBC's  10-10 @ 06:53  Hct-27.4 / Hgb-8.4 / Plat-157 / RBC-2.50 / WBC-7.21  Serial CBC's  10-09 @ 06:24  Hct-28.1 / Hgb-8.6 / Plat-148 / RBC-2.52 / WBC-6.16      10-11    134<L>  |  104  |  49<H>  ----------------------------<  107<H>  4.3   |  19  |  1.6<H>    Ca    9.1      11 Oct 2019 07:03  Mg     2.6     10-11    TPro  5.2<L>  /  Alb  3.6  /  TBili  1.4<H>  /  DBili  0.4<H>  /  AST  21  /  ALT  22  /  AlkPhos  49  10-11          Reticulocyte Percent: 8.9 % (10-11 @ 07:03)  Reticulocyte Percent: 16.1 % (10-10 @ 06:53)  Reticulocyte Percent: 16.6 % (10-09 @ 06:24)  Reticulocyte Percent: 18.6 % (10-08 @ 06:53)  Reticulocyte Percent: 21.6 % (10-07 @ 06:18)  Vitamin B12, Serum: 1424 pg/mL (10-07 @ 06:18)  Reticulocyte Percent: 19.8 % (10-06 @ 11:08)  Reticulocyte Percent: 24.8 % (10-05 @ 09:13)

## 2019-10-12 NOTE — PROGRESS NOTE ADULT - ASSESSMENT
89 year old female with history of HTN, ASHD,  diverticulosis, and B12 deficiency presents with hemolytic anemia ( H 4.6)  and jaundice. Symptomatic for gen weakness and SOB. During hospital stay pt was transfused, started on steroid therapy and given RTX infusion therapy 10/10.  Pt today is medicall stable and cleared by hem-onc to be D/C on prednisone 50 mg with GI prophylaxis.  Pt will ff up with DR Orantes for second dose of RTX infusion and surveillance of the CBC.        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

## 2019-10-12 NOTE — DISCHARGE NOTE PROVIDER - HOSPITAL COURSE
89 year old female with history of HTN, diverticulosis, and B12 deficiency presented with anemia.     patient had been having weakness, loss of appetite, headache worsening for 5 days prior to presentation. last month she was admitted to [facility] for similar presentation, found to be anemia, received transfusion and discharged on B12 injections. She had been doing relatively well until 5 days when she stared to have similar symptoms again with jaundice this time. Workup revealed warm autoimmune hemolytic anemia with (+)IgG and (-)C3. The patient received 2 units PRBCs 89 year old female with history of HTN, diverticulosis, and B12 deficiency presented with anemia. The patient had been having weakness, loss of appetite, headache worsening for 5 days prior to presentation. last month she was admitted to [facility] for similar presentation, found to be anemia, received transfusion and discharged on B12 injections. She had been doing relatively well until 5 days when she stared to have similar symptoms again with jaundice this time. Workup revealed warm autoimmune hemolytic anemia with (+)IgG and (-)C3. The patient received 2 units of PRBCs for hb 4.7 (10/4), and 1 unit PRBC for Hb 5.9 (10/6). Hemoglobin and LDH levels stabilized with steroid treatment. The patient received her first dose of rituximab on 10/10, which she tolerated well. She will receive 3 additional doses, once weekly. The patient is now stable and has been cleared by heme/onc for discharge with outpatient follow up for blood work and rituximab treatment.

## 2019-10-12 NOTE — PROGRESS NOTE ADULT - ATTENDING COMMENTS
Patient examined , management discussed with fellow and daughter . Tolerated rituxan #1 , Hb is stable for discharge on prednisone 40mg , PPI , folic acid . follow up next week for cbc , BMP and for rituxan #2 . All the patients concerns and questions were addressed .

## 2019-10-12 NOTE — PROGRESS NOTE ADULT - ASSESSMENT
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 Keep hb >6.  -Started on prednisone 1mg/kg daily with folic acid daily.  Given stable hemoglobin, patient's dose decreased to prednisone 50 mg daily.  She will c/w with steroids on DC as well until she sees us in the office.  - s/p 1 dose of rituxan on 10/10/19- She will receive total of 4 doses Qweekly   - Flow cytometry negative.    # Low Vitamin B12 level with normal MMA : on vitamin b12 IM twice weekly at home  - Switched to daily PO vitamin B12.  - Repeat [id number].    # DVT PPx : can use lovenox since patient is not bleeding.    There is high risk of VTE with AIHA    Labs once daily, including cbc, bmp/hepatic panel, LDH, and reticulocyte count.  Transfuse to keep hb >6 . 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 Keep hb >6.  -Started on prednisone 1mg/kg daily with folic acid daily.  Given stable hemoglobin, patient's dose decreased to prednisone 50 mg daily.  She will c/w with steroids on DC as well until she sees us in the office.  - s/p 1 dose of rituxan on 10/10/19- She will receive total of 4 doses Qweekly   - Flow cytometry negative.    # Low Vitamin B12 level with normal MMA : on vitamin b12 IM twice weekly at home  - Switched to daily PO vitamin B12.  - Repeat [id number].    # DVT PPx : can use lovenox since patient is not bleeding.    There is high risk of VTE with AIHA    Labs once daily, including cbc, bmp/hepatic panel, LDH, and reticulocyte count.  Transfuse to keep hb >6 . Run transfusion very slowly to avoid overt hemolysis    # Dispo-Patient wishes to be discharged home and is stable for discharge.  Is requesting prescription to have CBC, BMP performed at outside lab.  Will reach out to new patient  to have patient follow up with Dr. Orantes at [facility] next week.  Patient is also due for second dose of rituximab on Friday, 10/18/2019, which may be given in outpatient setting.

## 2019-10-12 NOTE — PROGRESS NOTE ADULT - SUBJECTIVE AND OBJECTIVE BOX
89 year old female with history of HTN, diverticulosis, and B12 deficiency presents with anemia. The   patient has been having weakness, loss of appetite, headache worsening for 5 days PTA.    Last month she was admitted to [facility] for similar presentation, found to be anemic, received transfusion and discharged on B12 injections.   Pt was doing relatively well until 5 days when she stared to have similar symptoms and noted to  be jaundiced. The pt denies chest pain, nausea, vomiting but  reports on/off LLQ for the last month.   Blood work from last admission showed hemolytic pattern with low haptoglobin, elevated LDH.   In ED patient was hemodynamically stable, received one unit and had unremarkable CT abdomen.  The pt is being admitted for hemolytic anemia, PRBC transfusions,  IV steroids and RTX infusion    PAST MEDICAL & SURGICAL HISTORY:     HTN, ASHD  TIA (transient ischemic attack)  Chronic anemia  CKD  Diverticulosis, Diverticulitis  Chronic Constipation  OA, DDD, DJD, kyphosis, Osteoporosis  No significant past surgical history    Meds;    enoxaparin 40mg q 24  pantoprazole 40mg q 24  prednisone 80mg q 24, dec to 50mg q 24  vit B12 1000mc po q 24  folic acid 1mg q 24  nifedipine XL 30gmg q24  triamterene-HCTZ 37.5/25mg q 24    Overnight events:  pt clinically improved, medically stable for D/C on po    Vital Signs Last 24 Hrs    T(F): 97.8  HR: 67  BP: 120/53    RR: 18  SpO2: 97%    Physical Exam:    GENERAL: thin, frail, elderly WF, chronically ill looking but in NAD, more energetic  HEAD:  Atraumatic, Normocephalic, Sclerae much less icteric  ENT: Moist mucous membranes  CHEST/LUNG: Clear to auscultation bilaterally;   HEART: Regular rate and rhythm; No murmurs, rubs, or gallops  ABDOMEN: Soft, Nontender, Nondistended.   EXTREMITIES:  brisk capillary refill. No clubbing, cyanosis, or edema  NERVOUS SYSTEM:  Alert & Oriented X3, speech clear. No deficits                  7.8    6.45)-----------( 137    , retic %  21.6, absolute retic 540              25.9     140  |  107  |  49  ----------------------------<  118  4.6  |  22  |  1.5    GFR 30, 33, 32, 28, 30  Ca    9.6          TPro  5.8,   /  Alb  4.1, 3.9  /  TBili  6.9, 4.1, 2.4,  1.5 /  DBili  1.1, 0.4  /I Bili d1.1  AST  51<H>  /  ALT  11  /  AlkPhos  72  10-05     LDH   938,  884,  736,  617,  557,  568                      Bilirubin Direct, Serum (10.05.19 @ 01:00)    Bilirubin Direct, Serum: 1.1 mg/dL    Gamma Glutamyl Transferase, Serum (10.05.19 @ 01:00)    Gamma Glutamyl Transferase, Serum: 9 U/L    Lactate Dehydrogenase, Serum (10.05.19 @ 01:00)    Lactate Dehydrogenase, Serum: 784    Direct Lorna Profile (10.04.19 @ 20:10)    Dir Antiglob Polyspecific Interpretation: POS: 10/04/2019 23:17  AVILLAFRAN  T    Bilirubin Total, Serum: 7.8 mg/dL (10.04.19 @ 18:20)      EXAM:  US ABDOMEN LIMITED            INTERPRETATION:  CLINICAL HISTORY: Jaundice.    COMPARISON: None.    PROCEDURE: Ultrasound of the right upper quadrant was performed.    FINDINGS:    LIVER:  Normal in contour and echogenicity measuring 14.3 cm in length,   containing multiple cysts the largest measuring up to approximately 2.2 x   2.4 x 2.3 cm.    GALLBLADDER/BILIARY TREE:  No evidence of cholelithiasis. No wall   thickening or pericholecystic fluid.  Negative sonographic Cameron's sign.   No intrahepatic biliary ductal dilatation. The common bile duct measures   7 mm, which is normal or age.     PANCREAS: Obscured by overlying bowel gas.    KIDNEY:  Right kidney measures 9.2 cm in length, with a mid pole cyst   measuring up to 0.8 cm. No hydronephrosis, calculi or solid mass.    AORTA/IVC:  Visualized proximal portions unremarkable.    ASCITES:  None.    IMPRESSION:    Essentially unremarkable right upper quadrant ultrasound.  Nonvisualization of the pancreas.          EXAM:  CT ABDOMEN AND PELVIS IC            PROCEDURE DATE:  10/04/2019            INTERPRETATION:  CLINICAL STATEMENT: Left lower quadrant abdominal pain.      TECHNIQUE: Contiguous axial CT images were obtained from the lower chest   to the pubic symphysis following administration of 100cc Optiray 320   intravenous contrast.  Oral contrast was not administered.  Reformatted   images in the coronal and sagittal planes were acquired.    COMPARISON CT: CT of the abdomen and pelvis dated 4/2/2018.      FINDINGS:    LOWER CHEST: Partially imaged right sided pleural effusion. Bilateral   subsegmental and dependent atelectasis also seen.    HEPATOBILIARY: Right hepatic cyst and additional subcentimeter hepatic   hypodensities, too small to further characterize.    SPLEEN: Unremarkable.    PANCREAS: Unchanged pancreatic tail hypodense lesion, likely a side   branch IPMN.    ADRENAL GLANDS: Unremarkable.    KIDNEYS: Symmetric renal enhancement. No hydronephrosis. Bilateral renal   cysts and additional subcentimeter bilateral hypodensities, too small to   further characterize.    ABDOMINOPELVIC NODES: No lymphadenopathy.    PELVIC ORGANS: Unremarkable.    PERITONEUM/MESENTERY/BOWEL: Sigmoid diverticulosis without definitive   evidence of diverticulitis. No bowel obstruction or pneumoperitoneum.   Large hiatal hernia.    BONES/SOFT TISSUES: No acute osseous abnormality. Degenerative changes of   the spine. Small fat-containing umbilical hernia and bilateral inguinal   hernias.    OTHER: Aortic atherosclerosis.      IMPRESSION:     No CT evidence of acute intra-abdominal pathology.    Partially imaged right-sided pleural effusion.    Additional Findings/Recommendations After Attending Radiologist Review:    Mild splenomegaly, new.              ARUN LOTT M.D., RESIDENT RADIOLOGIST  This document has been electronically signed.  IRON DUARTE M.D., ATTENDING RADIOLOGIST  This document has been electronically signed. Oct  4 2019 11:47PM

## 2019-10-12 NOTE — DISCHARGE NOTE PROVIDER - NSDCCPCAREPLAN_GEN_ALL_CORE_FT
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 Keep hb >6.  -Started on prednisone 1mg/kg daily with folic acid daily.  Given stable hemoglobin, patient's dose decreased to prednisone 50 mg daily.  She will c/w with steroids on DC as well until she sees us in the office.  - s/p 1 dose of rituxan on 10/10/19- She will receive total of 4 doses Qweekly   - Flow cytometry negative.  # Low Vitamin B12 level with normal MMA : on vitamin b12 IM twice weekly at home  - Switched to daily PO vitamin B12.  - Repeat [id number].  # DVT PPx : can use lovenox since patient is not bleeding.    There is high risk of VTE with AIHA  Labs once daily, including cbc, bmp/hepatic panel, LDH, and reticulocyte count.  Transfuse to keep hb >6 . 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 Keep hb >6.  -Started on prednisone 1mg/kg daily with folic acid daily.  Given stable hemoglobin, patient's dose decreased to prednisone 50 mg daily.  She will c/w with steroids on DC as well until she sees us in the office.  - s/p 1 dose of rituxan on 10/10/19- She will receive total of 4 doses Qweekly   - Flow cytometry negative.  # Low Vitamin B12 level with normal MMA : on vitamin b12 IM twice weekly at home  - Switched to daily PO vitamin B12.  - Repeat [id number].  # DVT PPx : can use lovenox since patient is not bleeding.    There is high risk of VTE with AIHA  Labs once daily, including cbc, bmp/hepatic panel, LDH, and reticulocyte count.  Transfuse to keep hb >6 . Run transfusion very slowly to avoid overt hemolysis      SECONDARY DISCHARGE DIAGNOSES  Diagnosis: Painless jaundice  Assessment and Plan of Treatment: PRINCIPAL DISCHARGE DIAGNOSIS  Diagnosis: Autoimmune hemolytic anemia  Assessment and Plan of Treatment: You were found to be severely anemic due to a condition called autoimmune hemolytic anemia. This is a condition in which the body's immune system attacks the red blood cells, which causes the hemoglobin to drop. Hemoglobin in a special protein inside the blood that carries oxygen to the body's tissues. This drop in hemoglobin makes the body less able to utilize oxygen, which causes the symptoms of severe anemia: shortness of breath, weakness, dizziness, and sometimes chest pain.  Your anemia has been improving with steroid treatment. Steroids help to dampen the body's immune response, which helps prevent the immune system from attacking the blood cells. You have also started infusions of rituximab, which is another agent that will help to prevent the immune system from causing your hemoglobin levels to fall.  Please continue to take 40mg of prednisone daily. It is important that you take your pantoprazole while you are on steroids in order to prevent irritation of the stomach. Please continue to take your folic acid a Vitamin B12 supplements. You will continue your rituximab treatment weekly for 3 additional infusions.   Please have your bloodwork drawn on Tuesday, 10/15/19.  Please call Dr. Orantes's office on Wednesday 10/16/19 to make an appointment for your next rituximab infusion.      SECONDARY DISCHARGE DIAGNOSES  Diagnosis: Hypertension  Assessment and Plan of Treatment: Please continue to take your blood pressure medications as prescribed. Please follow up with your primary care physician for routine management.

## 2019-10-15 ENCOUNTER — INBOUND DOCUMENT (OUTPATIENT)
Age: 84
End: 2019-10-15

## 2019-10-16 ENCOUNTER — LABORATORY RESULT (OUTPATIENT)
Age: 84
End: 2019-10-16

## 2019-10-16 ENCOUNTER — APPOINTMENT (OUTPATIENT)
Dept: HEMATOLOGY ONCOLOGY | Facility: CLINIC | Age: 84
End: 2019-10-16
Payer: MEDICARE

## 2019-10-16 VITALS
WEIGHT: 110 LBS | RESPIRATION RATE: 14 BRPM | HEIGHT: 59 IN | DIASTOLIC BLOOD PRESSURE: 55 MMHG | SYSTOLIC BLOOD PRESSURE: 110 MMHG | BODY MASS INDEX: 22.18 KG/M2 | HEART RATE: 86 BPM

## 2019-10-16 DIAGNOSIS — I12.9 HYPERTENSIVE CHRONIC KIDNEY DISEASE WITH STAGE 1 THROUGH STAGE 4 CHRONIC KIDNEY DISEASE, OR UNSPECIFIED CHRONIC KIDNEY DISEASE: ICD-10-CM

## 2019-10-16 DIAGNOSIS — D51.0 VITAMIN B12 DEFICIENCY ANEMIA DUE TO INTRINSIC FACTOR DEFICIENCY: ICD-10-CM

## 2019-10-16 DIAGNOSIS — M48.8X9 OTHER SPECIFIED SPONDYLOPATHIES, SITE UNSPECIFIED: ICD-10-CM

## 2019-10-16 DIAGNOSIS — R63.0 ANOREXIA: ICD-10-CM

## 2019-10-16 DIAGNOSIS — Z00.00 ENCOUNTER FOR GENERAL ADULT MEDICAL EXAMINATION W/OUT ABNORMAL FINDINGS: ICD-10-CM

## 2019-10-16 DIAGNOSIS — Z88.5 ALLERGY STATUS TO NARCOTIC AGENT: ICD-10-CM

## 2019-10-16 DIAGNOSIS — R17 UNSPECIFIED JAUNDICE: ICD-10-CM

## 2019-10-16 DIAGNOSIS — D59.1 OTHER AUTOIMMUNE HEMOLYTIC ANEMIAS: ICD-10-CM

## 2019-10-16 DIAGNOSIS — M19.90 UNSPECIFIED OSTEOARTHRITIS, UNSPECIFIED SITE: ICD-10-CM

## 2019-10-16 DIAGNOSIS — K59.09 OTHER CONSTIPATION: ICD-10-CM

## 2019-10-16 DIAGNOSIS — D64.9 ANEMIA, UNSPECIFIED: ICD-10-CM

## 2019-10-16 DIAGNOSIS — R10.32 LEFT LOWER QUADRANT PAIN: ICD-10-CM

## 2019-10-16 DIAGNOSIS — I25.10 ATHEROSCLEROTIC HEART DISEASE OF NATIVE CORONARY ARTERY WITHOUT ANGINA PECTORIS: ICD-10-CM

## 2019-10-16 DIAGNOSIS — Z88.8 ALLERGY STATUS TO OTHER DRUGS, MEDICAMENTS AND BIOLOGICAL SUBSTANCES STATUS: ICD-10-CM

## 2019-10-16 DIAGNOSIS — K57.30 DIVERTICULOSIS OF LARGE INTESTINE WITHOUT PERFORATION OR ABSCESS WITHOUT BLEEDING: ICD-10-CM

## 2019-10-16 DIAGNOSIS — N18.9 CHRONIC KIDNEY DISEASE, UNSPECIFIED: ICD-10-CM

## 2019-10-16 DIAGNOSIS — R54 AGE-RELATED PHYSICAL DEBILITY: ICD-10-CM

## 2019-10-16 DIAGNOSIS — Z86.73 PERSONAL HISTORY OF TRANSIENT ISCHEMIC ATTACK (TIA), AND CEREBRAL INFARCTION WITHOUT RESIDUAL DEFICITS: ICD-10-CM

## 2019-10-16 DIAGNOSIS — R51 HEADACHE: ICD-10-CM

## 2019-10-16 DIAGNOSIS — Z88.0 ALLERGY STATUS TO PENICILLIN: ICD-10-CM

## 2019-10-16 DIAGNOSIS — M81.0 AGE-RELATED OSTEOPOROSIS WITHOUT CURRENT PATHOLOGICAL FRACTURE: ICD-10-CM

## 2019-10-16 PROCEDURE — 99214 OFFICE O/P EST MOD 30 MIN: CPT

## 2019-10-17 ENCOUNTER — APPOINTMENT (OUTPATIENT)
Dept: INFUSION THERAPY | Facility: CLINIC | Age: 84
End: 2019-10-17

## 2019-10-17 LAB
ALBUMIN SERPL ELPH-MCNC: 4.1 G/DL
ALP BLD-CCNC: 65 U/L
ALT SERPL-CCNC: 18 U/L
ANION GAP SERPL CALC-SCNC: 14 MMOL/L
AST SERPL-CCNC: 16 U/L
BILIRUB SERPL-MCNC: 1.2 MG/DL
BUN SERPL-MCNC: 48 MG/DL
CALCIUM SERPL-MCNC: 9.8 MG/DL
CHLORIDE SERPL-SCNC: 104 MMOL/L
CO2 SERPL-SCNC: 23 MMOL/L
CREAT SERPL-MCNC: 1.4 MG/DL
GLUCOSE SERPL-MCNC: 115 MG/DL
HCT VFR BLD CALC: 27.8 %
HGB BLD-MCNC: 8.7 G/DL
LDH SERPL-CCNC: 556
MCHC RBC-ENTMCNC: 31.3 G/DL
MCHC RBC-ENTMCNC: 32.5 PG
MCV RBC AUTO: 103.7 FL
PLATELET # BLD AUTO: 218 K/UL
PMV BLD: 10.5 FL
POTASSIUM SERPL-SCNC: 4.8 MMOL/L
PROT SERPL-MCNC: 5.8 G/DL
RBC # BLD: 2.68 M/UL
RBC # FLD: 14.8 %
SODIUM SERPL-SCNC: 141 MMOL/L
WBC # FLD AUTO: 11.1 K/UL

## 2019-10-17 RX ORDER — RITUXIMAB 10 MG/ML
525 INJECTION, SOLUTION INTRAVENOUS ONCE
Refills: 0 | Status: COMPLETED | OUTPATIENT
Start: 2019-10-17 | End: 2019-10-17

## 2019-10-17 RX ORDER — ACETAMINOPHEN 500 MG
650 TABLET ORAL ONCE
Refills: 0 | Status: COMPLETED | OUTPATIENT
Start: 2019-10-17 | End: 2019-10-17

## 2019-10-17 RX ORDER — DIPHENHYDRAMINE HCL 50 MG
25 CAPSULE ORAL ONCE
Refills: 0 | Status: COMPLETED | OUTPATIENT
Start: 2019-10-17 | End: 2019-10-17

## 2019-10-17 RX ORDER — HYDROCORTISONE 20 MG
100 TABLET ORAL ONCE
Refills: 0 | Status: COMPLETED | OUTPATIENT
Start: 2019-10-17 | End: 2019-10-17

## 2019-10-17 RX ORDER — PREDNISONE 10 MG/1
10 TABLET ORAL DAILY
Qty: 120 | Refills: 2 | Status: ACTIVE | COMMUNITY
Start: 2019-10-17 | End: 1900-01-01

## 2019-10-17 RX ADMIN — RITUXIMAB 525 MILLIGRAM(S): 10 INJECTION, SOLUTION INTRAVENOUS at 12:50

## 2019-10-17 RX ADMIN — Medication 650 MILLIGRAM(S): at 09:24

## 2019-10-17 RX ADMIN — Medication 100 MILLIGRAM(S): at 09:45

## 2019-10-17 RX ADMIN — Medication 25 MILLIGRAM(S): at 09:24

## 2019-10-17 RX ADMIN — Medication 100 MILLIGRAM(S): at 09:24

## 2019-10-17 RX ADMIN — RITUXIMAB 175 MILLIGRAM(S): 10 INJECTION, SOLUTION INTRAVENOUS at 09:41

## 2019-10-17 NOTE — DISCHARGE NOTE NURSING/CASE MANAGEMENT/SOCIAL WORK - PATIENT PORTAL LINK FT
Patent You can access the FollowMyHealth Patient Portal offered by [facility] by registering at the following website: [email or web address]. By joining [facility]’s FollowMyHealth portal, you will also be able to view your health information using other applications (apps) compatible with our system.

## 2019-10-18 NOTE — ASSESSMENT
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  Flow cytometry negative.   CT imaging did not reveal malignancy.  No folate or vitamin B12 deficiency,  low vitamin B12 level with normal MMA level. Macrocytosis secondary to elevated retic count\par - Started on prednisone 1mg/kg daily with folic acid daily as inpatient. Patient now tolerating dose reduced prednisone 40 mg daily. We will start to taper once hgb reaches 10g/dL to 30mg daily\par - c/w cycle 2 of  rituxan on 10/17/19.  She will receive total of 4 doses  weekly\par -her bili is now noraml. retic coming down and LDH is coming down\par -on folic acid and oral B12 for now\par \par # Low Vitamin B12 level with normal MMA : was on  vitamin b12 IM twice weekly at home\par - c/w PO vitamin B12.\par \par RTC in 3 weeks, CBC weekly will call daughter with results, and taper Prednisone as needed\par \par \par [phone]\par (Jasmine Bailey - daughter)

## 2019-10-18 NOTE — PHYSICAL EXAM
[Capable of only limited self care, confined to bed or chair more than 50% of waking hours] : Status 3- Capable of only limited self care, confined to bed or chair more than 50% of waking hours [Normal] : affect appropriate [de-identified] : seated in wheelchair

## 2019-10-18 NOTE — HISTORY OF PRESENT ILLNESS
[Therapy: ___] : Therapy: [unfilled] [Cycle: ___] : Cycle: [unfilled] [de-identified] : Ms. RITA RAMOS is a 89 year old female here today for evaluation of Hemolytic Anemia.  \par \par 89 year old female with history of HTN, diverticulosis, and B12 deficiency presented with anemia on 2 occasion in [facility]. I saw her on her second admission.  The patient had been having weakness, loss of appetite, headache worsening for 5 days prior to presentation.  She was admitted to [facility] for similar presentation, found to be anemic, received transfusion and discharged on B12 injections. She had been doing relatively well until 5 days prior to most recent hospitalization, when she stared to have similar symptoms again with jaundice this time. Workup revealed warm autoimmune hemolytic anemia with (+)IgG and (-)C3. The patient received 2 units of PRBCs for hb 4.7 (10/4), and 1 unit PRBC for Hb 5.9 (10/6). Hemoglobin and LDH levels stabilized with steroid treatment.  The patient received her first dose of rituximab on 10/10, which she tolerated well. She will receive 3 additional doses, once weekly. The patient is now stable and is here for transition of care to receive remainder of rituxan and monitoring.  She has been taking Folic Acid and B12 PO.  Today she is seated in wheelchair with family member, hgb today is 8.7g/dL.   She is on prednisone 40 mg now but was on higher doses initially. [FreeTextEntry1] : Started Rituxumab weekly 10.10.19 in-house.   Has been on Prednisone/.

## 2019-10-21 ENCOUNTER — OUTPATIENT (OUTPATIENT)
Dept: OUTPATIENT SERVICES | Facility: HOSPITAL | Age: 84
LOS: 1 days | Discharge: HOME | End: 2019-10-21

## 2019-10-21 DIAGNOSIS — I10 ESSENTIAL (PRIMARY) HYPERTENSION: ICD-10-CM

## 2019-10-21 DIAGNOSIS — E53.8 DEFICIENCY OF OTHER SPECIFIED B GROUP VITAMINS: ICD-10-CM

## 2019-10-21 DIAGNOSIS — N39.9 DISORDER OF URINARY SYSTEM, UNSPECIFIED: ICD-10-CM

## 2019-10-21 DIAGNOSIS — D51.9 VITAMIN B12 DEFICIENCY ANEMIA, UNSPECIFIED: ICD-10-CM

## 2019-10-21 DIAGNOSIS — R26.89 OTHER ABNORMALITIES OF GAIT AND MOBILITY: ICD-10-CM

## 2019-10-21 DIAGNOSIS — R53.83 OTHER FATIGUE: ICD-10-CM

## 2019-10-24 ENCOUNTER — LABORATORY RESULT (OUTPATIENT)
Age: 84
End: 2019-10-24

## 2019-10-24 ENCOUNTER — APPOINTMENT (OUTPATIENT)
Dept: INFUSION THERAPY | Facility: CLINIC | Age: 84
End: 2019-10-24

## 2019-10-24 LAB
HCT VFR BLD CALC: 25.4 %
HGB BLD-MCNC: 7.9 G/DL
MCHC RBC-ENTMCNC: 31.1 G/DL
MCHC RBC-ENTMCNC: 32.4 PG
MCV RBC AUTO: 104.1 FL
PLATELET # BLD AUTO: 146 K/UL
PMV BLD: 11.4 FL
RBC # BLD: 2.44 M/UL
RBC # FLD: 14.2 %
WBC # FLD AUTO: 10.44 K/UL

## 2019-10-24 RX ORDER — RITUXIMAB 10 MG/ML
525 INJECTION, SOLUTION INTRAVENOUS ONCE
Refills: 0 | Status: COMPLETED | OUTPATIENT
Start: 2019-10-24 | End: 2019-10-24

## 2019-10-24 RX ORDER — DIPHENHYDRAMINE HCL 50 MG
25 CAPSULE ORAL ONCE
Refills: 0 | Status: COMPLETED | OUTPATIENT
Start: 2019-10-24 | End: 2019-10-24

## 2019-10-24 RX ORDER — ACETAMINOPHEN 500 MG
650 TABLET ORAL ONCE
Refills: 0 | Status: COMPLETED | OUTPATIENT
Start: 2019-10-24 | End: 2019-10-24

## 2019-10-24 RX ORDER — HYDROCORTISONE 20 MG
100 TABLET ORAL ONCE
Refills: 0 | Status: COMPLETED | OUTPATIENT
Start: 2019-10-24 | End: 2019-10-24

## 2019-10-24 RX ADMIN — Medication 25 MILLIGRAM(S): at 09:44

## 2019-10-24 RX ADMIN — Medication 100 MILLIGRAM(S): at 09:39

## 2019-10-24 RX ADMIN — RITUXIMAB 525 MILLIGRAM(S): 10 INJECTION, SOLUTION INTRAVENOUS at 13:00

## 2019-10-24 RX ADMIN — Medication 650 MILLIGRAM(S): at 09:44

## 2019-10-24 RX ADMIN — RITUXIMAB 175 MILLIGRAM(S): 10 INJECTION, SOLUTION INTRAVENOUS at 10:13

## 2019-10-24 RX ADMIN — Medication 100 MILLIGRAM(S): at 10:10

## 2019-10-25 LAB
ALBUMIN SERPL ELPH-MCNC: 3.4 G/DL
ALP BLD-CCNC: 60 U/L
ALT SERPL-CCNC: 14 U/L
ANION GAP SERPL CALC-SCNC: 16 MMOL/L
AST SERPL-CCNC: 15 U/L
BILIRUB SERPL-MCNC: 1.4 MG/DL
BUN SERPL-MCNC: 27 MG/DL
CALCIUM SERPL-MCNC: 8.8 MG/DL
CHLORIDE SERPL-SCNC: 109 MMOL/L
CO2 SERPL-SCNC: 20 MMOL/L
CREAT SERPL-MCNC: 1.1 MG/DL
FERRITIN SERPL-MCNC: 807 NG/ML
FOLATE SERPL-MCNC: 14.7 NG/ML
GLUCOSE SERPL-MCNC: 191 MG/DL
HAPTOGLOB SERPL-MCNC: <20 MG/DL
IRON SATN MFR SERPL: 31 %
IRON SERPL-MCNC: 62 UG/DL
LDH SERPL-CCNC: 520
POTASSIUM SERPL-SCNC: 3.4 MMOL/L
PROT SERPL-MCNC: 4.9 G/DL
RETICS # AUTO: 9 %
RETICS AGGREG/RBC NFR: 216.9 K/UL
SODIUM SERPL-SCNC: 145 MMOL/L
TIBC SERPL-MCNC: 203 UG/DL
UIBC SERPL-MCNC: 141 UG/DL
VIT B12 SERPL-MCNC: 1156 PG/ML

## 2019-10-28 LAB — METHYLMALONATE SERPL-SCNC: 183 NMOL/L

## 2019-10-31 ENCOUNTER — LABORATORY RESULT (OUTPATIENT)
Age: 84
End: 2019-10-31

## 2019-10-31 ENCOUNTER — APPOINTMENT (OUTPATIENT)
Dept: INFUSION THERAPY | Facility: CLINIC | Age: 84
End: 2019-10-31

## 2019-10-31 ENCOUNTER — RX RENEWAL (OUTPATIENT)
Age: 84
End: 2019-10-31

## 2019-10-31 LAB
HCT VFR BLD CALC: 23.7 %
HGB BLD-MCNC: 7.3 G/DL
MCHC RBC-ENTMCNC: 30.8 G/DL
MCHC RBC-ENTMCNC: 32.2 PG
MCV RBC AUTO: 104.4 FL
PLATELET # BLD AUTO: 37 K/UL
PMV BLD: 11.8 FL
RBC # BLD: 2.27 M/UL
RBC # FLD: 15 %
RETICS # AUTO: 9.6 %
RETICS AGGREG/RBC NFR: 217.9 K/UL
WBC # FLD AUTO: 5.59 K/UL

## 2019-10-31 RX ORDER — HYDROCORTISONE 20 MG
100 TABLET ORAL ONCE
Refills: 0 | Status: COMPLETED | OUTPATIENT
Start: 2019-10-31 | End: 2019-10-31

## 2019-10-31 RX ORDER — RITUXIMAB 10 MG/ML
535 INJECTION, SOLUTION INTRAVENOUS ONCE
Refills: 0 | Status: COMPLETED | OUTPATIENT
Start: 2019-10-31 | End: 2019-10-31

## 2019-10-31 RX ORDER — DIPHENHYDRAMINE HCL 50 MG
25 CAPSULE ORAL ONCE
Refills: 0 | Status: COMPLETED | OUTPATIENT
Start: 2019-10-31 | End: 2019-10-31

## 2019-10-31 RX ORDER — ACETAMINOPHEN 500 MG
650 TABLET ORAL ONCE
Refills: 0 | Status: COMPLETED | OUTPATIENT
Start: 2019-10-31 | End: 2019-10-31

## 2019-10-31 RX ADMIN — Medication 650 MILLIGRAM(S): at 11:11

## 2019-10-31 RX ADMIN — Medication 25 MILLIGRAM(S): at 11:10

## 2019-10-31 RX ADMIN — Medication 650 MILLIGRAM(S): at 11:10

## 2019-10-31 RX ADMIN — Medication 80 MILLIGRAM(S): at 11:09

## 2019-10-31 RX ADMIN — Medication 100 MILLIGRAM(S): at 11:45

## 2019-10-31 RX ADMIN — Medication 100 MILLIGRAM(S): at 11:20

## 2019-10-31 RX ADMIN — RITUXIMAB 535 MILLIGRAM(S): 10 INJECTION, SOLUTION INTRAVENOUS at 13:15

## 2019-10-31 RX ADMIN — RITUXIMAB 178.33 MILLIGRAM(S): 10 INJECTION, SOLUTION INTRAVENOUS at 11:45

## 2019-11-04 ENCOUNTER — APPOINTMENT (OUTPATIENT)
Dept: HEMATOLOGY ONCOLOGY | Facility: CLINIC | Age: 84
End: 2019-11-04
Payer: MEDICARE

## 2019-11-04 ENCOUNTER — APPOINTMENT (OUTPATIENT)
Dept: INFUSION THERAPY | Facility: CLINIC | Age: 84
End: 2019-11-04

## 2019-11-04 ENCOUNTER — LABORATORY RESULT (OUTPATIENT)
Age: 84
End: 2019-11-04

## 2019-11-04 VITALS
HEART RATE: 88 BPM | DIASTOLIC BLOOD PRESSURE: 90 MMHG | HEIGHT: 59 IN | TEMPERATURE: 97.6 F | SYSTOLIC BLOOD PRESSURE: 160 MMHG | WEIGHT: 110 LBS | RESPIRATION RATE: 14 BRPM | BODY MASS INDEX: 22.18 KG/M2

## 2019-11-04 DIAGNOSIS — Z78.9 OTHER SPECIFIED HEALTH STATUS: ICD-10-CM

## 2019-11-04 DIAGNOSIS — E53.8 DEFICIENCY OF OTHER SPECIFIED B GROUP VITAMINS: ICD-10-CM

## 2019-11-04 DIAGNOSIS — Z86.79 PERSONAL HISTORY OF OTHER DISEASES OF THE CIRCULATORY SYSTEM: ICD-10-CM

## 2019-11-04 LAB
ALBUMIN SERPL ELPH-MCNC: 3.6 G/DL
ALP BLD-CCNC: 118 U/L
ALT SERPL-CCNC: 65 U/L
ANION GAP SERPL CALC-SCNC: 17 MMOL/L
AST SERPL-CCNC: 24 U/L
BILIRUB SERPL-MCNC: 0.8 MG/DL
BUN SERPL-MCNC: 28 MG/DL
CALCIUM SERPL-MCNC: 9 MG/DL
CHLORIDE SERPL-SCNC: 101 MMOL/L
CO2 SERPL-SCNC: 24 MMOL/L
CREAT SERPL-MCNC: 1 MG/DL
GLUCOSE SERPL-MCNC: 180 MG/DL
HCT VFR BLD CALC: 29.1 %
HGB BLD-MCNC: 9.3 G/DL
LDH SERPL-CCNC: 473
MCHC RBC-ENTMCNC: 31.7 PG
MCHC RBC-ENTMCNC: 32 G/DL
MCV RBC AUTO: 99.3 FL
PLATELET # BLD AUTO: 25 K/UL
PMV BLD: 11.4 FL
POTASSIUM SERPL-SCNC: 3.2 MMOL/L
PROT SERPL-MCNC: 5.6 G/DL
RBC # BLD: 2.93 M/UL
RBC # FLD: 15.3 %
RETICS # AUTO: 9.8 %
RETICS AGGREG/RBC NFR: 285.7 K/UL
SODIUM SERPL-SCNC: 142 MMOL/L
WBC # FLD AUTO: 4.65 K/UL

## 2019-11-04 PROCEDURE — 99214 OFFICE O/P EST MOD 30 MIN: CPT

## 2019-11-04 NOTE — ASSESSMENT
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  Flow cytometry negative.   CT imaging did not reveal malignancy.  No folate or vitamin B12 deficiency,  low vitamin B12 level with normal MMA level. Macrocytosis secondary to elevated retic count\par - Started on prednisone 1mg/kg daily with folic acid daily as inpatient. Patient now tolerating dose reduced prednisone 40 mg daily but hemolysis worsend now back on 80 mg daily and respoding. We will start to taper once hgb reaches 12\par - completed 4/4 doses of  Retuxan in  October\par -on folic acid and oral B12 for now\par -she will start ca and vit d, will possibly PCP ppx but has many allergies\par \par #Acute Thrombocytopenia not sure why, Kuo?  Maybe due to pepcid\par -will stop pepcid\par -will hold off IVIG\par -she does not want a bone marrow so if continues to fall will start Promecta\par \par # Low Vitamin B12 level with normal MMA : was on  vitamin b12 IM \par - c/w PO vitamin B12.\par \par RTC in 2 week, blood work on Thurs, Mon, Thurs \par \par \par [phone]\par (Jasmine Bailey - daughter)

## 2019-11-04 NOTE — PHYSICAL EXAM
[Capable of only limited self care, confined to bed or chair more than 50% of waking hours] : Status 3- Capable of only limited self care, confined to bed or chair more than 50% of waking hours [Normal] : affect appropriate [de-identified] : seated in wheelchair

## 2019-11-04 NOTE — HISTORY OF PRESENT ILLNESS
[Therapy: ___] : Therapy: [unfilled] [Cycle: ___] : Cycle: [unfilled] [de-identified] : Ms. RITA RAMOS is a 89 year old female here today for evaluation of Hemolytic Anemia.  \par \par 89 year old female with history of HTN, diverticulosis, and B12 deficiency presented with anemia on 2 occasion in [facility]. I saw her on her second admission.  The patient had been having weakness, loss of appetite, headache worsening for 5 days prior to presentation.  She was admitted to [facility] for similar presentation, found to be anemic, received transfusion and discharged on B12 injections. She had been doing relatively well until 5 days prior to most recent hospitalization, when she stared to have similar symptoms again with jaundice this time. Workup revealed warm autoimmune hemolytic anemia with (+)IgG and (-)C3. The patient received 2 units of PRBCs for hb 4.7 (10/4), and 1 unit PRBC for Hb 5.9 (10/6). Hemoglobin and LDH levels stabilized with steroid treatment.  The patient received her first dose of rituximab on 10/10, which she tolerated well. She will receive 3 additional doses, once weekly. The patient is now stable and is here for transition of care to receive remainder of rituxan and monitoring.  She has been taking Folic Acid and B12 PO.  Today she is seated in wheelchair with family member, hgb today is 8.7g/dL.   She is on prednisone 40 mg now but was on higher doses initially. [FreeTextEntry1] : Started Rituxumab weekly 10.10.19 in-house and completed last dose on 10/31/19 Has been on Prednisone/. [de-identified] : 11/4/19\par She is here for follow up. Her Hgb has been trending down with last hemolysis panel positive again. She never went bellow 40 mg of prednisone. Last week in 10/3/19 I went up to 80 mg of prednisone and she had her 4th dose of Rituxan. Her hgb was coming down and she developed acute severe thrombocytopenia maybe Kuo syndrome. I reviewed her smear there were no platelet clumps, no schistocytes, no microspherocytes.   Todays cbc showed increase in hgb, fall in mcv and same retic. her plateelts are coming down. She was set up for IVIG but will hold off.

## 2019-11-05 ENCOUNTER — APPOINTMENT (OUTPATIENT)
Dept: INFUSION THERAPY | Facility: CLINIC | Age: 84
End: 2019-11-05

## 2019-11-06 ENCOUNTER — APPOINTMENT (OUTPATIENT)
Dept: INFUSION THERAPY | Facility: CLINIC | Age: 84
End: 2019-11-06

## 2019-11-07 ENCOUNTER — APPOINTMENT (OUTPATIENT)
Dept: INFUSION THERAPY | Facility: CLINIC | Age: 84
End: 2019-11-07

## 2019-11-07 ENCOUNTER — APPOINTMENT (OUTPATIENT)
Dept: HEMATOLOGY ONCOLOGY | Facility: CLINIC | Age: 84
End: 2019-11-07

## 2019-11-07 ENCOUNTER — LABORATORY RESULT (OUTPATIENT)
Age: 84
End: 2019-11-07

## 2019-11-07 LAB
HCT VFR BLD CALC: 32.7 %
HGB BLD-MCNC: 10.3 G/DL
MCHC RBC-ENTMCNC: 31.1 PG
MCHC RBC-ENTMCNC: 31.5 G/DL
MCV RBC AUTO: 98.8 FL
PLATELET # BLD AUTO: 59 K/UL
PMV BLD: 11.9 FL
RBC # BLD: 3.31 M/UL
RBC # FLD: 15.9 %
RETICS # AUTO: 7.5 %
RETICS AGGREG/RBC NFR: 246.6 K/UL
WBC # FLD AUTO: 10.89 K/UL

## 2019-11-08 LAB
ALBUMIN SERPL ELPH-MCNC: 3.8 G/DL
ALP BLD-CCNC: 104 U/L
ALT SERPL-CCNC: 38 U/L
ANION GAP SERPL CALC-SCNC: 16 MMOL/L
AST SERPL-CCNC: 20 U/L
BILIRUB SERPL-MCNC: 0.7 MG/DL
BUN SERPL-MCNC: 26 MG/DL
CALCIUM SERPL-MCNC: 9.6 MG/DL
CHLORIDE SERPL-SCNC: 102 MMOL/L
CO2 SERPL-SCNC: 25 MMOL/L
CREAT SERPL-MCNC: 1 MG/DL
GLUCOSE SERPL-MCNC: 140 MG/DL
LDH SERPL-CCNC: 457
POTASSIUM SERPL-SCNC: 3.2 MMOL/L
PROT SERPL-MCNC: 5.8 G/DL
SODIUM SERPL-SCNC: 143 MMOL/L

## 2019-11-11 ENCOUNTER — RX RENEWAL (OUTPATIENT)
Age: 84
End: 2019-11-11

## 2019-11-11 ENCOUNTER — APPOINTMENT (OUTPATIENT)
Dept: HEMATOLOGY ONCOLOGY | Facility: CLINIC | Age: 84
End: 2019-11-11

## 2019-11-11 LAB
ALBUMIN SERPL ELPH-MCNC: 3.3 G/DL
ALP BLD-CCNC: 76 U/L
ALT SERPL-CCNC: 22 U/L
ANION GAP SERPL CALC-SCNC: 14 MMOL/L
AST SERPL-CCNC: 15 U/L
BILIRUB SERPL-MCNC: 0.8 MG/DL
BUN SERPL-MCNC: 28 MG/DL
CALCIUM SERPL-MCNC: 9.4 MG/DL
CHLORIDE SERPL-SCNC: 102 MMOL/L
CO2 SERPL-SCNC: 26 MMOL/L
CREAT SERPL-MCNC: 0.9 MG/DL
GLUCOSE SERPL-MCNC: 92 MG/DL
LDH SERPL-CCNC: 400
POTASSIUM SERPL-SCNC: 3.6 MMOL/L
PROT SERPL-MCNC: 5.2 G/DL
SODIUM SERPL-SCNC: 142 MMOL/L

## 2019-11-11 RX ORDER — FOLIC ACID 1 MG/1
1 TABLET ORAL DAILY
Qty: 30 | Refills: 3 | Status: ACTIVE | COMMUNITY
Start: 2019-11-11 | End: 1900-01-01

## 2019-11-14 ENCOUNTER — APPOINTMENT (OUTPATIENT)
Dept: HEMATOLOGY ONCOLOGY | Facility: CLINIC | Age: 84
End: 2019-11-14

## 2019-11-14 ENCOUNTER — LABORATORY RESULT (OUTPATIENT)
Age: 84
End: 2019-11-14

## 2019-11-14 LAB
HCT VFR BLD CALC: 33.5 %
HGB BLD-MCNC: 10.7 G/DL
LDH SERPL-CCNC: 404
MCHC RBC-ENTMCNC: 30.7 PG
MCHC RBC-ENTMCNC: 31.9 G/DL
MCV RBC AUTO: 96 FL
PLATELET # BLD AUTO: 119 K/UL
PMV BLD: 10.5 FL
RBC # BLD: 3.49 M/UL
RBC # FLD: 14.2 %
RETICS # AUTO: 3.4 %
RETICS AGGREG/RBC NFR: 118.3 K/UL
WBC # FLD AUTO: 7.91 K/UL

## 2019-11-18 ENCOUNTER — LABORATORY RESULT (OUTPATIENT)
Age: 84
End: 2019-11-18

## 2019-11-18 ENCOUNTER — RX RENEWAL (OUTPATIENT)
Age: 84
End: 2019-11-18

## 2019-11-18 ENCOUNTER — APPOINTMENT (OUTPATIENT)
Dept: HEMATOLOGY ONCOLOGY | Facility: CLINIC | Age: 84
End: 2019-11-18
Payer: MEDICARE

## 2019-11-18 VITALS
SYSTOLIC BLOOD PRESSURE: 197 MMHG | TEMPERATURE: 98.9 F | DIASTOLIC BLOOD PRESSURE: 102 MMHG | BODY MASS INDEX: 22.58 KG/M2 | WEIGHT: 112 LBS | RESPIRATION RATE: 14 BRPM | HEIGHT: 59 IN | HEART RATE: 115 BPM

## 2019-11-18 DIAGNOSIS — D69.6 THROMBOCYTOPENIA, UNSPECIFIED: ICD-10-CM

## 2019-11-18 PROCEDURE — 99213 OFFICE O/P EST LOW 20 MIN: CPT

## 2019-11-18 RX ORDER — PREDNISONE 20 MG/1
20 TABLET ORAL DAILY
Qty: 80 | Refills: 0 | Status: ACTIVE | COMMUNITY
Start: 2019-10-31 | End: 1900-01-01

## 2019-11-19 LAB
ALBUMIN SERPL ELPH-MCNC: 3.5 G/DL
ALP BLD-CCNC: 79 U/L
ALT SERPL-CCNC: 17 U/L
ANION GAP SERPL CALC-SCNC: 16 MMOL/L
AST SERPL-CCNC: 16 U/L
BILIRUB SERPL-MCNC: 0.7 MG/DL
BUN SERPL-MCNC: 28 MG/DL
CALCIUM SERPL-MCNC: 9.6 MG/DL
CHLORIDE SERPL-SCNC: 101 MMOL/L
CO2 SERPL-SCNC: 26 MMOL/L
CREAT SERPL-MCNC: 1 MG/DL
GLUCOSE SERPL-MCNC: 104 MG/DL
HCT VFR BLD CALC: 35.7 %
HGB BLD-MCNC: 11.4 G/DL
LDH SERPL-CCNC: 413
MCHC RBC-ENTMCNC: 30.4 PG
MCHC RBC-ENTMCNC: 31.9 G/DL
MCV RBC AUTO: 95.2 FL
PLATELET # BLD AUTO: 153 K/UL
PMV BLD: 11 FL
POTASSIUM SERPL-SCNC: 3 MMOL/L
PROT SERPL-MCNC: 5.5 G/DL
RBC # BLD: 3.75 M/UL
RBC # FLD: 13.8 %
RETICS # AUTO: 3.7 %
RETICS AGGREG/RBC NFR: 138 K/UL
SODIUM SERPL-SCNC: 143 MMOL/L
WBC # FLD AUTO: 11.44 K/UL

## 2019-11-21 NOTE — PHYSICAL EXAM
[Capable of only limited self care, confined to bed or chair more than 50% of waking hours] : Status 3- Capable of only limited self care, confined to bed or chair more than 50% of waking hours [Normal] : affect appropriate [de-identified] : seated in wheelchair

## 2019-11-21 NOTE — HISTORY OF PRESENT ILLNESS
[Therapy: ___] : Therapy: [unfilled] [Cycle: ___] : Cycle: [unfilled] [de-identified] : Ms. RITA RAMOS is a 89 year old female here today for evaluation of Hemolytic Anemia.  \par \par 89 year old female with history of HTN, diverticulosis, and B12 deficiency presented with anemia on 2 occasion in [facility]. I saw her on her second admission.  The patient had been having weakness, loss of appetite, headache worsening for 5 days prior to presentation.  She was admitted to [facility] for similar presentation, found to be anemic, received transfusion and discharged on B12 injections. She had been doing relatively well until 5 days prior to most recent hospitalization, when she stared to have similar symptoms again with jaundice this time. Workup revealed warm autoimmune hemolytic anemia with (+)IgG and (-)C3. The patient received 2 units of PRBCs for hb 4.7 (10/4), and 1 unit PRBC for Hb 5.9 (10/6). Hemoglobin and LDH levels stabilized with steroid treatment.  The patient received her first dose of rituximab on 10/10, which she tolerated well. She will receive 3 additional doses, once weekly. The patient is now stable and is here for transition of care to receive remainder of rituxan and monitoring.  She has been taking Folic Acid and B12 PO.  Today she is seated in wheelchair with family member, hgb today is 8.7g/dL.   She is on prednisone 40 mg now but was on higher doses initially. [FreeTextEntry1] : Started Rituxumab weekly 10.10.19 in-house and completed last dose on 10/31/19 Has been on Prednisone since 10/5/19 [de-identified] : 11/4/19\par She is here for follow up. Her Hgb has been trending down with last hemolysis panel positive again. She never went bellow 40 mg of prednisone. Last week in 10/3/19 I went up to 80 mg of prednisone and she had her 4th dose of Rituxan. Her hgb was coming down and she developed acute severe thrombocytopenia maybe Kuo syndrome. I reviewed her smear there were no platelet clumps, no schistocytes, no microspherocytes.   Todays cbc showed increase in hgb, fall in mcv and same retic. her platelets are coming down. She was set up for IVIG but will hold off. \par \par 11/18/19\par Patient is here for a follow-up visit for Hemolytic Anemia and thrombocytopenia with her daughter.  She is feeling well with no complaints.  Most recent CBC  from todays visit Hgb of 11.4, Plts of 153,  however her retic is still elevated suggesting some remaining hemolysis.  Patient denies fever, chills, nausea, vomiting, bleeding or jaundice.

## 2019-11-21 NOTE — ASSESSMENT
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  Flow cytometry negative.   CT imaging did not reveal malignancy.  No folate or vitamin B12 deficiency,  low vitamin B12 level with normal MMA level. Macrocytosis secondary to elevated retic count.  Started on prednisone 1mg/kg daily with folic acid daily as inpatient. Patient experienced worsening hemolysis so now back on 80 mg daily and responding. \par - we will start to taper to 60mg daily starting today\par - completed 4/4 doses of  Rituxan in October\par - on folic acid and oral B12 for now\par - she will start ca and vit d, will possibly PCP ppx but has many allergies so holding off \par \par #Acute Thrombocytopenia not sure why, Kuo?   But suspect was due to Pepcid sine it resolved after we stopped it \par \par # Low Vitamin B12 level with normal MMA : was on  vitamin b12 IM \par - c/w PO vitamin B12.\par \par RTC in 1 month with weekly CBC checks, will call with results and will titrate off steroids depending on blood work, ma yneed to consider  other options if unable to get her of steroids in the future\par \par [phone]\par (Jasmine Bailey - daughter)

## 2019-11-26 ENCOUNTER — INPATIENT (INPATIENT)
Facility: HOSPITAL | Age: 84
LOS: 14 days | Discharge: NURSING, REHAB OR LONG-TERM CARE | End: 2019-12-11
Attending: INTERNAL MEDICINE | Admitting: INTERNAL MEDICINE
Payer: MEDICARE

## 2019-11-26 ENCOUNTER — APPOINTMENT (OUTPATIENT)
Dept: HEMATOLOGY ONCOLOGY | Facility: CLINIC | Age: 84
End: 2019-11-26

## 2019-11-26 VITALS
RESPIRATION RATE: 18 BRPM | OXYGEN SATURATION: 96 % | SYSTOLIC BLOOD PRESSURE: 181 MMHG | HEART RATE: 72 BPM | DIASTOLIC BLOOD PRESSURE: 86 MMHG | TEMPERATURE: 98 F

## 2019-11-26 LAB
ALBUMIN SERPL ELPH-MCNC: 3.1 G/DL — LOW (ref 3.5–5.2)
ALP SERPL-CCNC: 73 U/L — SIGNIFICANT CHANGE UP (ref 30–115)
ALT FLD-CCNC: 13 U/L — SIGNIFICANT CHANGE UP (ref 0–41)
ANION GAP SERPL CALC-SCNC: 17 MMOL/L — HIGH (ref 7–14)
ANISOCYTOSIS BLD QL: SLIGHT — SIGNIFICANT CHANGE UP
APPEARANCE UR: CLEAR — SIGNIFICANT CHANGE UP
APTT BLD: 21.6 SEC — CRITICAL LOW (ref 27–39.2)
AST SERPL-CCNC: 14 U/L — SIGNIFICANT CHANGE UP (ref 0–41)
BACTERIA # UR AUTO: ABNORMAL
BASOPHILS # BLD AUTO: 0 K/UL — SIGNIFICANT CHANGE UP (ref 0–0.2)
BASOPHILS NFR BLD AUTO: 0 % — SIGNIFICANT CHANGE UP (ref 0–1)
BILIRUB DIRECT SERPL-MCNC: 0.4 MG/DL — HIGH (ref 0–0.2)
BILIRUB INDIRECT FLD-MCNC: 0.6 MG/DL — SIGNIFICANT CHANGE UP (ref 0.2–1.2)
BILIRUB SERPL-MCNC: 1 MG/DL — SIGNIFICANT CHANGE UP (ref 0.2–1.2)
BILIRUB UR-MCNC: NEGATIVE — SIGNIFICANT CHANGE UP
BLD GP AB SCN SERPL QL: SIGNIFICANT CHANGE UP
BUN SERPL-MCNC: 64 MG/DL — CRITICAL HIGH (ref 10–20)
CALCIUM SERPL-MCNC: 9.4 MG/DL — SIGNIFICANT CHANGE UP (ref 8.5–10.1)
CHLORIDE SERPL-SCNC: 98 MMOL/L — SIGNIFICANT CHANGE UP (ref 98–110)
CO2 SERPL-SCNC: 26 MMOL/L — SIGNIFICANT CHANGE UP (ref 17–32)
COLOR SPEC: YELLOW — SIGNIFICANT CHANGE UP
CREAT SERPL-MCNC: 1.7 MG/DL — HIGH (ref 0.7–1.5)
DIFF PNL FLD: ABNORMAL
EOSINOPHIL # BLD AUTO: 0 K/UL — SIGNIFICANT CHANGE UP (ref 0–0.7)
EOSINOPHIL NFR BLD AUTO: 0 % — SIGNIFICANT CHANGE UP (ref 0–8)
EPI CELLS # UR: 5 /HPF — SIGNIFICANT CHANGE UP (ref 0–5)
GIANT PLATELETS BLD QL SMEAR: PRESENT — SIGNIFICANT CHANGE UP
GLUCOSE BLDC GLUCOMTR-MCNC: 210 MG/DL — HIGH (ref 70–99)
GLUCOSE SERPL-MCNC: 176 MG/DL — HIGH (ref 70–99)
GLUCOSE UR QL: NEGATIVE — SIGNIFICANT CHANGE UP
HCT VFR BLD CALC: 39.1 % — SIGNIFICANT CHANGE UP (ref 37–47)
HGB BLD-MCNC: 12.1 G/DL — SIGNIFICANT CHANGE UP (ref 12–16)
HYALINE CASTS # UR AUTO: 8 /LPF — HIGH (ref 0–7)
INR BLD: 1.14 RATIO — SIGNIFICANT CHANGE UP (ref 0.65–1.3)
KETONES UR-MCNC: NEGATIVE — SIGNIFICANT CHANGE UP
LACTATE SERPL-SCNC: 1.9 MMOL/L — SIGNIFICANT CHANGE UP (ref 0.5–2.2)
LEUKOCYTE ESTERASE UR-ACNC: NEGATIVE — SIGNIFICANT CHANGE UP
LIDOCAIN IGE QN: 17 U/L — SIGNIFICANT CHANGE UP (ref 7–60)
LYMPHOCYTES # BLD AUTO: 0.13 K/UL — LOW (ref 1.2–3.4)
LYMPHOCYTES # BLD AUTO: 0.9 % — LOW (ref 20.5–51.1)
MAGNESIUM SERPL-MCNC: 2.5 MG/DL — HIGH (ref 1.8–2.4)
MANUAL SMEAR VERIFICATION: SIGNIFICANT CHANGE UP
MCHC RBC-ENTMCNC: 29.6 PG — SIGNIFICANT CHANGE UP (ref 27–31)
MCHC RBC-ENTMCNC: 30.9 G/DL — LOW (ref 32–37)
MCV RBC AUTO: 95.6 FL — SIGNIFICANT CHANGE UP (ref 81–99)
MICROCYTES BLD QL: SLIGHT — SIGNIFICANT CHANGE UP
MONOCYTES # BLD AUTO: 0.52 K/UL — SIGNIFICANT CHANGE UP (ref 0.1–0.6)
MONOCYTES NFR BLD AUTO: 3.6 % — SIGNIFICANT CHANGE UP (ref 1.7–9.3)
NEUTROPHILS # BLD AUTO: 13.69 K/UL — HIGH (ref 1.4–6.5)
NEUTROPHILS NFR BLD AUTO: 95.5 % — HIGH (ref 42.2–75.2)
NEUTS HYPERSEG # BLD: PRESENT — SIGNIFICANT CHANGE UP
NITRITE UR-MCNC: NEGATIVE — SIGNIFICANT CHANGE UP
NRBC # BLD: 2 /100 — HIGH (ref 0–0)
NRBC # BLD: SIGNIFICANT CHANGE UP /100 WBCS (ref 0–0)
NT-PROBNP SERPL-SCNC: 7429 PG/ML — HIGH (ref 0–300)
PH UR: 6 — SIGNIFICANT CHANGE UP (ref 5–8)
PLAT MORPH BLD: NORMAL — SIGNIFICANT CHANGE UP
PLATELET # BLD AUTO: 134 K/UL — SIGNIFICANT CHANGE UP (ref 130–400)
POIKILOCYTOSIS BLD QL AUTO: SLIGHT — SIGNIFICANT CHANGE UP
POLYCHROMASIA BLD QL SMEAR: SLIGHT — SIGNIFICANT CHANGE UP
POTASSIUM SERPL-MCNC: 4.2 MMOL/L — SIGNIFICANT CHANGE UP (ref 3.5–5)
POTASSIUM SERPL-SCNC: 4.2 MMOL/L — SIGNIFICANT CHANGE UP (ref 3.5–5)
PROT SERPL-MCNC: 5 G/DL — LOW (ref 6–8)
PROT UR-MCNC: ABNORMAL
PROTHROM AB SERPL-ACNC: 13.1 SEC — HIGH (ref 9.95–12.87)
RBC # BLD: 4.09 M/UL — LOW (ref 4.2–5.4)
RBC # FLD: 15.9 % — HIGH (ref 11.5–14.5)
RBC BLD AUTO: ABNORMAL
RBC CASTS # UR COMP ASSIST: 10 /HPF — HIGH (ref 0–4)
SMUDGE CELLS # BLD: PRESENT — SIGNIFICANT CHANGE UP
SODIUM SERPL-SCNC: 141 MMOL/L — SIGNIFICANT CHANGE UP (ref 135–146)
SP GR SPEC: 1.02 — SIGNIFICANT CHANGE UP (ref 1.01–1.02)
TROPONIN T SERPL-MCNC: 0.02 NG/ML — HIGH
TROPONIN T SERPL-MCNC: 0.03 NG/ML — CRITICAL HIGH
UROBILINOGEN FLD QL: SIGNIFICANT CHANGE UP
WBC # BLD: 14.33 K/UL — HIGH (ref 4.8–10.8)
WBC # FLD AUTO: 14.33 K/UL — HIGH (ref 4.8–10.8)
WBC UR QL: 6 /HPF — HIGH (ref 0–5)

## 2019-11-26 PROCEDURE — 76705 ECHO EXAM OF ABDOMEN: CPT | Mod: 26

## 2019-11-26 PROCEDURE — 99285 EMERGENCY DEPT VISIT HI MDM: CPT

## 2019-11-26 PROCEDURE — 71045 X-RAY EXAM CHEST 1 VIEW: CPT | Mod: 26

## 2019-11-26 PROCEDURE — 93010 ELECTROCARDIOGRAM REPORT: CPT

## 2019-11-26 PROCEDURE — 74176 CT ABD & PELVIS W/O CONTRAST: CPT | Mod: 26

## 2019-11-26 RX ORDER — FUROSEMIDE 40 MG
20 TABLET ORAL DAILY
Refills: 0 | Status: DISCONTINUED | OUTPATIENT
Start: 2019-11-26 | End: 2019-12-09

## 2019-11-26 RX ORDER — SODIUM CHLORIDE 9 MG/ML
1000 INJECTION INTRAMUSCULAR; INTRAVENOUS; SUBCUTANEOUS ONCE
Refills: 0 | Status: COMPLETED | OUTPATIENT
Start: 2019-11-26 | End: 2019-11-26

## 2019-11-26 RX ORDER — CHLORHEXIDINE GLUCONATE 213 G/1000ML
1 SOLUTION TOPICAL
Refills: 0 | Status: DISCONTINUED | OUTPATIENT
Start: 2019-11-26 | End: 2019-12-11

## 2019-11-26 RX ORDER — ONDANSETRON 8 MG/1
4 TABLET, FILM COATED ORAL ONCE
Refills: 0 | Status: COMPLETED | OUTPATIENT
Start: 2019-11-26 | End: 2019-11-26

## 2019-11-26 RX ORDER — CEFTRIAXONE 500 MG/1
1000 INJECTION, POWDER, FOR SOLUTION INTRAMUSCULAR; INTRAVENOUS ONCE
Refills: 0 | Status: COMPLETED | OUTPATIENT
Start: 2019-11-26 | End: 2019-11-26

## 2019-11-26 RX ORDER — NIFEDIPINE 30 MG
30 TABLET, EXTENDED RELEASE 24 HR ORAL DAILY
Refills: 0 | Status: DISCONTINUED | OUTPATIENT
Start: 2019-11-26 | End: 2019-12-11

## 2019-11-26 RX ORDER — AZITHROMYCIN 500 MG/1
500 TABLET, FILM COATED ORAL ONCE
Refills: 0 | Status: COMPLETED | OUTPATIENT
Start: 2019-11-26 | End: 2019-11-26

## 2019-11-26 RX ORDER — PREGABALIN 225 MG/1
1000 CAPSULE ORAL DAILY
Refills: 0 | Status: DISCONTINUED | OUTPATIENT
Start: 2019-11-26 | End: 2019-12-11

## 2019-11-26 RX ORDER — FOLIC ACID 0.8 MG
1 TABLET ORAL DAILY
Refills: 0 | Status: DISCONTINUED | OUTPATIENT
Start: 2019-11-26 | End: 2019-12-11

## 2019-11-26 RX ORDER — ONDANSETRON 8 MG/1
1 TABLET, FILM COATED ORAL
Qty: 0 | Refills: 0 | DISCHARGE

## 2019-11-26 RX ADMIN — SODIUM CHLORIDE 1000 MILLILITER(S): 9 INJECTION INTRAMUSCULAR; INTRAVENOUS; SUBCUTANEOUS at 14:07

## 2019-11-26 RX ADMIN — CEFTRIAXONE 1000 MILLIGRAM(S): 500 INJECTION, POWDER, FOR SOLUTION INTRAMUSCULAR; INTRAVENOUS at 18:13

## 2019-11-26 RX ADMIN — SODIUM CHLORIDE 1000 MILLILITER(S): 9 INJECTION INTRAMUSCULAR; INTRAVENOUS; SUBCUTANEOUS at 17:30

## 2019-11-26 RX ADMIN — ONDANSETRON 4 MILLIGRAM(S): 8 TABLET, FILM COATED ORAL at 23:30

## 2019-11-26 RX ADMIN — AZITHROMYCIN 255 MILLIGRAM(S): 500 TABLET, FILM COATED ORAL at 18:13

## 2019-11-26 RX ADMIN — Medication 30 MILLIGRAM(S): at 23:58

## 2019-11-26 RX ADMIN — CEFTRIAXONE 100 MILLIGRAM(S): 500 INJECTION, POWDER, FOR SOLUTION INTRAMUSCULAR; INTRAVENOUS at 17:30

## 2019-11-26 RX ADMIN — Medication 20 MILLIGRAM(S): at 22:44

## 2019-11-26 NOTE — H&P ADULT - HISTORY OF PRESENT ILLNESS
This is a 89 year old female with a significant PMHx of Warm Autoimmune Hemolytic Anemia who presented with a cc/o generalized myalgia. Her muscle aches are associated with generalized weakness and inability to ambulate. This is a 89 year old female with a significant PMHx of Warm Autoimmune Hemolytic Anemia who presented with a cc/o generalized myalgia. Her muscle aches are associated with generalized weakness and inability to ambulate x3 days. At baseline, the patient is able to walk with a walker, however, was unable to get out of bed on the day of presentation. She was recently admitted for hemolytic anemia, requiring multiple transfusions. As a result, per the daughter, due to concern of recurrence given no recent blood work, she was brought to the ED for further evaluation. ROS was otherwise negative.     In the ED, the patient VS were WNL. Labs significant for leukocytosis and mildly decreased renal function from baseline. CXR was significant for pulmonary infiltrate BL, suspicious for fluid overload and cannot rule out PNA. CT A/P was performed with incidental finding of small calcification in the region of the sybil hepatis.

## 2019-11-26 NOTE — ED PROVIDER NOTE - CLINICAL SUMMARY MEDICAL DECISION MAKING FREE TEXT BOX
89 y/o female in ER for generalized weakness, LE swelling, mild lower abdominal pain, pt had recent admission for hemolytic anemia.  hgb 12, wbc 14K with 95% N. trop 0.03, bnp 7,429. ct abd - ? small stone GB neck normal lft's, ruq sono ordered, however pain is more to lower abd.  cxr - patchy opacities, infiltrate v. edema.  does have elevated wbc with shift, pt given abx. admitted to tele for further evaluation and treatment.

## 2019-11-26 NOTE — H&P ADULT - NSICDXPASTMEDICALHX_GEN_ALL_CORE_FT
PAST MEDICAL HISTORY:  Constipation     Diverticulitis     Hypertension     TIA (transient ischemic attack) PAST MEDICAL HISTORY:  Chronic kidney disease, unspecified CKD stage Stage IIIB    Constipation     Diverticulitis     Hypertension     TIA (transient ischemic attack)

## 2019-11-26 NOTE — ED PROVIDER NOTE - OBJECTIVE STATEMENT
89 y/o female with a PMH of diverticulosis, TIA, HTN, and recently dx hemolytic anemia with a recent blood transfusion on prednisone and rituximab since 10/2019, presents to the ED for evaluation of weakness for several weeks that worsened over the weekend and bilateral lower extremity swelling that started today. As per pt daughter, ever since pt was hospitalized on 10/12 and diagnosed with hemolytic anemia pt has not returned to her normal functioning self. Daughter noticed this weekend pt was unable to get into her car due to weakness. Pt admits to abdominal pain and diarrhea. pt denies fever, chills, n/v, sob, chest pain, back pain, lower extremity pain, dizziness, or headaches.

## 2019-11-26 NOTE — H&P ADULT - NSHPPHYSICALEXAM_GEN_ALL_CORE
:  VITAL SIGNS: Last 24 Hours  T(C): 36.3 (26 Nov 2019 16:05), Max: 36.4 (26 Nov 2019 12:04)  T(F): 97.4 (26 Nov 2019 16:05), Max: 97.6 (26 Nov 2019 12:04)  HR: 78 (26 Nov 2019 16:05) (72 - 78)  BP: 143/68 (26 Nov 2019 16:05) (143/68 - 181/86)  RR: 18 (26 Nov 2019 16:05) (18 - 18)  SpO2: 96% (26 Nov 2019 16:05) (96% - 96%)    PHYSICAL EXAM:  GENERAL:   Awake, alert; NAD.  HEENT:  Head NC/AT; Conjunctivae pink, Sclera anicteric; Oral mucosa moist.  CARDIO:   Regular rate; Regular rhythm; S1 & S2.  RESP:   No rales or rhonchi appreciated.  GI:   Soft; NT/ND; BS; No guarding; No rebound tenderness.  EXT:   No edema in UE and LE.  NEURO:   PERRL.  SKIN:   Intact. :  VITAL SIGNS: Last 24 Hours  T(C): 36.3 (26 Nov 2019 16:05), Max: 36.4 (26 Nov 2019 12:04)  T(F): 97.4 (26 Nov 2019 16:05), Max: 97.6 (26 Nov 2019 12:04)  HR: 78 (26 Nov 2019 16:05) (72 - 78)  BP: 143/68 (26 Nov 2019 16:05) (143/68 - 181/86)  RR: 18 (26 Nov 2019 16:05) (18 - 18)  SpO2: 96% (26 Nov 2019 16:05) (96% - 96%)    PHYSICAL EXAM:  GENERAL:   Awake, alert; NAD.  HEENT:  Head NC/AT; Conjunctivae pink, Sclera anicteric.  CARDIO:   Regular rate; Regular rhythm; S1 & S2; murmur on auscultation.  RESP:   Crackles BL; Decreased at bases.  GI:   Soft; NT/ND; BS; No guarding; No rebound tenderness.  EXT:   2+ pitting edema in the LE.  SKIN:   Intact.

## 2019-11-26 NOTE — H&P ADULT - ASSESSMENT
This is a 89 year old female with a significant PMHx of Warm Autoimmune Hemolytic Anemia who presented with a cc/o generalized myalgia. Her muscle aches are associated with generalized weakness and inability to ambulate. She is currently admitted for suspected steroid-induced myalgia.     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 Orantes)  - Hepatitis panel negative in recent past; Flow cytometry normal  - Folic acid daily  - Maintain T+S    Hx of TIA: Continue home medications  Hx of Low Vitamin B12 level with normal MMA: Continue B12 1000mcg daily  Hx of HTN: Continue home medications  Hx of Diverticulosis: High fiber diet to avoid constipation This is a 89 year old female with a significant PMHx of Warm Autoimmune Hemolytic Anemia who presented with a cc/o generalized myalgia. Her muscle aches are associated with generalized weakness and inability to ambulate. She is currently admitted for suspected steroid-induced myalgia.     Fluid overload; possibly cardiac in nature  - Currently asymptomatic  - Physical exam with BL crackles and BL LE edema; murmur on exam   - BNP 4500 at time of admission  - Lasix daily for now  - Follow up TTE  - Daily weight; fluid restriction; Low salt; I+O    Myalgia and decreased functional status possibly due to steroid-induced myalgia  - Patient taking steroids since last admission  - Leukocytosis likely due to steroid use; no physical symptoms to suggest infection at this time  - CXR with BL infiltrate consistent with fluid overload; cannot rule out PNA, however  - Will continue steroid taper and monitor for now  - PT     Elevated troponin  - Likely due to CKD  - Follow up repeat    Hx of CKD Stage IIIB  - Slight worsening of renal function  - Monitor    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 Orantes)  - Hepatitis panel negative in recent past; Flow cytometry normal  - B12 and Folic acid daily  - Continue prednisone taper; 50mg daily this week  - Maintain T+S    Hx of TIA: Occurred ~15 years ago; Stable  Hx of Low Vitamin B12 level with normal MMA: Continue B12 1000mcg daily  Hx of HTN: Continue home medications  Hx of Diverticulosis: High fiber diet to avoid constipation This is a 89 year old female with a significant PMHx of Warm Autoimmune Hemolytic Anemia who presented with a cc/o generalized myalgia. Her muscle aches are associated with generalized weakness and inability to ambulate. She is currently admitted for suspected steroid-induced myalgia.     Fluid overload; Cardiac vs. Renal  - Currently asymptomatic  - Physical exam with BL crackles and BL LE edema; murmur on exam   - BNP 4500 at time of admission  - UA with proteinuria  - Lasix daily for now  - Follow up TTE  - Daily weight; fluid restriction; Low salt; I+O    Myalgia and decreased functional status possibly due to steroid-induced myalgia  - Patient taking steroids since last admission  - Leukocytosis likely due to steroid use; no physical symptoms to suggest infection at this time  - CXR with BL infiltrate consistent with fluid overload; cannot rule out PNA, however  - Will continue steroid taper and monitor for now  - PT     Elevated troponin  - Likely due to CKD  - Follow up repeat    Hx of CKD Stage IIIB  - Slight worsening of renal function  - Monitor    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 Orantes)  - Hepatitis panel negative in recent past; Flow cytometry normal  - B12 and Folic acid daily  - Continue prednisone taper; 50mg daily this week  - Maintain T+S    Hx of TIA: Occurred ~15 years ago; Stable  Hx of Low Vitamin B12 level with normal MMA: Continue B12 1000mcg daily  Hx of HTN: Continue home medications  Hx of Diverticulosis: High fiber diet to avoid constipation This is a 89 year old female with a significant PMHx of Warm Autoimmune Hemolytic Anemia who presented with a cc/o generalized myalgia. Her muscle aches are associated with generalized weakness and inability to ambulate. She is currently admitted for suspected steroid-induced myalgia.     Fluid overload; Cardiac vs. Renal  - Currently asymptomatic  - Physical exam with BL crackles and BL LE edema; murmur on exam   - BNP 4500 at time of admission  - UA with proteinuria  - Lasix daily for now  - Follow up TTE  - Daily weight; fluid restriction; Low salt; I+O    Myalgia and decreased functional status possibly due to steroid-induced myalgia  - Patient taking steroids since last admission  - Leukocytosis likely due to steroid use; no physical symptoms to suggest infection at this time  - CXR with BL infiltrate consistent with fluid overload; cannot rule out PNA, however  - Will continue steroid taper and monitor for now  - PT     Elevated troponin  - Likely due to CKD  - Follow up repeat    Small calcification in the region of the sybil hepatis  - Follow up Abdominal US    Hx of CKD Stage IIIB  - Slight worsening of renal function  - Monitor    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 Orantes)  - Hepatitis panel negative in recent past; Flow cytometry normal  - B12 and Folic acid daily  - Continue prednisone taper; 50mg daily this week  - Maintain T+S    Hx of TIA: Occurred ~15 years ago; Stable  Hx of Low Vitamin B12 level with normal MMA: Continue B12 1000mcg daily  Hx of HTN: Continue home medications  Hx of Diverticulosis: High fiber diet to avoid constipation This is a 89 year old female with a significant PMHx of Warm Autoimmune Hemolytic Anemia who presented with a cc/o generalized myalgia. Her muscle aches are associated with generalized weakness and inability to ambulate. She is currently admitted for suspected steroid-induced myalgia.     Fluid overload; Cardiac vs. Renal  - Currently asymptomatic  - Physical exam with BL crackles and BL LE edema; murmur on exam   - BNP 4500 at time of admission  - UA with proteinuria  - Lasix daily for now  - Follow up TTE  - Daily weight; fluid restriction; Low salt; I+O    Myalgia and decreased functional status possibly due to steroid-induced myalgia  - Patient taking steroids since last admission  - Leukocytosis likely due to steroid use; no physical symptoms to suggest infection at this time  - CXR with BL infiltrate consistent with fluid overload; cannot rule out PNA, however  - Will continue steroid taper and monitor for now  - PT     Elevated troponin  - Likely due to CKD  - Follow up repeat    Small calcification in the region of the sybil hepatis  - Follow up Abdominal US    Hx of CKD Stage IIIB  - Slight worsening of renal function  - Monitor    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 Orantes)  - Hepatitis panel negative in recent past; Flow cytometry normal  - B12 and Folic acid daily  - Continue prednisone taper; 50mg daily this week  - Maintain T+S    Hx of TIA: Occurred ~15 years ago; Stable  Hx of Low Vitamin B12 level with normal MMA: Continue B12 1000mcg daily  Hx of HTN: Continue home medications; Holding HCTZ/Triamterene  Hx of Diverticulosis: High fiber diet to avoid constipation

## 2019-11-26 NOTE — ED PROVIDER NOTE - NS ED ROS FT
Constitutional: (-) fever (-) malaise (-) diaphoresis (-) chills   Eyes: (-) visual changes   Cardiac: (-) chest pain  (-) palpitations (-) syncope (+) edema  Respiratory: (-) cough (-) hemoptysis (-) history of asthma or COPD (-) SOB (-) JAMES  GI: (-) nausea (-) vomiting (+) diarrhea (+) abdominal pain (-) hematochezia   : (-) dysuria (-) increased frequency  (-) hematuria  MS: (-) back pain (-) myalgia (-) muscle weakness (+)  joint pain  Neuro: (-) headache (-) dizziness (-) numbness/tingling to extremities B/L (+) weakness  Skin: (-) rash   Except as documented in the HPI, all other systems are negative.

## 2019-11-26 NOTE — ED ADULT NURSE NOTE - OBJECTIVE STATEMENT
Pt has b.l feet swelling +3 denies pain no sob denies n/v/f/d/chest pain. daugther states patient more weak today then usual

## 2019-11-26 NOTE — ED PROVIDER NOTE - PROGRESS NOTE DETAILS
Endorsed to Dr Patel to follow up CT scan and admit. OMID Costa: I was directly involved in the care and management of this patient while supervising PA Fellow Javier

## 2019-11-26 NOTE — ED ADULT NURSE REASSESSMENT NOTE - NS ED NURSE REASSESS COMMENT FT1
Received report from previous RN; pt aox4 in no acute distress ;on cardiac monitor VSS. iv abx infusing as per order. will continue to monitor/assess

## 2019-11-26 NOTE — ED PROVIDER NOTE - PHYSICAL EXAMINATION
GENERAL: Well-nourished, Well-developed. NAD.  Eyes: PERRLA, EOMI. No asymmetry. No nystagmus. No conjunctival injection. Non-icteric sclera.  CVS: No JVD. No reproducible chest wall tenderness. Normal S1,S2. No murmurs appreciated on auscultation   RESP: No use of accessory muscles. Chest rise symmetrical with good expansion. Lungs clear to auscultation B/L. No wheezing, rales, or rhonchi asucultated.  GI:  Soft, mild diffuse abdominal tenderness,Nondistended. No guarding or rebound tenderness.   Skin: Warm, Dry. No rashes or lesions.   EXT: Radial and pedal pulses present B/L. No calf tenderness or swelling B/L. No palpable cords. b/l lower extremity edema.   Neuro: AA&O x 3. Speaking in full sentences.   Psych:  Appropriate mood and affect. Cooperative. Calm.

## 2019-11-26 NOTE — ED PROVIDER NOTE - ATTENDING CONTRIBUTION TO CARE
89 yo female h/o TIA, HTN, hemolytic anemia, diverticulitis here for evaluation of generalized weakness and abdominal pain for a few days.  No N/V or urinary complaints, no change in MS or PO intake, no fever, chills or flank pain, no black or bloody stools.  Elderly female resting on a stretcher, NAD, PERRL, pink conjunctivae, dry oral mucosa, nml work of breathing, fine inspiratory crackles both lungs fields, RRR, distal pulses intact, no midline spine or CVA ttp, + rt sided abdominal ttp, mild b/l lower leg edema no calf ttp or asymmetry, awake and alert, no gross neuro deficits.  Will check labs, get CT scan, reassess.  Patient and daughter are amenable with the plan.

## 2019-11-26 NOTE — H&P ADULT - NSHPLABSRESULTS_GEN_ALL_CORE
:  LAB RESULTS:                        12.1   14.33 )-----------( 134      ( 2019 13:30 )             39.1     141  |  98  |  64<HH>  ----------------------------<  176<H>  4.2   |  26  |  1.7<H>    Ca    9.4     Mg     2.5         TPro  5.0<L>  /  Alb  3.1<L>  /  TBili  1.0  /  DBili  0.4<H>  /  AST  14  /  ALT  13  /  AlkPhos  73      PT/INR - ( 2019 13:30 )   PT: 13.10 sec;   INR: 1.14 ratio    PTT - ( 2019 13:30 )  PTT:21.6 sec    Urinalysis Basic - ( 2019 16:20 )  Color: Yellow / Appearance: Clear / S.020 / pH: x  Gluc: x / Ketone: Negative  / Bili: Negative / Urobili: <2 mg/dL   Blood: x / Protein: 30 mg/dL / Nitrite: Negative   Leuk Esterase: Negative / RBC: 10 /HPF / WBC 6 /HPF   Sq Epi: x / Non Sq Epi: 5 /HPF / Bacteria: Few    Lactate, Blood: 1.9 mmol/L (19 @ 13:30)  Troponin T, Serum: 0.03 ng/mL <HH> (19 @ 13:30)    CARDIAC MARKERS ( 2019 13:30 )  x     / 0.03 ng/mL / x     / x     / x        MICROBIOLOGY:    RADIOLOGY:  CT Abdomen and Pelvis No Cont (19 @ 15:59)     IMPRESSION: A small calcification is noted in the region of the sybil   hepatis, possibly in the region of the  gallbladder neck or cystic duct   (; 601/24;  602/54). A small stone cannot be excluded. Sonography   and/or hepatobiliary scan may be useful for further evaluation if the   patient is symptomatic in the right upper quadrant.  -------------------------------------------------------------------------------------  Xray Chest 1 View AP/PA (19 @ 16:23)     Impression:    Bilateral predominantly central patchy opacities appear new when compared   to prior 2018 study and may reflect edema or infection in the appropriate   clinical setting. Questionable trace left pleural effusion.    ALLERGIES:  aspirin (Unknown)  Cipro (Unknown)  codeine (Unknown)  dicyclomine (Unknown)  Diovan (Unknown)  Flagyl (Unknown)  Librax (Unknown)  metronidazole (Unknown)  penicillin (Unknown)  Prevacid (Unknown)  trimethobenzamide (Unknown)    =========================================================== :  LAB RESULTS:                        12.1   14.33 )-----------( 134      ( 2019 13:30 )             39.1     141  |  98  |  64<HH>  ----------------------------<  176<H>  4.2   |  26  |  1.7<H>    Ca    9.4     Mg     2.5         TPro  5.0<L>  /  Alb  3.1<L>  /  TBili  1.0  /  DBili  0.4<H>  /  AST  14  /  ALT  13  /  AlkPhos  73      PT/INR - ( 2019 13:30 )   PT: 13.10 sec;   INR: 1.14 ratio    PTT - ( 2019 13:30 )  PTT:21.6 sec    Urinalysis Basic - ( 2019 16:20 )  Color: Yellow / Appearance: Clear / S.020 / pH: x  Gluc: x / Ketone: Negative  / Bili: Negative / Urobili: <2 mg/dL   Blood: x / Protein: 30 mg/dL / Nitrite: Negative   Leuk Esterase: Negative / RBC: 10 /HPF / WBC 6 /HPF   Sq Epi: x / Non Sq Epi: 5 /HPF / Bacteria: Few    Lactate, Blood: 1.9 mmol/L (19 @ 13:30)  Troponin T, Serum: 0.03 ng/mL <HH> (19 @ 13:30)    CARDIAC MARKERS ( 2019 13:30 )  x     / 0.03 ng/mL / x     / x     / x        MICROBIOLOGY:    RADIOLOGY:  CT Abdomen and Pelvis No Cont (19 @ 15:59)     IMPRESSION: A small calcification is noted in the region of the sybil   hepatis, possibly in the region of the gallbladder neck or cystic duct   (; 601/24;  602/54). A small stone cannot be excluded. Sonography   and/or hepatobiliary scan may be useful for further evaluation if the   patient is symptomatic in the right upper quadrant.  -------------------------------------------------------------------------------------  Xray Chest 1 View AP/PA (19 @ 16:23)     Impression:    Bilateral predominantly central patchy opacities appear new when compared   to prior 2018 study and may reflect edema or infection in the appropriate   clinical setting. Questionable trace left pleural effusion.    ALLERGIES:  aspirin (Unknown)  Cipro (Unknown)  codeine (Unknown)  dicyclomine (Unknown)  Diovan (Unknown)  Flagyl (Unknown)  Librax (Unknown)  metronidazole (Unknown)  penicillin (Unknown)  Prevacid (Unknown)  trimethobenzamide (Unknown)    ===========================================================

## 2019-11-26 NOTE — H&P ADULT - NSHPSOCIALHISTORY_GEN_ALL_CORE
:  Lives  EtOH  Smoking  Illicit drugs :  Lives with ; Walked with walker at baseline  EtOH Denied  Smoking Denied  Illicit drugs Denied

## 2019-11-27 LAB
ANION GAP SERPL CALC-SCNC: 18 MMOL/L — HIGH (ref 7–14)
ANISOCYTOSIS BLD QL: SIGNIFICANT CHANGE UP
BASE EXCESS BLDA CALC-SCNC: 4.4 MMOL/L — HIGH (ref -2–2)
BASOPHILS # BLD AUTO: 0 K/UL — SIGNIFICANT CHANGE UP (ref 0–0.2)
BASOPHILS NFR BLD AUTO: 0 % — SIGNIFICANT CHANGE UP (ref 0–1)
BUN SERPL-MCNC: 57 MG/DL — HIGH (ref 10–20)
CALCIUM SERPL-MCNC: 8.4 MG/DL — LOW (ref 8.5–10.1)
CHLORIDE SERPL-SCNC: 99 MMOL/L — SIGNIFICANT CHANGE UP (ref 98–110)
CO2 SERPL-SCNC: 22 MMOL/L — SIGNIFICANT CHANGE UP (ref 17–32)
CREAT SERPL-MCNC: 1.6 MG/DL — HIGH (ref 0.7–1.5)
EOSINOPHIL # BLD AUTO: 0 K/UL — SIGNIFICANT CHANGE UP (ref 0–0.7)
EOSINOPHIL NFR BLD AUTO: 0 % — SIGNIFICANT CHANGE UP (ref 0–8)
GIANT PLATELETS BLD QL SMEAR: PRESENT — SIGNIFICANT CHANGE UP
GLUCOSE BLDC GLUCOMTR-MCNC: 214 MG/DL — HIGH (ref 70–99)
GLUCOSE BLDC GLUCOMTR-MCNC: 262 MG/DL — HIGH (ref 70–99)
GLUCOSE BLDC GLUCOMTR-MCNC: 288 MG/DL — HIGH (ref 70–99)
GLUCOSE SERPL-MCNC: 257 MG/DL — HIGH (ref 70–99)
HCO3 BLDA-SCNC: 27 MMOL/L — SIGNIFICANT CHANGE UP (ref 23–27)
HCT VFR BLD CALC: 38.4 % — SIGNIFICANT CHANGE UP (ref 37–47)
HGB BLD-MCNC: 11.7 G/DL — LOW (ref 12–16)
LDH SERPL L TO P-CCNC: 500 — HIGH (ref 50–242)
LYMPHOCYTES # BLD AUTO: 0.23 K/UL — LOW (ref 1.2–3.4)
LYMPHOCYTES # BLD AUTO: 1.8 % — LOW (ref 20.5–51.1)
MAGNESIUM SERPL-MCNC: 2.1 MG/DL — SIGNIFICANT CHANGE UP (ref 1.8–2.4)
MANUAL SMEAR VERIFICATION: SIGNIFICANT CHANGE UP
MCHC RBC-ENTMCNC: 29.6 PG — SIGNIFICANT CHANGE UP (ref 27–31)
MCHC RBC-ENTMCNC: 30.5 G/DL — LOW (ref 32–37)
MCV RBC AUTO: 97.2 FL — SIGNIFICANT CHANGE UP (ref 81–99)
MICROCYTES BLD QL: SLIGHT — SIGNIFICANT CHANGE UP
MONOCYTES # BLD AUTO: 0.23 K/UL — SIGNIFICANT CHANGE UP (ref 0.1–0.6)
MONOCYTES NFR BLD AUTO: 1.8 % — SIGNIFICANT CHANGE UP (ref 1.7–9.3)
MYELOCYTES NFR BLD: 0.9 % — HIGH (ref 0–0)
NEUTROPHILS # BLD AUTO: 12.14 K/UL — HIGH (ref 1.4–6.5)
NEUTROPHILS NFR BLD AUTO: 90.2 % — HIGH (ref 42.2–75.2)
NEUTS BAND # BLD: 5.3 % — SIGNIFICANT CHANGE UP (ref 0–6)
NRBC # BLD: 4 /100 — HIGH (ref 0–0)
NRBC # BLD: SIGNIFICANT CHANGE UP /100 WBCS (ref 0–0)
PCO2 BLDA: 32 MMHG — LOW (ref 38–42)
PH BLDA: 7.54 — HIGH (ref 7.38–7.42)
PHOSPHATE SERPL-MCNC: 3.4 MG/DL — SIGNIFICANT CHANGE UP (ref 2.1–4.9)
PLAT MORPH BLD: NORMAL — SIGNIFICANT CHANGE UP
PLATELET # BLD AUTO: 90 K/UL — LOW (ref 130–400)
PO2 BLDA: 76 MMHG — LOW (ref 78–95)
POIKILOCYTOSIS BLD QL AUTO: SLIGHT — SIGNIFICANT CHANGE UP
POLYCHROMASIA BLD QL SMEAR: SLIGHT — SIGNIFICANT CHANGE UP
POTASSIUM SERPL-MCNC: 3.3 MMOL/L — LOW (ref 3.5–5)
POTASSIUM SERPL-SCNC: 3.3 MMOL/L — LOW (ref 3.5–5)
RBC # BLD: 3.95 M/UL — LOW (ref 4.2–5.4)
RBC # FLD: 16.6 % — HIGH (ref 11.5–14.5)
RBC BLD AUTO: NORMAL — SIGNIFICANT CHANGE UP
SAO2 % BLDA: 97 % — SIGNIFICANT CHANGE UP (ref 94–98)
SODIUM SERPL-SCNC: 139 MMOL/L — SIGNIFICANT CHANGE UP (ref 135–146)
WBC # BLD: 12.71 K/UL — HIGH (ref 4.8–10.8)
WBC # FLD AUTO: 12.71 K/UL — HIGH (ref 4.8–10.8)

## 2019-11-27 PROCEDURE — 99223 1ST HOSP IP/OBS HIGH 75: CPT

## 2019-11-27 PROCEDURE — 93970 EXTREMITY STUDY: CPT | Mod: 26

## 2019-11-27 PROCEDURE — 93010 ELECTROCARDIOGRAM REPORT: CPT

## 2019-11-27 RX ORDER — FUROSEMIDE 40 MG
20 TABLET ORAL ONCE
Refills: 0 | Status: COMPLETED | OUTPATIENT
Start: 2019-11-27 | End: 2019-11-27

## 2019-11-27 RX ORDER — CEFTRIAXONE 500 MG/1
1000 INJECTION, POWDER, FOR SOLUTION INTRAMUSCULAR; INTRAVENOUS EVERY 24 HOURS
Refills: 0 | Status: DISCONTINUED | OUTPATIENT
Start: 2019-11-27 | End: 2019-11-29

## 2019-11-27 RX ORDER — AZITHROMYCIN 500 MG/1
500 TABLET, FILM COATED ORAL EVERY 24 HOURS
Refills: 0 | Status: DISCONTINUED | OUTPATIENT
Start: 2019-11-27 | End: 2019-11-29

## 2019-11-27 RX ORDER — HEPARIN SODIUM 5000 [USP'U]/ML
5000 INJECTION INTRAVENOUS; SUBCUTANEOUS EVERY 8 HOURS
Refills: 0 | Status: DISCONTINUED | OUTPATIENT
Start: 2019-11-27 | End: 2019-11-28

## 2019-11-27 RX ADMIN — CHLORHEXIDINE GLUCONATE 1 APPLICATION(S): 213 SOLUTION TOPICAL at 06:11

## 2019-11-27 RX ADMIN — Medication 20 MILLIGRAM(S): at 23:02

## 2019-11-27 RX ADMIN — PREGABALIN 1000 MICROGRAM(S): 225 CAPSULE ORAL at 11:41

## 2019-11-27 RX ADMIN — AZITHROMYCIN 255 MILLIGRAM(S): 500 TABLET, FILM COATED ORAL at 23:02

## 2019-11-27 RX ADMIN — HEPARIN SODIUM 5000 UNIT(S): 5000 INJECTION INTRAVENOUS; SUBCUTANEOUS at 23:48

## 2019-11-27 RX ADMIN — Medication 20 MILLIGRAM(S): at 10:07

## 2019-11-27 RX ADMIN — Medication 50 MILLIGRAM(S): at 07:27

## 2019-11-27 RX ADMIN — Medication 1 MILLIGRAM(S): at 11:41

## 2019-11-27 RX ADMIN — Medication 30 MILLIGRAM(S): at 07:27

## 2019-11-27 NOTE — PROVIDER CONTACT NOTE (OTHER) - SITUATION
patient has been on nonrebreather overnight, unable to tolerate other O2 delivery methods. patient desaturates without O2. patient a&ox4 at this time, has intermittent episodes of confusion

## 2019-11-27 NOTE — PROGRESS NOTE ADULT - SUBJECTIVE AND OBJECTIVE BOX
RITA RAMOS 89y Female from home brought by family to the ER for c/o generalized weakness, SOB and inc difficulty ambulating x 3 days du to generalized muscke pain.  The pt at baseline isable to ambulate with walker but in the last few days has been unable to get out of be due to severe musculoskeletal pain.  Of note the pt has Hx of  warm autoimmune hemlytic anemia and has been ofn prolonged steroid  tx  and has had  Rutixan tx as well.  The pt was noted to be fluid overloaded with a BNP of 4500.  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  The pt is being evaluated by Hem-Onc Dr Mejias.  The PMHx includes:  HTN, ASHD, TIA, CKD III, Warm IGG Hemolytic Anemia, OA, DDD, DJD, mobility dysfunction, GERD, diverticulosis, ch constipation.    INTERVAL HPI/OVERNIGHT EVENTS:  pt feels better after dose of Lasix and O2 NC    MEDICATIONS  (STANDING):  chlorhexidine 4% Liquid 1 Application(s) Topical <User Schedule>  cyanocobalamin 1000 MICROGram(s) Oral daily  folic acid 1 milliGRAM(s) Oral daily  furosemide   Injectable 20 milliGRAM(s) IV Push daily  NIFEdipine XL 30 milliGRAM(s) Oral daily  predniSONE   Tablet 50 milliGRAM(s) Oral daily    MEDICATIONS  (PRN):      Allergies    aspirin (Unknown)  Cipro (Unknown)  codeine (Unknown)  dicyclomine (Unknown)  Diovan (Unknown)  Flagyl (Unknown)  Librax (Unknown)  metronidazole (Unknown)  penicillin (Unknown)  Prevacid (Unknown)  trimethobenzamide (Unknown)  	    f    Vital Signs Last 24 Hrs  T(C): 36.8 (2019 15:20), Max: 36.8 (2019 15:20)  T(F): 98.2 (2019 15:20), Max: 98.2 (2019 15:20)  HR: 89 (2019 15:20) (89 - 124)  BP: 137/59 (2019 15:20) (112/54 - 140/80)  BP(mean): 77 (2019 02:59) (77 - 77)  RR: 16 (2019 15:20) (16 - 81)  SpO2: 97% (2019 15:20) (86% - 97%)    PHYSICAL EXAM:      Constitutional:  elderly, chronically looking WF, + O2NC, NAD    Eyes:  nonicteric    ENMT:  dental defects    Neck:  supple, + JVD, no bruits    Back:  + Kyphosis    Respiratory:  dec BS, shallow respirations, bibasilar crackles    Cardiovascular:  s1S2 tachy    Gastrointestinal: globose soft and benign    Genitourinary:    Extremities: moves all ext, + arthritic changes, + edema        LABS:                        11.7   12.71 )-----------( 90       ( 2019 08:04 )             38.4     11    139  |  99  |  57<H>  ----------------------------<  257<H>  3.3<L>   |  22  |  1.6<H>    Ca    8.4<L>      2019 08:04  Phos  3.4       Mg     2.1       troponin 0.02  BNP  4000    TPro  5.0<L>  /  Alb  3.1<L>  /  TBili  1.0  /  DBili  0.4<H>  /  AST  14  /  ALT  13  /  AlkPhos  73  11-26    PT/INR - ( 2019 13:30 )   PT: 13.10 sec;   INR: 1.14 ratio         PTT - ( 2019 13:30 )  PTT:21.6 sec  Urinalysis Basic - ( 2019 16:20 )    Color: Yellow / Appearance: Clear / S.020 / pH: x  Gluc: x / Ketone: Negative  / Bili: Negative / Urobili: <2 mg/dL   Blood: x / Protein: 30 mg/dL / Nitrite: Negative   Leuk Esterase: Negative / RBC: 10 /HPF / WBC 6 /HPF   Sq Epi: x / Non Sq Epi: 5 /HPF / Bacteria: Few        RADIOLOGY & ADDITIONAL TESTS:  CXR  inc vasc markings, cardiomegaly  EKG sinus tach 121/min QTc 462    CT of abd:  bibasilar atelectasis, stable hepatic cyst,  calcification n the sybil hepatis in the area of the GB neck, stable  side branch pancreatic IPMN, no pancreatic constanza dilatation    Sono of abd:  no cholelithiases RITA RAMOS 89y Female from home brought by family to the ER for c/o generalized weakness, SOB and inc difficulty ambulating x 3 days du to generalized muscke pain.  The pt at baseline is able to ambulate with walker but in the last few days has been unable to get out of be due to severe musculoskeletal pain.  Of note the pt has Hx of  warm autoimmune hemolytic anemia and has been on prolonged steroid  tx  and has had  Rutixan tx as well.  The pt was noted to be fluid overloaded with a BNP of 4500.  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 The pt is being evaluated by Hem-Onc Dr Mejias.  The PMHx includes:  HTN, ASHD, TIA, CKD III, Warm IGG Hemolytic Anemia, OA, DDD, DJD, mobility dysfunction, GERD, diverticulosis, ch constipation.    INTERVAL HPI/OVERNIGHT EVENTS:  pt feels better after dose of Lasix and O2 NC, desats when she takes O2 off    MEDICATIONS  (STANDING):  chlorhexidine 4% Liquid 1 Application(s) Topical <User Schedule>  cyanocobalamin 1000 MICROGram(s) Oral daily  folic acid 1 milliGRAM(s) Oral daily  furosemide   Injectable 20 milliGRAM(s) IV Push daily  NIFEdipine XL 30 milliGRAM(s) Oral daily  predniSONE   Tablet 50 milliGRAM(s) Oral daily    MEDICATIONS  (PRN):      Allergies    aspirin (Unknown)  Cipro (Unknown)  codeine (Unknown)  dicyclomine (Unknown)  Diovan (Unknown)  Flagyl (Unknown)  Librax (Unknown)  metronidazole (Unknown)  penicillin (Unknown)  Prevacid (Unknown)  trimethobenzamide (Unknown)  	  Vital Signs Last 24 Hrs  T(C): 36.8 (2019 15:20), Max: 36.8 (2019 15:20)  T(F): 98.2 (2019 15:20), Max: 98.2 (2019 15:20)  HR: 89 (2019 15:20) (89 - 124)  BP: 137/59 (2019 15:20) (112/54 - 140/80)  BP(mean): 77 (2019 02:59) (77 - 77)  RR: 16 (2019 15:20) (16 - 81)  SpO2: 97% (2019 15:20) (86% - 97%)    PHYSICAL EXAM:      Constitutional:  elderly, thin, pale and fragile, chronically looking WF, + O2NC, NAD    Eyes:  nonicteric    ENMT:  dental defects    Neck:  supple, + JVD, no bruits    Back:  + Kyphosis    Respiratory:  dec BS, shallow respirations, bibasilar crackles    Cardiovascular:  s1S2 tachy    Gastrointestinal: globose soft and benign    Genitourinary:    Extremities: moves all ext, + arthritic changes, + edema        LABS:                        11.7   12.71 )-----------( 90       ( 2019 08:04 )             38.4         139  |  99  |  57<H>  ----------------------------<  257<H>  3.3<L>   |  22  |  1.6<H>    Ca    8.4<L>      2019 08:04  Phos  3.4       Mg     2.1       troponin 0.02  BNP  4000    TPro  5.0<L>  /  Alb  3.1<L>  /  TBili  1.0  /  DBili  0.4<H>  /  AST  14  /  ALT  13  /  AlkPhos  73      PT/INR - ( 2019 13:30 )   PT: 13.10 sec;   INR: 1.14 ratio         PTT - ( 2019 13:30 )  PTT:21.6 sec  Urinalysis Basic - ( 2019 16:20 )    Color: Yellow / Appearance: Clear / S.020 / pH: x  Gluc: x / Ketone: Negative  / Bili: Negative / Urobili: <2 mg/dL   Blood: x / Protein: 30 mg/dL / Nitrite: Negative   Leuk Esterase: Negative / RBC: 10 /HPF / WBC 6 /HPF   Sq Epi: x / Non Sq Epi: 5 /HPF / Bacteria: Few        RADIOLOGY & ADDITIONAL TESTS:  CXR  inc vasc markings, cardiomegaly  EKG sinus tach 121/min QTc 462    CT of abd:  bibasilar atelectasis, stable hepatic cyst,  calcification n the sybil hepatis in the area of the GB neck, stable  side branch pancreatic IPMN, no pancreatic constanza dilatation    Sono of abd:  no cholelithiases RITA RAMOS 89y Female from home brought by family to the ER for c/o generalized weakness, SOB and inc difficulty ambulating x 3 days du to generalized muscke pain.  The pt at baseline is able to ambulate with walker but in the last few days has been unable to get out of be due to severe musculoskeletal pain.  Of note the pt has Hx of  warm autoimmune hemolytic anemia and has been on prolonged steroid  tx  and has had  Rutixan tx as well.  The pt was noted to be fluid overloaded with a BNP of 4500.  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 The pt is being evaluated by Hem-Onc Dr Mejias.  The PMHx includes:  HTN, ASHD, TIA, CKD III, Warm IGG Hemolytic Anemia, OA, DDD, DJD, mobility dysfunction, GERD, diverticulosis, ch constipation.    INTERVAL HPI/OVERNIGHT EVENTS:  pt feels better after dose of Lasix and O2 NC, desats when she takes O2 off    MEDICATIONS  (STANDING):  Meropenem 1gm q12  chlorhexidine 4% Liquid 1 Application(s) Topical <User Schedule>  cyanocobalamin 1000 MICROGram(s) Oral daily  folic acid 1 milliGRAM(s) Oral daily  furosemide   Injectable 20 milliGRAM(s) IV Push daily  NIFEdipine XL 30 milliGRAM(s) Oral daily  predniSONE   Tablet 50 milliGRAM(s) Oral daily    MEDICATIONS  (PRN):      Allergies    aspirin (Unknown)  Cipro (Unknown)  codeine (Unknown)  dicyclomine (Unknown)  Diovan (Unknown)  Flagyl (Unknown)  Librax (Unknown)  metronidazole (Unknown)  penicillin (Unknown)  Prevacid (Unknown)  trimethobenzamide (Unknown)  	  Vital Signs Last 24 Hrs  T(C): 36.8 (2019 15:20), Max: 36.8 (2019 15:20)  T(F): 98.2 (2019 15:20), Max: 98.2 (2019 15:20)  HR: 89 (2019 15:20) (89 - 124)  BP: 137/59 (2019 15:20) (112/54 - 140/80)  BP(mean): 77 (2019 02:59) (77 - 77)  RR: 16 (2019 15:20) (16 - 81)  SpO2: 97% (2019 15:20) (86% - 97%)    PHYSICAL EXAM:      Constitutional:  elderly, thin, pale and fragile, chronically looking WF, + O2NC, NAD    Eyes:  nonicteric    ENMT:  dental defects    Neck:  supple, + JVD, no bruits    Back:  + Kyphosis    Respiratory:  dec BS, shallow respirations, bibasilar crackles    Cardiovascular:  s1S2 tachy    Gastrointestinal: globose soft and benign    Genitourinary:    Extremities: moves all ext, + arthritic changes, + edema        LABS:                        11.7   12.71 )-----------( 90       ( 2019 08:04 )             38.4         139  |  99  |  57<H>  ----------------------------<  257<H>  3.3<L>   |  22  |  1.6<H>    Ca    8.4<L>      2019 08:04  Phos  3.4       Mg     2.1       troponin 0.02  BNP  4000    TPro  5.0<L>  /  Alb  3.1<L>  /  TBili  1.0  /  DBili  0.4<H>  /  AST  14  /  ALT  13  /  AlkPhos  73  11-26    PT/INR - ( 2019 13:30 )   PT: 13.10 sec;   INR: 1.14 ratio         PTT - ( 2019 13:30 )  PTT:21.6 sec  Urinalysis Basic - ( 2019 16:20 )    Color: Yellow / Appearance: Clear / S.020 / pH: x  Gluc: x / Ketone: Negative  / Bili: Negative / Urobili: <2 mg/dL   Blood: x / Protein: 30 mg/dL / Nitrite: Negative   Leuk Esterase: Negative / RBC: 10 /HPF / WBC 6 /HPF   Sq Epi: x / Non Sq Epi: 5 /HPF / Bacteria: Few        RADIOLOGY & ADDITIONAL TESTS:  CXR  inc vasc markings, cardiomegaly  EKG sinus tach 121/min QTc 462    CT of abd:  bibasilar atelectasis, stable hepatic cyst,  calcification n the sybil hepatis in the area of the GB neck, stable  side branch pancreatic IPMN, no pancreatic constanza dilatation    Sono of abd:  no cholelithiases RITA RAMOS 89y Female from home brought by family to the ER for c/o generalized weakness, SOB and inc difficulty ambulating x 3 days due to generalized muscke pain and the SOB.  The pt at baseline is able to ambulate with walker but in the last few days has been unable to get out of be due to severe musculoskeletal pain.  Of note the pt has Hx of  warm autoimmune hemolytic anemia and has been on prolonged steroid  tx  and has had  Rutixan tx as well.  The pt was noted to be fluid overloaded with a BNP of 4500.  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 The pt is being evaluated by Hem-Onc Dr Mejias.  The PMHx includes:  HTN, ASHD, TIA, CKD III, Warm IGG Hemolytic Anemia, OA, DDD, DJD, mobility dysfunction, GERD, diverticulosis, ch constipation.    INTERVAL HPI/OVERNIGHT EVENTS:  pt feels better after dose of Lasix and O2 NC, desats when she takes O2 off    MEDICATIONS  (STANDING):  Meropenem 1gm q12  chlorhexidine 4% Liquid 1 Application(s) Topical <User Schedule>  cyanocobalamin 1000 MICROGram(s) Oral daily  folic acid 1 milliGRAM(s) Oral daily  furosemide   Injectable 20 milliGRAM(s) IV Push daily  NIFEdipine XL 30 milliGRAM(s) Oral daily  predniSONE   Tablet 50 milliGRAM(s) Oral daily    MEDICATIONS  (PRN):      Allergies    aspirin (Unknown)  Cipro (Unknown)  codeine (Unknown)  dicyclomine (Unknown)  Diovan (Unknown)  Flagyl (Unknown)  Librax (Unknown)  metronidazole (Unknown)  penicillin (Unknown)  Prevacid (Unknown)  trimethobenzamide (Unknown)  	  Vital Signs Last 24 Hrs  T(C): 36.8 (2019 15:20), Max: 36.8 (2019 15:20)  T(F): 98.2 (2019 15:20), Max: 98.2 (2019 15:20)  HR: 89 (2019 15:20) (89 - 124)  BP: 137/59 (2019 15:20) (112/54 - 140/80)  BP(mean): 77 (2019 02:59) (77 - 77)  RR: 16 (2019 15:20) (16 - 81)  SpO2: 97% (2019 15:20) (86% - 97%)    PHYSICAL EXAM:      Constitutional:  elderly, thin, pale and fragile, chronically looking WF, + O2NC, NAD    Eyes:  nonicteric    ENMT:  dental defects    Neck:  supple, + JVD, no bruits    Back:  + Kyphosis    Respiratory:  dec BS, shallow respirations, bibasilar crackles    Cardiovascular:  s1S2 tachy    Gastrointestinal: globose soft and benign    Genitourinary:    Extremities: moves all ext, + arthritic changes, + edema        LABS:                        11.7   12.71 )-----------( 90       ( 2019 08:04 )             38.4     11-    139  |  99  |  57<H>  ----------------------------<  257<H>  3.3<L>   |  22  |  1.6<H>    Ca    8.4<L>      2019 08:04  Phos  3.4     11-  Mg     2.1     11  troponin 0.02  BNP  4000    TPro  5.0<L>  /  Alb  3.1<L>  /  TBili  1.0  /  DBili  0.4<H>  /  AST  14  /  ALT  13  /  AlkPhos  73  11-26    PT/INR - ( 2019 13:30 )   PT: 13.10 sec;   INR: 1.14 ratio         PTT - ( 2019 13:30 )  PTT:21.6 sec  Urinalysis Basic - ( 2019 16:20 )    Color: Yellow / Appearance: Clear / S.020 / pH: x  Gluc: x / Ketone: Negative  / Bili: Negative / Urobili: <2 mg/dL   Blood: x / Protein: 30 mg/dL / Nitrite: Negative   Leuk Esterase: Negative / RBC: 10 /HPF / WBC 6 /HPF   Sq Epi: x / Non Sq Epi: 5 /HPF / Bacteria: Few        RADIOLOGY & ADDITIONAL TESTS:  CXR  inc vasc markings, cardiomegaly  EKG sinus tach 121/min QTc 462    CT of abd:  bibasilar atelectasis, stable hepatic cyst,  calcification n the sybil hepatis in the area of the GB neck, stable  side branch pancreatic IPMN, no pancreatic constanza dilatation    Sono of abd:  no cholelithiases

## 2019-11-27 NOTE — CONSULT NOTE ADULT - SUBJECTIVE AND OBJECTIVE BOX
Patient is a 89y old  Female who presents with a chief complaint of Fluid overload; steroid-induced myalgia (26 Nov 2019 18:04)      HPI:  This is a 89 year old female with a significant PMHx of Warm Autoimmune Hemolytic Anemia who presented with a cc/o generalized myalgia. Her muscle aches are associated with generalized weakness and inability to ambulate x3 days. At baseline, the patient is able to walk with a walker, however, was unable to get out of bed on the day of presentation. She was recently admitted for hemolytic anemia, requiring multiple transfusions. As a result, per the daughter, due to concern of recurrence given no recent blood work, she was brought to the ED for further evaluation. ROS was otherwise negative. In the ED, the patient VS were WNL. Labs significant for leukocytosis and mildly decreased renal function from baseline. CXR was significant for pulmonary infiltrate BL, suspicious for fluid overload and cannot rule out PNA. CT A/P was performed with incidental finding of small calcification in the region of the sybil hepatis.    Hematology history-   We saw Ms Pate during her last admission when she was diagnosed with hemolytic anemia and followed up with Dr Orantes as outpt. She has completed 4/4 rituxan therapy and is on prednisone 50 mg. Her work up including Flow cytometry negative. CT imaging did not reveal malignancy. No folate deficiency.  Low vitamin B12 level with normal MMA level and is on PO B12 and her repeat B12 level is normal. She is on folic acid        ROS:  Negative except for:    PAST MEDICAL & SURGICAL HISTORY:  Chronic kidney disease, unspecified CKD stage: Stage IIIB  TIA (transient ischemic attack)  Diverticulitis  Constipation  Hypertension  No significant past surgical history      SOCIAL HISTORY: Non smoker     FAMILY HISTORY:  No family history of cancer      MEDICATIONS  (STANDING):  chlorhexidine 4% Liquid 1 Application(s) Topical <User Schedule>  cyanocobalamin 1000 MICROGram(s) Oral daily  folic acid 1 milliGRAM(s) Oral daily  furosemide   Injectable 20 milliGRAM(s) IV Push daily  NIFEdipine XL 30 milliGRAM(s) Oral daily  predniSONE   Tablet 50 milliGRAM(s) Oral daily    MEDICATIONS  (PRN):    Height (cm): 160 (11-26-19 @ 14:08)  Weight (kg): 45 (11-26-19 @ 14:08)  BMI (kg/m2): 17.6 (11-26-19 @ 14:08)  BSA (m2): 1.44 (11-26-19 @ 14:08)  Allergies    aspirin (Unknown)  Cipro (Unknown)  codeine (Unknown)  dicyclomine (Unknown)  Diovan (Unknown)  Flagyl (Unknown)  Librax (Unknown)  metronidazole (Unknown)  penicillin (Unknown)  Prevacid (Unknown)  trimethobenzamide (Unknown)    Intolerances        Vital Signs Last 24 Hrs  T(C): 36.1 (26 Nov 2019 22:42), Max: 36.4 (26 Nov 2019 12:04)  T(F): 97 (26 Nov 2019 22:42), Max: 97.6 (26 Nov 2019 12:04)  HR: 124 (27 Nov 2019 02:59) (72 - 124)  BP: 112/54 (27 Nov 2019 02:59) (112/54 - 181/86)  BP(mean): 77 (27 Nov 2019 02:59) (77 - 77)  RR: 81 (27 Nov 2019 05:50) (18 - 81)  SpO2: 95% (27 Nov 2019 06:24) (86% - 96%)    PHYSICAL EXAM  General: adult in NAD  HEENT: clear oropharynx, anicteric sclera, pink conjunctiva  Neck: supple  CV: normal S1/S2 with no murmur rubs or gallops  Lungs: positive air movement b/l ant lungs,clear to auscultation, no wheezes, no rales  Abdomen: soft non-tender non-distended, no hepatosplenomegaly  Ext: no clubbing cyanosis or edema  Skin: no rashes and no petechiae  Neuro: alert and oriented X 4, no focal deficits      LABS:                          11.7   12.71 )-----------( 90       ( 27 Nov 2019 08:04 )             38.4         Mean Cell Volume : 97.2 fL  Mean Cell Hemoglobin : 29.6 pg  Mean Cell Hemoglobin Concentration : 30.5 g/dL  Auto Neutrophil # : x  Auto Lymphocyte # : x  Auto Monocyte # : x  Auto Eosinophil # : x  Auto Basophil # : x  Auto Neutrophil % : x  Auto Lymphocyte % : x  Auto Monocyte % : x  Auto Eosinophil % : x  Auto Basophil % : x      Serial CBC's  11-27 @ 08:04  Hct-38.4 / Hgb-11.7 / Plat-90 / RBC-3.95 / WBC-12.71  Serial CBC's  11-26 @ 13:30  Hct-39.1 / Hgb-12.1 / Plat-134 / RBC-4.09 / WBC-14.33      11-26    141  |  98  |  64<HH>  ----------------------------<  176<H>  4.2   |  26  |  1.7<H>    Ca    9.4      26 Nov 2019 13:30  Mg     2.5     11-26    TPro  5.0<L>  /  Alb  3.1<L>  /  TBili  1.0  /  DBili  0.4<H>  /  AST  14  /  ALT  13  /  AlkPhos  73  11-26      PT/INR - ( 26 Nov 2019 13:30 )   PT: 13.10 sec;   INR: 1.14 ratio         PTT - ( 26 Nov 2019 13:30 )  PTT:21.6 sec                BLOOD SMEAR INTERPRETATION:       RADIOLOGY & ADDITIONAL STUDIES: Patient is a 89y old  Female who presents with a chief complaint of Fluid overload; steroid-induced myalgia (26 Nov 2019 18:04)      HPI:  This is a 89 year old female with a significant PMHx of Warm Autoimmune Hemolytic Anemia who presented with a cc/o generalized myalgia. Her muscle aches are associated with generalized weakness and inability to ambulate x3 days. At baseline, the patient is able to walk with a walker, however, was unable to get out of bed on the day of presentation. She was recently admitted for hemolytic anemia, requiring multiple transfusions. As a result, per the daughter, due to concern of recurrence given no recent blood work, she was brought to the ED for further evaluation. ROS was otherwise negative. In the ED, the patient VS were WNL. Labs significant for leukocytosis and mildly decreased renal function from baseline. CXR was significant for pulmonary infiltrate BL, suspicious for fluid overload and cannot rule out PNA. CT A/P was performed with incidental finding of small calcification in the region of the sybil hepatis.    Hematology history-   We saw Ms Pate during her last admission when she was diagnosed with hemolytic anemia and followed up with Dr Orantes as outpt. She has completed 4/4 rituxan therapy and is on prednisone 50 mg. Her work up including Flow cytometry negative. CT imaging did not reveal malignancy. No folate deficiency.  Low vitamin B12 level with normal MMA level and is on PO B12 and her repeat B12 level is normal. She is on folic acid        ROS:  Negative except for:    PAST MEDICAL & SURGICAL HISTORY:  Chronic kidney disease, unspecified CKD stage: Stage IIIB  TIA (transient ischemic attack)  Diverticulitis  Constipation  Hypertension  No significant past surgical history      SOCIAL HISTORY: Non smoker     FAMILY HISTORY:  No family history of cancer      MEDICATIONS  (STANDING):  chlorhexidine 4% Liquid 1 Application(s) Topical <User Schedule>  cyanocobalamin 1000 MICROGram(s) Oral daily  folic acid 1 milliGRAM(s) Oral daily  furosemide   Injectable 20 milliGRAM(s) IV Push daily  NIFEdipine XL 30 milliGRAM(s) Oral daily  predniSONE   Tablet 50 milliGRAM(s) Oral daily    MEDICATIONS  (PRN):    Height (cm): 160 (11-26-19 @ 14:08)  Weight (kg): 45 (11-26-19 @ 14:08)  BMI (kg/m2): 17.6 (11-26-19 @ 14:08)  BSA (m2): 1.44 (11-26-19 @ 14:08)  Allergies    aspirin (Unknown)  Cipro (Unknown)  codeine (Unknown)  dicyclomine (Unknown)  Diovan (Unknown)  Flagyl (Unknown)  Librax (Unknown)  metronidazole (Unknown)  penicillin (Unknown)  Prevacid (Unknown)  trimethobenzamide (Unknown)    Intolerances        Vital Signs Last 24 Hrs  T(C): 36.1 (26 Nov 2019 22:42), Max: 36.4 (26 Nov 2019 12:04)  T(F): 97 (26 Nov 2019 22:42), Max: 97.6 (26 Nov 2019 12:04)  HR: 124 (27 Nov 2019 02:59) (72 - 124)  BP: 112/54 (27 Nov 2019 02:59) (112/54 - 181/86)  BP(mean): 77 (27 Nov 2019 02:59) (77 - 77)  RR: 81 (27 Nov 2019 05:50) (18 - 81)  SpO2: 95% (27 Nov 2019 06:24) (86% - 96%)    PHYSICAL EXAM  General: adult in NAD, Face mask, looks comfortable   Neck: supple  CV: normal S1/S2   Lungs: Decreased BS b/l bases+  Abdomen: soft non-tender  Ext: mild LE edema  Neuro: alert and oriented X 4, no focal deficits      LABS:                          11.7   12.71 )-----------( 90       ( 27 Nov 2019 08:04 )             38.4         Mean Cell Volume : 97.2 fL  Mean Cell Hemoglobin : 29.6 pg  Mean Cell Hemoglobin Concentration : 30.5 g/dL  Auto Neutrophil # : x  Auto Lymphocyte # : x  Auto Monocyte # : x  Auto Eosinophil # : x  Auto Basophil # : x  Auto Neutrophil % : x  Auto Lymphocyte % : x  Auto Monocyte % : x  Auto Eosinophil % : x  Auto Basophil % : x      Serial CBC's  11-27 @ 08:04  Hct-38.4 / Hgb-11.7 / Plat-90 / RBC-3.95 / WBC-12.71  Serial CBC's  11-26 @ 13:30  Hct-39.1 / Hgb-12.1 / Plat-134 / RBC-4.09 / WBC-14.33      11-26    141  |  98  |  64<HH>  ----------------------------<  176<H>  4.2   |  26  |  1.7<H>    Ca    9.4      26 Nov 2019 13:30  Mg     2.5     11-26    TPro  5.0<L>  /  Alb  3.1<L>  /  TBili  1.0  /  DBili  0.4<H>  /  AST  14  /  ALT  13  /  AlkPhos  73  11-26      PT/INR - ( 26 Nov 2019 13:30 )   PT: 13.10 sec;   INR: 1.14 ratio         PTT - ( 26 Nov 2019 13:30 )  PTT:21.6 sec      < from: US Abdomen Limited (11.26.19 @ 18:57) >  Essentially unremarkable right upper quadrant ultrasound. No definite   sonographic correlate to the CT finding. More specifically, no   sonographic evidence of cholelithiasis or choledocholithiasis.    < end of copied text >  < from: Xray Chest 1 View AP/PA (11.26.19 @ 16:23) >  Bilateral predominantly central patchy opacities appear new when compared   to prior 2018 study and may reflect edema or infection in the appropriate   clinical setting. Questionable trace left pleural effusion.    < end of copied text > Patient is a 89y old  Female who presents with a chief complaint of Fluid overload; steroid-induced myalgia (26 Nov 2019 18:04)      HPI:  This is a 89 year old female with a significant PMHx of Warm Autoimmune Hemolytic Anemia who presented with a cc/o generalized myalgia. Her muscle aches are associated with generalized weakness and inability to ambulate x3 days. At baseline, the patient is able to walk with a walker, however, was unable to get out of bed on the day of presentation. She was recently admitted for hemolytic anemia, requiring multiple transfusions. As a result, per the daughter, due to concern of recurrence given no recent blood work, she was brought to the ED for further evaluation. ROS was otherwise negative. In the ED, the patient VS were WNL. Labs significant for leukocytosis and mildly decreased renal function from baseline. CXR was significant for pulmonary infiltrate BL, suspicious for fluid overload and cannot rule out PNA. CT A/P was performed with incidental finding of small calcification in the region of the sybil hepatis.    Hematology history-   We saw Ms Pate during her last admission when she was diagnosed with hemolytic anemia and followed up with Dr Orantes as outpt. She has completed 4/4 rituxan therapy and is on prednisone 50 mg. Her work up including Flow cytometry negative. CT imaging did not reveal malignancy. No folate deficiency.  Low vitamin B12 level with normal MMA level and is on PO B12 and her repeat B12 level is normal. She is on folic acid        ROS:  Negative except for: Weakness, no fever not SOB.     PAST MEDICAL & SURGICAL HISTORY:  Chronic kidney disease, unspecified CKD stage: Stage IIIB  TIA (transient ischemic attack)  Diverticulitis  Constipation  Hypertension  No significant past surgical history      SOCIAL HISTORY: Non smoker     FAMILY HISTORY:  No family history of cancer      MEDICATIONS  (STANDING):  chlorhexidine 4% Liquid 1 Application(s) Topical <User Schedule>  cyanocobalamin 1000 MICROGram(s) Oral daily  folic acid 1 milliGRAM(s) Oral daily  furosemide   Injectable 20 milliGRAM(s) IV Push daily  NIFEdipine XL 30 milliGRAM(s) Oral daily  predniSONE   Tablet 50 milliGRAM(s) Oral daily    MEDICATIONS  (PRN):    Height (cm): 160 (11-26-19 @ 14:08)  Weight (kg): 45 (11-26-19 @ 14:08)  BMI (kg/m2): 17.6 (11-26-19 @ 14:08)  BSA (m2): 1.44 (11-26-19 @ 14:08)  Allergies    aspirin (Unknown)  Cipro (Unknown)  codeine (Unknown)  dicyclomine (Unknown)  Diovan (Unknown)  Flagyl (Unknown)  Librax (Unknown)  metronidazole (Unknown)  penicillin (Unknown)  Prevacid (Unknown)  trimethobenzamide (Unknown)    Intolerances        Vital Signs Last 24 Hrs  T(C): 36.1 (26 Nov 2019 22:42), Max: 36.4 (26 Nov 2019 12:04)  T(F): 97 (26 Nov 2019 22:42), Max: 97.6 (26 Nov 2019 12:04)  HR: 124 (27 Nov 2019 02:59) (72 - 124)  BP: 112/54 (27 Nov 2019 02:59) (112/54 - 181/86)  BP(mean): 77 (27 Nov 2019 02:59) (77 - 77)  RR: 81 (27 Nov 2019 05:50) (18 - 81)  SpO2: 95% (27 Nov 2019 06:24) (86% - 96%)    PHYSICAL EXAM  General: adult in NAD, Face mask, looks comfortable   Neck: supple  CV: normal S1/S2   Lungs: Decreased BS b/l bases+  Abdomen: soft non-tender  Ext: mild LE edema  Neuro: alert and oriented X 4, no focal deficits      LABS:                          11.7   12.71 )-----------( 90       ( 27 Nov 2019 08:04 )             38.4         Mean Cell Volume : 97.2 fL  Mean Cell Hemoglobin : 29.6 pg  Mean Cell Hemoglobin Concentration : 30.5 g/dL  Auto Neutrophil # : x  Auto Lymphocyte # : x  Auto Monocyte # : x  Auto Eosinophil # : x  Auto Basophil # : x  Auto Neutrophil % : x  Auto Lymphocyte % : x  Auto Monocyte % : x  Auto Eosinophil % : x  Auto Basophil % : x      Serial CBC's  11-27 @ 08:04  Hct-38.4 / Hgb-11.7 / Plat-90 / RBC-3.95 / WBC-12.71  Serial CBC's  11-26 @ 13:30  Hct-39.1 / Hgb-12.1 / Plat-134 / RBC-4.09 / WBC-14.33      11-26    141  |  98  |  64<HH>  ----------------------------<  176<H>  4.2   |  26  |  1.7<H>    Ca    9.4      26 Nov 2019 13:30  Mg     2.5     11-26    TPro  5.0<L>  /  Alb  3.1<L>  /  TBili  1.0  /  DBili  0.4<H>  /  AST  14  /  ALT  13  /  AlkPhos  73  11-26      PT/INR - ( 26 Nov 2019 13:30 )   PT: 13.10 sec;   INR: 1.14 ratio         PTT - ( 26 Nov 2019 13:30 )  PTT:21.6 sec      < from: US Abdomen Limited (11.26.19 @ 18:57) >  Essentially unremarkable right upper quadrant ultrasound. No definite   sonographic correlate to the CT finding. More specifically, no   sonographic evidence of cholelithiasis or choledocholithiasis.    < end of copied text >  < from: Xray Chest 1 View AP/PA (11.26.19 @ 16:23) >  Bilateral predominantly central patchy opacities appear new when compared   to prior 2018 study and may reflect edema or infection in the appropriate   clinical setting. Questionable trace left pleural effusion.    < end of copied text >

## 2019-11-27 NOTE — PROGRESS NOTE ADULT - SUBJECTIVE AND OBJECTIVE BOX
RITA RAMOS 89y Female  MRN#: [id number]     SUBJECTIVE  Patient is a 89y old Female who presents with a chief complaint of Fluid overload; steroid-induced myalgia. (2019 19:06)  Currently admitted to medicine with the primary diagnosis of Generalized weakness.    OBJECTIVE  PAST MEDICAL & SURGICAL HISTORY  Chronic kidney disease, unspecified CKD stage: Stage IIIB  TIA (transient ischemic attack)  Diverticulitis  Constipation  Hypertension  No significant past surgical history    ALLERGIES:  aspirin (Unknown)  Cipro (Unknown)  codeine (Unknown)  dicyclomine (Unknown)  Diovan (Unknown)  Flagyl (Unknown)  Librax (Unknown)  metronidazole (Unknown)  penicillin (Unknown)  Prevacid (Unknown)  trimethobenzamide (Unknown)    MEDICATIONS:  STANDING MEDICATIONS  chlorhexidine 4% Liquid 1 Application(s) Topical <User Schedule>  cyanocobalamin 1000 MICROGram(s) Oral daily  folic acid 1 milliGRAM(s) Oral daily  furosemide   Injectable 20 milliGRAM(s) IV Push daily  heparin  Injectable 5000 Unit(s) SubCutaneous every 8 hours  NIFEdipine XL 30 milliGRAM(s) Oral daily  predniSONE   Tablet 50 milliGRAM(s) Oral daily    PRN MEDICATIONS      VITAL SIGNS: Last 24 Hours  T(C): 36.8 (2019 15:20), Max: 36.8 (2019 15:20)  T(F): 98.2 (2019 15:20), Max: 98.2 (2019 15:20)  HR: 89 (2019 15:20) (89 - 124)  BP: 137/59 (2019 15:20) (112/54 - 137/59)  BP(mean): 77 (2019 02:59) (77 - 77)  RR: 16 (2019 15:20) (16 - 81)  SpO2: 97% (2019 15:20) (86% - 97%)    LABS:                        11.7   12.71 )-----------( 90       ( 2019 08:04 )             38.4         139  |  99  |  57<H>  ----------------------------<  257<H>  3.3<L>   |  22  |  1.6<H>    Ca    8.4<L>      2019 08:04  Phos  3.4       Mg     2.1         TPro  5.0<L>  /  Alb  3.1<L>  /  TBili  1.0  /  DBili  0.4<H>  /  AST  14  /  ALT  13  /  AlkPhos  73      PT/INR - ( 2019 13:30 )   PT: 13.10 sec;   INR: 1.14 ratio         PTT - ( 2019 13:30 )  PTT:21.6 sec  Urinalysis Basic - ( 2019 16:20 )  Color: Yellow / Appearance: Clear / S.020 / pH: x  Gluc: x / Ketone: Negative  / Bili: Negative / Urobili: <2 mg/dL   Blood: x / Protein: 30 mg/dL / Nitrite: Negative   Leuk Esterase: Negative / RBC: 10 /HPF / WBC 6 /HPF   Sq Epi: x / Non Sq Epi: 5 /HPF / Bacteria: Few    ABG - ( 2019 08:57 )  pH, Arterial: 7.54  pH, Blood: x     /  pCO2: 32    /  pO2: 76    / HCO3: 27    / Base Excess: 4.4   /  SaO2: 97        Troponin T, Serum: 0.02 ng/mL <H> (19 @ 20:52)    CARDIAC MARKERS ( 2019 20:52 )  x     / 0.02 ng/mL / x     / x     / x      CARDIAC MARKERS ( 2019 13:30 )  x     / 0.03 ng/mL / x     / x     / x        PHYSICAL EXAM:  	GENERAL:   Awake, alert; NAD, on High flow  	HEENT:  Head NC/AT; Conjunctivae pink, Sclera anicteric.  	CARDIO:   Regular rate; Regular rhythm; S1 & S2; murmur on auscultation.  	RESP:   Crackles BL; Decreased at bases.  	GI:   Soft; NT/ND; BS; No guarding; No rebound tenderness.  EXT:   2+ pitting edema in the LE.    ASSESSMENT & PLAN  This is a 89 year old female with a significant PMHx of Warm Autoimmune Hemolytic Anemia who presented with a cc/o generalized myalgia. Her muscle aches are associated with generalized weakness and inability to ambulate. She is currently admitted for suspected steroid-induced myalgia.     Fluid overload; Cardiac vs. Renal  -On High flow nasal cannula.  - Physical exam with BL crackles and BL LE edema; murmur on exam   - BNP 4500 at time of admission  - Lasix daily for now, increase the dose if BP and kidney function tolerates  - Follow up TTE, Pending  - Daily weight; fluid restriction; Low salt; I+O    Myalgia and decreased functional status possibly due to steroid-induced myalgia  - Patient taking steroids since last admission  - Leukocytosis likely due to steroid use; no physical symptoms to suggest infection at this time  - CXR with BL infiltrate consistent with fluid overload; cannot rule out PNA,   -Will continue ceftriaxone and azithromycin for now    Hx of CKD Stage IIIB  - Slight worsening of renal function  - Monitor    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 Orantes)  - Continue prednisone 50mg   -Monitor Hb, Keep active type and screen  -High risk for clotting  -Will get Venous Duplex to rule out DVT    Hx of Low Vitamin B12 level with normal MMA: Continue B12 1000mcg daily  Hx of HTN: Continue home medications; Holding HCTZ/Triamterene, on lasix  Hx of Diverticulosis: High fiber diet to avoid constipation    DVT - Heparin  FULL CODE for now

## 2019-11-27 NOTE — PROGRESS NOTE ADULT - ASSESSMENT
SOB, generalized weakness due to fluid overload, exacerbation of Odalis CHF, fluid retention due to long term steroid therapy, CKD    Worsening myalgia, probably steroid induced with worsening mobility dysfunction    Hx of Warm IGG Hemolytic Anemia ( IGG +, C3 -), chronic steroid therapy, sp Rituxan  therapy  Hx of HTN, ASHD, Odalis CHF, TIA  Hx of CKD III  Hx of OA, DDD, DJD, mobility dysfunction, ambulates with walker  Hx of GERD, diverticulosis, ch constipataion  Advanced age    pt has elevated BNP of 4500 with GFR of 28, CXR shows inc vascular markings,  start Lasix 20mg daily, O2 2L, NC, check pulse Ox  monitor electrolytes  ECHO  trend troponins , 0.02  monitor CBC, Plts 90  cont prednisone 50mg  Hem-Onc consult Dr Mejias  fall precautions SOB, generalized weakness due to fluid overload, exacerbation of Odalis CHF, fluid retention due to long term steroid therapy, CKD    Worsening myalgia, probably steroid induced with worsening mobility dysfunction    Hx of Warm IGG Hemolytic Anemia ( IGG +, C3 -), chronic steroid therapy, sp Rituxan  therapy  Hx of HTN, ASHD, Odalis CHF, TIA  Hx of CKD III  Hx of OA, DDD, DJD, mobility dysfunction, ambulates with walker  Hx of GERD, diverticulosis, ch constipataion  Advanced age    pt has elevated BNP of 4500 with GFR of 28, CXR shows inc vascular markings,  start Lasix 20mg IV daily, O2 2L, NC, check pulse Ox  monitor electrolytes  ECHO  trend troponins , 0.02  monitor CBC, Plts 90  cont prednisone 50mg  Hem-Onc consult Dr Mejias  fall precautions SOB, generalized weakness due to fluid overload, exacerbation of Odalis CHF, fluid retention due to long term steroid therapy, CKD  R/O PNA, ques of possible DVT/PE   Worsening myalgia, probably steroid induced with worsening mobility dysfunction    Hx of Warm IGG Hemolytic Anemia ( IGG +, C3 -), chronic steroid therapy, sp Rituxan  therapy  Hx of HTN, ASHD, Odalis CHF, TIA  Hx of CKD III  Hx of OA, DDD, DJD, mobility dysfunction, ambulates with walker  Hx of GERD, diverticulosis, ch constipataion  Advanced age    pt has elevated BNP of 4500 with GFR of 28, CXR shows inc vascular markings,  start Lasix 20mg IV daily, O2 2L, NC, check pulse Ox  monitor electrolytes  ECHO   Venous doppler   trend troponins , 0.02  monitor CBC, Plts 90  cont prednisone 50mg  Hem-Onc consult Dr Mejias  fall precautions  aspiration precautions

## 2019-11-28 LAB
ALBUMIN SERPL ELPH-MCNC: 2.8 G/DL — LOW (ref 3.5–5.2)
ALP SERPL-CCNC: 67 U/L — SIGNIFICANT CHANGE UP (ref 30–115)
ALT FLD-CCNC: 12 U/L — SIGNIFICANT CHANGE UP (ref 0–41)
ANION GAP SERPL CALC-SCNC: 20 MMOL/L — HIGH (ref 7–14)
APTT BLD: >200 SEC — CRITICAL HIGH (ref 27–39.2)
AST SERPL-CCNC: 22 U/L — SIGNIFICANT CHANGE UP (ref 0–41)
BILIRUB SERPL-MCNC: 0.7 MG/DL — SIGNIFICANT CHANGE UP (ref 0.2–1.2)
BUN SERPL-MCNC: 48 MG/DL — HIGH (ref 10–20)
CALCIUM SERPL-MCNC: 8.5 MG/DL — SIGNIFICANT CHANGE UP (ref 8.5–10.1)
CHLORIDE SERPL-SCNC: 97 MMOL/L — LOW (ref 98–110)
CO2 SERPL-SCNC: 24 MMOL/L — SIGNIFICANT CHANGE UP (ref 17–32)
CREAT SERPL-MCNC: 1.3 MG/DL — SIGNIFICANT CHANGE UP (ref 0.7–1.5)
CULTURE RESULTS: SIGNIFICANT CHANGE UP
GLUCOSE SERPL-MCNC: 228 MG/DL — HIGH (ref 70–99)
HCT VFR BLD CALC: 36.5 % — LOW (ref 37–47)
HGB BLD-MCNC: 11.3 G/DL — LOW (ref 12–16)
INR BLD: 1.37 RATIO — HIGH (ref 0.65–1.3)
MCHC RBC-ENTMCNC: 29.6 PG — SIGNIFICANT CHANGE UP (ref 27–31)
MCHC RBC-ENTMCNC: 31 G/DL — LOW (ref 32–37)
MCV RBC AUTO: 95.5 FL — SIGNIFICANT CHANGE UP (ref 81–99)
NRBC # BLD: 0 /100 WBCS — SIGNIFICANT CHANGE UP (ref 0–0)
PLATELET # BLD AUTO: 64 K/UL — LOW (ref 130–400)
POTASSIUM SERPL-MCNC: 3.4 MMOL/L — LOW (ref 3.5–5)
POTASSIUM SERPL-SCNC: 3.4 MMOL/L — LOW (ref 3.5–5)
PROT SERPL-MCNC: 4.6 G/DL — LOW (ref 6–8)
PROTHROM AB SERPL-ACNC: 15.7 SEC — HIGH (ref 9.95–12.87)
RBC # BLD: 3.82 M/UL — LOW (ref 4.2–5.4)
RBC # FLD: 16.1 % — HIGH (ref 11.5–14.5)
SODIUM SERPL-SCNC: 141 MMOL/L — SIGNIFICANT CHANGE UP (ref 135–146)
SPECIMEN SOURCE: SIGNIFICANT CHANGE UP
WBC # BLD: 10.88 K/UL — HIGH (ref 4.8–10.8)
WBC # FLD AUTO: 10.88 K/UL — HIGH (ref 4.8–10.8)

## 2019-11-28 RX ORDER — HEPARIN SODIUM 5000 [USP'U]/ML
2000 INJECTION INTRAVENOUS; SUBCUTANEOUS EVERY 6 HOURS
Refills: 0 | Status: DISCONTINUED | OUTPATIENT
Start: 2019-11-28 | End: 2019-11-29

## 2019-11-28 RX ORDER — HEPARIN SODIUM 5000 [USP'U]/ML
4000 INJECTION INTRAVENOUS; SUBCUTANEOUS EVERY 6 HOURS
Refills: 0 | Status: DISCONTINUED | OUTPATIENT
Start: 2019-11-28 | End: 2019-11-29

## 2019-11-28 RX ORDER — HEPARIN SODIUM 5000 [USP'U]/ML
INJECTION INTRAVENOUS; SUBCUTANEOUS
Qty: 25000 | Refills: 0 | Status: DISCONTINUED | OUTPATIENT
Start: 2019-11-28 | End: 2019-11-28

## 2019-11-28 RX ORDER — HEPARIN SODIUM 5000 [USP'U]/ML
4000 INJECTION INTRAVENOUS; SUBCUTANEOUS ONCE
Refills: 0 | Status: COMPLETED | OUTPATIENT
Start: 2019-11-28 | End: 2019-11-28

## 2019-11-28 RX ORDER — HEPARIN SODIUM 5000 [USP'U]/ML
900 INJECTION INTRAVENOUS; SUBCUTANEOUS
Qty: 25000 | Refills: 0 | Status: DISCONTINUED | OUTPATIENT
Start: 2019-11-28 | End: 2019-11-29

## 2019-11-28 RX ADMIN — HEPARIN SODIUM 4000 UNIT(S): 5000 INJECTION INTRAVENOUS; SUBCUTANEOUS at 10:25

## 2019-11-28 RX ADMIN — AZITHROMYCIN 255 MILLIGRAM(S): 500 TABLET, FILM COATED ORAL at 22:30

## 2019-11-28 RX ADMIN — Medication 30 MILLIGRAM(S): at 05:26

## 2019-11-28 RX ADMIN — Medication 50 MILLIGRAM(S): at 05:27

## 2019-11-28 RX ADMIN — HEPARIN SODIUM 1000 UNIT(S)/HR: 5000 INJECTION INTRAVENOUS; SUBCUTANEOUS at 10:30

## 2019-11-28 RX ADMIN — Medication 1 MILLIGRAM(S): at 12:02

## 2019-11-28 RX ADMIN — Medication 20 MILLIGRAM(S): at 05:27

## 2019-11-28 RX ADMIN — CEFTRIAXONE 100 MILLIGRAM(S): 500 INJECTION, POWDER, FOR SOLUTION INTRAMUSCULAR; INTRAVENOUS at 05:26

## 2019-11-28 RX ADMIN — HEPARIN SODIUM 9 UNIT(S)/HR: 5000 INJECTION INTRAVENOUS; SUBCUTANEOUS at 21:10

## 2019-11-28 RX ADMIN — HEPARIN SODIUM 5000 UNIT(S): 5000 INJECTION INTRAVENOUS; SUBCUTANEOUS at 05:26

## 2019-11-28 RX ADMIN — CHLORHEXIDINE GLUCONATE 1 APPLICATION(S): 213 SOLUTION TOPICAL at 06:10

## 2019-11-28 RX ADMIN — PREGABALIN 1000 MICROGRAM(S): 225 CAPSULE ORAL at 12:02

## 2019-11-28 NOTE — PROGRESS NOTE ADULT - SUBJECTIVE AND OBJECTIVE BOX
SUBJECTIVE:    Patient is a 89y old Female who presents with a chief complaint of Fluid overload; steroid-induced myalgia (2019 08:17)    Currently admitted to medicine with the primary diagnosis of Generalized weakness     Today is hospital day 2d. This morning she is resting comfortably in bed and reports no new issues or overnight events.     PAST MEDICAL & SURGICAL HISTORY  Chronic kidney disease, unspecified CKD stage: Stage IIIB  TIA (transient ischemic attack)  Diverticulitis  Constipation  Hypertension  No significant past surgical history    SOCIAL HISTORY:  Negative for smoking/alcohol/drug use.     ALLERGIES:  aspirin (Unknown)  Cipro (Unknown)  codeine (Unknown)  dicyclomine (Unknown)  Diovan (Unknown)  Flagyl (Unknown)  Librax (Unknown)  metronidazole (Unknown)  penicillin (Unknown)  Prevacid (Unknown)  trimethobenzamide (Unknown)    MEDICATIONS:  STANDING MEDICATIONS  azithromycin  IVPB 500 milliGRAM(s) IV Intermittent every 24 hours  cefTRIAXone   IVPB 1000 milliGRAM(s) IV Intermittent every 24 hours  chlorhexidine 4% Liquid 1 Application(s) Topical <User Schedule>  cyanocobalamin 1000 MICROGram(s) Oral daily  folic acid 1 milliGRAM(s) Oral daily  furosemide   Injectable 20 milliGRAM(s) IV Push daily  heparin  Injectable 5000 Unit(s) SubCutaneous every 8 hours  NIFEdipine XL 30 milliGRAM(s) Oral daily  predniSONE   Tablet 50 milliGRAM(s) Oral daily    PRN MEDICATIONS    VITALS:   T(F): 97.7  HR: 112  BP: 122/58  RR: 18  SpO2: 97%    LABS:                        11.3   10.88 )-----------( 64       ( 2019 06:51 )             36.5     11-    141  |  97<L>  |  48<H>  ----------------------------<  228<H>  3.4<L>   |  24  |  1.3    Ca    8.5      2019 06:51  Phos  3.4     -  Mg     2.1         TPro  4.6<L>  /  Alb  2.8<L>  /  TBili  0.7  /  DBili  x   /  AST  22  /  ALT  12  /  AlkPhos  67  -    PT/INR - ( 2019 13:30 )   PT: 13.10 sec;   INR: 1.14 ratio         PTT - ( 2019 13:30 )  PTT:21.6 sec  Urinalysis Basic - ( 2019 16:20 )    Color: Yellow / Appearance: Clear / S.020 / pH: x  Gluc: x / Ketone: Negative  / Bili: Negative / Urobili: <2 mg/dL   Blood: x / Protein: 30 mg/dL / Nitrite: Negative   Leuk Esterase: Negative / RBC: 10 /HPF / WBC 6 /HPF   Sq Epi: x / Non Sq Epi: 5 /HPF / Bacteria: Few      ABG - ( 2019 08:57 )  pH, Arterial: 7.54  pH, Blood: x     /  pCO2: 32    /  pO2: 76    / HCO3: 27    / Base Excess: 4.4   /  SaO2: 97                    Culture - Urine (collected 2019 16:20)  Source: .Urine Clean Catch (Midstream)  Final Report (2019 04:36):    >=3 organisms. Probable collection contamination.      CARDIAC MARKERS ( 2019 20:52 )  x     / 0.02 ng/mL / x     / x     / x      CARDIAC MARKERS ( 2019 13:30 )  x     / 0.03 ng/mL / x     / x     / x          RADIOLOGY:  no radiology in past 24 hours.  PHYSICAL EXAM:  GENERAL:   Awake, alert; NAD, on High flow  HEENT:  Head NC/AT; Conjunctivae pink, Sclera anicteric.  CARDIO:   Regular rate; Regular rhythm; S1 & S2; murmur on auscultation.  RESP:   Crackles BL; Decreased at bases.  GI:   Soft; NT/ND; BS; No guarding; No rebound tenderness.  EXT:   2+ pitting edema in the LE.

## 2019-11-28 NOTE — PROGRESS NOTE ADULT - SUBJECTIVE AND OBJECTIVE BOX
RITA RAMOS 89y Female from home brought by family to the ER for c/o generalized weakness, SOB and inc difficulty ambulating x 3 days du to generalized muscke pain.  The pt at baseline isable to ambulate with walker but in the last few days has been unable to get out of be due to severe musculoskeletal pain.  Of note the pt has Hx of  warm autoimmune hemlytic anemia and has been ofn prolonged steroid  tx  and has had  Rutixan tx as well.  The pt was noted to be fluid overloaded with a BNP of 4500.  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  The pt is being evaluated by Hem-Onc Dr Mejias.  The PMHx includes:  HTN, ASHD, TIA, CKD III, Warm IGG Hemolytic Anemia, OA, DDD, DJD, mobility dysfunction, GERD, diverticulosis, ch constipation.    INTERVAL HPI/OVERNIGHT EVENTS:  pt feels better,  on Lasix and O2 NC, H/H , but dropping PLTS 64,     MEDICATIONS  (STANDING):  chlorhexidine 4% Liquid 1 Application(s) Topical <User Schedule>  cyanocobalamin 1000 MICROGram(s) Oral daily  folic acid 1 milliGRAM(s) Oral daily  furosemide   Injectable 20 milliGRAM(s) IV Push daily  NIFEdipine XL 30 milliGRAM(s) Oral daily  predniSONE   Tablet 50 milliGRAM(s) Oral daily    MEDICATIONS  (PRN):      Allergies    aspirin (Unknown)  Cipro (Unknown)  codeine (Unknown)  dicyclomine (Unknown)  Diovan (Unknown)  Flagyl (Unknown)  Librax (Unknown)  metronidazole (Unknown)  penicillin (Unknown)  Prevacid (Unknown)  trimethobenzamide (Unknown)  	    f    Vital Signs Last 24 Hrs    T(F): 97.7  HR: 95  BP: 122/58    RR: 18  SpO2: 97% (2019 15:20) (86% - 97%)    PHYSICAL EXAM:      Constitutional:  elderly, chronically looking WF, + O2NC, NAD    Eyes:  nonicteric    ENMT:  dental defects    Neck:  supple, + JVD, no bruits    Back:  + Kyphosis    Respiratory:  dec BS, shallow respirations, bibasilar crackles    Cardiovascular:  s1S2 tachy    Gastrointestinal: globose soft and benign    Genitourinary:    Extremities: moves all ext, + arthritic changes, + edema        LABS:                        11.3  10 )-----------(64  ( 90 0                   36    141  |  97  |  48  ----------------------------<  228  3.4   |  24  |  1.3    Ca    8.4<L>       Phos  3.4     11-27  Mg     2.1     11-27  troponin 0.02  BNP  4000    TPro  4.6 /  Alb  3.1, 2.8  /  TBili  1.0  /  DBili  0.4<H>  /  AST  14  /  ALT  13  /  AlkPhos  73  11-26    PT/INR - ( 2019 13:30 )   PT: 13.10 sec;   INR: 1.14 ratio         PTT - ( 2019 13:30 )  PTT:21.6 sec  Urinalysis Basic - ( 2019 16:20 )    Color: Yellow / Appearance: Clear / S.020 / pH: x  Gluc: x / Ketone: Negative  / Bili: Negative / Urobili: <2 mg/dL   Blood: x / Protein: 30 mg/dL / Nitrite: Negative   Leuk Esterase: Negative / RBC: 10 /HPF / WBC 6 /HPF   Sq Epi: x / Non Sq Epi: 5 /HPF / Bacteria: Few        RADIOLOGY & ADDITIONAL TESTS:  CXR  inc vasc markings, cardiomegaly  EKG sinus tach 121/min QTc 462    CT of abd:  bibasilar atelectasis, stable hepatic cyst,  calcification n the sybil hepatis in the area of the GB neck, stable  side branch pancreatic IPMN, no pancreatic constanza dilatation    Sono of abd:  no cholelithiases RITA RAMOS 89y Female from home brought by family to the ER for c/o generalized weakness, SOB and inc difficulty ambulating x 3 days du to generalized muscke pain.  The pt at baseline isable to ambulate with walker but in the last few days has been unable to get out of be due to severe musculoskeletal pain.  Of note the pt has Hx of  warm autoimmune hemlytic anemia and has been ofn prolonged steroid  tx  and has had  Rutixan tx as well.  The pt was noted to be fluid overloaded with a BNP of 4500.  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  The pt is being evaluated by Hem-Onc Dr Mejias.  The PMHx includes:  HTN, ASHD, TIA, CKD III, Warm IGG Hemolytic Anemia, OA, DDD, DJD, mobility dysfunction, GERD, diverticulosis, ch constipation.    INTERVAL HPI/OVERNIGHT EVENTS:  pt feels better,  on Lasix and O2 NC, H/H /, but dropping PLTS 64, ECHO P. venous doppler of lower ext done, preliminary report + DVT and pt started on IV heparin    MEDICATIONS  (STANDING):  chlorhexidine 4% Liquid 1 Application(s) Topical <User Schedule>  cyanocobalamin 1000 MICROGram(s) Oral daily  folic acid 1 milliGRAM(s) Oral daily  furosemide   Injectable 20 milliGRAM(s) IV Push daily  NIFEdipine XL 30 milliGRAM(s) Oral daily  predniSONE   Tablet 50 milliGRAM(s) Oral daily    MEDICATIONS  (PRN):      Allergies    aspirin (Unknown)  Cipro (Unknown)  codeine (Unknown)  dicyclomine (Unknown)  Diovan (Unknown)  Flagyl (Unknown)  Librax (Unknown)  metronidazole (Unknown)  penicillin (Unknown)  Prevacid (Unknown)  trimethobenzamide (Unknown)  	    f    Vital Signs Last 24 Hrs    T(F): 97.7  HR: 95  BP: 122/58    RR: 18  SpO2: 97% (2019 15:20) (86% - 97%)    PHYSICAL EXAM:      Constitutional:  elderly, frail and fragile,  chronically looking WF, + O2NC, NAD, temporal wasting    Eyes:  nonicteric    ENMT:  dental defects    Neck:  supple, + JVD, no bruits    Back:  + Kyphosis    Respiratory:  dec BS, shallow respirations, bibasilar crackles    Cardiovascular:  s1S2 tachy    Gastrointestinal: globose soft and benign    Genitourinary:    Extremities: moves all ext, + arthritic changes, + edema        LABS:                        11.3  10 )-----------(64  ( 90 0                   36    141  |  97  |  48  ----------------------------<  228  3.4   |  24  |  1.3    Ca    8.4<L>       Phos  3.4     11-27  Mg     2.1     11-27  troponin 0.02  BNP  4000    TPro  4.6 /  Alb  3.1, 2.8  /  TBili  1.0  /  DBili  0.4<H>  /  AST  14  /  ALT  13  /  AlkPhos  73  11-    PT/INR - ( 2019 13:30 )   PT: 13.10 sec;   INR: 1.14 ratio         PTT - ( 2019 13:30 )  PTT:21.6 sec  Urinalysis Basic - ( 2019 16:20 )    Color: Yellow / Appearance: Clear / S.020 / pH: x  Gluc: x / Ketone: Negative  / Bili: Negative / Urobili: <2 mg/dL   Blood: x / Protein: 30 mg/dL / Nitrite: Negative   Leuk Esterase: Negative / RBC: 10 /HPF / WBC 6 /HPF   Sq Epi: x / Non Sq Epi: 5 /HPF / Bacteria: Few        RADIOLOGY & ADDITIONAL TESTS:  CXR  inc vasc markings, cardiomegaly  EKG sinus tach 121/min QTc 462    CT of abd:  bibasilar atelectasis, stable hepatic cyst,  calcification n the sybil hepatis in the area of the GB neck, stable  side branch pancreatic IPMN, no pancreatic constanza dilatation    Sono of abd:  no cholelithiases    Venous doppler of lower ext RITA RAMOS 89y Female from home brought by family to the ER for c/o generalized weakness, SOB and inc difficulty ambulating x 3 days du to generalized muscke pain.  The pt at baseline isable to ambulate with walker but in the last few days has been unable to get out of be due to severe musculoskeletal pain.  Of note the pt has Hx of  warm autoimmune hemlytic anemia and has been ofn prolonged steroid  tx  and has had  Rutixan tx as well.  The pt was noted to be fluid overloaded with a BNP of 4500.  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 In addition question of PNA infiltrate and if  lower ext doppler + may have PE given the cont low pulse ox. The pt is being evaluated by Hem-Onc Dr Mejias.  The PMHx includes:  HTN, ASHD, TIA, CKD III, Warm IGG Hemolytic Anemia, OA, DDD, DJD, mobility dysfunction, GERD, diverticulosis, ch constipation.    INTERVAL HPI/OVERNIGHT EVENTS:  pt feels better,  on Lasix and O2 NC, H/H , but dropping PLTS 64, ECHO P. venous doppler of lower ext done, preliminary report + DVT and pt  on IV heparin    MEDICATIONS  (STANDING):  chlorhexidine 4% Liquid 1 Application(s) Topical <User Schedule>  cyanocobalamin 1000 MICROGram(s) Oral daily  folic acid 1 milliGRAM(s) Oral daily  furosemide   Injectable 20 milliGRAM(s) IV Push daily  NIFEdipine XL 30 milliGRAM(s) Oral daily  predniSONE   Tablet 50 milliGRAM(s) Oral daily    MEDICATIONS  (PRN):      Allergies    aspirin (Unknown)  Cipro (Unknown)  codeine (Unknown)  dicyclomine (Unknown)  Diovan (Unknown)  Flagyl (Unknown)  Librax (Unknown)  metronidazole (Unknown)  penicillin (Unknown)  Prevacid (Unknown)  trimethobenzamide (Unknown)  	  Vital Signs Last 24 Hrs    T(F): 97.7  HR: 95  BP: 122/58    RR: 18  SpO2: 97% (2019 15:20) (86% - 97%)    PHYSICAL EXAM:      Constitutional:  elderly, frail and fragile,  chronically looking WF, + O2NC, NAD, temporal wasting    Eyes:  nonicteric    ENMT:  dental defects    Neck:  supple, + JVD, no bruits    Back:  + Kyphosis    Respiratory:  dec BS, shallow respirations, bibasilar crackles    Cardiovascular:  s1S2 tachy    Gastrointestinal: globose soft and benign    Genitourinary:    Extremities: moves all ext, + arthritic changes, + edema        LABS:                        11.3  10 )-----------(64  ( 90 0                   36    141  |  97  |  48  ----------------------------<  228  3.4   |  24  |  1.3    Ca    8.4<L>       Phos  3.4     11-27  Mg     2.1     11-27  troponin 0.02  BNP  4000    TPro  4.6 /  Alb  3.1, 2.8  /  TBili  1.0  /  DBili  0.4<H>  /  AST  14  /  ALT  13  /  AlkPhos  73  11-26    PT/INR - ( 2019 13:30 )   PT: 13.10 sec;   INR: 1.14 ratio         PTT - ( 2019 13:30 )  PTT:21.6 sec  Urinalysis Basic - ( 2019 16:20 )    Color: Yellow / Appearance: Clear / S.020 / pH: x  Gluc: x / Ketone: Negative  / Bili: Negative / Urobili: <2 mg/dL   Blood: x / Protein: 30 mg/dL / Nitrite: Negative   Leuk Esterase: Negative / RBC: 10 /HPF / WBC 6 /HPF   Sq Epi: x / Non Sq Epi: 5 /HPF / Bacteria: Few        RADIOLOGY & ADDITIONAL TESTS:  CXR  inc vasc markings, cardiomegaly  EKG sinus tach 121/min QTc 462    CT of abd:  bibasilar atelectasis, stable hepatic cyst,  calcification n the sybil hepatis in the area of the GB neck, stable  side branch pancreatic IPMN, no pancreatic constanza dilatation    Sono of abd:  no cholelithiases    Venous doppler of lower ext RITA RAMOS 89y Female from home brought by family to the ER for c/o generalized weakness, SOB and inc difficulty ambulating x 3 days du to generalized muscke pain.  The pt at baseline isable to ambulate with walker but in the last few days has been unable to get out of be due to severe musculoskeletal pain.  Of note the pt has Hx of  warm autoimmune hemlytic anemia and has been ofn prolonged steroid  tx  and has had  Rutixan tx as well.  The pt was noted to be fluid overloaded with a BNP of 4500.  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 In addition question of PNA infiltrate and if  lower ext doppler + may have PE given the cont low pulse ox. The pt is being evaluated by Hem-Onc Dr Mejias.  The PMHx includes:  HTN, ASHD, TIA, CKD III, Warm IGG Hemolytic Anemia, OA, DDD, DJD, mobility dysfunction, GERD, diverticulosis, ch constipation.    INTERVAL HPI/OVERNIGHT EVENTS:  pt feels better,  on Lasix and O2 NC, H/H , but dropping PLTS 64, ECHO P. venous doppler of lower ext done, preliminary report + DVT and pt  on IV heparin    MEDICATIONS  (STANDING):  chlorhexidine 4% Liquid 1 Application(s) Topical <User Schedule>  cyanocobalamin 1000 MICROGram(s) Oral daily  folic acid 1 milliGRAM(s) Oral daily  furosemide   Injectable 20 milliGRAM(s) IV Push daily  NIFEdipine XL 30 milliGRAM(s) Oral daily  predniSONE   Tablet 50 milliGRAM(s) Oral daily    MEDICATIONS  (PRN):      Allergies    aspirin (Unknown)  Cipro (Unknown)  codeine (Unknown)  dicyclomine (Unknown)  Diovan (Unknown)  Flagyl (Unknown)  Librax (Unknown)  metronidazole (Unknown)  penicillin (Unknown)  Prevacid (Unknown)  trimethobenzamide (Unknown)  	  Vital Signs Last 24 Hrs    T(F): 97.7  HR: 95  BP: 122/58    RR: 18  SpO2: 97% (2019 15:20) (86% - 97%)    PHYSICAL EXAM:      Constitutional:  elderly, frail and fragile,  chronically looking WF, + O2NC, NAD, temporal wasting    Eyes:  nonicteric    ENMT:  dental defects    Neck:  supple, + JVD, no bruits    Back:  + Kyphosis    Respiratory:  dec BS, shallow respirations, bibasilar crackles    Cardiovascular:  s1S2 tachy    Gastrointestinal: globose soft and benign    Genitourinary:    Extremities: moves all ext, + arthritic changes, + edema        LABS:                        11.3  10 )-----------(64  ( 90 0                   36    141  |  97  |  48  ----------------------------<  228  3.4   |  24  |  1.3  GFR 28, 44  Ca    8.4<L>       Phos  3.4     11-27  Mg     2.1     11-27  troponin 0.02  BNP  4500    TPro  4.6 /  Alb  3.1, 2.8  /  TBili  1.0  /  DBili  0.4<H>  /  AST  14  /  ALT  13  /  AlkPhos  73  11-    PT/INR - ( 2019 13:30 )   PT: 13.10 sec;   INR: 1.14 ratio         PTT - ( 2019 13:30 )  PTT:21.6 sec  Urinalysis Basic - ( 2019 16:20 )    Color: Yellow / Appearance: Clear / S.020 / pH: x  Gluc: x / Ketone: Negative  / Bili: Negative / Urobili: <2 mg/dL   Blood: x / Protein: 30 mg/dL / Nitrite: Negative   Leuk Esterase: Negative / RBC: 10 /HPF / WBC 6 /HPF   Sq Epi: x / Non Sq Epi: 5 /HPF / Bacteria: Few        RADIOLOGY & ADDITIONAL TESTS:  CXR  inc vasc markings, cardiomegaly  EKG sinus tach 121/min QTc 462    CT of abd:  bibasilar atelectasis, stable hepatic cyst,  calcification n the sybil hepatis in the area of the GB neck, stable  side branch pancreatic IPMN, no pancreatic constanza dilatation    Sono of abd:  no cholelithiases    Venous doppler of lower ext RITA RAMOS 89y Female from home brought by family to the ER for c/o generalized weakness, SOB and inc difficulty ambulating x 3 days du to generalized muscke pain.  The pt at baseline isable to ambulate with walker but in the last few days has been unable to get out of be due to severe musculoskeletal pain.  Of note the pt has Hx of  warm autoimmune hemlytic anemia and has been ofn prolonged steroid  tx  and has had  Rutixan tx as well.  The pt was noted to be fluid overloaded with a BNP of 4500.  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 In addition question of PNA infiltrate and if  lower ext doppler + may have PE given the cont low pulse ox. The pt is being evaluated by Hem-Onc Dr Mejias.  The PMHx includes:  HTN, ASHD, TIA, CKD III, Warm IGG Hemolytic Anemia, OA, DDD, DJD, mobility dysfunction, GERD, diverticulosis, ch constipation.    INTERVAL HPI/OVERNIGHT EVENTS:  pt feels better,  on Lasix and O2 NC, H/H , but dropping PLTS 64, ECHO P. venous doppler of lower ext done, preliminary report + DVT and pt  on IV heparin    MEDICATIONS  (STANDING):  Meropenem 1gm q12  chlorhexidine 4% Liquid 1 Application(s) Topical <User Schedule>  cyanocobalamin 1000 MICROGram(s) Oral daily  folic acid 1 milliGRAM(s) Oral daily  furosemide   Injectable 20 milliGRAM(s) IV Push daily  NIFEdipine XL 30 milliGRAM(s) Oral daily  predniSONE   Tablet 50 milliGRAM(s) Oral daily    MEDICATIONS  (PRN):      Allergies    aspirin (Unknown)  Cipro (Unknown)  codeine (Unknown)  dicyclomine (Unknown)  Diovan (Unknown)  Flagyl (Unknown)  Librax (Unknown)  metronidazole (Unknown)  penicillin (Unknown)  Prevacid (Unknown)  trimethobenzamide (Unknown)  	  Vital Signs Last 24 Hrs    T(F): 97.7  HR: 95  BP: 122/58    RR: 18  SpO2: 97% (2019 15:20) (86% - 97%)    PHYSICAL EXAM:      Constitutional:  elderly, frail and fragile,  chronically looking WF, + O2NC, NAD, temporal wasting    Eyes:  nonicteric    ENMT:  dental defects    Neck:  supple, + JVD, no bruits    Back:  + Kyphosis    Respiratory:  dec BS, shallow respirations, bibasilar crackles    Cardiovascular:  s1S2 tachy    Gastrointestinal: globose soft and benign    Genitourinary:    Extremities: moves all ext, + arthritic changes, + edema        LABS:                        11.3  10 )-----------(64  ( 90 0                   36    141  |  97  |  48  ----------------------------<  228  3.4   |  24  |  1.3  GFR 28, 44  Ca    8.4<L>       Phos  3.4     11-27  Mg     2.1     11-27  troponin 0.02  BNP  4500    TPro  4.6 /  Alb  3.1, 2.8  /  TBili  1.0  /  DBili  0.4<H>  /  AST  14  /  ALT  13  /  AlkPhos  73  11-26    PT/INR - ( 2019 13:30 )   PT: 13.10 sec;   INR: 1.14 ratio         PTT - ( 2019 13:30 )  PTT:21.6 sec  Urinalysis Basic - ( 2019 16:20 )    Color: Yellow / Appearance: Clear / S.020 / pH: x  Gluc: x / Ketone: Negative  / Bili: Negative / Urobili: <2 mg/dL   Blood: x / Protein: 30 mg/dL / Nitrite: Negative   Leuk Esterase: Negative / RBC: 10 /HPF / WBC 6 /HPF   Sq Epi: x / Non Sq Epi: 5 /HPF / Bacteria: Few        RADIOLOGY & ADDITIONAL TESTS:  CXR  inc vasc markings, cardiomegaly  EKG sinus tach 121/min QTc 462    CT of abd:  bibasilar atelectasis, stable hepatic cyst,  calcification n the sybil hepatis in the area of the GB neck, stable  side branch pancreatic IPMN, no pancreatic constanza dilatation    Sono of abd:  no cholelithiases    Venous doppler of lower ext

## 2019-11-28 NOTE — PROGRESS NOTE ADULT - ASSESSMENT
This is a 89 year old female with a significant PMHx of Warm Autoimmune Hemolytic Anemia who presented with a cc/o generalized myalgia. Her muscle aches are associated with generalized weakness and inability to ambulate. She is currently admitted for suspected steroid-induced myalgia.     Fluid overload; Cardiac vs. Renal  -On High flow nasal cannula.  - Physical exam with BL crackles and BL LE edema; murmur on exam   - BNP 4500 at time of admission  - Lasix daily for now, increase the dose if BP and kidney function tolerates  - Follow up TTE, Pending  - Daily weight; fluid restriction; Low salt; I+O    Myalgia and decreased functional status possibly due to steroid-induced myalgia  - Patient taking steroids since last admission  - Leukocytosis likely due to steroid use; no physical symptoms to suggest infection at this time  - CXR with BL infiltrate consistent with fluid overload; cannot rule out PNA,   -Will continue ceftriaxone and azithromycin for now    Hx of CKD Stage IIIB  - Slight worsening of renal function  - Monitor    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 Orantes)  - Continue prednisone 50mg   -Monitor Hb, Keep active type and screen  -High risk for clotting  -Will get Venous Duplex to rule out DVT    Hx of Low Vitamin B12 level with normal MMA: Continue B12 1000mcg daily  Hx of HTN: Continue home medications; Holding HCTZ/Triamterene, on lasix  Hx of Diverticulosis: High fiber diet to avoid constipation    DVT - Heparin  FULL CODE for now This is a 89 year old female with a significant PMHx of Warm Autoimmune Hemolytic Anemia who presented with a cc/o generalized myalgia. Her muscle aches are associated with generalized weakness and inability to ambulate. She is currently admitted for suspected steroid-induced myalgia.     Fluid overload; Cardiac vs. Renal  -On High flow nasal cannula.  - Physical exam with BL crackles and BL LE edema; murmur on exam   - BNP 4500 at time of admission  - Lasix daily for now, increase the dose if BP and kidney function tolerates  - Follow up TTE, Pending  - Daily weight; fluid restriction; Low salt; I+O    # LE DVT  - pt started on heparin drip  - f/u  PTT    Myalgia and decreased functional status possibly due to steroid-induced myalgia  - Patient taking steroids since last admission  - Leukocytosis likely due to steroid use; no physical symptoms to suggest infection at this time  - CXR with BL infiltrate consistent with fluid overload; cannot rule out PNA,   -Will continue ceftriaxone and azithromycin for now    Hx of CKD Stage IIIB  - Slight worsening of renal function  - Monitor    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 Orantes)  - Continue prednisone 50mg   -Monitor Hb, Keep active type and screen  -High risk for clotting  -Will get Venous Duplex to rule out DVT    Hx of Low Vitamin B12 level with normal MMA: Continue B12 1000mcg daily  Hx of HTN: Continue home medications; Holding HCTZ/Triamterene, on lasix  Hx of Diverticulosis: High fiber diet to avoid constipation    DVT - Heparin  FULL CODE for now

## 2019-11-29 LAB
ALBUMIN SERPL ELPH-MCNC: 2.8 G/DL — LOW (ref 3.5–5.2)
ALP SERPL-CCNC: 65 U/L — SIGNIFICANT CHANGE UP (ref 30–115)
ALT FLD-CCNC: 10 U/L — SIGNIFICANT CHANGE UP (ref 0–41)
ANION GAP SERPL CALC-SCNC: 16 MMOL/L — HIGH (ref 7–14)
ANION GAP SERPL CALC-SCNC: 17 MMOL/L — HIGH (ref 7–14)
APTT BLD: 134.2 SEC — CRITICAL HIGH (ref 27–39.2)
APTT BLD: 26.6 SEC — LOW (ref 27–39.2)
APTT BLD: 45.6 SEC — HIGH (ref 27–39.2)
APTT BLD: 46.5 SEC — HIGH (ref 27–39.2)
AST SERPL-CCNC: 17 U/L — SIGNIFICANT CHANGE UP (ref 0–41)
BASOPHILS # BLD AUTO: 0.02 K/UL — SIGNIFICANT CHANGE UP (ref 0–0.2)
BASOPHILS # BLD AUTO: 0.03 K/UL — SIGNIFICANT CHANGE UP (ref 0–0.2)
BASOPHILS NFR BLD AUTO: 0.2 % — SIGNIFICANT CHANGE UP (ref 0–1)
BASOPHILS NFR BLD AUTO: 0.3 % — SIGNIFICANT CHANGE UP (ref 0–1)
BILIRUB SERPL-MCNC: 0.7 MG/DL — SIGNIFICANT CHANGE UP (ref 0.2–1.2)
BUN SERPL-MCNC: 36 MG/DL — HIGH (ref 10–20)
BUN SERPL-MCNC: 37 MG/DL — HIGH (ref 10–20)
CALCIUM SERPL-MCNC: 8.3 MG/DL — LOW (ref 8.5–10.1)
CALCIUM SERPL-MCNC: 8.5 MG/DL — SIGNIFICANT CHANGE UP (ref 8.5–10.1)
CHLORIDE SERPL-SCNC: 92 MMOL/L — LOW (ref 98–110)
CHLORIDE SERPL-SCNC: 98 MMOL/L — SIGNIFICANT CHANGE UP (ref 98–110)
CO2 SERPL-SCNC: 29 MMOL/L — SIGNIFICANT CHANGE UP (ref 17–32)
CO2 SERPL-SCNC: 29 MMOL/L — SIGNIFICANT CHANGE UP (ref 17–32)
CREAT SERPL-MCNC: 1 MG/DL — SIGNIFICANT CHANGE UP (ref 0.7–1.5)
CREAT SERPL-MCNC: 1.1 MG/DL — SIGNIFICANT CHANGE UP (ref 0.7–1.5)
EOSINOPHIL # BLD AUTO: 0.02 K/UL — SIGNIFICANT CHANGE UP (ref 0–0.7)
EOSINOPHIL # BLD AUTO: 0.06 K/UL — SIGNIFICANT CHANGE UP (ref 0–0.7)
EOSINOPHIL NFR BLD AUTO: 0.2 % — SIGNIFICANT CHANGE UP (ref 0–8)
EOSINOPHIL NFR BLD AUTO: 0.5 % — SIGNIFICANT CHANGE UP (ref 0–8)
GAS PNL BLDA: SIGNIFICANT CHANGE UP
GLUCOSE BLDC GLUCOMTR-MCNC: 189 MG/DL — HIGH (ref 70–99)
GLUCOSE SERPL-MCNC: 153 MG/DL — HIGH (ref 70–99)
GLUCOSE SERPL-MCNC: 338 MG/DL — HIGH (ref 70–99)
HCT VFR BLD CALC: 32.3 % — LOW (ref 37–47)
HCT VFR BLD CALC: 37.9 % — SIGNIFICANT CHANGE UP (ref 37–47)
HGB BLD-MCNC: 11.5 G/DL — LOW (ref 12–16)
HGB BLD-MCNC: 9.8 G/DL — LOW (ref 12–16)
IMM GRANULOCYTES NFR BLD AUTO: 4.8 % — HIGH (ref 0.1–0.3)
IMM GRANULOCYTES NFR BLD AUTO: 5 % — HIGH (ref 0.1–0.3)
INR BLD: 1.21 RATIO — SIGNIFICANT CHANGE UP (ref 0.65–1.3)
LDH SERPL L TO P-CCNC: 470 — HIGH (ref 50–242)
LYMPHOCYTES # BLD AUTO: 0.62 K/UL — LOW (ref 1.2–3.4)
LYMPHOCYTES # BLD AUTO: 0.62 K/UL — LOW (ref 1.2–3.4)
LYMPHOCYTES # BLD AUTO: 5.4 % — LOW (ref 20.5–51.1)
LYMPHOCYTES # BLD AUTO: 6.3 % — LOW (ref 20.5–51.1)
MCHC RBC-ENTMCNC: 29.3 PG — SIGNIFICANT CHANGE UP (ref 27–31)
MCHC RBC-ENTMCNC: 29.3 PG — SIGNIFICANT CHANGE UP (ref 27–31)
MCHC RBC-ENTMCNC: 30.3 G/DL — LOW (ref 32–37)
MCHC RBC-ENTMCNC: 30.3 G/DL — LOW (ref 32–37)
MCV RBC AUTO: 96.4 FL — SIGNIFICANT CHANGE UP (ref 81–99)
MCV RBC AUTO: 96.4 FL — SIGNIFICANT CHANGE UP (ref 81–99)
MONOCYTES # BLD AUTO: 0.22 K/UL — SIGNIFICANT CHANGE UP (ref 0.1–0.6)
MONOCYTES # BLD AUTO: 0.24 K/UL — SIGNIFICANT CHANGE UP (ref 0.1–0.6)
MONOCYTES NFR BLD AUTO: 1.9 % — SIGNIFICANT CHANGE UP (ref 1.7–9.3)
MONOCYTES NFR BLD AUTO: 2.4 % — SIGNIFICANT CHANGE UP (ref 1.7–9.3)
MRSA PCR RESULT.: NEGATIVE — SIGNIFICANT CHANGE UP
NEUTROPHILS # BLD AUTO: 10.04 K/UL — HIGH (ref 1.4–6.5)
NEUTROPHILS # BLD AUTO: 8.43 K/UL — HIGH (ref 1.4–6.5)
NEUTROPHILS NFR BLD AUTO: 85.9 % — HIGH (ref 42.2–75.2)
NEUTROPHILS NFR BLD AUTO: 87.1 % — HIGH (ref 42.2–75.2)
NRBC # BLD: 0 /100 WBCS — SIGNIFICANT CHANGE UP (ref 0–0)
NRBC # BLD: 0 /100 WBCS — SIGNIFICANT CHANGE UP (ref 0–0)
PLATELET # BLD AUTO: 63 K/UL — LOW (ref 130–400)
PLATELET # BLD AUTO: 66 K/UL — LOW (ref 130–400)
POTASSIUM SERPL-MCNC: 3.2 MMOL/L — LOW (ref 3.5–5)
POTASSIUM SERPL-MCNC: 3.3 MMOL/L — LOW (ref 3.5–5)
POTASSIUM SERPL-SCNC: 3.2 MMOL/L — LOW (ref 3.5–5)
POTASSIUM SERPL-SCNC: 3.3 MMOL/L — LOW (ref 3.5–5)
PROT SERPL-MCNC: 4.6 G/DL — LOW (ref 6–8)
PROTHROM AB SERPL-ACNC: 13.9 SEC — HIGH (ref 9.95–12.87)
RBC # BLD: 3.35 M/UL — LOW (ref 4.2–5.4)
RBC # BLD: 3.93 M/UL — LOW (ref 4.2–5.4)
RBC # FLD: 16.1 % — HIGH (ref 11.5–14.5)
RBC # FLD: 16.1 % — HIGH (ref 11.5–14.5)
SODIUM SERPL-SCNC: 137 MMOL/L — SIGNIFICANT CHANGE UP (ref 135–146)
SODIUM SERPL-SCNC: 144 MMOL/L — SIGNIFICANT CHANGE UP (ref 135–146)
WBC # BLD: 11.52 K/UL — HIGH (ref 4.8–10.8)
WBC # BLD: 9.82 K/UL — SIGNIFICANT CHANGE UP (ref 4.8–10.8)
WBC # FLD AUTO: 11.52 K/UL — HIGH (ref 4.8–10.8)
WBC # FLD AUTO: 9.82 K/UL — SIGNIFICANT CHANGE UP (ref 4.8–10.8)

## 2019-11-29 PROCEDURE — 99231 SBSQ HOSP IP/OBS SF/LOW 25: CPT

## 2019-11-29 RX ORDER — POTASSIUM CHLORIDE 20 MEQ
20 PACKET (EA) ORAL ONCE
Refills: 0 | Status: DISCONTINUED | OUTPATIENT
Start: 2019-11-29 | End: 2019-11-30

## 2019-11-29 RX ORDER — APIXABAN 2.5 MG/1
10 TABLET, FILM COATED ORAL EVERY 12 HOURS
Refills: 0 | Status: DISCONTINUED | OUTPATIENT
Start: 2019-11-29 | End: 2019-12-05

## 2019-11-29 RX ORDER — MEROPENEM 1 G/30ML
1000 INJECTION INTRAVENOUS EVERY 12 HOURS
Refills: 0 | Status: DISCONTINUED | OUTPATIENT
Start: 2019-11-29 | End: 2019-12-02

## 2019-11-29 RX ORDER — LANOLIN ALCOHOL/MO/W.PET/CERES
5 CREAM (GRAM) TOPICAL AT BEDTIME
Refills: 0 | Status: DISCONTINUED | OUTPATIENT
Start: 2019-11-29 | End: 2019-11-29

## 2019-11-29 RX ORDER — ARGATROBAN 50 MG/50ML
1.5 INJECTION, SOLUTION INTRAVENOUS
Qty: 100 | Refills: 0 | Status: DISCONTINUED | OUTPATIENT
Start: 2019-11-29 | End: 2019-11-29

## 2019-11-29 RX ORDER — MEROPENEM 1 G/30ML
1000 INJECTION INTRAVENOUS EVERY 8 HOURS
Refills: 0 | Status: DISCONTINUED | OUTPATIENT
Start: 2019-11-29 | End: 2019-11-29

## 2019-11-29 RX ORDER — ARGATROBAN 50 MG/50ML
1 INJECTION, SOLUTION INTRAVENOUS
Qty: 250 | Refills: 0 | Status: DISCONTINUED | OUTPATIENT
Start: 2019-11-29 | End: 2019-11-29

## 2019-11-29 RX ADMIN — PREGABALIN 1000 MICROGRAM(S): 225 CAPSULE ORAL at 11:26

## 2019-11-29 RX ADMIN — MEROPENEM 100 MILLIGRAM(S): 1 INJECTION INTRAVENOUS at 17:36

## 2019-11-29 RX ADMIN — Medication 50 MILLIGRAM(S): at 05:23

## 2019-11-29 RX ADMIN — CEFTRIAXONE 100 MILLIGRAM(S): 500 INJECTION, POWDER, FOR SOLUTION INTRAMUSCULAR; INTRAVENOUS at 05:23

## 2019-11-29 RX ADMIN — Medication 30 MILLIGRAM(S): at 05:23

## 2019-11-29 RX ADMIN — APIXABAN 10 MILLIGRAM(S): 2.5 TABLET, FILM COATED ORAL at 22:11

## 2019-11-29 RX ADMIN — Medication 1 MILLIGRAM(S): at 11:26

## 2019-11-29 RX ADMIN — Medication 20 MILLIGRAM(S): at 05:23

## 2019-11-29 RX ADMIN — MEROPENEM 100 MILLIGRAM(S): 1 INJECTION INTRAVENOUS at 12:14

## 2019-11-29 RX ADMIN — ARGATROBAN 3.05 MICROGRAM(S)/KG/MIN: 50 INJECTION, SOLUTION INTRAVENOUS at 11:26

## 2019-11-29 NOTE — DIETITIAN INITIAL EVALUATION ADULT. - OTHER INFO
Pt adm for Fluid overload; steroid-induced myalgia. HFNC. Daily weights, fluid restriction. LE DVT. Myalgia and decreased functional status possibly due to steroid-induced myalgia. 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 Diverticulosis - High fiber diet to avoid constipation.

## 2019-11-29 NOTE — PROGRESS NOTE ADULT - ASSESSMENT
This is a 89 year old female with a significant PMHx of Warm Autoimmune Hemolytic Anemia who presented with a cc/o generalized myalgia. Her muscle aches are associated with generalized weakness and inability to ambulate. She is currently admitted for suspected steroid-induced myalgia.       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 CT imaging did not reveal malignancy. No folate deficiency.    Low vitamin B12 level with normal MMA level and is on PO B12 and her repeat B12 level is normal.   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 Can start at 1mcg/hr . Check PTT 2 hrs after initiation of argatroban  Keep PTT between 50-70.   If PTT<50-increase infusion rate by 20%,   If PTT 50-80- No change  If PTT>80- hold infusion for 2 hours and then restart at 50% lower rate   Check HIT ab and EDWARD  Hold all heparin products     4) Acute hypoxia 2/2 to Fluid overload/?PE (given that she has DVT)- on high flow NC   5) ANDREAS- improving   f/u echo results   On Iv lasix  Plan as per primary team      Code status needs to be addressed   Discussed with med resident This is a 89 year old female with a significant PMHx of Warm Autoimmune Hemolytic Anemia who presented with a cc/o generalized myalgia. Her muscle aches are associated with generalized weakness and inability to ambulate. She is currently admitted for suspected steroid-induced myalgia.       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 CT imaging did not reveal malignancy. No folate deficiency.    Low vitamin B12 level with normal MMA level and is on PO B12 and her repeat B12 level is normal.   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 Can start at 1mcg/hr . Check PTT 2 hrs after initiation of argatroban  Keep PTT between 50-70.   If PTT<50-increase infusion rate by 20%,   If PTT 50-80- No change  If PTT>80- hold infusion for 2 hours and then restart at 50% lower rate   Check HIT ab and EDWARD  Hold all heparin products     4) Acute hypoxia 2/2 to Fluid overload/?PE (given that she has DVT)- on high flow NC   5) ANDREAS- improving   f/u echo results   On Iv lasix  Consider Pulm eval and may be ?CT chest to r/o PE.   Plan as per primary team      Code status needs to be addressed   Discussed with med resident This is a 89 year old female with a significant PMHx of Warm Autoimmune Hemolytic Anemia who presented with a cc/o generalized myalgia. Her muscle aches are associated with generalized weakness and inability to ambulate. She is currently admitted for suspected steroid-induced myalgia.       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 CT imaging did not reveal malignancy. No folate deficiency.    Low vitamin B12 level with normal MMA level and is on PO B12 and her repeat B12 level is normal.   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 Check HIT ab and EDWARD  Hold all heparin products   Can stop argatroban and start her on Eliquis 10 mg bid for 7 days and starting Day 8 Eliquis 5mg bid     4) Acute hypoxia 2/2 to Fluid overload/?PE (given that she has DVT)- on high flow NC   5) ANDREAS- improving   f/u echo results   On Iv lasix  Consider Pulm eval and may be ?CT chest to r/o PE.   Plan as per primary team      Code status needs to be addressed   Discussed with med resident This is a 89 year old female with a significant PMHx of Warm Autoimmune Hemolytic Anemia who presented with a cc/o generalized myalgia. Her muscle aches are associated with generalized weakness and inability to ambulate. She is currently admitted for suspected steroid-induced myalgia.       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 CT imaging did not reveal malignancy. No folate deficiency.    Low vitamin B12 level with normal MMA level and is on PO B12 and her repeat B12 level is normal.   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 Check HIT ab and EDWARD  Hold all heparin products   Can stop argatroban and start her on Eliquis 10 mg bid for 7 days and starting Day 8 Eliquis 5mg bid     4) Acute hypoxia 2/2 to Fluid overload/?PE (given that she has DVT)- on high flow NC   5) ANDREAS- improving   Echo showing severe pulHTN, Moderately enlarged right ventricle. Moderately reduced RV systolic function.  She probably has a PE too  On Iv lasix  Consider Pulm eval and may be ?CT chest to r/o PE.     Code status needs to be addressed   Discussed with senior med resident

## 2019-11-29 NOTE — DIETITIAN INITIAL EVALUATION ADULT. - FACTORS AFF FOOD INTAKE
Spoke w/ pt and pt's daughter at b/s. Reports eating normally. No changes in appetite since PTA. Pt was never a big eater. She had soup and pudding for lunch today. Reports pasta was too hard to chew. Likes sandwiches better. Edentulous. No BM since adm. NKFA. Agreeable to trial ensure compact on her tray.

## 2019-11-29 NOTE — DIETITIAN INITIAL EVALUATION ADULT. - PHYSICAL APPEARANCE
BMI 22.6  IBW: 44.3 kg UBW: ~114# Denies weight loss. 1+ b/l leg edema. BS 15  ecchymosis/well nourished

## 2019-11-29 NOTE — PROGRESS NOTE ADULT - ASSESSMENT
SOB, generalized weakness due to fluid overload, exacerbation of Odalis CHF, fluid retention due to long term steroid therapy, CKD  BL DVT  Worsening myalgia, probably steroid induced with worsening mobility dysfunction    Hx of Warm IGG Hemolytic Anemia ( IGG +, C3 -), chronic steroid therapy, sp Rituxan  therapy  Hx of HTN, ASHD, Odalis CHF, TIA  Hx of CKD III  Hx of OA, DDD, DJD, mobility dysfunction, ambulates with walker  Hx of GERD, diverticulosis, ch constipataion  Advanced age    pt has elevated BNP of 4500 with GFR of 28, CXR shows inc vascular markings,  start Lasix 20mg IV daily, O2 2L, NC, check pulse Ox  monitor electrolytes    Venous doppler of lower ext:  preliminary report +, pt was on sq heparin then about 24 hrs on IV heparin noted to have dec PLTS 134, 90, 64, ? HIT, D/C all heparin products,  NB pt had previously dec PLTS which improved when Pepcid was D/C    ECHO:  EF 55-60%, tr MR, severe AS/mild AI, GIDD, severe Pul HTN  trend troponins , 0.02f  monitor CBC, Plts 132, 90, 60, hold all heparin products ? HIT  cont prednisone 50mg as per Hem-Onc  Hem-Onc consult Dr Mejias  fall precautions  aspiration precautions  Vasc surgical consult for BL DVT on Eliquis  Pul consult, BL DVT R/O possible PE, to do CT angio ?, pt on Eliquis  guarded state given pt advanced age, frail state and multiple complex medical issues SOB, generalized weakness due to fluid overload, exacerbation of Odalis CHF, fluid retention due to long term steroid therapy, CKD  BL DVT, Posssible PE  Worsening myalgia, probably steroid induced with worsening mobility dysfunction  Thombocytopenia  Hx of Warm IGG Hemolytic Anemia ( IGG +, C3 -), chronic steroid therapy, sp Rituxan  therapy  Hx of HTN, ASHD, Odalis CHF, TIA  Hx of CKD III  Hx of OA, DDD, DJD, mobility dysfunction, ambulates with walker  Hx of GERD, diverticulosis, ch constipataion  Advanced age    pt has elevated BNP of 4500 with GFR of 28 which is improving,  CXR shows inc vascular markings and opacities, started Lasix 20mg IV daily, O2 2L, NC, check pulse Ox  monitor electrolytes and CBC    Venous doppler of lower ext:  preliminary report +, pt was on sq heparin then about 24 hrs on IV heparin noted to have dec PLTS 134, 90, 64, ? HIT, D/C all heparin products,  NB pt had previously dec PLTS which improved when Pepcid was D/C    ECHO:  EF 55-60%, tr MR, severe AS/mild AI, GIDD, severe Pul HTN  trend troponins , 0.02  monitor CBC, Plts 132, 90, 60, hold all heparin products ? HIT  cont prednisone 50mg as per Hem-Onc  Hem-Onc consult Dr Mejias    fall precautions  aspiration precautions  Vasc surgical consult for BL DVT on Eliquis  Pul consult, BL DVT R/O possible PE, to do CT angio ?, pt on Eliquis  guarded state given pt advanced age, frail state and multiple complex medical issues

## 2019-11-29 NOTE — PROGRESS NOTE ADULT - SUBJECTIVE AND OBJECTIVE BOX
*This is a Preliminary Note and will be edited*  SUBJECTIVE:    Patient is a 89y old Female who presents with a chief complaint of Fluid overload; steroid-induced myalgia, hemolytic anemia, thrombocytopenia (28 Nov 2019 14:52)   PMH**  Currently admitted to medicine with the primary diagnosis of Generalized weakness     Today is hospital day 3d. This morning she is resting comfortably in bed and reports no new** issues or overnight events.   Pt. denies HA, Cp, Abd Pain or discomfort.**  Pt. is urinating and stooling appropriately.**    PAST MEDICAL & SURGICAL HISTORY  Chronic kidney disease, unspecified CKD stage: Stage IIIB  TIA (transient ischemic attack)  Diverticulitis  Constipation  Hypertension  No significant past surgical history    SOCIAL HISTORY:  Negative for smoking/alcohol/drug use.     ALLERGIES:  aspirin (Unknown)  Cipro (Unknown)  codeine (Unknown)  dicyclomine (Unknown)  Diovan (Unknown)  Flagyl (Unknown)  Librax (Unknown)  metronidazole (Unknown)  penicillin (Unknown)  Prevacid (Unknown)  trimethobenzamide (Unknown)    MEDICATIONS:  STANDING MEDICATIONS  azithromycin  IVPB 500 milliGRAM(s) IV Intermittent every 24 hours  cefTRIAXone   IVPB 1000 milliGRAM(s) IV Intermittent every 24 hours  chlorhexidine 4% Liquid 1 Application(s) Topical <User Schedule>  cyanocobalamin 1000 MICROGram(s) Oral daily  folic acid 1 milliGRAM(s) Oral daily  furosemide   Injectable 20 milliGRAM(s) IV Push daily  heparin  Infusion 900 Unit(s)/Hr (9 mL/Hr) IV Continuous <Continuous>  heparin  Injectable 4000 Unit(s) IV Push every 6 hours PRN For aPTT less than 40  heparin  Injectable 2000 Unit(s) IV Push every 6 hours PRN For aPTT between 40 - 57  NIFEdipine XL 30 milliGRAM(s) Oral daily  predniSONE   Tablet 50 milliGRAM(s) Oral daily    PRN MEDICATIONS  heparin  Injectable 4000 Unit(s) IV Push every 6 hours PRN  heparin  Injectable 2000 Unit(s) IV Push every 6 hours PRN    VITALS:   T(F): 95.4  HR: 76 (67 - 95)  BP: 128/53 (128/53 - 137/61)  RR: 18 (18 - 18)  SpO2: 97% (93% - 98%)    LABS:                        9.8    9.82  )-----------( 66       ( 29 Nov 2019 01:32 )             32.3     11-28    141  |  97<L>  |  48<H>  ----------------------------<  228<H>  3.4<L>   |  24  |  1.3    Ca    8.5      28 Nov 2019 06:51  Phos  3.4     11-27  Mg     2.1     11-27    TPro  4.6<L>  /  Alb  2.8<L>  /  TBili  0.7  /  DBili  x   /  AST  22  /  ALT  12  /  AlkPhos  67  11-28    PT/INR - ( 28 Nov 2019 18:33 )   PT: 15.70 sec;   INR: 1.37 ratio         PTT - ( 28 Nov 2019 18:33 )  PTT:>200.0 sec    ABG - ( 27 Nov 2019 08:57 )  pH, Arterial: 7.54  pH, Blood: x     /  pCO2: 32    /  pO2: 76    / HCO3: 27    / Base Excess: 4.4   /  SaO2: 97                    Culture - Urine (collected 26 Nov 2019 16:20)  Source: .Urine Clean Catch (Midstream)  Final Report (28 Nov 2019 04:36):    >=3 organisms. Probable collection contamination.          Troponin T, Serum: 0.02 ng/mL (11-26-19 @ 20:52)  Serum Pro-Brain Natriuretic Peptide: 7429 pg/mL (11-26-19 @ 17:30)  Troponin T, Serum: 0.03 ng/mL (11-26-19 @ 13:30)      RADIOLOGY:    PHYSICAL EXAM:  GEN: In no acute distress. Pt. is awake in bed able to have a conversation.  LUNGS: CTABL, Symmetrical inspiration, no increased work of breathing  HEART: +S1,S2, RRR, No murmurs, Rubs, Gallops   ABD: Bowel Sounds Present, Soft, non tender, non distended, no guarding, no rebound.   EXT: 2+ peripheral Pulses, no clubbing, no cyanosis, no Edema.   NEURO: AAOX3. No focal deficits. CN 2-12 Grossly intact. SUBJECTIVE:    Patient is a 89y old Female who presents with a chief complaint of Fluid overload; steroid-induced myalgia, hemolytic anemia, thrombocytopenia (28 Nov 2019 14:52)   PMH**  Currently admitted to medicine with the primary diagnosis of Generalized weakness     Today is hospital day 3d. This morning she is resting comfortably in bed on high flow 50% oxygen. Feels improved since admission, but still not well overall/   Pt. denies HA, Cp, Abd Pain or discomfort.  Pt. is urinating but is not passing stool/    Spoke wiht son and daughter at length about pt. status and current management plan     Diego Epstein - [phone]    PAST MEDICAL & SURGICAL HISTORY  Chronic kidney disease, unspecified CKD stage: Stage IIIB  TIA (transient ischemic attack)  Diverticulitis  Constipation  Hypertension  No significant past surgical history    SOCIAL HISTORY:  Negative for smoking/alcohol/drug use.     ALLERGIES:  aspirin (Unknown)  Cipro (Unknown)  codeine (Unknown)  dicyclomine (Unknown)  Diovan (Unknown)  Flagyl (Unknown)  Librax (Unknown)  metronidazole (Unknown)  penicillin (Unknown)  Prevacid (Unknown)  trimethobenzamide (Unknown)    MEDICATIONS:  STANDING MEDICATIONS  azithromycin  IVPB 500 milliGRAM(s) IV Intermittent every 24 hours  cefTRIAXone   IVPB 1000 milliGRAM(s) IV Intermittent every 24 hours  chlorhexidine 4% Liquid 1 Application(s) Topical <User Schedule>  cyanocobalamin 1000 MICROGram(s) Oral daily  folic acid 1 milliGRAM(s) Oral daily  furosemide   Injectable 20 milliGRAM(s) IV Push daily  heparin  Infusion 900 Unit(s)/Hr (9 mL/Hr) IV Continuous <Continuous>  heparin  Injectable 4000 Unit(s) IV Push every 6 hours PRN For aPTT less than 40  heparin  Injectable 2000 Unit(s) IV Push every 6 hours PRN For aPTT between 40 - 57  NIFEdipine XL 30 milliGRAM(s) Oral daily  predniSONE   Tablet 50 milliGRAM(s) Oral daily    PRN MEDICATIONS  heparin  Injectable 4000 Unit(s) IV Push every 6 hours PRN  heparin  Injectable 2000 Unit(s) IV Push every 6 hours PRN    VITALS:   T(F): 95.4  HR: 76 (67 - 95)  BP: 128/53 (128/53 - 137/61)  RR: 18 (18 - 18)  SpO2: 97% (93% - 98%)    LABS:                        9.8    9.82  )-----------( 66       ( 29 Nov 2019 01:32 )             32.3     11-28    141  |  97<L>  |  48<H>  ----------------------------<  228<H>  3.4<L>   |  24  |  1.3    Ca    8.5      28 Nov 2019 06:51  Phos  3.4     11-27  Mg     2.1     11-27    TPro  4.6<L>  /  Alb  2.8<L>  /  TBili  0.7  /  DBili  x   /  AST  22  /  ALT  12  /  AlkPhos  67  11-28    PT/INR - ( 28 Nov 2019 18:33 )   PT: 15.70 sec;   INR: 1.37 ratio         PTT - ( 28 Nov 2019 18:33 )  PTT:>200.0 sec    ABG - ( 27 Nov 2019 08:57 )  pH, Arterial: 7.54  pH, Blood: x     /  pCO2: 32    /  pO2: 76    / HCO3: 27    / Base Excess: 4.4   /  SaO2: 97                    Culture - Urine (collected 26 Nov 2019 16:20)  Source: .Urine Clean Catch (Midstream)  Final Report (28 Nov 2019 04:36):    >=3 organisms. Probable collection contamination.          Troponin T, Serum: 0.02 ng/mL (11-26-19 @ 20:52)  Serum Pro-Brain Natriuretic Peptide: 7429 pg/mL (11-26-19 @ 17:30)  Troponin T, Serum: 0.03 ng/mL (11-26-19 @ 13:30)      RADIOLOGY:  < from: Transthoracic Echocardiogram (11.28.19 @ 17:23) >  Summary:   1. Left ventricular ejection fraction, by visual estimation, is 55 to   60%.   2. Mildly increased LV wall thickness.   3. Spectral Doppler shows impaired relaxation pattern of left   ventricular myocardial filling (Grade I diastolic dysfunction).   4. Moderately enlarged right ventricle.   5. Moderately reduced RV systolic function.   6. Trace mitral valve regurgitation.   7. Severe tricuspid regurgitation.   8. Aortic valve thickening with decreased leaflet opening.   9. Mild aortic regurgitation.  10. Mild to moderate pulmonic valve regurgitation.  11. Estimated pulmonary artery systolic pressure is 89.9 mmHg assuming a   right atrial pressure of 15 mmHg, which is consistent with severe   pulmonary hypertension.  12.Peak transaortic gradient equals 37.7 mmHg, mean transaortic gradient   equals 14.5 mmHg, the calculated aortic valve area equals 0.62 cm² by the   continuity equation consistent with severe aortic stenosis.    < end of copied text >    PHYSICAL EXAM:  GEN: In no acute distress. Pt. is awake in bed able to have a conversation. Cachetic  LUNGS: Breath sounds clear Superior, Mild crackles in Lower lung fields, Symmetrical inspiration, no increased work of breathing  HEART: +S1,S2, RRR, Systolic ejection murmur, Rubs, Gallops   ABD: Bowel Sounds Present, Soft, non tender, non distended, no guarding, no rebound.   EXT: 2+ peripheral Pulses, no clubbing, no cyanosis mild swelling B/L LE. Toes have disfigured appearance.   NEURO: AAOX3. No focal deficits. CN 2-12 Grossly intact. SUBJECTIVE:    Patient is a 89y old Female who presents with a chief complaint of Fluid overload; steroid-induced myalgia, hemolytic anemia, thrombocytopenia (28 Nov 2019 14:52)    Currently admitted to medicine with the primary diagnosis of Generalized weakness     Today is hospital day 3d. This morning she is resting comfortably in bed on high flow 50% oxygen. Feels improved since admission, but still not well overall/   Pt. denies HA, Cp, Abd Pain or discomfort.  Pt. is urinating but is not passing stool/    Spoke with son and daughter at length about pt. status and current management plan     Diego Epstein - [phone]    PAST MEDICAL & SURGICAL HISTORY  Chronic kidney disease, unspecified CKD stage: Stage IIIB  TIA (transient ischemic attack)  Diverticulitis  Constipation  Hypertension  No significant past surgical history    SOCIAL HISTORY:  Negative for smoking/alcohol/drug use.     ALLERGIES:  aspirin (Unknown)  Cipro (Unknown)  codeine (Unknown)  dicyclomine (Unknown)  Diovan (Unknown)  Flagyl (Unknown)  Librax (Unknown)  metronidazole (Unknown)  penicillin (Unknown)  Prevacid (Unknown)  trimethobenzamide (Unknown)    MEDICATIONS:  STANDING MEDICATIONS  azithromycin  IVPB 500 milliGRAM(s) IV Intermittent every 24 hours  cefTRIAXone   IVPB 1000 milliGRAM(s) IV Intermittent every 24 hours  chlorhexidine 4% Liquid 1 Application(s) Topical <User Schedule>  cyanocobalamin 1000 MICROGram(s) Oral daily  folic acid 1 milliGRAM(s) Oral daily  furosemide   Injectable 20 milliGRAM(s) IV Push daily  heparin  Infusion 900 Unit(s)/Hr (9 mL/Hr) IV Continuous <Continuous>  heparin  Injectable 4000 Unit(s) IV Push every 6 hours PRN For aPTT less than 40  heparin  Injectable 2000 Unit(s) IV Push every 6 hours PRN For aPTT between 40 - 57  NIFEdipine XL 30 milliGRAM(s) Oral daily  predniSONE   Tablet 50 milliGRAM(s) Oral daily    PRN MEDICATIONS  heparin  Injectable 4000 Unit(s) IV Push every 6 hours PRN  heparin  Injectable 2000 Unit(s) IV Push every 6 hours PRN    VITALS:   T(F): 95.4  HR: 76 (67 - 95)  BP: 128/53 (128/53 - 137/61)  RR: 18 (18 - 18)  SpO2: 97% (93% - 98%)    LABS:                        9.8    9.82  )-----------( 66       ( 29 Nov 2019 01:32 )             32.3     11-28    141  |  97<L>  |  48<H>  ----------------------------<  228<H>  3.4<L>   |  24  |  1.3    Ca    8.5      28 Nov 2019 06:51  Phos  3.4     11-27  Mg     2.1     11-27    TPro  4.6<L>  /  Alb  2.8<L>  /  TBili  0.7  /  DBili  x   /  AST  22  /  ALT  12  /  AlkPhos  67  11-28    PT/INR - ( 28 Nov 2019 18:33 )   PT: 15.70 sec;   INR: 1.37 ratio         PTT - ( 28 Nov 2019 18:33 )  PTT:>200.0 sec    ABG - ( 27 Nov 2019 08:57 )  pH, Arterial: 7.54  pH, Blood: x     /  pCO2: 32    /  pO2: 76    / HCO3: 27    / Base Excess: 4.4   /  SaO2: 97                    Culture - Urine (collected 26 Nov 2019 16:20)  Source: .Urine Clean Catch (Midstream)  Final Report (28 Nov 2019 04:36):    >=3 organisms. Probable collection contamination.          Troponin T, Serum: 0.02 ng/mL (11-26-19 @ 20:52)  Serum Pro-Brain Natriuretic Peptide: 7429 pg/mL (11-26-19 @ 17:30)  Troponin T, Serum: 0.03 ng/mL (11-26-19 @ 13:30)      RADIOLOGY:  < from: Transthoracic Echocardiogram (11.28.19 @ 17:23) >  Summary:   1. Left ventricular ejection fraction, by visual estimation, is 55 to   60%.   2. Mildly increased LV wall thickness.   3. Spectral Doppler shows impaired relaxation pattern of left   ventricular myocardial filling (Grade I diastolic dysfunction).   4. Moderately enlarged right ventricle.   5. Moderately reduced RV systolic function.   6. Trace mitral valve regurgitation.   7. Severe tricuspid regurgitation.   8. Aortic valve thickening with decreased leaflet opening.   9. Mild aortic regurgitation.  10. Mild to moderate pulmonic valve regurgitation.  11. Estimated pulmonary artery systolic pressure is 89.9 mmHg assuming a   right atrial pressure of 15 mmHg, which is consistent with severe   pulmonary hypertension.  12.Peak transaortic gradient equals 37.7 mmHg, mean transaortic gradient   equals 14.5 mmHg, the calculated aortic valve area equals 0.62 cm² by the   continuity equation consistent with severe aortic stenosis.    < end of copied text >    PHYSICAL EXAM:  GEN: In no acute distress. Pt. is awake in bed able to have a conversation. Cachetic  LUNGS: Breath sounds clear Superior, Mild crackles in Lower lung fields, Symmetrical inspiration, no increased work of breathing  HEART: +S1,S2, RRR, Systolic ejection murmur, Rubs, Gallops   ABD: Bowel Sounds Present, Soft, non tender, non distended, no guarding, no rebound.   EXT: 2+ peripheral Pulses, no clubbing, no cyanosis mild swelling B/L LE. Toes have disfigured appearance.   NEURO: AAOX3. No focal deficits. CN 2-12 Grossly intact.

## 2019-11-29 NOTE — DIETITIAN INITIAL EVALUATION ADULT. - ADD RECOMMEND
Consider Ensure compact BID. Cont Dysphagia 2 mech soft, thin liquids, high fiber, DASH/TLC, 1L fluid restriction

## 2019-11-29 NOTE — PROGRESS NOTE ADULT - SUBJECTIVE AND OBJECTIVE BOX
RITA RAMOS 89y Female from home brought by family to the ER for c/o generalized weakness, SOB and inc difficulty ambulating x 3 days du to generalized muscke pain.  The pt at baseline isable to ambulate with walker but in the last few days has been unable to get out of be due to severe musculoskeletal pain.  Of note the pt has Hx of  warm autoimmune hemlytic anemia and has been ofn prolonged steroid  tx  and has had  Rutixan tx as well.  The pt was noted to be fluid overloaded with a BNP of 4500.  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  The pt is being evaluated by Hem-Onc Dr Mejias.  The PMHx includes:  HTN, ASHD, TIA, CKD III, Warm IGG Hemolytic Anemia, OA, DDD, DJD, mobility dysfunction, GERD, diverticulosis, ch constipation.    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 possibility of PE given the SOB and the DVT, ECHO shows EF 55-60%, GIDD, severe AS/mild AI, severe Pul HTN    MEDICATIONS  (STANDING):  chlorhexidine 4% Liquid 1 Application(s) Topical <User Schedule>  cyanocobalamin 1000 MICROGram(s) Oral daily  folic acid 1 milliGRAM(s) Oral daily  furosemide   Injectable 20 milliGRAM(s) IV Push daily  NIFEdipine XL 30 milliGRAM(s) Oral daily  predniSONE   Tablet 50 milliGRAM(s) Oral daily  Eliquis 10mg q 12 x 7 days then 5mg q 12  MEDICATIONS  (PRN):      Allergies    aspirin (Unknown)  Cipro (Unknown)  codeine (Unknown)  dicyclomine (Unknown)  Diovan (Unknown)  Flagyl (Unknown)  Librax (Unknown)  metronidazole (Unknown)  penicillin (Unknown)  Prevacid (Unknown)  trimethobenzamide (Unknown)  	    Vital Signs Last 24 Hrs    T(F): 97.7  HR: 95  BP: 122/58    RR: 18  SpO2: 97% (2019 15:20) (86% - 97%)    PHYSICAL EXAM:      Constitutional:  elderly, frail and fragile,  chronically looking WF, + O2NC, NAD, temporal wasting    Eyes:  nonicteric    ENMT:  dental defects    Neck:  supple, + JVD, no bruits    Back:  + Kyphosis    Respiratory:  dec BS, shallow respirations, bibasilar crackles    Cardiovascular:  s1S2 tachy    Gastrointestinal: globose soft and benign    Genitourinary:    Extremities: moves all ext, + arthritic changes, + edema        LABS:                        11.5  11.5 )-----------(63  (64. 90 )                 37.9    144  |  98  |  36  ----------------------------<  153  3.3   |  29  |  1.0    Ca    8.4<L>       Phos  3.4     11-27  Mg     2.1     11-27  troponin 0.02  BNP  4000    LA 4.0     TPro  4.6 /  Alb  3.1, 2.8  /  TBili  1.0  /  DBili  0.4<H>  /  AST  14  /  ALT  13  /  AlkPhos  73  11-26    PT/INR - ( 2019 13:30 )   PT: 13.10 sec;   INR: 1.14 ratio         PTT - ( 2019 13:30 )  PTT:21.6 sec  Urinalysis Basic - ( 2019 16:20 )    Color: Yellow / Appearance: Clear / S.020 / pH: x  Gluc: x / Ketone: Negative  / Bili: Negative / Urobili: <2 mg/dL   Blood: x / Protein: 30 mg/dL / Nitrite: Negative   Leuk Esterase: Negative / RBC: 10 /HPF / WBC 6 /HPF   Sq Epi: x / Non Sq Epi: 5 /HPF / Bacteria: Few        RADIOLOGY & ADDITIONAL TESTS:  CXR  inc vasc markings, cardiomegaly  EKG sinus tach 121/min QTc 462    CT of abd:  bibasilar atelectasis, stable hepatic cyst,  calcification n the sybil hepatis in the area of the GB neck, stable  side branch pancreatic IPMN, no pancreatic constanza dilatation    Sono of abd:  no cholelithiases    Venous doppler of lower ext:  + DVT of  R posterior tibial vein, + DVT of  L peroneal and soleal veins    ECHO:  LVEF 55-60%, , mild LV wall thickening, GIDD, moderate enlarged R ventricle, , moderate reduced LVS sys function, , trace MR, severe AS, mild AI, inc pul a pressure 89.9 c/w severe Pul HTN RITA RAMOS 89y Female from home brought by family to the ER for c/o generalized weakness, SOB and inc difficulty ambulating x 3 days du to generalized muscke pain.  The pt at baseline isable to ambulate with walker but in the last few days has been unable to get out of be due to severe musculoskeletal pain.  Of note the pt has Hx of  warm autoimmune hemlytic anemia and has been ofn prolonged steroid  tx  and has had  Rutixan tx as well.  The pt was noted to be fluid overloaded with a BNP of 4500. There is also the question of PNA  and PE.   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  The pt is being evaluated by Hem-Onc Dr Mejias.  The PMHx includes:  HTN, ASHD, TIA, CKD III, Warm IGG Hemolytic Anemia, OA, DDD, DJD, mobility dysfunction, GERD, diverticulosis, ch constipation.    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 possibility of PE given the SOB, quick desaturation off O2 and + DVT, ECHO shows EF 55-60%, GIDD, severe AS/mild AI, severe Pul HTN    MEDICATIONS  (STANDING):  chlorhexidine 4% Liquid 1 Application(s) Topical <User Schedule>  cyanocobalamin 1000 MICROGram(s) Oral daily  folic acid 1 milliGRAM(s) Oral daily  furosemide   Injectable 20 milliGRAM(s) IV Push daily  NIFEdipine XL 30 milliGRAM(s) Oral daily  predniSONE   Tablet 50 milliGRAM(s) Oral daily  Eliquis 10mg q 12 x 7 days then 5mg q 12  MEDICATIONS  (PRN):      Allergies    aspirin (Unknown)  Cipro (Unknown)  codeine (Unknown)  dicyclomine (Unknown)  Diovan (Unknown)  Flagyl (Unknown)  Librax (Unknown)  metronidazole (Unknown)  penicillin (Unknown)  Prevacid (Unknown)  trimethobenzamide (Unknown)  	    Vital Signs Last 24 Hrs    T(F): 97.7  HR: 95  BP: 122/58    RR: 18  SpO2: 97% (2019 15:20) (86% - 97%)    PHYSICAL EXAM:      Constitutional:  elderly, frail and fragile,  chronically looking WF, + O2NC, NAD, temporal wasting    Eyes:  nonicteric    ENMT:  dental defects    Neck:  supple, + JVD, no bruits    Back:  + Kyphosis    Respiratory:  dec BS, shallow respirations, bibasilar crackles    Cardiovascular:  s1S2 tachy    Gastrointestinal: globose soft and benign    Genitourinary:    Extremities: moves all ext, + arthritic changes, + edema        LABS:                        11.5  11.5 )-----------(63  (64. 90 )                 37.9    144  |  98  |  36  ----------------------------<  153  3.3   |  29  |  1.0    Ca    8.4<L>       Phos  3.4     11-27  Mg     2.1     11-27  troponin 0.02  BNP  4000    LA 4.0     TPro  4.6 /  Alb  3.1, 2.8  /  TBili  1.0  /  DBili  0.4<H>  /  AST  14  /  ALT  13  /  AlkPhos  73  11-26    PT/INR - ( 2019 13:30 )   PT: 13.10 sec;   INR: 1.14 ratio         PTT - ( 2019 13:30 )  PTT:21.6 sec  Urinalysis Basic - ( 2019 16:20 )    Color: Yellow / Appearance: Clear / S.020 / pH: x  Gluc: x / Ketone: Negative  / Bili: Negative / Urobili: <2 mg/dL   Blood: x / Protein: 30 mg/dL / Nitrite: Negative   Leuk Esterase: Negative / RBC: 10 /HPF / WBC 6 /HPF   Sq Epi: x / Non Sq Epi: 5 /HPF / Bacteria: Few        RADIOLOGY & ADDITIONAL TESTS:  CXR  inc vasc markings, cardiomegaly  EKG sinus tach 121/min QTc 462    CT of abd:  bibasilar atelectasis, stable hepatic cyst,  calcification n the sybil hepatis in the area of the GB neck, stable  side branch pancreatic IPMN, no pancreatic constanza dilatation    Sono of abd:  no cholelithiases    Venous doppler of lower ext:  + DVT of  R posterior tibial vein, + DVT of  L peroneal and soleal veins    ECHO:  LVEF 55-60%, , mild LV wall thickening, GIDD, moderate enlarged R ventricle, , moderate reduced LVS sys function, , trace MR, severe AS, mild AI, inc pul a pressure 89.9 c/w severe Pul HTN RITA RAMOS 89y Female from home brought by family to the ER for c/o generalized weakness, SOB and inc difficulty ambulating x 3 days du to generalized muscke pain.  The pt at baseline isable to ambulate with walker but in the last few days has been unable to get out of be due to severe musculoskeletal pain.  Of note the pt has Hx of  warm autoimmune hemlytic anemia and has been ofn prolonged steroid  tx  and has had  Rutixan tx as well.  The pt was noted to be fluid overloaded with a BNP of 4500. There is also the question of PNA  and PE.   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  The pt is being evaluated by Hem-Onc Dr Mejias.  The PMHx includes:  HTN, ASHD, TIA, CKD III, Warm IGG Hemolytic Anemia, OA, DDD, DJD, mobility dysfunction, GERD, diverticulosis, ch constipation.    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 possibility of PE given the SOB, quick desaturation off O2 and + DVT, ECHO shows EF 55-60%, GIDD, severe AS/mild AI, severe Pul HTN    MEDICATIONS  (STANDING):  chlorhexidine 4% Liquid 1 Application(s) Topical <User Schedule>  cyanocobalamin 1000 MICROGram(s) Oral daily  folic acid 1 milliGRAM(s) Oral daily  furosemide   Injectable 20 milliGRAM(s) IV Push daily  NIFEdipine XL 30 milliGRAM(s) Oral daily  predniSONE   Tablet 50 milliGRAM(s) Oral daily  Eliquis 10mg q 12 x 7 days then 5mg q 12  MEDICATIONS  (PRN):      Allergies    aspirin (Unknown)  Cipro (Unknown)  codeine (Unknown)  dicyclomine (Unknown)  Diovan (Unknown)  Flagyl (Unknown)  Librax (Unknown)  metronidazole (Unknown)  penicillin (Unknown)  Prevacid (Unknown)  trimethobenzamide (Unknown)  	    Vital Signs Last 24 Hrs    T(F): 97.7  HR: 95  BP: 122/58    RR: 18  SpO2: 97% (2019 15:20) (86% - 97%)    PHYSICAL EXAM:      Constitutional:  elderly, frail and fragile,  chronically looking WF, + O2NC, NAD, temporal wasting    Eyes:  nonicteric    ENMT:  dental defects    Neck:  supple, + JVD, no bruits    Back:  + Kyphosis    Respiratory:  dec BS, shallow respirations, bibasilar crackles    Cardiovascular:  s1S2 tachy    Gastrointestinal: globose soft and benign    Genitourinary:    Extremities: moves all ext, + arthritic changes, + edema        LABS:                        11.5  11.5 )-----------(63  (64. 90 )                 37.9    144  |  98  |  36  ----------------------------<  153  3.3   |  29  |  1.0  GFR 28, 44,  Ca    8.4<L>       Phos  3.4     11-27  Mg     2.1     11-27  troponin 0.02  BNP  4500    LA 4.0     TPro  4.6 /  Alb  3.1, 2.8  /  TBili  1.0  /  DBili  0.4<H>  /  AST  14  /  ALT  13  /  AlkPhos  73  11-26    PT/INR - ( 2019 13:30 )   PT: 13.10 sec;   INR: 1.14 ratio         PTT - ( 2019 13:30 )  PTT:21.6 sec  Urinalysis Basic - ( 2019 16:20 )    Color: Yellow / Appearance: Clear / S.020 / pH: x  Gluc: x / Ketone: Negative  / Bili: Negative / Urobili: <2 mg/dL   Blood: x / Protein: 30 mg/dL / Nitrite: Negative   Leuk Esterase: Negative / RBC: 10 /HPF / WBC 6 /HPF   Sq Epi: x / Non Sq Epi: 5 /HPF / Bacteria: Few        RADIOLOGY & ADDITIONAL TESTS:  CXR  inc vasc markings, cardiomegaly  EKG sinus tach 121/min QTc 462    CT of abd:  bibasilar atelectasis, stable hepatic cyst,  calcification n the sybil hepatis in the area of the GB neck, stable  side branch pancreatic IPMN, no pancreatic constanza dilatation    Sono of abd:  no cholelithiases    Venous doppler of lower ext:  + DVT of  R posterior tibial vein, + DVT of  L peroneal and soleal veins    ECHO:  LVEF 55-60%, , mild LV wall thickening, GIDD, moderate enlarged R ventricle, , moderate reduced LVS sys function, , trace MR, severe AS, mild AI, inc pul a pressure 89.9 c/w severe Pul HTN RITA RAMOS 89y Female from home brought by family to the ER for c/o generalized weakness, SOB and inc difficulty ambulating x 3 days du to generalized muscke pain.  The pt at baseline isable to ambulate with walker but in the last few days has been unable to get out of be due to severe musculoskeletal pain.  Of note the pt has Hx of  warm autoimmune hemlytic anemia and has been ofn prolonged steroid  tx  and has had  Rutixan tx as well.  The pt was noted to be fluid overloaded with a BNP of 4500. There is also the question of PNA  and PE.   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  The pt is being evaluated by Hem-Onc Dr Mejias.  The PMHx includes:  HTN, ASHD, TIA, CKD III, Warm IGG Hemolytic Anemia, OA, DDD, DJD, mobility dysfunction, GERD, diverticulosis, ch constipation.    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 possibility of PE given the SOB, quick desaturation off O2 and + DVT, ECHO shows EF 55-60%, GIDD, severe AS/mild AI, severe Pul HTN    MEDICATIONS  (STANDING):  Meropenem 1gm q 12  chlorhexidine 4% Liquid 1 Application(s) Topical <User Schedule>  cyanocobalamin 1000 MICROGram(s) Oral daily  folic acid 1 milliGRAM(s) Oral daily  furosemide   Injectable 20 milliGRAM(s) IV Push daily  NIFEdipine XL 30 milliGRAM(s) Oral daily  predniSONE   Tablet 50 milliGRAM(s) Oral daily  Eliquis 10mg q 12 x 7 days then 5mg q 12  MEDICATIONS  (PRN):      Allergies    aspirin (Unknown)  Cipro (Unknown)  codeine (Unknown)  dicyclomine (Unknown)  Diovan (Unknown)  Flagyl (Unknown)  Librax (Unknown)  metronidazole (Unknown)  penicillin (Unknown)  Prevacid (Unknown)  trimethobenzamide (Unknown)  	    Vital Signs Last 24 Hrs    T(F): 97.7  HR: 95  BP: 122/58    RR: 18  SpO2: 97% (2019 15:20) (86% - 97%)    PHYSICAL EXAM:      Constitutional:  elderly, frail and fragile,  chronically looking WF, + O2NC, NAD, temporal wasting    Eyes:  nonicteric    ENMT:  dental defects    Neck:  supple, + JVD, no bruits    Back:  + Kyphosis    Respiratory:  dec BS, shallow respirations, bibasilar crackles    Cardiovascular:  s1S2 tachy    Gastrointestinal: globose soft and benign    Genitourinary:    Extremities: moves all ext, + arthritic changes, + edema        LABS:                        11.5  11.5 )-----------(63  (64. 90 )                 37.9    144  |  98  |  36  ----------------------------<  153  3.3   |  29  |  1.0  GFR 28, 44,  Ca    8.4<L>       Phos  3.4     11-27  Mg     2.1     11-27  troponin 0.02  BNP  4500    LA 4.0     TPro  4.6 /  Alb  3.1, 2.8  /  TBili  1.0  /  DBili  0.4<H>  /  AST  14  /  ALT  13  /  AlkPhos  73  11-26    PT/INR - ( 2019 13:30 )   PT: 13.10 sec;   INR: 1.14 ratio         PTT - ( 2019 13:30 )  PTT:21.6 sec  Urinalysis Basic - ( 2019 16:20 )    Color: Yellow / Appearance: Clear / S.020 / pH: x  Gluc: x / Ketone: Negative  / Bili: Negative / Urobili: <2 mg/dL   Blood: x / Protein: 30 mg/dL / Nitrite: Negative   Leuk Esterase: Negative / RBC: 10 /HPF / WBC 6 /HPF   Sq Epi: x / Non Sq Epi: 5 /HPF / Bacteria: Few        RADIOLOGY & ADDITIONAL TESTS:  CXR  inc vasc markings, cardiomegaly  EKG sinus tach 121/min QTc 462    CT of abd:  bibasilar atelectasis, stable hepatic cyst,  calcification n the sybil hepatis in the area of the GB neck, stable  side branch pancreatic IPMN, no pancreatic constanza dilatation    Sono of abd:  no cholelithiases    Venous doppler of lower ext:  + DVT of  R posterior tibial vein, + DVT of  L peroneal and soleal veins    ECHO:  LVEF 55-60%, , mild LV wall thickening, GIDD, moderate enlarged R ventricle, , moderate reduced LVS sys function, , trace MR, severe AS, mild AI, inc pul a pressure 89.9 c/w severe Pul HTN

## 2019-11-29 NOTE — PROGRESS NOTE ADULT - SUBJECTIVE AND OBJECTIVE BOX
Patient is a 89y old  Female who presents with a chief complaint of Fluid overload; steroid-induced myalgia (29 Nov 2019 07:25)      Subjective: Patient is on high flow oxygen. Pt states she has no difficulty breathing   She was found to have new LE DVT      Vital Signs Last 24 Hrs  T(C): 35.2 (29 Nov 2019 05:28), Max: 36.2 (28 Nov 2019 20:41)  T(F): 95.4 (29 Nov 2019 05:28), Max: 97.1 (28 Nov 2019 20:41)  HR: 79 (29 Nov 2019 08:15) (67 - 79)  BP: 128/53 (29 Nov 2019 05:28) (128/53 - 137/61)  BP(mean): --  RR: 18 (29 Nov 2019 05:28) (18 - 18)  SpO2: 98% (29 Nov 2019 08:15) (93% - 98%)    PHYSICAL EXAM  General: adult in NAD  HEENT: clear oropharynx  Neck: supple  CV: normal S1/S2  Lungs: decreased BS at bases   Abdomen: soft non-tender  Ext: no clubbing cyanosis or edema  Neuro: alert and oriented X 4    MEDICATIONS  (STANDING):  argatroban Infusion 2 MICROgram(s)/kG/Min (6.096 mL/Hr) IV Continuous <Continuous>  azithromycin  IVPB 500 milliGRAM(s) IV Intermittent every 24 hours  cefTRIAXone   IVPB 1000 milliGRAM(s) IV Intermittent every 24 hours  chlorhexidine 4% Liquid 1 Application(s) Topical <User Schedule>  cyanocobalamin 1000 MICROGram(s) Oral daily  folic acid 1 milliGRAM(s) Oral daily  furosemide   Injectable 20 milliGRAM(s) IV Push daily  NIFEdipine XL 30 milliGRAM(s) Oral daily  predniSONE   Tablet 50 milliGRAM(s) Oral daily    MEDICATIONS  (PRN):      LABS:                          11.5   11.52 )-----------( 63       ( 29 Nov 2019 06:40 )             37.9         Mean Cell Volume : 96.4 fL  Mean Cell Hemoglobin : 29.3 pg  Mean Cell Hemoglobin Concentration : 30.3 g/dL  Auto Neutrophil # : 10.04 K/uL  Auto Lymphocyte # : 0.62 K/uL  Auto Monocyte # : 0.22 K/uL  Auto Eosinophil # : 0.06 K/uL  Auto Basophil # : 0.03 K/uL  Auto Neutrophil % : 87.1 %  Auto Lymphocyte % : 5.4 %  Auto Monocyte % : 1.9 %  Auto Eosinophil % : 0.5 %  Auto Basophil % : 0.3 %      Serial CBC's  11-29 @ 06:40  Hct-37.9 / Hgb-11.5 / Plat-63 / RBC-3.93 / WBC-11.52  Serial CBC's  11-29 @ 01:32  Hct-32.3 / Hgb-9.8 / Plat-66 / RBC-3.35 / WBC-9.82  Serial CBC's  11-28 @ 06:51  Hct-36.5 / Hgb-11.3 / Plat-64 / RBC-3.82 / WBC-10.88  Serial CBC's  11-27 @ 08:04  Hct-38.4 / Hgb-11.7 / Plat-90 / RBC-3.95 / WBC-12.71  Serial CBC's  11-26 @ 13:30  Hct-39.1 / Hgb-12.1 / Plat-134 / RBC-4.09 / WBC-14.33      11-29    144  |  98  |  36<H>  ----------------------------<  153<H>  3.3<L>   |  29  |  1.0    Ca    8.5      29 Nov 2019 06:40    TPro  4.6<L>  /  Alb  2.8<L>  /  TBili  0.7  /  DBili  x   /  AST  17  /  ALT  10  /  AlkPhos  65  11-29      PT/INR - ( 29 Nov 2019 06:40 )   PT: 13.90 sec;   INR: 1.21 ratio         PTT - ( 29 Nov 2019 06:40 )  PTT:134.2 sec    Culture - Urine (collected 26 Nov 2019 16:20)  Source: .Urine Clean Catch (Midstream)  Final Report (28 Nov 2019 04:36):    >=3 organisms. Probable collection contamination.

## 2019-11-29 NOTE — DIETITIAN INITIAL EVALUATION ADULT. - ENERGY NEEDS
estimated calorie needs: 996 - 1079 kcals/day (MSJ x 1.2 - 1.3 AF)  estimated protein needs: 51 - 61 gms/day (1.0 - 1.2 gm/kg)  estimated fluid needs: 1ml/kcal or per LIP

## 2019-11-29 NOTE — PROGRESS NOTE ADULT - ASSESSMENT
Pt is a 89 year old female with a significant PMHx of Warm Autoimmune Hemolytic Anemia who presented with a cc/o generalized myalgia. Her muscle aches are associated with generalized weakness and inability to ambulate. She is currently admitted for suspected steroid-induced myalgia.     # Fluid overload; Cardiac vs. Renal  - On High flow nasal cannula.  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  - Plt 64 (<90<134 on admission)  - Started on Hep Sub q Inj on admission -> Switched to Hep drip Nov 28th   - F/u Heme/Onc  - Hold Hep Drip   - Start Argatroban       # Myalgia and decreased functional status possibly due to steroid-induced myalgia  - Patient taking steroids since last admission  - Leukocytosis likely due to steroid use; no physical symptoms to suggest infection at this time  - CXR with BL infiltrate consistent with fluid overload; cannot rule out PNA,   - Will continue ceftriaxone and azithromycin for now    Hx of CKD Stage IIIB - resolving 1.3 (<1.6<1.7 Admission)  - Baseline 1.3   - Slight worsening of renal function  - Monitor    # 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 Orantes)  - Continue prednisone 50mg   - Monitor Hb, Keep active type and screen  - High risk for clotting  - LE Duplex - Preliminary read Positive - Pending Official Note    #Hx of Low Vitamin B12 level with normal MMA - Continue B12 1000mcg daily  #HTN - Continue with Nifedipine 30 mg qd; Holding HCTZ/Triamterene, on lasix  #Diverticulosis - High fiber diet to avoid constipation    Diet: Dash/ High Fiber  Activity: Ambulate as tolerated  DVT ppx: Heparin  GI ppx: Not indicated  Dispo: acute; From Home  FULL CODE Pt is a 89 year old female with a significant PMHx of Warm Autoimmune Hemolytic Anemia who presented with a cc/o generalized myalgia. Her muscle aches are associated with generalized weakness and inability to ambulate. She is currently admitted for suspected steroid-induced myalgia.     # Fluid overload; Cardiac vs. Renal  - On High flow nasal cannula.  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 Can start at 1mcg/hr . Check PTT 2 hrs after initiation of argatroban  Keep PTT between 50-70.   If PTT<50-increase infusion rate by 20%,   If PTT 50-80- No change  If PTT>80- hold infusion for 2 hours and then restart at 50% lower rate   Check HIT ab and EDWARD  Hold all heparin products       # Myalgia and decreased functional status possibly due to steroid-induced myalgia  - Patient taking steroids since last admission  - Leukocytosis likely due to steroid use; no physical symptoms to suggest infection at this time  - CXR with BL infiltrate consistent with fluid overload; cannot rule out PNA,   - Will continue ceftriaxone and azithromycin for now    Hx of CKD Stage IIIB - resolving 1.3 (<1.6<1.7 Admission)  - Baseline 1.3   - Slight worsening of renal function  - Monitor    # 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 Orantes)  - Continue prednisone 50mg   - Monitor Hb, Keep active type and screen  - High risk for clotting  - LE Duplex - Preliminary read Positive - Pending Official Note    #Hx of Low Vitamin B12 level with normal MMA - Continue B12 1000mcg daily  #HTN - Continue with Nifedipine 30 mg qd; Holding HCTZ/Triamterene, on lasix  #Diverticulosis - High fiber diet to avoid constipation    Diet: Dash/ High Fiber  Activity: Ambulate as tolerated  DVT ppx: Heparin  GI ppx: Not indicated  Dispo: acute; From Home  FULL CODE Pt is a 89 year old female with a significant PMHx of Warm Autoimmune Hemolytic Anemia who presented with a cc/o generalized myalgia. Her muscle aches are associated with generalized weakness and inability to ambulate. She is currently admitted for suspected steroid-induced myalgia.     # Fluid overload; Cardiac vs. Renal  - On High flow nasal cannula.  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 Can start at 1mcg/hr . Check PTT 2 hrs after initiation of argatroban  Keep PTT between 50-70.   If PTT<50-increase infusion rate by 20%,   If PTT 50-80- No change  If PTT>80- hold infusion for 2 hours and then restart at 50% lower rate   Check HIT ab and EDWARD  Hold all heparin products       # Myalgia and decreased functional status possibly due to steroid-induced myalgia  - Patient taking steroids since last admission  - Leukocytosis likely due to steroid use; no physical symptoms to suggest infection at this time  - CXR with BL infiltrate consistent with fluid overload; cannot rule out PNA,   - Will continue ceftriaxone and azithromycin for now    Hx of CKD Stage IIIB - resolving 1.3 (<1.6<1.7 Admission)  - Baseline 1.3   - Slight worsening of renal function  - Monitor    # 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 Orantes)  - Continue prednisone 50mg   - Monitor Hb, Keep active type and screen  - High risk for clotting  - LE Duplex - Preliminary read Positive - Pending Official Note    #Hx of Low Vitamin B12 level with normal MMA - Continue B12 1000mcg daily  #HTN - Continue with Nifedipine 30 mg qd; Holding HCTZ/Triamterene, on lasix  #Diverticulosis - High fiber diet to avoid constipation    Diet: Dash/ High Fiber  Activity: Ambulate as tolerated  DVT ppx: Heparin  GI ppx: Not indicated  Dispo: acute; From Home pT rehab  FULL CODE

## 2019-11-30 LAB
ALBUMIN SERPL ELPH-MCNC: 2.6 G/DL — LOW (ref 3.5–5.2)
ALP SERPL-CCNC: 63 U/L — SIGNIFICANT CHANGE UP (ref 30–115)
ALT FLD-CCNC: 9 U/L — SIGNIFICANT CHANGE UP (ref 0–41)
ANION GAP SERPL CALC-SCNC: 14 MMOL/L — SIGNIFICANT CHANGE UP (ref 7–14)
APTT BLD: 23.8 SEC — CRITICAL LOW (ref 27–39.2)
APTT BLD: 28.1 SEC — SIGNIFICANT CHANGE UP (ref 27–39.2)
APTT BLD: 29.4 SEC — SIGNIFICANT CHANGE UP (ref 27–39.2)
APTT BLD: 30.8 SEC — SIGNIFICANT CHANGE UP (ref 27–39.2)
AST SERPL-CCNC: 11 U/L — SIGNIFICANT CHANGE UP (ref 0–41)
BASOPHILS # BLD AUTO: 0.01 K/UL — SIGNIFICANT CHANGE UP (ref 0–0.2)
BASOPHILS NFR BLD AUTO: 0.1 % — SIGNIFICANT CHANGE UP (ref 0–1)
BILIRUB SERPL-MCNC: 0.7 MG/DL — SIGNIFICANT CHANGE UP (ref 0.2–1.2)
BUN SERPL-MCNC: 35 MG/DL — HIGH (ref 10–20)
CALCIUM SERPL-MCNC: 9 MG/DL — SIGNIFICANT CHANGE UP (ref 8.5–10.1)
CHLORIDE SERPL-SCNC: 95 MMOL/L — LOW (ref 98–110)
CO2 SERPL-SCNC: 33 MMOL/L — HIGH (ref 17–32)
CREAT SERPL-MCNC: 1 MG/DL — SIGNIFICANT CHANGE UP (ref 0.7–1.5)
EOSINOPHIL # BLD AUTO: 0.04 K/UL — SIGNIFICANT CHANGE UP (ref 0–0.7)
EOSINOPHIL NFR BLD AUTO: 0.5 % — SIGNIFICANT CHANGE UP (ref 0–8)
GLUCOSE BLDC GLUCOMTR-MCNC: 153 MG/DL — HIGH (ref 70–99)
GLUCOSE BLDC GLUCOMTR-MCNC: 196 MG/DL — HIGH (ref 70–99)
GLUCOSE BLDC GLUCOMTR-MCNC: 308 MG/DL — HIGH (ref 70–99)
GLUCOSE BLDC GLUCOMTR-MCNC: 350 MG/DL — HIGH (ref 70–99)
GLUCOSE SERPL-MCNC: 143 MG/DL — HIGH (ref 70–99)
HCT VFR BLD CALC: 35.7 % — LOW (ref 37–47)
HEPARIN-PF4 AB RESULT: <0.6 U/ML — SIGNIFICANT CHANGE UP (ref 0–0.9)
HGB BLD-MCNC: 10.7 G/DL — LOW (ref 12–16)
IMM GRANULOCYTES NFR BLD AUTO: 6.3 % — HIGH (ref 0.1–0.3)
LYMPHOCYTES # BLD AUTO: 0.63 K/UL — LOW (ref 1.2–3.4)
LYMPHOCYTES # BLD AUTO: 7.3 % — LOW (ref 20.5–51.1)
MAGNESIUM SERPL-MCNC: 2.2 MG/DL — SIGNIFICANT CHANGE UP (ref 1.8–2.4)
MCHC RBC-ENTMCNC: 28.8 PG — SIGNIFICANT CHANGE UP (ref 27–31)
MCHC RBC-ENTMCNC: 30 G/DL — LOW (ref 32–37)
MCV RBC AUTO: 96.2 FL — SIGNIFICANT CHANGE UP (ref 81–99)
MONOCYTES # BLD AUTO: 0.26 K/UL — SIGNIFICANT CHANGE UP (ref 0.1–0.6)
MONOCYTES NFR BLD AUTO: 3 % — SIGNIFICANT CHANGE UP (ref 1.7–9.3)
NEUTROPHILS # BLD AUTO: 7.19 K/UL — HIGH (ref 1.4–6.5)
NEUTROPHILS NFR BLD AUTO: 82.8 % — HIGH (ref 42.2–75.2)
NRBC # BLD: 0 /100 WBCS — SIGNIFICANT CHANGE UP (ref 0–0)
PF4 HEPARIN CMPLX AB SER-ACNC: NEGATIVE — SIGNIFICANT CHANGE UP
PLATELET # BLD AUTO: 60 K/UL — LOW (ref 130–400)
POTASSIUM SERPL-MCNC: 3.2 MMOL/L — LOW (ref 3.5–5)
POTASSIUM SERPL-SCNC: 3.2 MMOL/L — LOW (ref 3.5–5)
PROT SERPL-MCNC: 4.3 G/DL — LOW (ref 6–8)
RBC # BLD: 3.71 M/UL — LOW (ref 4.2–5.4)
RBC # FLD: 15.9 % — HIGH (ref 11.5–14.5)
SODIUM SERPL-SCNC: 142 MMOL/L — SIGNIFICANT CHANGE UP (ref 135–146)
WBC # BLD: 8.68 K/UL — SIGNIFICANT CHANGE UP (ref 4.8–10.8)
WBC # FLD AUTO: 8.68 K/UL — SIGNIFICANT CHANGE UP (ref 4.8–10.8)

## 2019-11-30 PROCEDURE — 71045 X-RAY EXAM CHEST 1 VIEW: CPT | Mod: 26

## 2019-11-30 RX ORDER — INSULIN LISPRO 100/ML
2 VIAL (ML) SUBCUTANEOUS ONCE
Refills: 0 | Status: COMPLETED | OUTPATIENT
Start: 2019-11-30 | End: 2019-11-30

## 2019-11-30 RX ORDER — POTASSIUM CHLORIDE 20 MEQ
20 PACKET (EA) ORAL ONCE
Refills: 0 | Status: COMPLETED | OUTPATIENT
Start: 2019-11-30 | End: 2019-11-30

## 2019-11-30 RX ORDER — INSULIN LISPRO 100/ML
VIAL (ML) SUBCUTANEOUS
Refills: 0 | Status: DISCONTINUED | OUTPATIENT
Start: 2019-11-30 | End: 2019-12-11

## 2019-11-30 RX ADMIN — APIXABAN 10 MILLIGRAM(S): 2.5 TABLET, FILM COATED ORAL at 21:38

## 2019-11-30 RX ADMIN — Medication 30 MILLIGRAM(S): at 05:42

## 2019-11-30 RX ADMIN — Medication 1 MILLIGRAM(S): at 12:23

## 2019-11-30 RX ADMIN — CHLORHEXIDINE GLUCONATE 1 APPLICATION(S): 213 SOLUTION TOPICAL at 05:41

## 2019-11-30 RX ADMIN — Medication 50 MILLIGRAM(S): at 05:42

## 2019-11-30 RX ADMIN — Medication 4: at 17:16

## 2019-11-30 RX ADMIN — Medication 2 UNIT(S): at 12:22

## 2019-11-30 RX ADMIN — Medication 20 MILLIGRAM(S): at 05:42

## 2019-11-30 RX ADMIN — PREGABALIN 1000 MICROGRAM(S): 225 CAPSULE ORAL at 12:23

## 2019-11-30 RX ADMIN — APIXABAN 10 MILLIGRAM(S): 2.5 TABLET, FILM COATED ORAL at 12:23

## 2019-11-30 RX ADMIN — MEROPENEM 100 MILLIGRAM(S): 1 INJECTION INTRAVENOUS at 05:41

## 2019-11-30 RX ADMIN — Medication 20 MILLIEQUIVALENT(S): at 14:21

## 2019-11-30 RX ADMIN — MEROPENEM 100 MILLIGRAM(S): 1 INJECTION INTRAVENOUS at 17:17

## 2019-11-30 NOTE — PROGRESS NOTE ADULT - SUBJECTIVE AND OBJECTIVE BOX
Patient is a 89y old  Female who presents with a chief complaint of Fluid overload; steroid-induced myalgia (29 Nov 2019 17:02)      Subjective: She remains on high flow nasal cannula 30LPM. she has no major complaints, feels better today      Vital Signs Last 24 Hrs  T(C): 35.7 (30 Nov 2019 05:20), Max: 35.9 (29 Nov 2019 20:49)  T(F): 96.2 (30 Nov 2019 05:20), Max: 96.7 (29 Nov 2019 20:49)  HR: 71 (30 Nov 2019 05:20) (71 - 86)  BP: 107/61 (30 Nov 2019 05:20) (107/61 - 118/59)  BP(mean): --  RR: 18 (30 Nov 2019 05:20) (18 - 18)  SpO2: --    PHYSICAL EXAM  General: adult in respiratory distress, on HFNC  HEENT: clear oropharynx, anicteric sclera, pale conjunctiva  Neck: supple  CV: normal S1/S2   Lungs: positive air movement b/l ant lungs  Abdomen: soft non-tender non-distended  Ext: + edema  Skin: no rashes and no petechiae  Neuro: alert and oriented X 4, no focal deficits    MEDICATIONS  (STANDING):  apixaban 10 milliGRAM(s) Oral every 12 hours  chlorhexidine 4% Liquid 1 Application(s) Topical <User Schedule>  cyanocobalamin 1000 MICROGram(s) Oral daily  folic acid 1 milliGRAM(s) Oral daily  furosemide   Injectable 20 milliGRAM(s) IV Push daily  meropenem  IVPB 1000 milliGRAM(s) IV Intermittent every 12 hours  NIFEdipine XL 30 milliGRAM(s) Oral daily  potassium chloride  20 mEq/100 mL IVPB 20 milliEquivalent(s) IV Intermittent once  predniSONE   Tablet 50 milliGRAM(s) Oral daily    MEDICATIONS  (PRN):      LABS:                          10.7   8.68  )-----------( 60       ( 30 Nov 2019 05:43 )             35.7         Mean Cell Volume : 96.2 fL  Mean Cell Hemoglobin : 28.8 pg  Mean Cell Hemoglobin Concentration : 30.0 g/dL  Auto Neutrophil # : 7.19 K/uL  Auto Lymphocyte # : 0.63 K/uL  Auto Monocyte # : 0.26 K/uL  Auto Eosinophil # : 0.04 K/uL  Auto Basophil # : 0.01 K/uL  Auto Neutrophil % : 82.8 %  Auto Lymphocyte % : 7.3 %  Auto Monocyte % : 3.0 %  Auto Eosinophil % : 0.5 %  Auto Basophil % : 0.1 %      Serial CBC's  11-30 @ 05:43  Hct-35.7 / Hgb-10.7 / Plat-60 / RBC-3.71 / WBC-8.68  Serial CBC's  11-29 @ 06:40  Hct-37.9 / Hgb-11.5 / Plat-63 / RBC-3.93 / WBC-11.52  Serial CBC's  11-29 @ 01:32  Hct-32.3 / Hgb-9.8 / Plat-66 / RBC-3.35 / WBC-9.82  Serial CBC's  11-28 @ 06:51  Hct-36.5 / Hgb-11.3 / Plat-64 / RBC-3.82 / WBC-10.88  Serial CBC's  11-27 @ 08:04  Hct-38.4 / Hgb-11.7 / Plat-90 / RBC-3.95 / WBC-12.71  Serial CBC's  11-26 @ 13:30  Hct-39.1 / Hgb-12.1 / Plat-134 / RBC-4.09 / WBC-14.33      11-30    142  |  95<L>  |  35<H>  ----------------------------<  143<H>  3.2<L>   |  33<H>  |  1.0    Ca    9.0      30 Nov 2019 05:43  Mg     2.2     11-30    TPro  4.3<L>  /  Alb  2.6<L>  /  TBili  0.7  /  DBili  x   /  AST  11  /  ALT  9   /  AlkPhos  63  11-30      PT/INR - ( 29 Nov 2019 06:40 )   PT: 13.90 sec;   INR: 1.21 ratio         PTT - ( 30 Nov 2019 05:43 )  PTT:30.8 sec      RADIOLOGY & ADDITIONAL STUDIES:  < from: [facility] Duplex Lower Ext Vein Scan, Bilat (11.27.19 @ 22:23) >  Impression:    Deep venous thrombosis right posterior tibial vein.    Deep venous thrombosis left peroneal and soleal veins..    < end of copied text >

## 2019-11-30 NOTE — PROGRESS NOTE ADULT - ASSESSMENT
This is a 89 year old female with a significant PMHx of Warm Autoimmune Hemolytic Anemia who presented with a cc/o generalized myalgia. Her muscle aches are associated with generalized weakness and inability to ambulate. She is currently admitted for suspected steroid-induced myalgia.       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 CT imaging did not reveal malignancy. No folate deficiency.   Low vitamin B12 level with normal MMA level and is on PO B12 and her repeat B12 level is normal.   Hepatitis work up negative  c/w folic acid and Po Vit B12  Completed 4/4 dose of Rituxan  on Prednisone 50 mg daily- c/w same((as previous quick taper/low dose caused hemolysis again)- will try to taper next week)  Her Hb is stable     3) New bilateral LE DVT : On eliquis BID  4) Worsening thrombocytopenia:  Could be platelet consumption   - HIT panel negative  - She had low platelets outpt- that improved after stopping pepcid      5) Acute hypoxia 2/2 to Fluid overload/?PE (given that she has DVT)- on high flow NC - Pulm consult pending  Consider CTA to rule out PE    6) ANDREAS- improving   Echo showing severe pulHTN, Moderately enlarged right ventricle. Moderately reduced RV systolic function.  She probably has a PE too  On Iv lasix

## 2019-11-30 NOTE — PROGRESS NOTE ADULT - ASSESSMENT
SOB, generalized weakness due to fluid overload, exacerbation of Odalis CHF, fluid retention due to long term steroid therapy, CKD  BL DVT, Posssible PE  Worsening myalgia, probably steroid induced with worsening mobility dysfunction  Thombocytopenia  Hx of Warm IGG Hemolytic Anemia ( IGG +, C3 -), chronic steroid therapy, sp Rituxan  therapy  Hx of HTN, ASHD, Odalis CHF, TIA  Hx of CKD III  Hx of OA, DDD, DJD, mobility dysfunction, ambulates with walker  Hx of GERD, diverticulosis, ch constipataion  Advanced age    pt has elevated BNP of 4500 with GFR of 28 which is improving to 49,  CXR shows inc vascular markings and opacities, started Lasix 20mg IV daily, O2 2L, NC, check pulse Ox  monitor electrolytes and CBC    Venous doppler of lower ext:  preliminary report +, pt was on sq heparin then about 24 hrs on IV heparin noted to have dec PLTS 134, 90, 64, ? HIT, D/C all heparin products,  NB pt had previously dec PLTS which improved when Pepcid was D/C    ECHO:  EF 55-60%, tr MR, severe AS/mild AI, GIDD, severe Pul HTN  trend troponins , 0.02  monitor CBC, Plts 132, 90, 60, hold all heparin products ? HIT  cont prednisone 50mg as per Hem-Onc  Hem-Onc consult Dr Mejias    fall precautions  aspiration precautions  Vasc surgical consult for BL DVT on Eliquis    Pul consult:  no need for CT angio as it would not change the tx even if +, pt already on Eliquis, will need at least 6mos of tx, check mycoplasma titers, urine Legionella Ag    guarded state given pt advanced age, frail state and multiple complex medical issues

## 2019-11-30 NOTE — CONSULT NOTE ADULT - ATTENDING COMMENTS
Attending Statement: I have personally performed a face to face diagnostic evaluation on this patient and have arrived at the suggested plan of care. I have written all aspects of the above note.
This is a 89 year old female who I have been treating for AIHA she completed  4 weekly treatment of Rituxan and has been on steroids taper, was on 80 mg daily and last week was on 60 mg. I tried to taper her steroids quickly earlier in her course prior to completion of RItuxan but she had worsening of hemolysis and hence had to increase her steroids. She also had an episode of severe thrombocytopenia as outpatient that resolved once I stopped her pepcid.  She had an extensive work up for secondary causes of her AIHA that was negative.    She was to come for blood work yesterday but family called me and told me she could not get up, weakness mainly in lower extremities so I asked her to come to ED. I saw her in ED she had no other complaints Her HgB and platelet were normal. She was also noted to be in ANDREAS. She had edema in feet which was new.  She was dehydrated based on labs. I spoke to ED and to medical team and asked them to decrease steroids to 50 mg weekly now since Hgb was 12.  She had abdominal pain that resolved, she had CT abd that showed maybe  gall stone  but US RUQ was negative. She was not short of breath even today but overnight she had desaturation and is now on high flow oxygen. CXR showed congestion and Serum Pro BNP was 7429 suggesting heart failure.     On exam in ED today there are some cackles at base, and her edema is better.  Murmur on exam.      Plan:  #AIHA c/w prednisone 50 mg  until next Tuesday if HgB is stable than will decrease to 40 mg daily for a week. She had a relapse when I tried to taper of steroids quicker last time   -c/w folic acid and vit B12 PO  -LDH came back high but was hemolyzed, her bili is normal   -monitor CBC    #Acute mild thrombocytopenia  today, was normal yesterday, maybe dilutional?  -just monitor at this point  -Avoid PEPCID it most likely caused her thrombocytopenia in the past     #Hypoxia and Tachycardia now on high flow nasal canula  -suspect due to fluid overload from CHF?  -lasix  is ordered  -TTE is pending  -can check Lower extremity dopplers since AIHA patients have a higher risk of thromboembolism   -would start Heparin SC for DVT ppx, if GFR goes up can switch to Lovenox than    #ANDREAS suspect was due to dehydration  -stable    #Edema from fluid retention due to CHF or ANDREAS?  -better today    Monitor CBC and CMP

## 2019-11-30 NOTE — PROGRESS NOTE ADULT - SUBJECTIVE AND OBJECTIVE BOX
RITA RAMOS 89y Female from home brought by family to the ER for c/o generalized weakness, SOB and inc difficulty ambulating x 3 days du to generalized muscke pain.  The pt at baseline isable to ambulate with walker but in the last few days has been unable to get out of be due to severe musculoskeletal pain.  Of note the pt has Hx of  warm autoimmune hemlytic anemia and has been ofn prolonged steroid  tx  and has had  Rutixan tx as well.  The pt was noted to be fluid overloaded with a BNP of 4500. There is also the question of PNA  and PE.   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 There is also the ques of PNA and PE given the lower ext DVT.  The pt is being evaluated by Hem-Onc Dr Mejias.  The PMHx includes:  HTN, ASHD, TIA, CKD III, Warm IGG Hemolytic Anemia, OA, DDD, DJD, mobility dysfunction, GERD, diverticulosis, ch constipation.  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 possibility of PE given the SOB, quick desaturation off O2 and + DVT, ECHO shows EF 55-60%, GIDD, severe AS/mild AI, severe Pul HTN    Overnight events:  pt pt  remains  weak and req O2, desaturates to low 70s off O2, on Eliquis, Pul states even with possibility of PE, pt already on Eliquis and CT angio would not change the management, Plts remain low at 60, H/h 10.7/35.7, GFR improving 28 to 44    MEDICATIONS  (STANDING):  chlorhexidine 4% Liquid 1 Application(s) Topical <User Schedule>  cyanocobalamin 1000 MICROGram(s) Oral daily  folic acid 1 milliGRAM(s) Oral daily  furosemide   Injectable 20 milliGRAM(s) IV Push daily  NIFEdipine XL 30 milliGRAM(s) Oral daily  predniSONE   Tablet 50 milliGRAM(s) Oral daily  Eliquis 10mg q 12 x 7 days then 5mg q 12  MEDICATIONS  (PRN):      Allergies    aspirin (Unknown)  Cipro (Unknown)  codeine (Unknown)  dicyclomine (Unknown)  Diovan (Unknown)  Flagyl (Unknown)  Librax (Unknown)  metronidazole (Unknown)  penicillin (Unknown)  Prevacid (Unknown)  trimethobenzamide (Unknown)  	    Vital Signs Last 24 Hrs    T(F): 96.2  HR: 71  BP: 107/61    RR: 18  SpO2: 97% , off O2 desat to 71%    PHYSICAL EXAM:      Constitutional:  elderly, pale and frail and fragile,  chronically looking WF, + O2NC, NAD, temporal wasting    Eyes:  nonicteric    ENMT:  dental defects    Neck:  supple, + JVD, no bruits    Back:  + Kyphosis    Respiratory:  dec BS, shallow respirations, bibasilar crackles    Cardiovascular:  S1S2 tachy    Gastrointestinal: globose soft and benign    Genitourinary:    Extremities: moves all ext, + arthritic changes, + edema        LABS:                        10.7  8.6 )-----------(60  (134, 90, 63)             37.9    142 |  95  |  35  ----------------------------<  143  3.2   |  33|  1.0  GFR  28...49  Ca    8.4<L>       Phos  3.4     11-27  Mg     2.1     11-27  troponin 0.02  BNP  4500    LA 4.0     TPro  4.6 /  Alb  3.1, 2.8  /  TBili  1.0  /  DBili  0.4<H>  /  AST  14  /  ALT  13  /  AlkPhos  73  11-26    PT/INR - ( 2019 13:30 )   PT: 13.10 sec;   INR: 1.14 ratio         PTT - ( 2019 13:30 )  PTT:21.6 sec  Urinalysis Basic - ( 2019 16:20 )    Color: Yellow / Appearance: Clear / S.020 / pH: x  Gluc: x / Ketone: Negative  / Bili: Negative / Urobili: <2 mg/dL   Blood: x / Protein: 30 mg/dL / Nitrite: Negative   Leuk Esterase: Negative / RBC: 10 /HPF / WBC 6 /HPF   Sq Epi: x / Non Sq Epi: 5 /HPF / Bacteria: Few        RADIOLOGY & ADDITIONAL TESTS:  CXR  inc vasc markings, cardiomegaly  EKG sinus tach 121/min QTc 462    CT of abd:  bibasilar atelectasis, stable hepatic cyst,  calcification n the sybil hepatis in the area of the GB neck, stable  side branch pancreatic IPMN, no pancreatic constanza dilatation    Sono of abd:  no cholelithiases    Venous doppler of lower ext:  + DVT of  R posterior tibial vein, + DVT of  L peroneal and soleal veins    ECHO:  LVEF 55-60%, , mild LV wall thickening, GIDD, moderate enlarged R ventricle, , moderate reduced LVS sys function, , trace MR, severe AS, mild AI, inc pul a pressure 89.9 c/w severe Pul HTN RITA RAMOS 89y Female from home brought by family to the ER for c/o generalized weakness, SOB and inc difficulty ambulating x 3 days du to generalized muscke pain.  The pt at baseline isable to ambulate with walker but in the last few days has been unable to get out of be due to severe musculoskeletal pain.  Of note the pt has Hx of  warm autoimmune hemlytic anemia and has been ofn prolonged steroid  tx  and has had  Rutixan tx as well.  The pt was noted to be fluid overloaded with a BNP of 4500. There is also the question of PNA  and PE.   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 There is also the ques of PNA and PE given the lower ext DVT.  The pt is being evaluated by Hem-Onc Dr Mejias.  The PMHx includes:  HTN, ASHD, TIA, CKD III, Warm IGG Hemolytic Anemia, OA, DDD, DJD, mobility dysfunction, GERD, diverticulosis, ch constipation.  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 possibility of PE given the SOB, quick desaturation off O2 and + DVT, ECHO shows EF 55-60%, GIDD, severe AS/mild AI, severe Pul HTN    Overnight events:  pt pt  remains  weak and req O2, desaturates to low 70s off O2, on Eliquis, Pul states even with possibility of PE, pt already on Eliquis and CT angio would not change the management, Plts remain low at 60, H/h 10.7/35.7, GFR improving 28 to 44    MEDICATIONS  (STANDING):  meropenem 1gm q 12  chlorhexidine 4% Liquid 1 Application(s) Topical <User Schedule>  cyanocobalamin 1000 MICROGram(s) Oral daily  folic acid 1 milliGRAM(s) Oral daily  furosemide   Injectable 20 milliGRAM(s) IV Push daily  NIFEdipine XL 30 milliGRAM(s) Oral daily  predniSONE   Tablet 50 milliGRAM(s) Oral daily  Eliquis 10mg q 12 x 7 days then 5mg q 12  MEDICATIONS  (PRN):      Allergies    aspirin (Unknown)  Cipro (Unknown)  codeine (Unknown)  dicyclomine (Unknown)  Diovan (Unknown)  Flagyl (Unknown)  Librax (Unknown)  metronidazole (Unknown)  penicillin (Unknown)  Prevacid (Unknown)  trimethobenzamide (Unknown)  	    Vital Signs Last 24 Hrs    T(F): 96.2  HR: 71  BP: 107/61    RR: 18  SpO2: 97% , off O2 desat to 71%    PHYSICAL EXAM:      Constitutional:  elderly, pale and frail and fragile,  chronically looking WF, + O2NC, NAD, temporal wasting    Eyes:  nonicteric    ENMT:  dental defects    Neck:  supple, + JVD, no bruits    Back:  + Kyphosis    Respiratory:  dec BS, shallow respirations, bibasilar crackles    Cardiovascular:  S1S2 tachy    Gastrointestinal: globose soft and benign    Genitourinary:    Extremities: moves all ext, + arthritic changes, + edema        LABS:                        10.7  8.6 )-----------(60  (134, 90, 63)             37.9    142 |  95  |  35  ----------------------------<  143  3.2   |  33|  1.0  GFR  28...49  Ca    8.4<L>       Phos  3.4     11-27  Mg     2.1     11-27  troponin 0.02  BNP  4500    LA 4.0     TPro  4.6 /  Alb  3.1, 2.8  /  TBili  1.0  /  DBili  0.4<H>  /  AST  14  /  ALT  13  /  AlkPhos  73  11-26    PT/INR - ( 2019 13:30 )   PT: 13.10 sec;   INR: 1.14 ratio         PTT - ( 2019 13:30 )  PTT:21.6 sec  Urinalysis Basic - ( 2019 16:20 )    Color: Yellow / Appearance: Clear / S.020 / pH: x  Gluc: x / Ketone: Negative  / Bili: Negative / Urobili: <2 mg/dL   Blood: x / Protein: 30 mg/dL / Nitrite: Negative   Leuk Esterase: Negative / RBC: 10 /HPF / WBC 6 /HPF   Sq Epi: x / Non Sq Epi: 5 /HPF / Bacteria: Few        RADIOLOGY & ADDITIONAL TESTS:  CXR  inc vasc markings, cardiomegaly  EKG sinus tach 121/min QTc 462    CT of abd:  bibasilar atelectasis, stable hepatic cyst,  calcification n the sybil hepatis in the area of the GB neck, stable  side branch pancreatic IPMN, no pancreatic constanza dilatation    Sono of abd:  no cholelithiases    Venous doppler of lower ext:  + DVT of  R posterior tibial vein, + DVT of  L peroneal and soleal veins    ECHO:  LVEF 55-60%, , mild LV wall thickening, GIDD, moderate enlarged R ventricle, , moderate reduced LVS sys function, , trace MR, severe AS, mild AI, inc pul a pressure 89.9 c/w severe Pul HTN

## 2019-11-30 NOTE — CONSULT NOTE ADULT - SUBJECTIVE AND OBJECTIVE BOX
[id number]    HPI:  This is a 89 year old female with a significant PMHx of Warm Autoimmune Hemolytic Anemia who presented with a cc/o generalized myalgia. Her muscle aches are associated with generalized weakness and inability to ambulate x3 days. At baseline, the patient is able to walk with a walker, however, was unable to get out of bed on the day of presentation. She was recently admitted for hemolytic anemia, requiring multiple transfusions. As a result, per the daughter, due to concern of recurrence given no recent blood work, she was brought to the ED for further evaluation. ROS was otherwise negative.     In the ED, the patient VS were WNL. Labs significant for leukocytosis and mildly decreased renal function from baseline. CXR was significant for pulmonary infiltrate BL, suspicious for fluid overload and cannot rule out PNA. CT A/P was performed with incidental finding of small calcification in the region of the sybil hepatis. (26 Nov 2019 18:04)      CC/ HPI Patient is a 89y old  Female who presents with a chief complaint of Fluid overload; steroid-induced myalgia (30 Nov 2019 09:34)      GENARLIZED WEAKNESS  ^GENARLIZED WEAKNESS    No family history of cancer    Chronic kidney disease, unspecified CKD stage  TIA (transient ischemic attack)  Diverticulitis  Constipation  Hypertension  Generalized weakness  No significant past surgical history  Opacity noted on imaging study          FAMILY HISTORY:  No family history of cancer or cardiac disease      aspirin (Unknown)  Cipro (Unknown)  codeine (Unknown)  dicyclomine (Unknown)  Diovan (Unknown)  Flagyl (Unknown)  Librax (Unknown)  metronidazole (Unknown)  penicillin (Unknown)  Prevacid (Unknown)  trimethobenzamide (Unknown)        Marital Status:  (   )    (   ) Single    (   )    ( x )   Occupation:   Lives with: (  ) alone  (  ) children   (  ) spouse   (  ) parents  ( x ) other  Recent Travel:     Substance Use (street drugs): ( x ) never used  (  ) other:  Tobacco Usage:  ( x  ) never smoked   (   ) former smoker   (   ) current smoker  (     ) pack year  Alcohol Usage:        Home prescriptions  B-12 1000 mcg oral tablet: 1 tab(s) orally once a day  folic acid 1 mg oral tablet: 1 tab(s) orally once a day  NIFEdipine 30 mg oral tablet, extended release: 1 tab(s) orally once a day  predniSONE 50 mg oral tablet: 1 tab(s) orally once a day  triamterene-hydrochlorothiazide 37.5 mg- 25 mg oral capsule: 1 cap(s) orally once a day          MEDICATIONS  (STANDING):  apixaban 10 milliGRAM(s) Oral every 12 hours  chlorhexidine 4% Liquid 1 Application(s) Topical <User Schedule>  cyanocobalamin 1000 MICROGram(s) Oral daily  folic acid 1 milliGRAM(s) Oral daily  furosemide   Injectable 20 milliGRAM(s) IV Push daily  insulin lispro (HumaLOG) corrective regimen sliding scale   SubCutaneous three times a day before meals  meropenem  IVPB 1000 milliGRAM(s) IV Intermittent every 12 hours  NIFEdipine XL 30 milliGRAM(s) Oral daily  potassium chloride   Powder 20 milliEquivalent(s) Oral once  predniSONE   Tablet 50 milliGRAM(s) Oral daily    MEDICATIONS  (PRN):      sodium chloride 0.9% Bolus:   1000 milliLiter(s), IV Bolus, once, infuse over 1 Hour(s), Stop After 1 Doses      T(C): 35.7 (11-30-19 @ 05:20), Max: 35.9 (11-29-19 @ 20:49)  HR: 71 (11-30-19 @ 05:20) (71 - 80)  BP: 107/61 (11-30-19 @ 05:20) (107/61 - 114/58)  RR: 18 (11-30-19 @ 05:20) (18 - 18)  ICU Vital Signs Last 24 Hrs  T(C): 35.7 (30 Nov 2019 05:20), Max: 35.9 (29 Nov 2019 20:49)  T(F): 96.2 (30 Nov 2019 05:20), Max: 96.7 (29 Nov 2019 20:49)  HR: 71 (30 Nov 2019 05:20) (71 - 80)  BP: 107/61 (30 Nov 2019 05:20) (107/61 - 114/58)    RR: 18 (30 Nov 2019 05:20) (18 - 18)        I&O's Summary    29 Nov 2019 07:01  -  30 Nov 2019 07:00  --------------------------------------------------------  IN: 0 mL / OUT: 700 mL / NET: -700 mL        I&O's Detail    29 Nov 2019 07:01  -  30 Nov 2019 07:00  --------------------------------------------------------  IN:  Total IN: 0 mL    OUT:    Voided: 700 mL  Total OUT: 700 mL    Total NET: -700 mL          Drug Dosing Weight  Height (cm): 149.86 (29 Nov 2019 16:08)  Weight (kg): 50.802 (27 Nov 2019 22:20)  BMI (kg/m2): 22.6 (29 Nov 2019 16:08)  BSA (m2): 1.44 (29 Nov 2019 16:08)    LABS:                          10.7   8.68  )-----------( 60       ( 30 Nov 2019 05:43 )             35.7       11-30    142  |  95<L>  |  35<H>  ----------------------------<  143<H>  3.2<L>   |  33<H>  |  1.0    Ca    9.0      30 Nov 2019 05:43  Mg     2.2     11-30    TPro  4.3<L>  /  Alb  2.6<L>  /  TBili  0.7  /  DBili  x   /  AST  11  /  ALT  9   /  AlkPhos  63  11-30      LIVER FUNCTIONS - ( 30 Nov 2019 05:43 )  Alb: 2.6 g/dL / Pro: 4.3 g/dL / ALK PHOS: 63 U/L / ALT: 9 U/L / AST: 11 U/L / GGT: x                 meropenem  IVPB 1000 milliGRAM(s) IV Intermittent every 12 hours      ABG - ( 29 Nov 2019 15:07 )  pH, Arterial: 7.55  pH, Blood: x     /  pCO2: 40    /  pO2: 122   / HCO3: 35    / Base Excess: 11.6  /  SaO2: 100             RADIOLOGY:  I have personally reviewed all chest and other pertinent radiology films.      REVIEW OF SYSTEMS:     · CONSTITUTIONAL:   no fever   no chills.  no weight gain   +weight loss    · EYES:   no discharge,   no pain,       · ENMT:   Ears: no ear pain and no hearing problems.  Nose: no nasal congestion and no nasal drainage.  Mouth/Throat: no dysphagia,  no hoarseness and no throat pain.      · CARDIOVASCULAR:   no chest pain,   +swelling      · RESPIRATORY:  +SOB,  +wheezing ,  +respiratory difficulty  no sputum production    · GASTROINTESTINAL:   no abdominal pain, ,   no nausea   no vomiting.    · GENITOURINARY:  no dysuria,   no frequency,       · MUSCULOSKELETAL:   no back pain,   no neck pain,   +weakness.    · SKIN:   no abrasions,   no rashes.    · NEURO:   no loss of consciousness,     +weakness.    · PSYCHIATRIC:   no known mental health issues  no anxiety  no depression          ALLERGIC/IMMUNOLOGIC:   No active allergic or immunologic issues    all other systems are negative        PHYSICAL EXAM:     · CONSTITUTIONAL:   Ill appearing,   well nourished,   NAD    · ENMT:   Airway patent,   Nasal mucosa clear.  Mouth with normal mucosa.     No thrush    · EYES:   Clear bilaterally,   pupils equal,   round and reactive to light.    · CARDIAC:   Normal rate,   regular rhythm.    Heart sounds S1, S2.   no thrills or bruits on palpitation  normal  cardiac impulse  normal palpable location of the heart   No murmurs, no rubs or gallops on auscultation  +edema        CAROTID:   normal systolic impulse  no bruits    · RESPIRATORY:   +rales  normal chest expansion  +tachypneic,  no retractions or use of accessory muscles      · GASTROINTESTINAL:  Abdomen soft,   non-tender,       GENITOURINARY  +edema    · MUSCULOSKELETAL:   no clubbing, cyanosis  no petechiae    · NEUROLOGICAL:   Alert and oriented   no obvious focal deficits in cranial nerve areas  no motor or sensory deficits.      · SKIN:   Skin normal color for race,   warm,   dry and intact.       · PSYCHIATRIC:   Alert and oriented to person,   place, time/situation.   normal mood and affect.     · HEME LYMPH:   no splenomegaly.  No cervical  lymphadenopathy.

## 2019-11-30 NOTE — PHYSICAL THERAPY INITIAL EVALUATION ADULT - PERTINENT HX OF CURRENT PROBLEM, REHAB EVAL
89y Female from home brought by family to the ER for c/o generalized weakness, SOB and inc difficulty ambulating x 3 days du to generalized muscke pain.  The pt at baseline isable to ambulate with walker but in the last few days has been unable to get out of be due to severe musculoskeletal pain. pt had + B/L DVT and was started on IV heparin,

## 2019-11-30 NOTE — PHYSICAL THERAPY INITIAL EVALUATION ADULT - GENERAL OBSERVATIONS, REHAB EVAL
16:15-16:45. chart reviewed. Pt received semi-willson at B/S, alert, oriented, able to follow multi-step instructions and agreeable to PT evaluation. + IV, high flow 20 L O2, + sequentials SPO2 91% On O2. stable vitals. NAD.

## 2019-12-01 LAB
ALBUMIN SERPL ELPH-MCNC: 2.4 G/DL — LOW (ref 3.5–5.2)
ALP SERPL-CCNC: 59 U/L — SIGNIFICANT CHANGE UP (ref 30–115)
ALT FLD-CCNC: 9 U/L — SIGNIFICANT CHANGE UP (ref 0–41)
ANION GAP SERPL CALC-SCNC: 13 MMOL/L — SIGNIFICANT CHANGE UP (ref 7–14)
ANION GAP SERPL CALC-SCNC: 13 MMOL/L — SIGNIFICANT CHANGE UP (ref 7–14)
APTT BLD: 28.7 SEC — SIGNIFICANT CHANGE UP (ref 27–39.2)
AST SERPL-CCNC: 13 U/L — SIGNIFICANT CHANGE UP (ref 0–41)
BASOPHILS # BLD AUTO: 0.02 K/UL — SIGNIFICANT CHANGE UP (ref 0–0.2)
BASOPHILS NFR BLD AUTO: 0.2 % — SIGNIFICANT CHANGE UP (ref 0–1)
BILIRUB SERPL-MCNC: 0.8 MG/DL — SIGNIFICANT CHANGE UP (ref 0.2–1.2)
BUN SERPL-MCNC: 34 MG/DL — HIGH (ref 10–20)
BUN SERPL-MCNC: 38 MG/DL — HIGH (ref 10–20)
CALCIUM SERPL-MCNC: 8.7 MG/DL — SIGNIFICANT CHANGE UP (ref 8.5–10.1)
CALCIUM SERPL-MCNC: 9.2 MG/DL — SIGNIFICANT CHANGE UP (ref 8.5–10.1)
CHLORIDE SERPL-SCNC: 93 MMOL/L — LOW (ref 98–110)
CHLORIDE SERPL-SCNC: 95 MMOL/L — LOW (ref 98–110)
CO2 SERPL-SCNC: 32 MMOL/L — SIGNIFICANT CHANGE UP (ref 17–32)
CO2 SERPL-SCNC: 35 MMOL/L — HIGH (ref 17–32)
CREAT SERPL-MCNC: 0.8 MG/DL — SIGNIFICANT CHANGE UP (ref 0.7–1.5)
CREAT SERPL-MCNC: 0.9 MG/DL — SIGNIFICANT CHANGE UP (ref 0.7–1.5)
EOSINOPHIL # BLD AUTO: 0.03 K/UL — SIGNIFICANT CHANGE UP (ref 0–0.7)
EOSINOPHIL NFR BLD AUTO: 0.3 % — SIGNIFICANT CHANGE UP (ref 0–8)
GLUCOSE BLDC GLUCOMTR-MCNC: 165 MG/DL — HIGH (ref 70–99)
GLUCOSE BLDC GLUCOMTR-MCNC: 179 MG/DL — HIGH (ref 70–99)
GLUCOSE BLDC GLUCOMTR-MCNC: 250 MG/DL — HIGH (ref 70–99)
GLUCOSE BLDC GLUCOMTR-MCNC: 252 MG/DL — HIGH (ref 70–99)
GLUCOSE SERPL-MCNC: 143 MG/DL — HIGH (ref 70–99)
GLUCOSE SERPL-MCNC: 252 MG/DL — HIGH (ref 70–99)
HCT VFR BLD CALC: 34.5 % — LOW (ref 37–47)
HGB BLD-MCNC: 11 G/DL — LOW (ref 12–16)
IMM GRANULOCYTES NFR BLD AUTO: 5.3 % — HIGH (ref 0.1–0.3)
LYMPHOCYTES # BLD AUTO: 0.69 K/UL — LOW (ref 1.2–3.4)
LYMPHOCYTES # BLD AUTO: 7.5 % — LOW (ref 20.5–51.1)
MAGNESIUM SERPL-MCNC: 2 MG/DL — SIGNIFICANT CHANGE UP (ref 1.8–2.4)
MCHC RBC-ENTMCNC: 29.7 PG — SIGNIFICANT CHANGE UP (ref 27–31)
MCHC RBC-ENTMCNC: 31.9 G/DL — LOW (ref 32–37)
MCV RBC AUTO: 93.2 FL — SIGNIFICANT CHANGE UP (ref 81–99)
MONOCYTES # BLD AUTO: 0.38 K/UL — SIGNIFICANT CHANGE UP (ref 0.1–0.6)
MONOCYTES NFR BLD AUTO: 4.1 % — SIGNIFICANT CHANGE UP (ref 1.7–9.3)
NEUTROPHILS # BLD AUTO: 7.63 K/UL — HIGH (ref 1.4–6.5)
NEUTROPHILS NFR BLD AUTO: 82.6 % — HIGH (ref 42.2–75.2)
NRBC # BLD: 0 /100 WBCS — SIGNIFICANT CHANGE UP (ref 0–0)
PLATELET # BLD AUTO: 70 K/UL — LOW (ref 130–400)
POTASSIUM SERPL-MCNC: 3.1 MMOL/L — LOW (ref 3.5–5)
POTASSIUM SERPL-MCNC: 3.7 MMOL/L — SIGNIFICANT CHANGE UP (ref 3.5–5)
POTASSIUM SERPL-SCNC: 3.1 MMOL/L — LOW (ref 3.5–5)
POTASSIUM SERPL-SCNC: 3.7 MMOL/L — SIGNIFICANT CHANGE UP (ref 3.5–5)
PROT SERPL-MCNC: 4.3 G/DL — LOW (ref 6–8)
RBC # BLD: 3.7 M/UL — LOW (ref 4.2–5.4)
RBC # FLD: 15.5 % — HIGH (ref 11.5–14.5)
SODIUM SERPL-SCNC: 140 MMOL/L — SIGNIFICANT CHANGE UP (ref 135–146)
SODIUM SERPL-SCNC: 141 MMOL/L — SIGNIFICANT CHANGE UP (ref 135–146)
WBC # BLD: 9.24 K/UL — SIGNIFICANT CHANGE UP (ref 4.8–10.8)
WBC # FLD AUTO: 9.24 K/UL — SIGNIFICANT CHANGE UP (ref 4.8–10.8)

## 2019-12-01 RX ORDER — SENNA PLUS 8.6 MG/1
2 TABLET ORAL AT BEDTIME
Refills: 0 | Status: DISCONTINUED | OUTPATIENT
Start: 2019-12-01 | End: 2019-12-11

## 2019-12-01 RX ORDER — POTASSIUM CHLORIDE 20 MEQ
20 PACKET (EA) ORAL
Refills: 0 | Status: COMPLETED | OUTPATIENT
Start: 2019-12-01 | End: 2019-12-01

## 2019-12-01 RX ORDER — POTASSIUM CHLORIDE 20 MEQ
20 PACKET (EA) ORAL
Refills: 0 | Status: DISCONTINUED | OUTPATIENT
Start: 2019-12-01 | End: 2019-12-01

## 2019-12-01 RX ORDER — POLYETHYLENE GLYCOL 3350 17 G/17G
17 POWDER, FOR SOLUTION ORAL
Refills: 0 | Status: DISCONTINUED | OUTPATIENT
Start: 2019-12-01 | End: 2019-12-11

## 2019-12-01 RX ADMIN — POLYETHYLENE GLYCOL 3350 17 GRAM(S): 17 POWDER, FOR SOLUTION ORAL at 17:56

## 2019-12-01 RX ADMIN — PREGABALIN 1000 MICROGRAM(S): 225 CAPSULE ORAL at 11:51

## 2019-12-01 RX ADMIN — Medication 20 MILLIGRAM(S): at 05:20

## 2019-12-01 RX ADMIN — MEROPENEM 100 MILLIGRAM(S): 1 INJECTION INTRAVENOUS at 17:44

## 2019-12-01 RX ADMIN — SENNA PLUS 2 TABLET(S): 8.6 TABLET ORAL at 21:41

## 2019-12-01 RX ADMIN — Medication 30 MILLIGRAM(S): at 05:20

## 2019-12-01 RX ADMIN — POLYETHYLENE GLYCOL 3350 17 GRAM(S): 17 POWDER, FOR SOLUTION ORAL at 11:51

## 2019-12-01 RX ADMIN — Medication 1: at 08:11

## 2019-12-01 RX ADMIN — APIXABAN 10 MILLIGRAM(S): 2.5 TABLET, FILM COATED ORAL at 10:25

## 2019-12-01 RX ADMIN — Medication 20 MILLIEQUIVALENT(S): at 11:52

## 2019-12-01 RX ADMIN — Medication 1 MILLIGRAM(S): at 11:51

## 2019-12-01 RX ADMIN — Medication 3: at 11:51

## 2019-12-01 RX ADMIN — Medication 50 MILLIGRAM(S): at 05:20

## 2019-12-01 RX ADMIN — Medication 20 MILLIEQUIVALENT(S): at 17:46

## 2019-12-01 RX ADMIN — MEROPENEM 100 MILLIGRAM(S): 1 INJECTION INTRAVENOUS at 05:16

## 2019-12-01 RX ADMIN — APIXABAN 10 MILLIGRAM(S): 2.5 TABLET, FILM COATED ORAL at 21:41

## 2019-12-01 RX ADMIN — CHLORHEXIDINE GLUCONATE 1 APPLICATION(S): 213 SOLUTION TOPICAL at 05:17

## 2019-12-01 RX ADMIN — Medication 2: at 17:44

## 2019-12-01 NOTE — PROGRESS NOTE ADULT - SUBJECTIVE AND OBJECTIVE BOX
SUBJECTIVE:  Patient is a 89y old Female who presents with a chief complaint of Fluid overload; steroid-induced myalgia, hemolytic anemia, thrombocytopenia (28 Nov 2019 14:52)    Currently admitted to medicine with the primary diagnosis of Generalized weakness     Today is hospital day 5d. This morning she is resting comfortably in bed on high flow 50% oxygen. Feels improved since admission, but still not well overall  Pt. denies HA, Cp, Abd Pain or discomfort.  Pt. is urinating but is not passing stool      Diego Epstein - [phone]  PAST MEDICAL & SURGICAL HISTORY  Chronic kidney disease, unspecified CKD stage: Stage IIIB  TIA (transient ischemic attack)  Diverticulitis  Constipation  Hypertension  No significant past surgical history    SOCIAL HISTORY:  Negative for smoking/alcohol/drug use.     ALLERGIES:  aspirin (Unknown)  Cipro (Unknown)  codeine (Unknown)  dicyclomine (Unknown)  Diovan (Unknown)  Flagyl (Unknown)  Librax (Unknown)  metronidazole (Unknown)  penicillin (Unknown)  Prevacid (Unknown)  trimethobenzamide (Unknown)    MEDICATIONS:  STANDING MEDICATIONS  apixaban 10 milliGRAM(s) Oral every 12 hours  chlorhexidine 4% Liquid 1 Application(s) Topical <User Schedule>  cyanocobalamin 1000 MICROGram(s) Oral daily  folic acid 1 milliGRAM(s) Oral daily  furosemide   Injectable 20 milliGRAM(s) IV Push daily  insulin lispro (HumaLOG) corrective regimen sliding scale   SubCutaneous three times a day before meals  meropenem  IVPB 1000 milliGRAM(s) IV Intermittent every 12 hours  NIFEdipine XL 30 milliGRAM(s) Oral daily  potassium chloride   Powder 20 milliEquivalent(s) Oral every 2 hours  predniSONE   Tablet 50 milliGRAM(s) Oral daily    PRN MEDICATIONS    VITALS:   T(F): 96.9  HR: 82 (78 - 89)  BP: 120/58 (114/55 - 120/58)  RR: 18 (18 - 18)  SpO2: 94% (94% - 94%)    LABS:                        11.0   9.24  )-----------( 70       ( 01 Dec 2019 05:45 )             34.5     12-01    140  |  95<L>  |  34<H>  ----------------------------<  143<H>  3.1<L>   |  32  |  0.8    Ca    8.7      01 Dec 2019 05:45  Mg     2.0     12-01    TPro  4.3<L>  /  Alb  2.4<L>  /  TBili  0.8  /  DBili  x   /  AST  13  /  ALT  9   /  AlkPhos  59  12-01    PTT - ( 01 Dec 2019 05:45 )  PTT:28.7 sec    ABG - ( 29 Nov 2019 15:07 )  pH, Arterial: 7.55  pH, Blood: x     /  pCO2: 40    /  pO2: 122   / HCO3: 35    / Base Excess: 11.6  /  SaO2: 100       RADIOLOGY:    PHYSICAL EXAM:  GEN: In no acute distress. Pt. is awake in bed able to have a conversation.  LUNGS: CTABL, Symmetrical inspiration, no increased work of breathing  HEART: +S1,S2, RRR, No murmurs, Rubs, Gallops   ABD: Bowel Sounds Present, Soft, non tender, non distended, no guarding, no rebound.   EXT: 2+ peripheral Pulses, no clubbing, no cyanosis, no Edema.   NEURO: AAOX3. No focal deficits. CN 2-12 Grossly intact. SUBJECTIVE:  Patient is a 89y old Female who presents with a chief complaint of Fluid overload; steroid-induced myalgia, hemolytic anemia, thrombocytopenia (28 Nov 2019 14:52)    Currently admitted to medicine with the primary diagnosis of Generalized weakness     Today is hospital day 5d. This morning she is resting comfortably in bed on high flow 50% oxygen. Feels improved since admission, but still not well overall  Pt. denies HA, Cp, Abd Pain or discomfort.  Pt. is urinating but is still not passing stool       Diego Epstein - [phone]  PAST MEDICAL & SURGICAL HISTORY  Chronic kidney disease, unspecified CKD stage: Stage IIIB  TIA (transient ischemic attack)  Diverticulitis  Constipation  Hypertension  No significant past surgical history    SOCIAL HISTORY:  Negative for smoking/alcohol/drug use.     ALLERGIES:  aspirin (Unknown)  Cipro (Unknown)  codeine (Unknown)  dicyclomine (Unknown)  Diovan (Unknown)  Flagyl (Unknown)  Librax (Unknown)  metronidazole (Unknown)  penicillin (Unknown)  Prevacid (Unknown)  trimethobenzamide (Unknown)    MEDICATIONS:  STANDING MEDICATIONS  apixaban 10 milliGRAM(s) Oral every 12 hours  chlorhexidine 4% Liquid 1 Application(s) Topical <User Schedule>  cyanocobalamin 1000 MICROGram(s) Oral daily  folic acid 1 milliGRAM(s) Oral daily  furosemide   Injectable 20 milliGRAM(s) IV Push daily  insulin lispro (HumaLOG) corrective regimen sliding scale   SubCutaneous three times a day before meals  meropenem  IVPB 1000 milliGRAM(s) IV Intermittent every 12 hours  NIFEdipine XL 30 milliGRAM(s) Oral daily  potassium chloride   Powder 20 milliEquivalent(s) Oral every 2 hours  predniSONE   Tablet 50 milliGRAM(s) Oral daily    PRN MEDICATIONS    VITALS:   T(F): 96.9  HR: 82 (78 - 89)  BP: 120/58 (114/55 - 120/58)  RR: 18 (18 - 18)  SpO2: 94% (94% - 94%)    LABS:                        11.0   9.24  )-----------( 70       ( 01 Dec 2019 05:45 )             34.5     12-01    140  |  95<L>  |  34<H>  ----------------------------<  143<H>  3.1<L>   |  32  |  0.8    Ca    8.7      01 Dec 2019 05:45  Mg     2.0     12-01    TPro  4.3<L>  /  Alb  2.4<L>  /  TBili  0.8  /  DBili  x   /  AST  13  /  ALT  9   /  AlkPhos  59  12-01    PTT - ( 01 Dec 2019 05:45 )  PTT:28.7 sec    ABG - ( 29 Nov 2019 15:07 )  pH, Arterial: 7.55  pH, Blood: x     /  pCO2: 40    /  pO2: 122   / HCO3: 35    / Base Excess: 11.6  /  SaO2: 100       RADIOLOGY:    PHYSICAL EXAM:  GEN: In no acute distress. Pt. is awake in bed able to have a conversation. Cachetic  LUNGS: Breath sounds clear Superior, Mild crackles in Lower lung fields, Symmetrical inspiration, no increased work of breathing  HEART: +S1,S2, RRR, Systolic ejection murmur, Rubs, Gallops   ABD: Bowel Sounds Present, Soft, non tender, non distended, no guarding, no rebound.   EXT: 2+ peripheral Pulses, no clubbing, no cyanosis mild swelling B/L LE. Toes have disfigured appearance.   NEURO: AAOX3. No focal deficits. CN 2-12 Grossly intact.

## 2019-12-01 NOTE — PROGRESS NOTE ADULT - ASSESSMENT
Hypoxic Respiratory Failure with SOB, generalized weakness due to fluid overload, exacerbation of Odalis CHF, fluid retention due to long term steroid therapy, CKD  BL DVT, Posssible PE  Worsening myalgia, probably steroid induced with worsening mobility dysfunction  Thombocytopenia  Hx of Warm IGG Hemolytic Anemia ( IGG +, C3 -), chronic steroid therapy, sp Rituxan  therapy  Hx of HTN, ASHD, Odalis CHF, TIA  Hx of CKD III  Hx of OA, DDD, DJD, mobility dysfunction, ambulates with walker  Hx of GERD, diverticulosis, ch constipataion  Advanced age    pt has elevated BNP of 4500 with GFR of 28 which is improving to 49, 64,  cont Mereopenem  Pt has cont need for high flow O2, ;paucity of inf respiratory signs and sx  CXR shows inc vascular markings and BL space occupying  opacities, started Lasix 20mg IV daily, O2 remains high flow, NC, check pulse Ox daily   monitor electrolytes and correct    Venous doppler of lower ext:  preliminary report +, pt was on sq heparin then about 24 hrs on IV heparin noted to have dec PLTS 134, 90, 64, 60, 70 ? HIT, D/C all heparin products,  NB pt had previously dec PLTS which improved when Pepcid was D/C    ECHO:  EF 55-60%, tr MR, severe AS/mild AI, GIDD, severe Pul HTN  trend troponins , 0.02  monitor CBC, Plts 132, 90, 60, 70  cont prednisone 50mg as per Hem-Onc  Hem-Onc consult Dr Mejias, ? should pt be on PCP prophylaxis given long teerm steroid therapy    fall precautions  aspiration precautions  Vasc surgical consult for BL DVT on Eliquis    Pul consult:  no need for CT angio as it would not change the tx even if +, pt already on Eliquis, will need at least 6mos of tx, check mycoplasma titers, urine Legionella Ag    guarded state given pt advanced age, frail state and multiple complex medical issues, but overall pt's clinical status is improving, pt is more alert and verbal with improving appetite    goal of care"  pt is full code, stabilize pt, dec high flow O2 needs and probable SNF

## 2019-12-01 NOTE — PROGRESS NOTE ADULT - SUBJECTIVE AND OBJECTIVE BOX
RITA RAMOS 89y Female from home brought by family to the ER for c/o generalized weakness, SOB and inc difficulty ambulating x 3 days du to generalized muscke pain.  The pt at baseline isable to ambulate with walker but in the last few days has been unable to get out of be due to severe musculoskeletal pain.  Of note the pt has Hx of  warm autoimmune hemlytic anemia and has been ofn prolonged steroid  tx  and has had  Rutixan tx as well.  The pt was noted to be fluid overloaded with a BNP of 4500. There is also the question of PNA  and PE.   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 There is also the ques of PNA and PE given the lower ext DVT.  The pt is being evaluated by Hem-Onc Dr Mejias.  The PMHx includes:  HTN, ASHD, TIA, CKD III, Warm IGG Hemolytic Anemia, OA, DDD, DJD, mobility dysfunction, GERD, diverticulosis, ch constipation.  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 possibility of PE given the SOB, quick desaturation off O2 and + DVT, ECHO shows EF 55-60%, GIDD, severe AS/mild AI, severe Pul HTN.  The pt cont to require high flow O2.    Overnight events:  pt pt  remains on high flow O2, con IV meropenem, Eliquis for BL lower ext DVT and possible PE, additional testing ordered as per Pul (RSV,  urine Legionella Ag, Mycoplasma AB), pt more alert, verbal, sitting OOB in chair states appetite improving, goal is to dec req of O2, given proloned immunosuppression with prednisone need for PCP prophylaxis    MEDICATIONS  (STANDING):  meropenem 1gm q 12  chlorhexidine 4% Liquid 1 Application(s) Topical <User Schedule>  cyanocobalamin 1000 MICROGram(s) Oral daily  folic acid 1 milliGRAM(s) Oral daily  furosemide   Injectable 20 milliGRAM(s) IV Push daily  NIFEdipine XL 30 milliGRAM(s) Oral daily  predniSONE   Tablet 50 milliGRAM(s) Oral daily  Eliquis 10mg q 12 x 7 days then 5mg q 12  MEDICATIONS  (PRN):      Allergies    aspirin (Unknown)  Cipro (Unknown)  codeine (Unknown)  dicyclomine (Unknown)  Diovan (Unknown)  Flagyl (Unknown)  Librax (Unknown)  metronidazole (Unknown)  penicillin (Unknown)  Prevacid (Unknown)  trimethobenzamide (Unknown)  	    Vital Signs Last 24 Hrs    T(F): 96.9  HR: 82  BP: 120/58    RR: 18  SpO2: 94% ,    PHYSICAL EXAM:      Constitutional:  elderly, pale,  frail and fragile, alert and oriented and verbal  + O2NC, NAD, temporal wasting    Eyes:  nonicteric    ENMT:  dental defects    Neck:  supple, + JVD, no bruits    Back:  + Kyphosis    Respiratory:  dec BS, shallow respirations, bibasilar crackles    Cardiovascular:  S1S2 tachy    Gastrointestinal: globose soft and benign    Genitourinary:    Extremities: moves all ext, + arthritic changes, + edema        LABS:                       11  9 )-----------(70  (134, 90, 63, 60)            34    140 |  95  |  34  ----------------------------<  143  3.1   |  32 |  0.8  GFR  28...49, 64  Ca    8.4<L>       Phos  3.4     11-27  Mg     2.1, 2.0  troponin 0.02  BNP  4500    LA 4.0     TPro  4.6 /  Alb  3.1, 2.8  /  TBili  1.0  /  DBili  0.4<H>  /  AST  14  /  ALT  13  /  AlkPhos  73  11-26    PT/INR - ( 2019 13:30 )   PT: 13.10 sec;   INR: 1.14 ratio         PTT - ( 2019 13:30 )  PTT:21.6 sec  Urinalysis Basic - ( 2019 16:20 )    Color: Yellow / Appearance: Clear / S.020 / pH: x  Gluc: x / Ketone: Negative  / Bili: Negative / Urobili: <2 mg/dL   Blood: x / Protein: 30 mg/dL / Nitrite: Negative   Leuk Esterase: Negative / RBC: 10 /HPF / WBC 6 /HPF   Sq Epi: x / Non Sq Epi: 5 /HPF / Bacteria: Few        RADIOLOGY & ADDITIONAL TESTS:  CXR  inc vasc markings, cardiomegaly  CXR :  stable BL space occupying  opacities and sm L pl effusion  EKG sinus tach 121/min QTc 462    CT of abd:  bibasilar atelectasis, stable hepatic cyst,  calcification n the sybil hepatis in the area of the GB neck, stable  side branch pancreatic IPMN, no pancreatic constanza dilatation    Sono of abd:  no cholelithiases    Venous doppler of lower ext:  + DVT of  R posterior tibial vein, + DVT of  L peroneal and soleal veins    ECHO:  LVEF 55-60%, , mild LV wall thickening, GIDD, moderate enlarged R ventricle,  trace MR, severe AS, mild AI, inc pul a pressure 89.9 c/w severe Pul HTN

## 2019-12-01 NOTE — PROGRESS NOTE ADULT - ASSESSMENT
Pt is a 89 year old female with a significant PMHx of Warm Autoimmune Hemolytic Anemia who presented with a cc/o generalized myalgia. Her muscle aches are associated with generalized weakness and inability to ambulate. She is currently admitted for suspected steroid-induced myalgia.     # Fluid overload; Cardiac vs. Renal VS PNA  - On High flow nasal cannula.  - BNP 4500 at time of admission  - Lasix IV 20 daily for now, increase the dose if BP and kidney function tolerates  - Echo - 55-60% - G1DD - Severe AS  - Daily weight; fluid restriction; Low salt; I+O  - Pulmonary Consult appreciated   -- check Respiratory viral panel, mycoplasma titers and Legionella capsular Ag.   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 Orantes)  - Continue prednisone 50mg   - Monitor Hb, Keep active type and screen  - LE Duplex - Positive DVT R. PT / DVT L. Peroneal, soleus      #Hx of Low Vitamin B12 level with normal MMA - Continue B12 1000mcg daily  #HTN - Continue with Nifedipine 30 mg qd; Holding HCTZ/Triamterene, on lasix  #Diverticulosis - High fiber diet to avoid constipation    Diet: Dash/ High Fiber  Activity: Ambulate as tolerated  DVT ppx: Heparin  GI ppx: Not indicated  Dispo: acute; From Home pT rehab  FULL CODE Pt is a 89 year old female with a significant PMHx of Warm Autoimmune Hemolytic Anemia who presented with a cc/o generalized myalgia. Her muscle aches are associated with generalized weakness and inability to ambulate. She is currently admitted for suspected steroid-induced myalgia.     # Hypoxemic Respiratory Failure  - Fluid overload; Cardiac vs. Renal VS PNA  - On High flow nasal cannula.  - BNP 4500 at time of admission - repeat Michael AM Dec 2  - CXR michael morning - Dec 2  - Lasix IV 20 daily for now - Consider Increasing or decreasing based on Condition  - Echo - 55-60% - G1DD - Severe AS  - Daily weight; fluid restriction; Low salt; I+O  - Considering PNA - Labs as per Pulm below - Discuss possibility of PCP as patient was immunocompromised with Chemotherapy and Steroids. - LDH elevated, Aa Gradient Increased, Fungitell Ordered  - Pulmonary Consult appreciated   -- check Respiratory viral panel, mycoplasma titers and Legionella capsular Ag.   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 Orantes)  - Continue prednisone 50mg   - Monitor Hb, Keep active type and screen  - LE Duplex - Positive DVT R. PT / DVT L. Peroneal, soleus      #Hx of Low Vitamin B12 level with normal MMA - Continue B12 1000mcg daily  #HTN - Continue with Nifedipine 30 mg qd; Holding HCTZ/Triamterene, on lasix  #Diverticulosis - High fiber diet to avoid constipation    Diet: Dash/ High Fiber  Activity: Ambulate as tolerated  DVT ppx: Heparin  GI ppx: Not indicated  Dispo: acute; From Home pT rehab  FULL CODE    HANDOFF:  Hypoxemic Respiratory Failure  - Cont high flow as pt desaturates without  - Continue Lasix 20   - Possible PE - No CTA Chest - On Eliquis  - Still consider PNA - RVP, MYcoplasma titers, Legionella Urine AG, Fungitell (Beta-d-glucan)  DVT   - HIT Ab neg  - On Eliquis - Regimen as above  Myalgias  - Continue Steroids

## 2019-12-02 LAB
ALBUMIN SERPL ELPH-MCNC: 2.6 G/DL — LOW (ref 3.5–5.2)
ALP SERPL-CCNC: 60 U/L — SIGNIFICANT CHANGE UP (ref 30–115)
ALT FLD-CCNC: 10 U/L — SIGNIFICANT CHANGE UP (ref 0–41)
ANION GAP SERPL CALC-SCNC: 12 MMOL/L — SIGNIFICANT CHANGE UP (ref 7–14)
AST SERPL-CCNC: 19 U/L — SIGNIFICANT CHANGE UP (ref 0–41)
BASOPHILS # BLD AUTO: 0.02 K/UL — SIGNIFICANT CHANGE UP (ref 0–0.2)
BASOPHILS NFR BLD AUTO: 0.2 % — SIGNIFICANT CHANGE UP (ref 0–1)
BILIRUB SERPL-MCNC: 1 MG/DL — SIGNIFICANT CHANGE UP (ref 0.2–1.2)
BUN SERPL-MCNC: 38 MG/DL — HIGH (ref 10–20)
CALCIUM SERPL-MCNC: 8.9 MG/DL — SIGNIFICANT CHANGE UP (ref 8.5–10.1)
CHLORIDE SERPL-SCNC: 95 MMOL/L — LOW (ref 98–110)
CO2 SERPL-SCNC: 32 MMOL/L — SIGNIFICANT CHANGE UP (ref 17–32)
CREAT SERPL-MCNC: 0.8 MG/DL — SIGNIFICANT CHANGE UP (ref 0.7–1.5)
EOSINOPHIL # BLD AUTO: 0.04 K/UL — SIGNIFICANT CHANGE UP (ref 0–0.7)
EOSINOPHIL NFR BLD AUTO: 0.5 % — SIGNIFICANT CHANGE UP (ref 0–8)
GLUCOSE BLDC GLUCOMTR-MCNC: 138 MG/DL — HIGH (ref 70–99)
GLUCOSE BLDC GLUCOMTR-MCNC: 167 MG/DL — HIGH (ref 70–99)
GLUCOSE BLDC GLUCOMTR-MCNC: 213 MG/DL — HIGH (ref 70–99)
GLUCOSE BLDC GLUCOMTR-MCNC: 251 MG/DL — HIGH (ref 70–99)
GLUCOSE SERPL-MCNC: 134 MG/DL — HIGH (ref 70–99)
HCT VFR BLD CALC: 36.8 % — LOW (ref 37–47)
HGB BLD-MCNC: 11.2 G/DL — LOW (ref 12–16)
IMM GRANULOCYTES NFR BLD AUTO: 6.3 % — HIGH (ref 0.1–0.3)
LYMPHOCYTES # BLD AUTO: 0.58 K/UL — LOW (ref 1.2–3.4)
LYMPHOCYTES # BLD AUTO: 6.8 % — LOW (ref 20.5–51.1)
MCHC RBC-ENTMCNC: 28.7 PG — SIGNIFICANT CHANGE UP (ref 27–31)
MCHC RBC-ENTMCNC: 30.4 G/DL — LOW (ref 32–37)
MCV RBC AUTO: 94.4 FL — SIGNIFICANT CHANGE UP (ref 81–99)
MONOCYTES # BLD AUTO: 0.41 K/UL — SIGNIFICANT CHANGE UP (ref 0.1–0.6)
MONOCYTES NFR BLD AUTO: 4.8 % — SIGNIFICANT CHANGE UP (ref 1.7–9.3)
NEUTROPHILS # BLD AUTO: 7 K/UL — HIGH (ref 1.4–6.5)
NEUTROPHILS NFR BLD AUTO: 81.4 % — HIGH (ref 42.2–75.2)
NRBC # BLD: 0 /100 WBCS — SIGNIFICANT CHANGE UP (ref 0–0)
NT-PROBNP SERPL-SCNC: 840 PG/ML — HIGH (ref 0–300)
PLATELET # BLD AUTO: 82 K/UL — LOW (ref 130–400)
POTASSIUM SERPL-MCNC: 3.5 MMOL/L — SIGNIFICANT CHANGE UP (ref 3.5–5)
POTASSIUM SERPL-SCNC: 3.5 MMOL/L — SIGNIFICANT CHANGE UP (ref 3.5–5)
PROT SERPL-MCNC: 4.3 G/DL — LOW (ref 6–8)
RBC # BLD: 3.9 M/UL — LOW (ref 4.2–5.4)
RBC # FLD: 15.4 % — HIGH (ref 11.5–14.5)
SODIUM SERPL-SCNC: 139 MMOL/L — SIGNIFICANT CHANGE UP (ref 135–146)
WBC # BLD: 8.59 K/UL — SIGNIFICANT CHANGE UP (ref 4.8–10.8)
WBC # FLD AUTO: 8.59 K/UL — SIGNIFICANT CHANGE UP (ref 4.8–10.8)

## 2019-12-02 PROCEDURE — 71045 X-RAY EXAM CHEST 1 VIEW: CPT | Mod: 26

## 2019-12-02 PROCEDURE — 99232 SBSQ HOSP IP/OBS MODERATE 35: CPT

## 2019-12-02 RX ADMIN — Medication 20 MILLIGRAM(S): at 05:57

## 2019-12-02 RX ADMIN — APIXABAN 10 MILLIGRAM(S): 2.5 TABLET, FILM COATED ORAL at 22:03

## 2019-12-02 RX ADMIN — CHLORHEXIDINE GLUCONATE 1 APPLICATION(S): 213 SOLUTION TOPICAL at 05:56

## 2019-12-02 RX ADMIN — Medication 50 MILLIGRAM(S): at 05:57

## 2019-12-02 RX ADMIN — SENNA PLUS 2 TABLET(S): 8.6 TABLET ORAL at 22:03

## 2019-12-02 RX ADMIN — APIXABAN 10 MILLIGRAM(S): 2.5 TABLET, FILM COATED ORAL at 10:51

## 2019-12-02 RX ADMIN — PREGABALIN 1000 MICROGRAM(S): 225 CAPSULE ORAL at 11:56

## 2019-12-02 RX ADMIN — Medication 1 MILLIGRAM(S): at 11:56

## 2019-12-02 RX ADMIN — POLYETHYLENE GLYCOL 3350 17 GRAM(S): 17 POWDER, FOR SOLUTION ORAL at 17:01

## 2019-12-02 RX ADMIN — Medication 30 MILLIGRAM(S): at 05:57

## 2019-12-02 RX ADMIN — Medication 2: at 17:01

## 2019-12-02 RX ADMIN — Medication 3: at 11:56

## 2019-12-02 RX ADMIN — MEROPENEM 100 MILLIGRAM(S): 1 INJECTION INTRAVENOUS at 05:58

## 2019-12-02 RX ADMIN — POLYETHYLENE GLYCOL 3350 17 GRAM(S): 17 POWDER, FOR SOLUTION ORAL at 05:58

## 2019-12-02 NOTE — CONSULT NOTE ADULT - ASSESSMENT
IMPRESSION: Rehab of    debility, myalgias, debility, DVT, pneumonia, CHF    PRECAUTIONS: [x  ] Cardiac  [x  ] Respiratory  [  ] Seizures [  ] Contact Isolation  [  ] Droplet Isolation  [  ] Other    Weight Bearing Status:     RECOMMENDATION:    Out of Bed to Chair     DVT/Decubiti Prophylaxis    REHAB PLAN:     [ x  ] Bedside P/T 3-5 times a week   [   ]   Bedside O/T  2-3 times a week             [   ] No Rehab Therapy Indicated                   [   ]  Speech Therapy   Conditioning/ROM                                    ADL  Bed Mobility                                               Conditioning/ROM  Transfers                                                     Bed Mobility  Sitting /Standing Balance                         Transfers                                        Gait Training                                               Sitting/Standing Balance  Stair Training [   ]Applicable                    Home equipment Eval                                                                        Splinting  [   ] Only      GOALS:   ADL   [ x  ]   Independent                    Transfers  [ x  ] Independent                          Ambulation  [ x  ] Independent     [ x   ] With device                            [ x  ]  CG                                                         [   ]  CG                                                                  [   ] CG                            [    ] Min A                                                   [   ] Min A                                                              [   ] Min  A          DISCHARGE PLAN:   [   ]  Good candidate for Intensive Rehabilitation/Hospital based-4A [facility]                                             Will tolerate 3hrs Intensive Rehab Daily                                       [ xx   ]  Short Term Rehab in Skilled Nursing Facility                                                                  VS                                     [ xx   ]  Home with Outpatient or VN services                                         [    ]  Possible Candidate for Intensive Hospital based Rehab
Impression:    Multilobar pneumonia cannot r/o element CHF  Likely due to GNR given age and comorbidities  DVT  cannot r/o concomitant PE    Suggest:    Supportive care  Continue current abx.  f/u full cultures  check Respiratory viral panel, mycoplasma titers and Legionella capsular Ag.   Pulmonary toilet  OOB ASAP  advance mobility as tolerated  Keep SaO2 >92% adjust O2 as needed  DVT/ Gastritis prophylaxis  The patient needs repeat CXR  in 3-4 months to document clearance    will need 6m anticoagulation at least  Heme w/u  no need for CT angio chest as will not change therapies
This is a 89 year old female with a significant PMHx of Warm Autoimmune Hemolytic Anemia who presented with a cc/o generalized myalgia. Her muscle aches are associated with generalized weakness and inability to ambulate. She is currently admitted for suspected steroid-induced myalgia.       1) Weakness/myopathy/difficulty ambulation probably 2/2 to steroids   c/w prednisone for now (as previous quick taper/low dose caused hemolysis again)      2) Warm IgG hemolytic anemia, IgG+, negative C3  Work up:  Flow cytometry negative. CT imaging did not reveal malignancy. No folate deficiency.    Low vitamin B12 level with normal MMA level and is on PO B12 and her repeat B12 level is normal.   Hepatitis work up negative  c/w folic acid and Po Vit B12  Completed 4/4 dose of Rituxan  on Prednisone 50 mg daily- c/w same  Given her complications/symptoms with steroids, we might have to consider other immunosuppressive therapy     3) Thrombocytopenia- ? Kuo  Had improved recently, now again trending down  c.w monitoring for now    4) Fluid overload  5) ANDREAS  Plan as per primary team  Pt on iv Lasix   Strict I&O    Discussed with daughter at bedside
89 year old female with a significant PMHx of Warm Autoimmune Hemolytic Anemia who presented with a cc/o generalized myalgia. Her muscle aches are associated with generalized weakness and inability to ambulate. She is currently admitted for suspected steroid-induced myalgia    IMPRESSION:  SIRS secondary to aspiration with chemical pneumonitis with CT with scattered opacities  -clinically no bacterial PNA  -WBC 8.5  -carter CRAIG    RECOMMENDATIONS:  -D/c meropenem

## 2019-12-02 NOTE — PROGRESS NOTE ADULT - SUBJECTIVE AND OBJECTIVE BOX
RITA RAMOS 89y Female from home brought by family to the ER for c/o generalized weakness, SOB and inc difficulty ambulating x 3 days du to generalized muscke pain.  The pt at baseline isable to ambulate with walker but in the last few days has been unable to get out of be due to severe musculoskeletal pain.  Of note the pt has Hx of  warm autoimmune hemlytic anemia and has been ofn prolonged steroid  tx  and has had  Rutixan tx as well.  The pt was noted to be fluid overloaded with a BNP of 4500. There is also the question of PNA  and PE.   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 There is also the ques of PNA and PE given the lower ext DVT.  The pt is being evaluated by Hem-Onc Dr Mejias.  The PMHx includes:  HTN, ASHD, TIA, CKD III, Warm IGG Hemolytic Anemia, OA, DDD, DJD, mobility dysfunction, GERD, diverticulosis, ch constipation.  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 possibility of PE given the SOB, quick desaturation off O2 and + DVT, ECHO shows EF 55-60%, GIDD, severe AS/mild AI, severe Pul HTN.  The pt cont to require high flow O2.    Overnight events:  afebrile, Meropenem D/C, remains on prednisone for hemolytic anemia, PLTS low but improving to 82, on Eliquis for BL DVT and possible DVT, start to dec O2 to 3 L in the AM and monitor resp status and puls Ox     MEDICATIONS  (STANDING):  meropenem 1gm q 12 D/C  chlorhexidine 4% Liquid 1 Application(s) Topical <User Schedule>  cyanocobalamin 1000 MICROGram(s) Oral daily  folic acid 1 milliGRAM(s) Oral daily  furosemide   Injectable 20 milliGRAM(s) IV Push daily  NIFEdipine XL 30 milliGRAM(s) Oral daily  predniSONE   Tablet 50 milliGRAM(s) Oral daily  Eliquis 10mg q 12 x 7 days then 5mg q 12 on   MEDICATIONS  (PRN):      Allergies    aspirin (Unknown)  Cipro (Unknown)  codeine (Unknown)  dicyclomine (Unknown)  Diovan (Unknown)  Flagyl (Unknown)  Librax (Unknown)  metronidazole (Unknown)  penicillin (Unknown)  Prevacid (Unknown)  trimethobenzamide (Unknown)  	    Vital Signs Last 24 Hrs    T(F): 96.2  HR: 80  BP: 117/56    RR: 18  SpO2: 94% ,    PHYSICAL EXAM:      Constitutional:  elderly, pale,  frail and fragile, alert and oriented and verbal  + O2NC, NAD, temporal wasting    Eyes:  nonicteric    ENMT:  dental defects    Neck:  supple, + JVD, no bruits    Back:  + Kyphosis    Respiratory:  dec BS, shallow respirations, bibasilar crackles    Cardiovascular:  S1S2 tachy    Gastrointestinal: globose soft and benign    Genitourinary:    Extremities: moves all ext, + arthritic changes, + edema        LABS:                       11.2  8 )-----------(82             34    139 |  95  |  38  ----------------------------<  134  3.5   |  32 |  0.8  GFR  28...49, 64  Ca    8.4<L>       Phos  3.4     11-27  Mg     2.1, 2.0  troponin 0.02  BNP  4500 dec to 840    LA 4.0     TPro  4.6, 4.3 /  Alb  3.1, 2.8, 2.6  /  TBili  1.0  /  DBili  0.4<H>  /  AST  14  /  ALT  13  /  AlkPhos  73  11-26    PT/INR - ( 2019 13:30 )   PT: 13.10 sec;   INR: 1.14 ratio         PTT - ( 2019 13:30 )  PTT:21.6 sec  Urinalysis Basic - ( 2019 16:20 )    Color: Yellow / Appearance: Clear / S.020 / pH: x  Gluc: x / Ketone: Negative  / Bili: Negative / Urobili: <2 mg/dL   Blood: x / Protein: 30 mg/dL / Nitrite: Negative   Leuk Esterase: Negative / RBC: 10 /HPF / WBC 6 /HPF   Sq Epi: x / Non Sq Epi: 5 /HPF / Bacteria: Few        RADIOLOGY & ADDITIONAL TESTS:  CXR  inc vasc markings, cardiomegaly  CXR :  stable BL space occupying  opacities and sm L pl effusion  EKG sinus tach 121/min QTc 462    CT of abd:  bibasilar atelectasis, stable hepatic cyst,  calcification n the sybil hepatis in the area of the GB neck, stable  side branch pancreatic IPMN, no pancreatic constanza dilatation    Sono of abd:  no cholelithiases    Venous doppler of lower ext:  + DVT of  R posterior tibial vein, + DVT of  L peroneal and soleal veins    ECHO:  LVEF 55-60%, , mild LV wall thickening, GIDD, moderate enlarged R ventricle,  trace MR, severe AS, mild AI, inc pul a pressure 89.9 c/w severe Pul HTN

## 2019-12-02 NOTE — PROGRESS NOTE ADULT - SUBJECTIVE AND OBJECTIVE BOX
LENGTH OF HOSPITAL STAY: 6d      CHIEF COMPLAINT:   Patient is a 89y old  Female who presents with a chief complaint of Fluid overload; steroid-induced myalgia (02 Dec 2019 12:56)      OVER Past 24hrs:  The patient was seen and examined at bedside there were no acute events during. patient  states that she has no difficulty breathing  while on  high flow.          PAST MEDICAL & SURGICAL HISTORY  PAST MEDICAL & SURGICAL HISTORY:  Chronic kidney disease, unspecified CKD stage: Stage IIIB  TIA (transient ischemic attack)  Diverticulitis  Constipation  Hypertension  No significant past surgical history        REVIEW OF SYSTEMS  CONSTITUTIONAL: No fever  RESPIRATORY: No cough, wheezing, chills or hemoptysis; No shortness of breath on high flow  CARDIOVASCULAR: No chest pain, palpitations, dizziness, or leg swelling  GASTROINTESTINAL: No abdominal or epigastric pain. No nausea, vomiting, or hematemesis; No diarrhea or constipation  GENITOURINARY: No dysuria, frequency, hematuria, or incontinence  NEUROLOGICAL: No headaches, memory loss, loss of strength, numbness, or tremors  SKIN: No itching, burning, rashes, or lesions   LYMPH NODES: No enlarged glands  ENDOCRINE: No heat or cold intolerance; No hair loss  MUSCULOSKELETAL: No joint pain      ALLERGIES:  aspirin (Unknown)  Cipro (Unknown)  codeine (Unknown)  dicyclomine (Unknown)  Diovan (Unknown)  Flagyl (Unknown)  Librax (Unknown)  metronidazole (Unknown)  penicillin (Unknown)  Prevacid (Unknown)  trimethobenzamide (Unknown)    MEDICATIONS:  STANDING MEDICATIONS  apixaban 10 milliGRAM(s) Oral every 12 hours  chlorhexidine 4% Liquid 1 Application(s) Topical <User Schedule>  cyanocobalamin 1000 MICROGram(s) Oral daily  folic acid 1 milliGRAM(s) Oral daily  furosemide   Injectable 20 milliGRAM(s) IV Push daily  insulin lispro (HumaLOG) corrective regimen sliding scale   SubCutaneous three times a day before meals  meropenem  IVPB 1000 milliGRAM(s) IV Intermittent every 12 hours  NIFEdipine XL 30 milliGRAM(s) Oral daily  polyethylene glycol 3350 17 Gram(s) Oral two times a day  senna 2 Tablet(s) Oral at bedtime    PRN MEDICATIONS    VITALS:   T(F): 97.3  HR: 74  BP: 120/64  RR: 18  SpO2: 94%    PHYSICAL EXAM:  General: No acute distress.  Alert, oriented, interactive, nonfocal. elderly   Female     HEENT: Pupils equal, reactive to light symmetrically.    PULM: Clear to auscultation bilaterally in upper lobes, Lower lobes crackles are herd  b/l and decreased breath sounds.  no significant sputum production.    CVS: Regular rate and rhythm, holosystolic murmured herd at sternal boarder murmurs, rubs, or gallops.    GI: Soft, nondistended, nontender, no masses.    MSK: No edema, nontender.    SKIN: Warm and well perfused, no rashes noted.    PSYCH: AAOx2-3    NEURO: NO focal     LABS:                        11.2   8.59  )-----------( 82       ( 02 Dec 2019 06:49 )             36.8     12-02    139  |  95<L>  |  38<H>  ----------------------------<  134<H>  3.5   |  32  |  0.8    Ca    8.9      02 Dec 2019 06:49  Mg     2.0     12-01    TPro  4.3<L>  /  Alb  2.6<L>  /  TBili  1.0  /  DBili  x   /  AST  19  /  ALT  10  /  AlkPhos  60  12-02    PTT - ( 01 Dec 2019 05:45 )  PTT:28.7 sec              RADIOLOGY:    < from: 12 Lead ECG (11.27.19 @ 09:35) >    Ventricular Rate 92 BPM    Atrial Rate 92 BPM    P-R Interval 124 ms    QRS Duration 86 ms    Q-T Interval 362 ms      QTC Calculation(Bezet) 447 ms    P Axis 53 degrees    R Axis 9 degrees    T Axis 68 degrees    Diagnosis Line Normal sinus rhythm  Nonspecific ST abnormality  Abnormal ECG    < end of copied text >    < from: CT Abdomen and Pelvis No Cont (11.26.19 @ 15:59) >    IMPRESSION: A small calcification is noted in the region of the sybil   hepatis, possibly in the region of the  gallbladder neck or cystic duct   (2/21; 601/24;  602/54). A small stone cannot be excluded. Sonography   and/or hepatobiliary scan may be useful for further evaluation if the   patient is symptomatic in the right upper quadrant..    < end of copied text >    < from: [facility] Duplex Lower Ext Vein Scan, Bilat (11.27.19 @ 22:23) >  Impression:    Deep venous thrombosis right posterior tibial vein.    Deep venous thrombosis left peroneal and soleal veins..    ICD-10: M 79.89    < end of copied text >    < from: Xray Chest 1 View- PORTABLE-Routine (12.02.19 @ 06:22) >    IMPRESSION:     Unchanged bibasilar opacity/effusions    < end of copied text > LENGTH OF HOSPITAL STAY: 6d      CHIEF COMPLAINT:   Patient is a 89y old  Female who presents with a chief complaint of Fluid overload; steroid-induced myalgia (02 Dec 2019 12:56)      OVER Past 24hrs:  The patient was seen and examined at bedside there were no acute events during. patient  states that she has no difficulty breathing  while on  high flow.          PAST MEDICAL & SURGICAL HISTORY  PAST MEDICAL & SURGICAL HISTORY:  Chronic kidney disease, unspecified CKD stage: Stage IIIB  TIA (transient ischemic attack)  Diverticulitis  Constipation  Hypertension  No significant past surgical history        REVIEW OF SYSTEMS  CONSTITUTIONAL: No fever  RESPIRATORY: No cough, wheezing, chills or hemoptysis; No shortness of breath on high flow  CARDIOVASCULAR: No chest pain, palpitations, dizziness, or leg swelling  GASTROINTESTINAL: No abdominal or epigastric pain. No nausea, vomiting, or hematemesis; No diarrhea or constipation  GENITOURINARY: No dysuria, frequency, hematuria, or incontinence  NEUROLOGICAL: No headaches, memory loss, loss of strength, numbness, or tremors  SKIN: No itching, burning, rashes, or lesions   LYMPH NODES: No enlarged glands  ENDOCRINE: No heat or cold intolerance; No hair loss  MUSCULOSKELETAL: No joint pain      ALLERGIES:  aspirin (Unknown)  Cipro (Unknown)  codeine (Unknown)  dicyclomine (Unknown)  Diovan (Unknown)  Flagyl (Unknown)  Librax (Unknown)  metronidazole (Unknown)  penicillin (Unknown)  Prevacid (Unknown)  trimethobenzamide (Unknown)    MEDICATIONS:  STANDING MEDICATIONS  apixaban 10 milliGRAM(s) Oral every 12 hours  chlorhexidine 4% Liquid 1 Application(s) Topical <User Schedule>  cyanocobalamin 1000 MICROGram(s) Oral daily  folic acid 1 milliGRAM(s) Oral daily  furosemide   Injectable 20 milliGRAM(s) IV Push daily  insulin lispro (HumaLOG) corrective regimen sliding scale   SubCutaneous three times a day before meals  meropenem  IVPB 1000 milliGRAM(s) IV Intermittent every 12 hours  NIFEdipine XL 30 milliGRAM(s) Oral daily  polyethylene glycol 3350 17 Gram(s) Oral two times a day  senna 2 Tablet(s) Oral at bedtime    PRN MEDICATIONS    VITALS:   T(F): 97.3  HR: 74  BP: 120/64  RR: 18  SpO2: 94%    PHYSICAL EXAM:  General: No acute distress.  Alert, oriented, interactive, nonfocal. elderly   Female     HEENT: Pupils equal, reactive to light symmetrically.    PULM: Clear to auscultation bilaterally in upper lobes, Lower lobes crackles are herd  b/l and decreased breath sounds.  no significant sputum production.    CVS: Regular rate and rhythm, holosystolic murmured herd at sternal boarder murmurs, rubs, or gallops.    GI: Soft, nondistended, nontender, no masses.    MSK: No edema, nontender.    SKIN: Warm and well perfused, no rashes noted.    PSYCH: AAOx2-3    NEURO: NO focal     LABS:                        11.2   8.59  )-----------( 82       ( 02 Dec 2019 06:49 )             36.8     12-02    139  |  95<L>  |  38<H>  ----------------------------<  134<H>  3.5   |  32  |  0.8    Ca    8.9      02 Dec 2019 06:49  Mg     2.0     12-01    TPro  4.3<L>  /  Alb  2.6<L>  /  TBili  1.0  /  DBili  x   /  AST  19  /  ALT  10  /  AlkPhos  60  12-02    PTT - ( 01 Dec 2019 05:45 )  PTT:28.7 sec              RADIOLOGY:    < from: 12 Lead ECG (11.27.19 @ 09:35) >    Ventricular Rate 92 BPM    Atrial Rate 92 BPM    P-R Interval 124 ms    QRS Duration 86 ms    Q-T Interval 362 ms      QTC Calculation(Bezet) 447 ms    P Axis 53 degrees    R Axis 9 degrees    T Axis 68 degrees    Diagnosis Line Normal sinus rhythm  Nonspecific ST abnormality  Abnormal ECG    < end of copied text >    < from: CT Abdomen and Pelvis No Cont (11.26.19 @ 15:59) >    IMPRESSION: A small calcification is noted in the region of the sybil   hepatis, possibly in the region of the  gallbladder neck or cystic duct   (2/21; 601/24;  602/54). A small stone cannot be excluded. Sonography   and/or hepatobiliary scan may be useful for further evaluation if the   patient is symptomatic in the right upper quadrant..    < end of copied text >    < from: [facility] Duplex Lower Ext Vein Scan, Bilat (11.27.19 @ 22:23) >  Impression:    Deep venous thrombosis right posterior tibial vein.    Deep venous thrombosis left peroneal and soleal veins..    ICD-10: M 79.89    < end of copied text >    < from: Xray Chest 1 View- PORTABLE-Routine (12.02.19 @ 06:22) >    IMPRESSION:     Unchanged bibasilar opacity/effusions    < end of copied text >      < from: Transthoracic Echocardiogram (11.28.19 @ 17:23) >  Summary:   1. Left ventricular ejection fraction, by visual estimation, is 55 to   60%.   2. Mildly increased LV wall thickness.   3. Spectral Doppler shows impaired relaxation pattern of left   ventricular myocardial filling (Grade I diastolic dysfunction).   4. Moderately enlarged right ventricle.   5. Moderately reduced RV systolic function.   6. Trace mitral valve regurgitation.   7. Severe tricuspid regurgitation.   8. Aortic valve thickening with decreased leaflet opening.   9. Mild aortic regurgitation.  10. Mild to moderate pulmonic valve regurgitation.  11. Estimated pulmonary artery systolic pressure is 89.9 mmHg assuming a   right atrial pressure of 15 mmHg, which is consistent with severe   pulmonary hypertension.  12.Peak transaortic gradient equals 37.7 mmHg, mean transaortic gradient   equals 14.5 mmHg, the calculated aortic valve area equals 0.62 cm² by the   continuity equation consistent with severe aortic stenosis.    < end of copied text >

## 2019-12-02 NOTE — PROGRESS NOTE ADULT - SUBJECTIVE AND OBJECTIVE BOX
Patient is a 89y old  Female who presents with a chief complaint of Fluid overload; steroid-induced myalgia (01 Dec 2019 15:03)      Subjective: She still on high flow NC  No fever/chills         Vital Signs Last 24 Hrs  T(C): 36.3 (02 Dec 2019 06:35), Max: 36.3 (02 Dec 2019 06:35)  T(F): 97.3 (02 Dec 2019 06:35), Max: 97.3 (02 Dec 2019 06:35)  HR: 74 (02 Dec 2019 06:35) (74 - 84)  BP: 120/64 (02 Dec 2019 06:35) (112/54 - 120/64)  BP(mean): --  RR: 18 (02 Dec 2019 06:35) (18 - 18)  SpO2: 94% (02 Dec 2019 08:34) (94% - 94%)    PHYSICAL EXAM  GEN: In no acute distress.  LUNGS: Breath sounds clear, mild crackles in lower b/l   HEART: +S1,S2, RRR,  ABD: Bowel Sounds Present, Soft, non tender, non distended  EXT:  mild swelling B/L LE.   NEURO: AAOX3. No focal deficits.        apixaban 10 milliGRAM(s) Oral every 12 hours  chlorhexidine 4% Liquid 1 Application(s) Topical <User Schedule>  cyanocobalamin 1000 MICROGram(s) Oral daily  folic acid 1 milliGRAM(s) Oral daily  furosemide   Injectable 20 milliGRAM(s) IV Push daily  insulin lispro (HumaLOG) corrective regimen sliding scale   SubCutaneous three times a day before meals  meropenem  IVPB 1000 milliGRAM(s) IV Intermittent every 12 hours  NIFEdipine XL 30 milliGRAM(s) Oral daily  polyethylene glycol 3350 17 Gram(s) Oral two times a day  senna 2 Tablet(s) Oral at bedtime    MEDICATIONS  (PRN):      LABS:                          11.2   8.59  )-----------( 82       ( 02 Dec 2019 06:49 )             36.8         Mean Cell Volume : 94.4 fL  Mean Cell Hemoglobin : 28.7 pg  Mean Cell Hemoglobin Concentration : 30.4 g/dL  Auto Neutrophil # : 7.00 K/uL  Auto Lymphocyte # : 0.58 K/uL  Auto Monocyte # : 0.41 K/uL  Auto Eosinophil # : 0.04 K/uL  Auto Basophil # : 0.02 K/uL  Auto Neutrophil % : 81.4 %  Auto Lymphocyte % : 6.8 %  Auto Monocyte % : 4.8 %  Auto Eosinophil % : 0.5 %  Auto Basophil % : 0.2 %      Serial CBC's  12-02 @ 06:49  Hct-36.8 / Hgb-11.2 / Plat-82 / RBC-3.90 / WBC-8.59  Serial CBC's  12-01 @ 05:45  Hct-34.5 / Hgb-11.0 / Plat-70 / RBC-3.70 / WBC-9.24  Serial CBC's  11-30 @ 05:43  Hct-35.7 / Hgb-10.7 / Plat-60 / RBC-3.71 / WBC-8.68  Serial CBC's  11-29 @ 06:40  Hct-37.9 / Hgb-11.5 / Plat-63 / RBC-3.93 / WBC-11.52  Serial CBC's  11-29 @ 01:32  Hct-32.3 / Hgb-9.8 / Plat-66 / RBC-3.35 / WBC-9.82      12-02    139  |  95<L>  |  38<H>  ----------------------------<  134<H>  3.5   |  32  |  0.8    Ca    8.9      02 Dec 2019 06:49  Mg     2.0     12-01    TPro  4.3<L>  /  Alb  2.6<L>  /  TBili  1.0  /  DBili  x   /  AST  19  /  ALT  10  /  AlkPhos  60  12-02      PTT - ( 01 Dec 2019 05:45 )  PTT:28.7 sec        < from: [facility] Duplex Lower Ext Vein Scan, Bilat (11.27.19 @ 22:23) >  Deep venous thrombosis right posterior tibial vein.    Deep venous thrombosis left peroneal and soleal veins..    < end of copied text >  < from: Xray Chest 1 View- PORTABLE-Routine (11.30.19 @ 14:48) >  Stable bilateral airspace opacities and small left effusion.    < end of copied text >  < from: US Abdomen Limited (11.26.19 @ 18:57) >  ssentially unremarkable right upper quadrant ultrasound. No definite   sonographic correlate to the CT finding. More specifically, no   sonographic evidence of cholelithiasis or choledocholithiasis.      < end of copied text >

## 2019-12-02 NOTE — CONSULT NOTE ADULT - SUBJECTIVE AND OBJECTIVE BOX
HPI:  This is a 89 year old female with a significant PMHx of Warm Autoimmune Hemolytic Anemia who presented with a cc/o generalized myalgia. Her muscle aches are associated with generalized weakness and inability to ambulate x3 days. At baseline, the patient is able to walk with a walker, however, was unable to get out of bed on the day of presentation. She was recently admitted for hemolytic anemia, requiring multiple transfusions. As a result, per the daughter, due to concern of recurrence given no recent blood work, she was brought to the ED for further evaluation. ROS was otherwise negative.     In the ED, the patient VS were WNL. Labs significant for leukocytosis and mildly decreased renal function from baseline. CXR was significant for pulmonary infiltrate BL, suspicious for fluid overload and cannot rule out PNA. CT A/P was performed with incidental finding of small calcification in the region of the sybil hepatis. (26 Nov 2019 18:04)      PAST MEDICAL & SURGICAL HISTORY:  Chronic kidney disease, unspecified CKD stage: Stage IIIB  TIA (transient ischemic attack)  Diverticulitis  Constipation  Hypertension  No significant past surgical history      Hospital Course:  She is deconditioned and weak. She reports she resides with her . Treated for myalgia, debility, DVT, CHF.   TODAY'S SUBJECTIVE & REVIEW OF SYMPTOMS:     Constitutional WNL   Cardio WNL   Resp WNL   GI WNL  Heme WNL  Endo WNL  Skin WNL  MSK WNL  Neuro WNL  Cognitive WNL  Psych WNL      MEDICATIONS  (STANDING):  apixaban 10 milliGRAM(s) Oral every 12 hours  chlorhexidine 4% Liquid 1 Application(s) Topical <User Schedule>  cyanocobalamin 1000 MICROGram(s) Oral daily  folic acid 1 milliGRAM(s) Oral daily  furosemide   Injectable 20 milliGRAM(s) IV Push daily  insulin lispro (HumaLOG) corrective regimen sliding scale   SubCutaneous three times a day before meals  meropenem  IVPB 1000 milliGRAM(s) IV Intermittent every 12 hours  NIFEdipine XL 30 milliGRAM(s) Oral daily  polyethylene glycol 3350 17 Gram(s) Oral two times a day  senna 2 Tablet(s) Oral at bedtime    MEDICATIONS  (PRN):      FAMILY HISTORY:  No family history of cancer      Allergies    aspirin (Unknown)  Cipro (Unknown)  codeine (Unknown)  dicyclomine (Unknown)  Diovan (Unknown)  Flagyl (Unknown)  Librax (Unknown)  metronidazole (Unknown)  penicillin (Unknown)  Prevacid (Unknown)  trimethobenzamide (Unknown)    Intolerances        SOCIAL HISTORY:    [  ] Etoh  [  ] Smoking  [  ] Substance abuse     Home Environment:  [  ] Home Alone  [x  ] Lives with Family-  [  ] Home Health Aid    Dwelling:  [  ] Apartment  [  ] Private House  [  ] Adult Home  [  ] Skilled Nursing Facility      [  ] Short Term  [  ] Long Term  [  ] Stairs       Elevator [  ]    FUNCTIONAL STATUS PTA: (Check all that apply)  Ambulation: [ x  ]Independent    [  ] Dependent     [  ] Non-Ambulatory  Assistive Device: [  ] SA Cane  [  ]  Q Cane  [x  ] Walker  [  ]  Wheelchair  ADL : [  ] Independent  [  ]  Dependent       Vital Signs Last 24 Hrs  T(C): 36.3 (02 Dec 2019 06:35), Max: 36.3 (02 Dec 2019 06:35)  T(F): 97.3 (02 Dec 2019 06:35), Max: 97.3 (02 Dec 2019 06:35)  HR: 74 (02 Dec 2019 06:35) (74 - 84)  BP: 120/64 (02 Dec 2019 06:35) (112/54 - 120/64)  BP(mean): --  RR: 18 (02 Dec 2019 06:35) (18 - 18)  SpO2: 94% (02 Dec 2019 08:34) (94% - 94%)      PHYSICAL EXAM: Alert & Oriented X3  GENERAL: NAD, well-groomed, well-developed  HEAD:  Atraumatic, Normocephalic  EYES: EOMI, PERRLA, conjunctiva and sclera clear  NECK: Supple, No JVD, Normal thyroid  CHEST/LUNG: Clear to percussion bilaterally; No rales, rhonchi, wheezing, or rubs  HEART: Regular rate and rhythm; No murmurs, rubs, or gallops  ABDOMEN: Soft, Nontender, Nondistended; Bowel sounds present  EXTREMITIES:  2+ Peripheral Pulses, No clubbing, cyanosis, or edema    NERVOUS SYSTEM:  Cranial Nerves 2-12 intact [  ] Abnormal  [  ]  ROM: WFL all extremities [  ]  Abnormal [  ]  Motor Strength: WFL all extremities  [  ]  Abnormal [x  ] 4+/5  Sensation: intact to light touch [  ] Abnormal [  ]  Reflexes: Symmetric [  ]  Abnormal [  ]    FUNCTIONAL STATUS:  Bed Mobility: Independent [  ]  Supervision [  ]  Needs Assistance [  ]  N/A [  ]  Transfers: Independent [  ]  Supervision [  ]  Needs Assistance [  ]  N/A [  ]   Ambulation: Independent [  ]  Supervision [  ]  Needs Assistance [  ]  N/A [  ]  ADL: Independent [  ] Requires Assistance [  ] N/A [  ]      LABS:                        11.2   8.59  )-----------( 82       ( 02 Dec 2019 06:49 )             36.8     12-02    139  |  95<L>  |  38<H>  ----------------------------<  134<H>  3.5   |  32  |  0.8    Ca    8.9      02 Dec 2019 06:49  Mg     2.0     12-01    TPro  4.3<L>  /  Alb  2.6<L>  /  TBili  1.0  /  DBili  x   /  AST  19  /  ALT  10  /  AlkPhos  60  12-02    PTT - ( 01 Dec 2019 05:45 )  PTT:28.7 sec      RADIOLOGY & ADDITIONAL STUDIES:    Assesment:

## 2019-12-02 NOTE — CONSULT NOTE ADULT - SUBJECTIVE AND OBJECTIVE BOX
LANRE RAMOSAN  89y, Female  Allergy: aspirin (Unknown)  Cipro (Unknown)  codeine (Unknown)  dicyclomine (Unknown)  Diovan (Unknown)  Flagyl (Unknown)  Librax (Unknown)  metronidazole (Unknown)  penicillin (Unknown)  Prevacid (Unknown)  trimethobenzamide (Unknown)      All historical available data reviewed.    HPI:  This is a 89 year old female with a significant PMHx of Warm Autoimmune Hemolytic Anemia who presented with a cc/o generalized myalgia. Her muscle aches are associated with generalized weakness and inability to ambulate x3 days. At baseline, the patient is able to walk with a walker, however, was unable to get out of bed on the day of presentation. She was recently admitted for hemolytic anemia, requiring multiple transfusions. As a result, per the daughter, due to concern of recurrence given no recent blood work, she was brought to the ED for further evaluation. ROS was otherwise negative.     In the ED, the patient VS were WNL. Labs significant for leukocytosis and mildly decreased renal function from baseline. CXR was significant for pulmonary infiltrate BL, suspicious for fluid overload and cannot rule out PNA. CT A/P was performed with incidental finding of small calcification in the region of the sybil hepatis. (26 Nov 2019 18:04)    Presently on meropenem   ID called to evaluate for possible PNA    FAMILY HISTORY:  No family history of cancer    PAST MEDICAL & SURGICAL HISTORY:  Chronic kidney disease, unspecified CKD stage: Stage IIIB  TIA (transient ischemic attack)  Diverticulitis  Constipation  Hypertension  No significant past surgical history        VITALS:  T(F): 96.8, Max: 97.3 (12-02-19 @ 06:35)  HR: 86  BP: 106/67  RR: 17Vital Signs Last 24 Hrs  T(C): 36 (02 Dec 2019 14:29), Max: 36.3 (02 Dec 2019 06:35)  T(F): 96.8 (02 Dec 2019 14:29), Max: 97.3 (02 Dec 2019 06:35)  HR: 86 (02 Dec 2019 14:29) (74 - 86)  BP: 106/67 (02 Dec 2019 14:29) (106/67 - 120/64)  BP(mean): --  RR: 17 (02 Dec 2019 14:29) (17 - 18)  SpO2: 94% (02 Dec 2019 08:34) (94% - 94%)    TESTS & MEASUREMENTS:                        11.2   8.59  )-----------( 82       ( 02 Dec 2019 06:49 )             36.8     12-02    139  |  95<L>  |  38<H>  ----------------------------<  134<H>  3.5   |  32  |  0.8    Ca    8.9      02 Dec 2019 06:49  Mg     2.0     12-01    TPro  4.3<L>  /  Alb  2.6<L>  /  TBili  1.0  /  DBili  x   /  AST  19  /  ALT  10  /  AlkPhos  60  12-02    LIVER FUNCTIONS - ( 02 Dec 2019 06:49 )  Alb: 2.6 g/dL / Pro: 4.3 g/dL / ALK PHOS: 60 U/L / ALT: 10 U/L / AST: 19 U/L / GGT: x             Culture - Urine (collected 11-26-19 @ 16:20)  Source: .Urine Clean Catch (Midstream)  Final Report (11-28-19 @ 04:36):    >=3 organisms. Probable collection contamination.            RADIOLOGY & ADDITIONAL TESTS:  Personal review of radiological diagnostics performed  Echo and EKG results noted when applicable.     ANTIBIOTICS:  meropenem  IVPB 1000 milliGRAM(s) IV Intermittent every 12 hours

## 2019-12-02 NOTE — PROGRESS NOTE ADULT - ASSESSMENT
Hypoxic Respiratory Failure with SOB, generalized weakness due to fluid overload, exacerbation of Odalis CHF, fluid retention due to long term steroid therapy, CKD and severe AS  BL DVT, Possible PE  Worsening myalgia, probably steroid induced with worsening mobility dysfunction  Thombocytopenia  Hx of Warm IGG Hemolytic Anemia ( IGG +, C3 -), chronic steroid therapy, sp Rituxan  therapy  Hx of HTN, ASHD, Odalis CHF, TIA  Hx of CKD III  Hx of OA, DDD, DJD, mobility dysfunction, ambulates with walker  Hx of GERD, diverticulosis, ch constipataion  Advanced age    pt has elevated BNP of 4500, repeat 840 after several days of IV Lasix  Pt has cont need for high flow O2, ;paucity of inf respiratory signs and sx    pt has BL DVT with probable PE, on Qliquis 10 mg q 12 x 7 days the  5mg q 12 starting 12/6    start to dec the O2 via NC tomorrow AM to 3 L, observe pt for resp distress and check pulse Ox     ECHO:  EF 55-60%, tr MR, severe AS/mild AI, GIDD, severe Pul HTN    monitor CBC, Plts 132, 90, 60, 70  cont prednisone 50mg as per Hem-Onc     fall precautions  aspiration precautions    Pul consult:  no need for CT angio as it would not change the tx even if +, pt already on Eliquis, will need at least 6mos of tx, check mycoplasma titers, urine Legionella Ag    guarded state given pt advanced age, frail state and multiple complex medical issues, but overall pt's clinical status is improving, pt is more alert and verbal with improving appetite    goal of care"  pt is full code, stabilize pt, dec high flow O2 needs and probable SNF

## 2019-12-02 NOTE — PROGRESS NOTE ADULT - ASSESSMENT
This is a 89 year old female with a significant PMHx of Warm Autoimmune Hemolytic Anemia who presented with a cc/o generalized myalgia. Her muscle aches are associated with generalized weakness and inability to ambulate. She is currently admitted for suspected steroid-induced myalgia.       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 CT imaging did not reveal malignancy. No folate deficiency.   Low vitamin B12 level with normal MMA level and is on PO B12 and her repeat B12 level is normal.   Hepatitis work up negative  c/w folic acid and Po Vit B12  Completed 4/4 dose of Rituxan  on Prednisone 50 mg daily- c/w same((as previous quick taper/low dose caused hemolysis again)- will try to taper next week)  Her Hb is stable     3) New bilateral LE DVT : On eliquis BID 10mg bid for 7days and on Day 8 switch her to eliquis 5mg bid   Her hemolytic anemia makes her high risk for clots     4) Worsening thrombocytopenia:  Could be platelet consumption   - HIT panel negative. EDWARD results pending   - She had low platelets outpt- that improved after stopping pepcid      5) Acute hypoxia 2/2 to Fluid overload/PNA/ cannot r/o concomitant PE (given that she has DVT)   on high flow NC   Pulm c/s noted- CT chest was not done as it would not   Pt on meropenem for suspicion of Gram neg PNA   Echo showing severe pulHTN, Moderately enlarged right ventricle. Moderately reduced RV systolic function.  On Iv lasix        6) ANDREAS(POA)- improved

## 2019-12-02 NOTE — PROGRESS NOTE ADULT - ASSESSMENT
Pt is a 89 year old female with a significant PMHx of Warm Autoimmune Hemolytic Anemia who presented with a cc/o generalized myalgia. Her muscle aches are associated with generalized weakness and inability to ambulate. She is currently admitted for suspected steroid-induced myalgia    Todays Events: F/u ID,  Wean  O2,     IMPRESSION  Hypoxemic Respiratory Failure etiology Unknown  PNA, volume overload    Echo - 55-60% - G1DD - Severe AS      Plan  # Hypoxemic Respiratory Failure  - Fluid overload; Cardiac vs. Renal VS PNA  - On High flow nasal cannula.  - BNP 4500 now 800's  - CXR repeat stable   - Lasix IV 20 daily for now   - Echo - 55-60% - G1DD - Severe AS  - Daily weight; fluid restriction; Low salt; I+O  - Considering PNA - Labs as per Pulm below - Discuss possibility of PCP as patient was immunocompromised with Chemotherapy and Steroids. - LDH elevated, Aa Gradient Increased  -  Fungitell Ordered - pending   - Pulmonary Consult appreciated   -- f/u  Respiratory viral panel, mycoplasma titers and Legionella capsular Ag.   -- Continue Current Abx - Meropenem  -- Keep SaO2 >92% adjust O2 as needed  --The patient needs repeat CXR  in 3-4 months to document clearance      # LE DVT  provoked   - Originally started on Hep Drip - stopped due to Possible HIT  - Started on Argatroban -- switched to Eliquis will need at least 6 months  - following  hemeonc      #Thrombocytopaenia improved likely secondary to AHA and DVT  - HIT less likely as per hem/Onc   - Recent Heparin Exposure  days - 4 t score around 6  - HIT ab - Negative   - Heme/Onc following   - DVT likely secondary to inactivity and AHA  Hold all heparin products   Originally Started on Argatroban - low dose. 1mcg/kg/min   c/w  Eliquis  10 mg BID 7 days (Nov 29th - Dec 5th) Then Dec 6th 5 mg BID - Will need at least 6 months      # Myalgia and decreased functional- resolved   - Continue with Steroids   - Leukocytosis likely due to steroid use; no physical symptoms to suggest infection at this time  - meropenem     Hx of CKD Stage IIIB - resolving .8 (<1.3<1.6<1.7 Admission)  - Baseline 1.3   - Monitor    # 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 Orantes)  - Continue prednisone 50mg   - Monitor Hb, Keep active type and screen  - LE Duplex - Positive DVT R. PT / DVT L. Peroneal, soleus      #Hx of Low Vitamin B12 level with normal MMA - Continue B12 1000mcg daily  #HTN - Continue with Nifedipine 30 mg qd; Holding HCTZ/Triamterene, on lasix  #Diverticulosis - High fiber diet to avoid constipation    Electrolyte Imbalances: Correct as needed  []  Hyponatremia   /   Hypernatremia  []   []  Hypokalemia   /   Hyperkalemia  []   []  Hypochlorhydria   /    Hypochlorhydria  []   []  Hypomagnesemia   /   Hypermagnesemia  []   []  Hypophosphatemia   /   Hyperphosphatemia  []       GI ppx:                                   [] Not indicated   /   [] Pantoprazole 40mg PO Daily    DVT ppx:  [] Not indicated / [] Heparin 5000mg SubQ / [] Lovenox 40mg SubQ / [] SCDs    Fluids:   [] PO  |  [] IVF    Activity:  [X] Assisatnce needed   [X] Increase as Tolerated  /  [] OOB w/ assist  /  [] Bed Rest    BMI:  Height (cm): 149.86 (11-29)  Weight (kg): 50.802 (11-27)  BMI (kg/m2): 22.6 (11-29)        DISPO:  Patient to be discharged when condition(s) optimized.  [ x] From Home     [ ] NH/SNF   [ ] 4A Rehab  [ ] Detox Clinic  [X] Plan Discussion with patient and/or family.  [X] Discussed Case and Plan with the Medical Attending.    CODE STATUS  [X] FULL   /    [] DNR Pt is a 89 year old female with a significant PMHx of Warm Autoimmune Hemolytic Anemia who presented with a cc/o generalized myalgia. Her muscle aches are associated with generalized weakness and inability to ambulate. She is currently admitted for suspected steroid-induced myalgia    Todays Events: F/u ID,  Wean  O2,     IMPRESSION  Hypoxemic Respiratory Failure etiology Unknown  PNA, volume overload    Echo - 55-60% - G1DD - Severe AS transaortic gradient 37, sever PAH      Plan  # Hypoxemic Respiratory Failure  - Fluid overload; Cardiac vs. Renal VS PNA  - On High flow nasal cannula.  - BNP 4500 now 800's  - CXR repeat stable   - Lasix IV 20 daily for now   - Echo - 55-60% - G1DD - Severe AS  - Daily weight; fluid restriction; Low salt; I+O  - Considering PNA - Labs as per Pulm below - Discuss possibility of PCP as patient was immunocompromised with Chemotherapy and Steroids. - LDH elevated, Aa Gradient Increased  -  Fungitell Ordered - pending   - Pulmonary Consult appreciated   -- f/u  Respiratory viral panel, mycoplasma titers and Legionella capsular Ag.   -- Continue Current Abx - Meropenem  -- Keep SaO2 >92% adjust O2 as needed  --The patient needs repeat CXR  in 3-4 months to document clearance      # LE DVT  provoked   - Originally started on Hep Drip - stopped due to Possible HIT  - Started on Argatroban -- switched to Eliquis will need at least 6 months  - following  hemeonc      #Thrombocytopaenia improved likely secondary to AHA and DVT  - HIT less likely as per hem/Onc   - Recent Heparin Exposure  days - 4 t score around 6  - HIT ab - Negative   - Heme/Onc following   - DVT likely secondary to inactivity and AHA  Hold all heparin products   Originally Started on Argatroban - low dose. 1mcg/kg/min   c/w  Eliquis  10 mg BID 7 days (Nov 29th - Dec 5th) Then Dec 6th 5 mg BID - Will need at least 6 months      # Myalgia and decreased functional- resolved   - Continue with Steroids   - Leukocytosis likely due to steroid use; no physical symptoms to suggest infection at this time  - meropenem     Hx of CKD Stage IIIB - resolving .8 (<1.3<1.6<1.7 Admission)  - Baseline 1.3   - Monitor    # 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 Orantes)  - Continue prednisone 50mg   - Monitor Hb, Keep active type and screen  - LE Duplex - Positive DVT R. PT / DVT L. Peroneal, soleus      #Hx of Low Vitamin B12 level with normal MMA - Continue B12 1000mcg daily  #HTN - Continue with Nifedipine 30 mg qd; Holding HCTZ/Triamterene, on lasix  #Diverticulosis - High fiber diet to avoid constipation    Electrolyte Imbalances: Correct as needed  []  Hyponatremia   /   Hypernatremia  []   []  Hypokalemia   /   Hyperkalemia  []   []  Hypochlorhydria   /    Hypochlorhydria  []   []  Hypomagnesemia   /   Hypermagnesemia  []   []  Hypophosphatemia   /   Hyperphosphatemia  []       GI ppx:                                   [] Not indicated   /   [] Pantoprazole 40mg PO Daily    DVT ppx:  [] Not indicated / [] Heparin 5000mg SubQ / [] Lovenox 40mg SubQ / [] SCDs    Fluids:   [] PO  |  [] IVF    Activity:  [X] Assisatnce needed   [X] Increase as Tolerated  /  [] OOB w/ assist  /  [] Bed Rest    BMI:  Height (cm): 149.86 (11-29)  Weight (kg): 50.802 (11-27)  BMI (kg/m2): 22.6 (11-29)        DISPO:  Patient to be discharged when condition(s) optimized.  [ x] From Home     [ ] NH/SNF   [ ] 4A Rehab  [ ] Detox Clinic  [X] Plan Discussion with patient and/or family.  [X] Discussed Case and Plan with the Medical Attending.    CODE STATUS  [X] FULL   /    [] DNR Pt is a 89 year old female with a significant PMHx of Warm Autoimmune Hemolytic Anemia who presented with a cc/o generalized myalgia. Her muscle aches are associated with generalized weakness and inability to ambulate. She is currently admitted for suspected steroid-induced myalgia    Todays Events: F/u ID,  Wean  O2,     IMPRESSION  Hypoxemic Respiratory Failure etiology Unknown  PNA, volume overload    Echo - 55-60% - G1DD - Severe AS transaortic gradient 37, sever PAH, sever TR      Plan  # Hypoxemic Respiratory Failure  - Fluid overload; Cardiac vs. Renal VS PNA  - On High flow nasal cannula.  - BNP 4500 now 800's  - CXR repeat stable   - Lasix IV 20 daily for now   - Echo - 55-60% - G1DD - Severe AS  - Daily weight; fluid restriction; Low salt; I+O  - Considering PNA - Labs as per Pulm below - Discuss possibility of PCP as patient was immunocompromised with Chemotherapy and Steroids. - LDH elevated, Aa Gradient Increased  -  Fungitell Ordered - pending   - Pulmonary Consult appreciated   -- f/u  Respiratory viral panel, mycoplasma titers and Legionella capsular Ag.   -- Continue Current Abx - Meropenem  -- Keep SaO2 >92% adjust O2 as needed  --The patient needs repeat CXR  in 3-4 months to document clearance      # LE DVT  provoked   - Originally started on Hep Drip - stopped due to Possible HIT  - Started on Argatroban -- switched to Eliquis will need at least 6 months  - following  hemeonc      #Thrombocytopaenia improved likely secondary to AHA and DVT  - HIT less likely as per hem/Onc   - Recent Heparin Exposure  days - 4 t score around 6  - HIT ab - Negative   - Heme/Onc following   - DVT likely secondary to inactivity and AHA  Hold all heparin products   Originally Started on Argatroban - low dose. 1mcg/kg/min   c/w  Eliquis  10 mg BID 7 days (Nov 29th - Dec 5th) Then Dec 6th 5 mg BID - Will need at least 6 months      # Myalgia and decreased functional- resolved   - Continue with Steroids   - Leukocytosis likely due to steroid use; no physical symptoms to suggest infection at this time  - meropenem     Hx of CKD Stage IIIB - resolving .8 (<1.3<1.6<1.7 Admission)  - Baseline 1.3   - Monitor    # 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 Orantes)  - Continue prednisone 50mg   - Monitor Hb, Keep active type and screen  - LE Duplex - Positive DVT R. PT / DVT L. Peroneal, soleus      #Hx of Low Vitamin B12 level with normal MMA - Continue B12 1000mcg daily  #HTN - Continue with Nifedipine 30 mg qd; Holding HCTZ/Triamterene, on lasix  #Diverticulosis - High fiber diet to avoid constipation    Electrolyte Imbalances: Correct as needed  []  Hyponatremia   /   Hypernatremia  []   []  Hypokalemia   /   Hyperkalemia  []   []  Hypochlorhydria   /    Hypochlorhydria  []   []  Hypomagnesemia   /   Hypermagnesemia  []   []  Hypophosphatemia   /   Hyperphosphatemia  []       GI ppx:                                   [] Not indicated   /   [] Pantoprazole 40mg PO Daily    DVT ppx:  [] Not indicated / [] Heparin 5000mg SubQ / [] Lovenox 40mg SubQ / [] SCDs    Fluids:   [] PO  |  [] IVF    Activity:  [X] Assisatnce needed   [X] Increase as Tolerated  /  [] OOB w/ assist  /  [] Bed Rest    BMI:  Height (cm): 149.86 (11-29)  Weight (kg): 50.802 (11-27)  BMI (kg/m2): 22.6 (11-29)        DISPO:  Patient to be discharged when condition(s) optimized.  [ x] From Home     [ ] NH/SNF   [ ] 4A Rehab  [ ] Detox Clinic  [X] Plan Discussion with patient and/or family.  [X] Discussed Case and Plan with the Medical Attending.    CODE STATUS  [X] FULL   /    [] DNR

## 2019-12-03 ENCOUNTER — APPOINTMENT (OUTPATIENT)
Dept: HEMATOLOGY ONCOLOGY | Facility: CLINIC | Age: 84
End: 2019-12-03

## 2019-12-03 LAB
ANION GAP SERPL CALC-SCNC: 13 MMOL/L — SIGNIFICANT CHANGE UP (ref 7–14)
BASOPHILS # BLD AUTO: 0.01 K/UL — SIGNIFICANT CHANGE UP (ref 0–0.2)
BASOPHILS NFR BLD AUTO: 0.1 % — SIGNIFICANT CHANGE UP (ref 0–1)
BUN SERPL-MCNC: 36 MG/DL — HIGH (ref 10–20)
CALCIUM SERPL-MCNC: 8.8 MG/DL — SIGNIFICANT CHANGE UP (ref 8.5–10.1)
CHLORIDE SERPL-SCNC: 98 MMOL/L — SIGNIFICANT CHANGE UP (ref 98–110)
CO2 SERPL-SCNC: 28 MMOL/L — SIGNIFICANT CHANGE UP (ref 17–32)
CREAT SERPL-MCNC: 0.7 MG/DL — SIGNIFICANT CHANGE UP (ref 0.7–1.5)
EOSINOPHIL # BLD AUTO: 0.07 K/UL — SIGNIFICANT CHANGE UP (ref 0–0.7)
EOSINOPHIL NFR BLD AUTO: 0.8 % — SIGNIFICANT CHANGE UP (ref 0–8)
FUNGITELL: >500 PG/ML — HIGH
GLUCOSE BLDC GLUCOMTR-MCNC: 119 MG/DL — HIGH (ref 70–99)
GLUCOSE BLDC GLUCOMTR-MCNC: 144 MG/DL — HIGH (ref 70–99)
GLUCOSE BLDC GLUCOMTR-MCNC: 178 MG/DL — HIGH (ref 70–99)
GLUCOSE BLDC GLUCOMTR-MCNC: 246 MG/DL — HIGH (ref 70–99)
GLUCOSE SERPL-MCNC: 120 MG/DL — HIGH (ref 70–99)
HAPTOGLOB SERPL-MCNC: 85 MG/DL — SIGNIFICANT CHANGE UP (ref 34–200)
HCT VFR BLD CALC: 34.5 % — LOW (ref 37–47)
HGB BLD-MCNC: 10.7 G/DL — LOW (ref 12–16)
IMM GRANULOCYTES NFR BLD AUTO: 5.6 % — HIGH (ref 0.1–0.3)
LDH SERPL L TO P-CCNC: 404 — HIGH (ref 50–242)
LYMPHOCYTES # BLD AUTO: 1.12 K/UL — LOW (ref 1.2–3.4)
LYMPHOCYTES # BLD AUTO: 12.3 % — LOW (ref 20.5–51.1)
MAGNESIUM SERPL-MCNC: 2.2 MG/DL — SIGNIFICANT CHANGE UP (ref 1.8–2.4)
MCHC RBC-ENTMCNC: 29 PG — SIGNIFICANT CHANGE UP (ref 27–31)
MCHC RBC-ENTMCNC: 31 G/DL — LOW (ref 32–37)
MCV RBC AUTO: 93.5 FL — SIGNIFICANT CHANGE UP (ref 81–99)
MONOCYTES # BLD AUTO: 0.4 K/UL — SIGNIFICANT CHANGE UP (ref 0.1–0.6)
MONOCYTES NFR BLD AUTO: 4.4 % — SIGNIFICANT CHANGE UP (ref 1.7–9.3)
NEUTROPHILS # BLD AUTO: 6.96 K/UL — HIGH (ref 1.4–6.5)
NEUTROPHILS NFR BLD AUTO: 76.8 % — HIGH (ref 42.2–75.2)
NRBC # BLD: 0 /100 WBCS — SIGNIFICANT CHANGE UP (ref 0–0)
PHOSPHATE SERPL-MCNC: 2.1 MG/DL — SIGNIFICANT CHANGE UP (ref 2.1–4.9)
PLATELET # BLD AUTO: 98 K/UL — LOW (ref 130–400)
POTASSIUM SERPL-MCNC: 3.3 MMOL/L — LOW (ref 3.5–5)
POTASSIUM SERPL-SCNC: 3.3 MMOL/L — LOW (ref 3.5–5)
RBC # BLD: 3.69 M/UL — LOW (ref 4.2–5.4)
RBC # FLD: 15.4 % — HIGH (ref 11.5–14.5)
SODIUM SERPL-SCNC: 139 MMOL/L — SIGNIFICANT CHANGE UP (ref 135–146)
SRA INTERP SER-IMP: SIGNIFICANT CHANGE UP
WBC # BLD: 9.07 K/UL — SIGNIFICANT CHANGE UP (ref 4.8–10.8)
WBC # FLD AUTO: 9.07 K/UL — SIGNIFICANT CHANGE UP (ref 4.8–10.8)

## 2019-12-03 PROCEDURE — 99232 SBSQ HOSP IP/OBS MODERATE 35: CPT

## 2019-12-03 RX ORDER — ATOVAQUONE 750 MG/5ML
750 SUSPENSION ORAL
Refills: 0 | Status: DISCONTINUED | OUTPATIENT
Start: 2019-12-03 | End: 2019-12-11

## 2019-12-03 RX ORDER — LACTULOSE 10 G/15ML
10 SOLUTION ORAL ONCE
Refills: 0 | Status: COMPLETED | OUTPATIENT
Start: 2019-12-03 | End: 2019-12-03

## 2019-12-03 RX ORDER — ATOVAQUONE 750 MG/5ML
5 SUSPENSION ORAL
Qty: 0 | Refills: 0 | DISCHARGE
Start: 2019-12-03 | End: 2019-12-24

## 2019-12-03 RX ADMIN — Medication 40 MILLIGRAM(S): at 09:28

## 2019-12-03 RX ADMIN — LACTULOSE 10 GRAM(S): 10 SOLUTION ORAL at 14:05

## 2019-12-03 RX ADMIN — Medication 1 MILLIGRAM(S): at 12:15

## 2019-12-03 RX ADMIN — Medication 2: at 17:01

## 2019-12-03 RX ADMIN — APIXABAN 10 MILLIGRAM(S): 2.5 TABLET, FILM COATED ORAL at 21:38

## 2019-12-03 RX ADMIN — Medication 20 MILLIGRAM(S): at 08:08

## 2019-12-03 RX ADMIN — Medication 1: at 12:15

## 2019-12-03 RX ADMIN — SENNA PLUS 2 TABLET(S): 8.6 TABLET ORAL at 21:38

## 2019-12-03 RX ADMIN — APIXABAN 10 MILLIGRAM(S): 2.5 TABLET, FILM COATED ORAL at 09:28

## 2019-12-03 RX ADMIN — POLYETHYLENE GLYCOL 3350 17 GRAM(S): 17 POWDER, FOR SOLUTION ORAL at 06:06

## 2019-12-03 RX ADMIN — ATOVAQUONE 750 MILLIGRAM(S): 750 SUSPENSION ORAL at 17:01

## 2019-12-03 RX ADMIN — Medication 30 MILLIGRAM(S): at 06:07

## 2019-12-03 RX ADMIN — CHLORHEXIDINE GLUCONATE 1 APPLICATION(S): 213 SOLUTION TOPICAL at 06:00

## 2019-12-03 RX ADMIN — POLYETHYLENE GLYCOL 3350 17 GRAM(S): 17 POWDER, FOR SOLUTION ORAL at 17:01

## 2019-12-03 RX ADMIN — PREGABALIN 1000 MICROGRAM(S): 225 CAPSULE ORAL at 12:15

## 2019-12-03 NOTE — PROGRESS NOTE ADULT - SUBJECTIVE AND OBJECTIVE BOX
LANRE RAMOSAN  89y, Female    All available historical data reviewed    OVERNIGHT EVENTS:  no fevers, no cough/ SOB    ROS:  General: Denies rigors, night sweats  HEENT: Denies headache, rhinorrhea, sore throat, eye pain  CV: Denies CP, palpitations  PULM: Denies wheezing, hemoptysis  GI: Denies hematemesis, hematochezia, melena  : Denies discharge, hematuria  MSK: Denies arthralgias, myalgias  SKIN: Denies rash, lesions  NEURO: Denies paresthesias, weakness  PSYCH: Denies depression, anxiety    VITALS:  T(F): 96.1, Max: 96.8 (12-02-19 @ 14:29)  HR: 74  BP: 110/61  RR: 18Vital Signs Last 24 Hrs  T(C): 35.6 (03 Dec 2019 05:19), Max: 36 (02 Dec 2019 14:29)  T(F): 96.1 (03 Dec 2019 05:19), Max: 96.8 (02 Dec 2019 14:29)  HR: 74 (03 Dec 2019 05:19) (74 - 86)  BP: 110/61 (03 Dec 2019 05:19) (106/67 - 117/56)  BP(mean): --  RR: 18 (03 Dec 2019 05:19) (17 - 18)  SpO2: 96% (03 Dec 2019 08:14) (79% - 96%)    TESTS & MEASUREMENTS:                        10.7   9.07  )-----------( 98       ( 03 Dec 2019 06:07 )             34.5     12-03    139  |  98  |  36<H>  ----------------------------<  120<H>  3.3<L>   |  28  |  0.7    Ca    8.8      03 Dec 2019 06:07  Phos  2.1     12-03  Mg     2.2     12-03    TPro  4.3<L>  /  Alb  2.6<L>  /  TBili  1.0  /  DBili  x   /  AST  19  /  ALT  10  /  AlkPhos  60  12-02    LIVER FUNCTIONS - ( 02 Dec 2019 06:49 )  Alb: 2.6 g/dL / Pro: 4.3 g/dL / ALK PHOS: 60 U/L / ALT: 10 U/L / AST: 19 U/L / GGT: x             Culture - Urine (collected 11-26-19 @ 16:20)  Source: .Urine Clean Catch (Midstream)  Final Report (11-28-19 @ 04:36):    >=3 organisms. Probable collection contamination.            RADIOLOGY & ADDITIONAL TESTS:  Personal review of radiological diagnostics performed  Echo and EKG results noted when applicable.     ANTIBIOTICS:

## 2019-12-03 NOTE — PROGRESS NOTE ADULT - ASSESSMENT
Pt is a 89 year old female with a significant PMHx of Warm Autoimmune Hemolytic Anemia who presented with a cc/o generalized myalgia. Her muscle aches are associated with generalized weakness and inability to ambulate. She is currently admitted for suspected steroid-induced myalgia    Today's Events:  ID notes  anabelle,  Wean  O2 decreased High flow to 40%,  elevated LDH and fungitell  possible PCP started mepron 750mg, decreased     IMPRESSION  Hypoxemic Respiratory Failure etiology Unknown  PNA, volume overload    Echo - 55-60% - G1DD - Severe AS transaortic gradient 37, sever PAH, sever TR      Plan  # Hypoxemic Respiratory Failure, immunocompromised,    - Possible PCP starting mepron 750mg q12  - component Fluid overload  - On High flow nasal cannula.  - BNP 4500 now 800's  - CXR repeat stable   - Lasix IV 20 daily for now   - Echo - 55-60% - G1DD - Severe AS  - Daily weight; fluid restriction; Low salt; I+O  - Considering PNA - Labs as per Pulm below - Discuss possibility of PCP as patient was immunocompromised with Chemotherapy and Steroids. - LDH elevated, Aa Gradient Increased  -  Fungitell elevated , LDH elevated   - Pulmonary Consult appreciated   --   mycoplasma titers and Legionella capsular Ag. Pending   -- ID anabelle- DC Meropenem starting mepron   -- Keep SaO2 >92% adjust O2 as needed  --The patient needs repeat CXR  in 3-4 months to document clearance      # LE DVT  provoked   - Originally started on Hep Drip - stopped due to Possible HIT  - Started on Argatroban -- switched to Eliquis will need at least 6 months  - following  hemeonc      #Thrombocytopaenia improved likely secondary to AHA and DVT  - HIT less likely as per hem/Onc   - Recent Heparin Exposure  days - 4 t score around 6  - HIT ab - Negative   - Heme/Onc following   - DVT likely secondary to inactivity and AHA  Hold all heparin products   Originally Started on Argatroban - low dose. 1mcg/kg/min   c/w  Eliquis  10 mg BID 7 days (Nov 29th - Dec 5th) Then Dec 6th 5 mg BID - Will need at least 6 months      # Myalgia and decreased functional- resolved   - Continue with Steroids  now 40mg PO  - Leukocytosis likely due to steroid use; no physical symptoms to suggest infection at this time  - meropenem     Hx of CKD Stage IIIB - resolving .8 (<1.3<1.6<1.7 Admission)  - Baseline 1.3   - Monitor    # 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 Orantes)  - Continue prednisone 50mg   - Monitor Hb, Keep active type and screen  - LE Duplex - Positive DVT R. PT / DVT L. Peroneal, soleus      #Hx of Low Vitamin B12 level with normal MMA - Continue B12 1000mcg daily  #HTN - Continue with Nifedipine 30 mg qd; Holding HCTZ/Triamterene, on lasix  #Diverticulosis - High fiber diet to avoid constipation    Electrolyte Imbalances: Correct as needed  []  Hyponatremia   /   Hypernatremia  []   []  Hypokalemia   /   Hyperkalemia  []   []  Hypochlorhydria   /    Hypochlorhydria  []   []  Hypomagnesemia   /   Hypermagnesemia  []   []  Hypophosphatemia   /   Hyperphosphatemia  []       GI ppx:                                   [] Not indicated   /   [] Pantoprazole 40mg PO Daily    DVT ppx:  [] Not indicated / [] Heparin 5000mg SubQ / [] Lovenox 40mg SubQ / [] SCDs    Fluids:   [] PO  |  [] IVF    Activity:  [X] Assisatnce needed   [X] Increase as Tolerated  /  [] OOB w/ assist  /  [] Bed Rest    BMI:  Height (cm): 149.86 (11-29)  Weight (kg): 50.802 (11-27)  BMI (kg/m2): 22.6 (11-29)        DISPO:  Patient to be discharged when condition(s) optimized.  [ x] From Home     [ ] NH/SNF   [ ] 4A Rehab  [ ] Detox Clinic  [X] Plan Discussion with patient and/or family.  [X] Discussed Case and Plan with the Medical Attending.    CODE STATUS  [X] FULL   /    [] DNR

## 2019-12-03 NOTE — PROGRESS NOTE ADULT - ASSESSMENT
This is a 89 year old female with a significant PMHx of Warm Autoimmune Hemolytic Anemia who presented with a cc/o generalized myalgia. Her muscle aches are associated with generalized weakness and inability to ambulate. She is currently admitted for suspected steroid-induced myalgia.       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 CT imaging did not reveal malignancy. No folate deficiency.   Low vitamin B12 level with normal MMA level and is on PO B12 and her repeat B12 level is normal.   Hepatitis work up negative  c/w folic acid and Po Vit B12  Completed 4/4 dose of Rituxan  on Prednisone taper down to 40mg now - c/w same((as previous quick taper/low dose caused hemolysis again)  LDH- 404. Haptoglobin pending   Her Hb is stable so far. Continue to monitor     3) New bilateral LE DVT : On eliquis BID 10mg bid for 7days and on Day 8 switch her to eliquis 5mg bid   Her hemolytic anemia makes her high risk for clots     4) Worsening thrombocytopenia:  Could be platelet consumption - improving now   - HIT panel negative. EDWARD results pending   - She had low platelets outpt- that improved after stopping pepcid      5) Acute hypoxia 2/2 to Fluid overload/PNA/ cannot r/o concomitant PE (given that she has DVT)   on high flow NC   Pulm c/s noted- CT chest was not done as it would not   Off ABx   Echo showing severe pulHTN, Moderately enlarged right ventricle. Moderately reduced RV systolic function.  On Iv lasix        6) ANDREAS(POA)- improved

## 2019-12-03 NOTE — PROGRESS NOTE ADULT - ASSESSMENT
Hypoxic Respiratory Failure with SOB, generalized weakness due to fluid overload, exacerbation of Odalis CHF, fluid retention due to long term steroid therapy, CKD and severe AS  BL DVT, Possible PE  Worsening myalgia, probably steroid induced with worsening mobility dysfunction  Thombocytopenia  Hx of Warm IGG Hemolytic Anemia ( IGG +, C3 -), chronic steroid therapy, sp Rituxan  therapy  Hx of HTN, ASHD, Odalis CHF, TIA  Hx of CKD III  Hx of OA, DDD, DJD, mobility dysfunction, ambulates with walker  Hx of GERD, diverticulosis, ch constipataion  Advanced age    pt has elevated BNP of 4500, repeat 840 after several days of IV Lasix  Pt has cont need for high flow O2, ;paucity of inf respiratory signs and sx    pt has BL DVT with probable PE, on Qliquis 10 mg q 12 x 7 days the  5mg q 12 starting 12/6    start to dec the O2 via NC tomorrow AM to 3 L, observe pt for resp distress and check pulse Ox     ECHO:  EF 55-60%, tr MR, severe AS/mild AI, GIDD, severe Pul HTN    monitor CBC, Plts 132, 90, 60, 70  cont prednisone 50mg as per Hem-Onc     fall precautions  aspiration precautions    Pul consult:  no need for CT angio as it would not change the tx even if +, pt already on Eliquis, will need at least 6mos of tx, check mycoplasma titers, urine Legionella Ag    guarded state given pt advanced age, frail state and multiple complex medical issues, but overall pt's clinical status is improving, pt is more alert and verbal with improving appetite    goal of care"  pt is full code, stabilize pt, dec high flow O2 needs and probable SNF Hypoxic Respiratory Failure with SOB, generalized weakness due to fluid overload, exacerbation of Odalis CHF, fluid retention due to long term steroid therapy, CKD and severe AS  BL DVT, Possible PE  Worsening myalgia, probably steroid induced with worsening mobility dysfunction  Thombocytopenia  Hx of Warm IGG Hemolytic Anemia ( IGG +, C3 -), chronic steroid therapy, sp Rituxan  therapy  Hx of HTN, ASHD, Odalis CHF, TIA  Hx of CKD III  Hx of OA, DDD, DJD, mobility dysfunction, ambulates with walker  Hx of GERD, diverticulosis, ch constipataion  Advanced age    pt has elevated BNP of 7,429 4,500, repeat 840 after several days of IV Lasix    pt has BL DVT with probable PE, on Eliquis 10 mg q 12 x 7 days the  5mg q 12 starting 12/6    start to dec the O2 via NC tomorrow AM to 3 L, observe pt for resp distress and check pulse Ox     ECHO:  EF 55-60%, tr MR, severe AS/mild AI, GIDD, severe Pul HTN    monitor CBC, Plts 132, 90, 60, 70, 98    Hem--onc dec prednisone to 40mg    inc risk of PCP due to long standing prednisone tx,, placed on  Mepron (Atovaquone) 750mg q 12    Pul consult:, pt on Eliquis for BL DVT and possible PE,  will need at least 6mos of tx, check mycoplasma titers, urine Legionella Ag    If unable to dec O2 may  need will do CT of chest or VQ scan, GFR ok now 75    pt with  advanced age, frail state and multiple complex medical issues, but overall pt's clinical status is improving, pt is more alert and verbal with improving appetite    OOB to chair   fall precautions  aspiration precautions    goal of care;  pt is full code, stabilize pt, dec high flow O2 needs, cont OOB to chair and probable SNF once the O2 req dec

## 2019-12-03 NOTE — PROGRESS NOTE ADULT - SUBJECTIVE AND OBJECTIVE BOX
Patient is a 89y old  Female who presents with a chief complaint of Fluid overload; steroid-induced myalgia (03 Dec 2019 11:39)        SUBJECTIVE:  Reports improved breathing. no events overnight    REVIEW OF SYSTEMS:    CONSTITUTIONAL:   no fever   no chills.  no weight gain   no weight loss    EYES:   no discharge,   no pain  no redness,   no visual changes.    ENT:   Ears: no ear pain and no hearing problems.  Nose: no nasal congestion and no nasal drainage.  Mouth/Throat: no dysphagia,  no hoarseness and no throat pain.  Neck: no lumps, no pain, no stiffness and no swollen glands.     CARDIOVASCULAR:   no chest pain,   no swelling  no palpitaions  no syncope    RESPIRATORY:  + SOB  no cough  no wheezing    GASTROINTESTINAL:   no abdominal pain,   no constipation,   no diarrhea,   no vomiting.    GENITOURINARY:  no dysuria,   no frequency,   no urgency  no hematuria.    MUSCULOSKELETAL:   no back pain,   no musculoskeletal pain,  + weakness    SKIN:   no jaundice,   no lesions,   no pruritis,   no rashes.    NEURO:   no loss of consciousness,   no gait abnormality,   no headache,   no sensory deficits,   no weakness.    PSYCHIATRIC:   no known mental health issues  no anxiety  no depression    ALLERGIC/IMMUNOLOGIC:   No active allergic or immunologic issues      PHYSICAL EXAM  Vital Signs Last 24 Hrs  T(C): 35.7 (03 Dec 2019 14:15), Max: 35.7 (02 Dec 2019 20:00)  T(F): 96.3 (03 Dec 2019 14:15), Max: 96.3 (03 Dec 2019 14:15)  HR: 89 (03 Dec 2019 14:15) (74 - 89)  BP: 191/66 (03 Dec 2019 14:15) (110/61 - 191/66)  BP(mean): --  RR: 20 (03 Dec 2019 14:15) (18 - 20)  SpO2: 94% (03 Dec 2019 14:20) (79% - 96%) HF 50%    CONSTITUTIONAL:   Ill appearing.  NAD    ENT:   Airway patent,   Nasal mucosa clear.  Mouth with normal mucosa.   Throat has no vesicles,  no oropharyngeal exudates and uvula is midline.  No thrush    EYES:   Clear bilaterally,   pupils equal,   round and reactive to light.    CARDIAC:   Normal rate,   regular rhythm.    Heart sounds S1, S2.   normal  cardiac impulse  no edema      CAROTID:   normal systolic impulse  no bruits    RESPIRATORY:   + b/l crackles  no wheezing  normal chest expansion  not tachypneic,  no use of accessory muscles    GASTROINTESTINAL:  Abdomen soft,   non-tender,   no guarding,   + BS    GENITOURINARY  normal genitalia for sex  no edema    MUSCULOSKELETAL:   range of motion is not limited,  no muscle or joint tenderness  no clubbing, cyanosis    NEUROLOGICAL:   Alert and oriented   no focal deficits in cranial nerve areas  no motor or sensory deficits.  pertinent DTRs normal    SKIN:   Skin normal color for race,   warm,   dry and intact.   No evidence of rash.    PSYCHIATRIC:   Alert and oriented to person,   place, time/situation.   normal mood and affect.   no apparent risk to self or others.    HEME LYMPH:   no splenomegaly.  No cervical  lymphadenopathy.  no inguinal lymphadenopathy        LABS:                          10.7   9.07  )-----------( 98       ( 03 Dec 2019 06:07 )             34.5                                               12-03    139  |  98  |  36<H>  ----------------------------<  120<H>  3.3<L>   |  28  |  0.7    Ca    8.8      03 Dec 2019 06:07  Phos  2.1     12-03  Mg     2.2     12-03    TPro  4.3<L>  /  Alb  2.6<L>  /  TBili  1.0  /  DBili  x   /  AST  19  /  ALT  10  /  AlkPhos  60  12-02                                                                                           LIVER FUNCTIONS - ( 02 Dec 2019 06:49 )  Alb: 2.6 g/dL / Pro: 4.3 g/dL / ALK PHOS: 60 U/L / ALT: 10 U/L / AST: 19 U/L / GGT: x                                                                                                MEDICATIONS  (STANDING):  apixaban 10 milliGRAM(s) Oral every 12 hours  atovaquone Suspension 750 milliGRAM(s) Oral two times a day  chlorhexidine 4% Liquid 1 Application(s) Topical <User Schedule>  cyanocobalamin 1000 MICROGram(s) Oral daily  folic acid 1 milliGRAM(s) Oral daily  furosemide   Injectable 20 milliGRAM(s) IV Push daily  insulin lispro (HumaLOG) corrective regimen sliding scale   SubCutaneous three times a day before meals  NIFEdipine XL 30 milliGRAM(s) Oral daily  polyethylene glycol 3350 17 Gram(s) Oral two times a day  predniSONE   Tablet 40 milliGRAM(s) Oral daily  senna 2 Tablet(s) Oral at bedtime    MEDICATIONS  (PRN): Patient is a 89y old  Female who presents with a chief complaint of Fluid overload; steroid-induced myalgia (03 Dec 2019 11:39)        SUBJECTIVE:  Reports improved breathing. no events overnight    REVIEW OF SYSTEMS:    CONSTITUTIONAL:   no fever   no chills.  no weight gain   no weight loss    EYES:   no discharge,   no pain  no redness,   no visual changes.    ENT:   Ears: no ear pain and no hearing problems.  Nose: no nasal congestion and no nasal drainage.  Mouth/Throat: no dysphagia,  no hoarseness and no throat pain.  Neck: no lumps, no pain, no stiffness and no swollen glands.     CARDIOVASCULAR:   no chest pain,   no swelling  no palpitaions  no syncope    RESPIRATORY:  + SOB  no cough  no wheezing    GASTROINTESTINAL:   no abdominal pain,   no constipation,   no diarrhea,   no vomiting.    GENITOURINARY:  no dysuria,   no frequency,   no urgency  no hematuria.    MUSCULOSKELETAL:   no back pain,   no musculoskeletal pain,  + weakness    SKIN:   no jaundice,   no lesions,   no pruritis,   no rashes.    NEURO:   no loss of consciousness,   no gait abnormality,   no headache,   no sensory deficits,   no weakness.    PSYCHIATRIC:   no known mental health issues  no anxiety  no depression    ALLERGIC/IMMUNOLOGIC:   No active allergic or immunologic issues      PHYSICAL EXAM  Vital Signs Last 24 Hrs  T(C): 35.7 (03 Dec 2019 14:15), Max: 35.7 (02 Dec 2019 20:00)  T(F): 96.3 (03 Dec 2019 14:15), Max: 96.3 (03 Dec 2019 14:15)  HR: 89 (03 Dec 2019 14:15) (74 - 89)  BP: 191/66 (03 Dec 2019 14:15) (110/61 - 191/66)  RR: 20 (03 Dec 2019 14:15) (18 - 20)  SpO2: 94% (03 Dec 2019 14:20) (79% - 96%) HF 50%    CONSTITUTIONAL:   Ill appearing.  NAD    ENT:   Airway patent,   Nasal mucosa clear.  Mouth with normal mucosa.   Throat has no vesicles,  no oropharyngeal exudates and uvula is midline.  No thrush    EYES:   Clear bilaterally,   pupils equal,   round and reactive to light.    CARDIAC:   Normal rate,   regular rhythm.    Heart sounds S1, S2.   normal  cardiac impulse  no edema      CAROTID:   normal systolic impulse  no bruits    RESPIRATORY:   + b/l crackles  no wheezing  normal chest expansion  not tachypneic,  no use of accessory muscles    GASTROINTESTINAL:  Abdomen soft,   non-tender,   no guarding,   + BS    GENITOURINARY  normal genitalia for sex  no edema    MUSCULOSKELETAL:   range of motion is not limited,  no muscle or joint tenderness  no clubbing, cyanosis    NEUROLOGICAL:   Alert and oriented         SKIN:   Skin normal color for race,   warm,   dry and intact.       PSYCHIATRIC:   Alert and oriented to person,   place, time/situation.       HEME LYMPH:   no splenomegaly.  No cervical  lymphadenopathy.  no inguinal lymphadenopathy        LABS:                          10.7   9.07  )-----------( 98       ( 03 Dec 2019 06:07 )             34.5                                               12-03    139  |  98  |  36<H>  ----------------------------<  120<H>  3.3<L>   |  28  |  0.7    Ca    8.8      03 Dec 2019 06:07  Phos  2.1     12-03  Mg     2.2     12-03    TPro  4.3<L>  /  Alb  2.6<L>  /  TBili  1.0  /  DBili  x   /  AST  19  /  ALT  10  /  AlkPhos  60  12-02                                                                                           LIVER FUNCTIONS - ( 02 Dec 2019 06:49 )  Alb: 2.6 g/dL / Pro: 4.3 g/dL / ALK PHOS: 60 U/L / ALT: 10 U/L / AST: 19 U/L / GGT: x                                                                                                MEDICATIONS  (STANDING):  apixaban 10 milliGRAM(s) Oral every 12 hours  atovaquone Suspension 750 milliGRAM(s) Oral two times a day  chlorhexidine 4% Liquid 1 Application(s) Topical <User Schedule>  cyanocobalamin 1000 MICROGram(s) Oral daily  folic acid 1 milliGRAM(s) Oral daily  furosemide   Injectable 20 milliGRAM(s) IV Push daily  insulin lispro (HumaLOG) corrective regimen sliding scale   SubCutaneous three times a day before meals  NIFEdipine XL 30 milliGRAM(s) Oral daily  polyethylene glycol 3350 17 Gram(s) Oral two times a day  predniSONE   Tablet 40 milliGRAM(s) Oral daily  senna 2 Tablet(s) Oral at bedtime    MEDICATIONS  (PRN):

## 2019-12-03 NOTE — PROGRESS NOTE ADULT - ASSESSMENT
Impression:    Multilobar pneumonia; likely aspiration chemical pneumonitis  HFpEF  Likely due to GNR given age and comorbidities  DVT  cannot r/o concomitant PE    Suggest:    Supportive care  Continue current abx. to complete 7 days total  f/u full cultures  check repeat RVP and fungitell   Pulmonary toilet  Aspiration precautions  OOB ASAP  advance mobility as tolerated  Keep SaO2 >92% adjust O2 as needed  Wean off high flow  DVT/ Gastritis prophylaxis  The patient needs repeat CXR  in 3-4 months to document clearance Impression:    Multilobar pneumonia; likely aspiration /chemical pneumonitis note CT abdomen showing esophagus full of fluid  HFpEF  Likely due to GNR given age and comorbidities  DVT  cannot r/o concomitant PE    Suggest:    Supportive care  Continue current abx. to complete 7 days total  f/u full cultures  check repeat RVP and fungitell   Pulmonary toilet  Aspiration precautions  OOB ASAP  advance mobility as tolerated  Keep SaO2 >92% adjust O2 as needed  Wean off high flow  DVT/ Gastritis prophylaxis  The patient needs repeat CXR  in 3-4 months to document clearance

## 2019-12-03 NOTE — CHART NOTE - NSCHARTNOTEFT_GEN_A_CORE
Registered Dietitian Follow-Up     Patient Profile Reviewed                           Yes [x]   No []     Nutrition History Previously Obtained        Yes [x]  No []       Pertinent Subjective Information:     Pertinent Medical Interventions:  Hypoxemic Respiratory Failure- Fluid overload; Cardiac vs. Renal VS PNA. - On High flow nasal cannula. #Thrombocytopaenia improved likely secondary to AHA and DVT. Myalgia and decreased functional- resolved. Hx of CKD Stage IIIB - resolving. Diverticulosis - High fiber diet to avoid constipation.        Diet order: dysphagia 2 mech soft thin liquid, high fiber, DASH/TLC, 1000ml fluid restr      Anthropometrics:  - Ht. 149.8cm  - Wt. 46.4kg on 11/30 vs. 50.8kg on 11/29 pt. noted on Lasix, will continue to monitor wt trends   - %wt change  - BMI 20.7   - IBW 97.5lbs      Pertinent Lab Data: (12/3) RBC 3.69, Hg 10.7, Hct 34.5, K 3.3, BUN 36, glu 120      Pertinent Meds: Miralax, Prednisone, Senna, Cyanocobalamin, Folic acid, Lasix      Physical Findings:  - Appearance: AAOx4  - GI function: no symptoms noted, no BM doc  - Tubes: none noted  - Oral/Mouth cavity: no symptoms noted  - Skin: intact (BS 16)      Nutrition Requirements(from RD note on 11/29)   Weight Used: 50.8kg     Estimated Energy Needs    Continue [x]  Adjust []  996 - 1079 kcals/day (MSJ x 1.2 - 1.3 AF)  Adjusted Energy Recommendations:   kcal/day        Estimated Protein Needs    Continue [x]  Adjust [] 51 - 61 gms/day (1.0 - 1.2 gm/kg) *CKD considered   Adjusted Protein Recommendations:   gm/day        Estimated Fluid Needs        Continue [x]  Adjust [] 1ml/kcal or per LIP  Adjusted Fluid Recommendations:   mL/day     Nutrient Intake: ~25% PO at meals         [] Previous Nutrition Diagnosis:  Inadequate Oral Intake             [x] Ongoing          [] Resolved       Nutrition Intervention: meals and snacks, medical food supplement, nutrition related medication management     Rec:  Cont Dysphagia 2 mech soft, thin liquids, high fiber, DASH/TLC, 1L fluid restriction. Order Ensure compact BID. Consider bowel regimen     Goal/Expected Outcome: In 3 days pt. to consume at least 25-50% PO and supplement, at least 1BM in 3 days.      Indicator/Monitoring: energy intake, body composition, NFPF Registered Dietitian Follow-Up     Patient Profile Reviewed                           Yes [x]   No []     Nutrition History Previously Obtained        Yes [x]  No []       Pertinent Subjective Information: Pt. remains with suboptimal PO intake. Recommended supplement has not been ordered. Will communicate with resident.      Pertinent Medical Interventions:  Hypoxemic Respiratory Failure- Fluid overload; Cardiac vs. Renal VS PNA. - On High flow nasal cannula. #Thrombocytopaenia improved likely secondary to AHA and DVT. Myalgia and decreased functional- resolved. Hx of CKD Stage IIIB - resolving. Diverticulosis - High fiber diet to avoid constipation.        Diet order: dysphagia 2 mech soft thin liquid, high fiber, DASH/TLC, 1000ml fluid restr      Anthropometrics:  - Ht. 149.8cm  - Wt. 46.4kg on 11/30 vs. 50.8kg on 11/29 pt. noted on Lasix, will continue to monitor wt trends   - %wt change  - BMI 20.7   - IBW 97.5lbs      Pertinent Lab Data: (12/3) RBC 3.69, Hg 10.7, Hct 34.5, K 3.3, BUN 36, glu 120      Pertinent Meds: Miralax, Prednisone, Senna, Cyanocobalamin, Folic acid, Lasix      Physical Findings:  - Appearance: AAOx4  - GI function: no symptoms noted, no BM doc  - Tubes: none noted  - Oral/Mouth cavity: no symptoms noted  - Skin: intact (BS 16)      Nutrition Requirements(from RD note on 11/29)   Weight Used: 50.8kg     Estimated Energy Needs    Continue [x]  Adjust []  996 - 1079 kcals/day (MSJ x 1.2 - 1.3 AF)  Adjusted Energy Recommendations:   kcal/day        Estimated Protein Needs    Continue [x]  Adjust [] 51 - 61 gms/day (1.0 - 1.2 gm/kg) *CKD considered   Adjusted Protein Recommendations:   gm/day        Estimated Fluid Needs        Continue [x]  Adjust [] 1ml/kcal or per LIP  Adjusted Fluid Recommendations:   mL/day     Nutrient Intake: ~25% PO at meals         [] Previous Nutrition Diagnosis:  Inadequate Oral Intake             [x] Ongoing          [] Resolved       Nutrition Intervention: meals and snacks, medical food supplement, nutrition related medication management     Rec:  Cont Dysphagia 2 mech soft, thin liquids, high fiber, DASH/TLC, 1L fluid restriction. Order Ensure compact BID. Consider bowel regimen     Goal/Expected Outcome: In 3 days pt. to consume at least 25-50% PO and supplement, at least 1BM in 3 days.      Indicator/Monitoring: energy intake, body composition, NFPF

## 2019-12-03 NOTE — PROGRESS NOTE ADULT - SUBJECTIVE AND OBJECTIVE BOX
LENGTH OF HOSPITAL STAY: 7d      CHIEF COMPLAINT:   Patient is a 89y old  Female who presents with a chief complaint of Fluid overload; steroid-induced myalgia (03 Dec 2019 14:40)      OVER Past 24hrs:  The patient was seen and examined at bedside there were no acute events during the night.  Patient failed wean off  high flow  O2.       PAST MEDICAL & SURGICAL HISTORY  PAST MEDICAL & SURGICAL HISTORY:  Chronic kidney disease, unspecified CKD stage: Stage IIIB  TIA (transient ischemic attack)  Diverticulitis  Constipation  Hypertension  No significant past surgical history        REVIEW OF SYSTEMS  CONSTITUTIONAL: No fever  RESPIRATORY: No cough, wheezing, chills or hemoptysis; No shortness of breath on high flow  CARDIOVASCULAR: No chest pain, palpitations, dizziness, or leg swelling  GASTROINTESTINAL: No abdominal or epigastric pain. No nausea, vomiting, or hematemesis; No diarrhea or constipation  GENITOURINARY: No dysuria, frequency, hematuria, or incontinence  NEUROLOGICAL: No headaches, memory loss, loss of strength, numbness, or tremors  SKIN: No itching, burning, rashes, or lesions   LYMPH NODES: No enlarged glands  ENDOCRINE: No heat or cold intolerance; No hair loss  MUSCULOSKELETAL: No joint pain    ALLERGIES:  aspirin (Unknown)  Cipro (Unknown)  codeine (Unknown)  dicyclomine (Unknown)  Diovan (Unknown)  Flagyl (Unknown)  Librax (Unknown)  metronidazole (Unknown)  penicillin (Unknown)  Prevacid (Unknown)  trimethobenzamide (Unknown)    MEDICATIONS:  STANDING MEDICATIONS  apixaban 10 milliGRAM(s) Oral every 12 hours  atovaquone Suspension 750 milliGRAM(s) Oral two times a day  chlorhexidine 4% Liquid 1 Application(s) Topical <User Schedule>  cyanocobalamin 1000 MICROGram(s) Oral daily  folic acid 1 milliGRAM(s) Oral daily  furosemide   Injectable 20 milliGRAM(s) IV Push daily  insulin lispro (HumaLOG) corrective regimen sliding scale   SubCutaneous three times a day before meals  NIFEdipine XL 30 milliGRAM(s) Oral daily  polyethylene glycol 3350 17 Gram(s) Oral two times a day  predniSONE   Tablet 40 milliGRAM(s) Oral daily  senna 2 Tablet(s) Oral at bedtime    PRN MEDICATIONS    VITALS:   T(F): 96.3  HR: 89  BP: 191/66  RR: 20  SpO2: 94%    PHYSICAL EXAM:  General: No acute distress.  Alert, oriented, interactive, nonfocal. elderly   Female     HEENT: Pupils equal, reactive to light symmetrically.    PULM: Clear to auscultation bilaterally in upper lobes, Lower lobes crackles are herd  b/l and decreased breath sounds.  no significant sputum production.    CVS: Regular rate and rhythm, holosystolic murmured herd at sternal boarder murmurs, rubs, or gallops.    GI: Soft, nondistended, nontender, no masses.    MSK: No edema, nontender.    SKIN: Warm and well perfused, no rashes noted.    PSYCH: AAOx2-3    NEURO: NO focal       LABS:                        10.7   9.07  )-----------( 98       ( 03 Dec 2019 06:07 )             34.5     12-03    139  |  98  |  36<H>  ----------------------------<  120<H>  3.3<L>   |  28  |  0.7    Ca    8.8      03 Dec 2019 06:07  Phos  2.1     12-03  Mg     2.2     12-03    TPro  4.3<L>  /  Alb  2.6<L>  /  TBili  1.0  /  DBili  x   /  AST  19  /  ALT  10  /  AlkPhos  60  12-02                  RADIOLOGY:  Chest Xray - unchanged interstitial Opacities

## 2019-12-03 NOTE — PROGRESS NOTE ADULT - SUBJECTIVE AND OBJECTIVE BOX
RITA RAMOS 89y Female from home brought by family to the ER for c/o generalized weakness, SOB and inc difficulty ambulating x 3 days du to generalized muscke pain.  The pt at baseline isable to ambulate with walker but in the last few days has been unable to get out of be due to severe musculoskeletal pain.  Of note the pt has Hx of  warm autoimmune hemlytic anemia and has been ofn prolonged steroid  tx  and has had  Rutixan tx as well.  The pt was noted to be fluid overloaded with a BNP of 4500. There is also the question of PNA  and PE.   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 There is also the ques of PNA and PE given the lower ext DVT.  The pt is being evaluated by Hem-Onc Dr Mejias.  The PMHx includes:  HTN, ASHD, TIA, CKD III, Warm IGG Hemolytic Anemia, OA, DDD, DJD, mobility dysfunction, GERD, diverticulosis, ch constipation.  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 possibility of PE given the SOB, quick desaturation off O2 and + DVT, ECHO shows EF 55-60%, GIDD, severe AS/mild AI, severe Pul HTN.  The pt cont to require high flow O2.    Overnight events:  afebrile, Meropenem D/C, remains on prednisone for hemolytic anemia, PLTS low but improving to 82, on Eliquis for BL DVT and possible DVT, start to dec O2 to 3 L in the AM and monitor resp status and puls Ox     MEDICATIONS  (STANDING):  meropenem 1gm q 12 D/C  chlorhexidine 4% Liquid 1 Application(s) Topical <User Schedule>  cyanocobalamin 1000 MICROGram(s) Oral daily  folic acid 1 milliGRAM(s) Oral daily  furosemide   Injectable 20 milliGRAM(s) IV Push daily  NIFEdipine XL 30 milliGRAM(s) Oral daily  predniSONE   Tablet 50 milliGRAM(s) Oral daily  Eliquis 10mg q 12 x 7 days then 5mg q 12 on   MEDICATIONS  (PRN):      Allergies    aspirin (Unknown)  Cipro (Unknown)  codeine (Unknown)  dicyclomine (Unknown)  Diovan (Unknown)  Flagyl (Unknown)  Librax (Unknown)  metronidazole (Unknown)  penicillin (Unknown)  Prevacid (Unknown)  trimethobenzamide (Unknown)  	    Vital Signs Last 24 Hrs    T(F): 96.2  HR: 80  BP: 117/56    RR: 18  SpO2: 94% ,    PHYSICAL EXAM:      Constitutional:  elderly, pale,  frail and fragile, alert and oriented and verbal  + O2NC, NAD, temporal wasting    Eyes:  nonicteric    ENMT:  dental defects    Neck:  supple, + JVD, no bruits    Back:  + Kyphosis    Respiratory:  dec BS, shallow respirations, bibasilar crackles    Cardiovascular:  S1S2 tachy    Gastrointestinal: globose soft and benign    Genitourinary:    Extremities: moves all ext, + arthritic changes, + edema        LABS:                       11.2  8 )-----------(82             34    139 |  95  |  38  ----------------------------<  134  3.5   |  32 |  0.8  GFR  28...49, 64  Ca    8.4<L>       Phos  3.4     11-27  Mg     2.1, 2.0  troponin 0.02  BNP  4500 dec to 840    LA 4.0     TPro  4.6, 4.3 /  Alb  3.1, 2.8, 2.6  /  TBili  1.0  /  DBili  0.4<H>  /  AST  14  /  ALT  13  /  AlkPhos  73  11-26    PT/INR - ( 2019 13:30 )   PT: 13.10 sec;   INR: 1.14 ratio         PTT - ( 2019 13:30 )  PTT:21.6 sec  Urinalysis Basic - ( 2019 16:20 )    Color: Yellow / Appearance: Clear / S.020 / pH: x  Gluc: x / Ketone: Negative  / Bili: Negative / Urobili: <2 mg/dL   Blood: x / Protein: 30 mg/dL / Nitrite: Negative   Leuk Esterase: Negative / RBC: 10 /HPF / WBC 6 /HPF   Sq Epi: x / Non Sq Epi: 5 /HPF / Bacteria: Few        RADIOLOGY & ADDITIONAL TESTS:  CXR  inc vasc markings, cardiomegaly  CXR :  stable BL space occupying  opacities and sm L pl effusion  EKG sinus tach 121/min QTc 462    CT of abd:  bibasilar atelectasis, stable hepatic cyst,  calcification n the sybil hepatis in the area of the GB neck, stable  side branch pancreatic IPMN, no pancreatic constanza dilatation    Sono of abd:  no cholelithiases    Venous doppler of lower ext:  + DVT of  R posterior tibial vein, + DVT of  L peroneal and soleal veins    ECHO:  LVEF 55-60%, , mild LV wall thickening, GIDD, moderate enlarged R ventricle,  trace MR, severe AS, mild AI, inc pul a pressure 89.9 c/w severe Pul HTN RITA RAMOS 89y Female from home brought by family to the ER for c/o generalized weakness, SOB and inc difficulty ambulating x 3 days du to generalized muscke pain.  The pt at baseline isable to ambulate with walker but in the last few days has been unable to get out of be due to severe musculoskeletal pain.  Of note the pt has Hx of  warm autoimmune hemlytic anemia and has been ofn prolonged steroid  tx  and has had  Rutixan tx as well.  The pt was noted to be fluid overloaded with a BNP of 4500. There is also the question of PNA  and PE.   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 There is also the ques of PNA and PE given the lower ext DVT.  The pt is being evaluated by Hem-Onc Dr Mejias.  The PMHx includes:  HTN, ASHD, TIA, CKD III, Warm IGG Hemolytic Anemia, OA, DDD, DJD, mobility dysfunction, GERD, diverticulosis, ch constipation.  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 possibility of PE given the SOB, quick desaturation off O2 and + DVT, ECHO shows EF 55-60%, GIDD, severe AS/mild AI, severe Pul HTN.  The pt cont to require high flow O2.    Overnight events:  dec O2 to 40 %, dec prednisone to 40mg, H/H low but stable 10.7/34.5, Plt inc to 98, remains on Lasix 20mg IV, meropenem completed but started on Mepro ( Atovaquone) for PCP prophylaxis, remains on apixiban for BL DVT and possible PE, improved appetite, OOB in chair today, goal is to dec the high flow O2 and cont care in SNF    MEDICATIONS  (STANDING):  meropenem 1gm q 12 D/C  chlorhexidine 4% Liquid 1 Application(s) Topical <User Schedule>  cyanocobalamin 1000 MICROGram(s) Oral daily  folic acid 1 milliGRAM(s) Oral daily  furosemide   Injectable 20 milliGRAM(s) IV Push daily  NIFEdipine XL 30 milliGRAM(s) Oral daily  predniSONE   Tablet 50 milliGRAM(s) Oral daily  Eliquis 10mg q 12 x 7 days then 5mg q 12 on   MEDICATIONS  (PRN):      Allergies    aspirin (Unknown)  Cipro (Unknown)  codeine (Unknown)  dicyclomine (Unknown)  Diovan (Unknown)  Flagyl (Unknown)  Librax (Unknown)  metronidazole (Unknown)  penicillin (Unknown)  Prevacid (Unknown)  trimethobenzamide (Unknown)  	    Vital Signs Last 24 Hrs    T(F): 96.2  HR: 75  BP: 116/56    RR: 18  SpO2: 93% ,    PHYSICAL EXAM:      Constitutional:  elderly, pale,  frail and fragile, alert and oriented and verbal  + O2NC, NAD, temporal wasting    Eyes:  nonicteric    ENMT:  dental defects    Neck:  supple, + JVD, no bruits    Back:  + Kyphosis    Respiratory:  dec BS, shallow respirations, bibasilar crackles    Cardiovascular:  S1S2 tachy    Gastrointestinal: globose soft and benign    Genitourinary:    Extremities: moves all ext, + arthritic changes, + edema        LABS:                       10.7  9 )-----------(98            34.5    139 |  98  |  36  ----------------------------<  120  3.3   |  28 |  0.7  GFR  28...49, 64, 75  Ca    8.4<L>       Phos  3.4     11-27  Mg     2.1, 2.0, 2.2  troponin 0.02  BNP 7,429  4,500 dec to 840  , 404  Haptos 85  LA 4.0     TPro  4.6, 4.3 /  Alb  3.1, 2.8, 2.6  /  TBili  1.0  /  DBili  0.4<H>  /  AST  14  /  ALT  13  /  AlkPhos  73  11-26    PT/INR - ( 2019 13:30 )   PT: 13.10 sec;   INR: 1.14 ratio         PTT - ( 2019 13:30 )  PTT:21.6 sec  Urinalysis Basic - ( 2019 16:20 )    Color: Yellow / Appearance: Clear / S.020 / pH: x  Gluc: x / Ketone: Negative  / Bili: Negative / Urobili: <2 mg/dL   Blood: x / Protein: 30 mg/dL / Nitrite: Negative   Leuk Esterase: Negative / RBC: 10 /HPF / WBC 6 /HPF   Sq Epi: x / Non Sq Epi: 5 /HPF / Bacteria: Few        RADIOLOGY & ADDITIONAL TESTS:  CXR  inc vasc markings, cardiomegaly  CXR :  stable BL space occupying  opacities and sm L pl effusion  EKG sinus tach 121/min QTc 462    CT of abd:  bibasilar atelectasis, stable hepatic cyst,  calcification n the sybil hepatis in the area of the GB neck, stable  side branch pancreatic IPMN, no pancreatic constanza dilatation    Sono of abd:  no cholelithiases    Venous doppler of lower ext:  + DVT of  R posterior tibial vein, + DVT of  L peroneal and soleal veins    ECHO:  LVEF 55-60%, , mild LV wall thickening, GIDD, moderate enlarged R ventricle,  trace MR, severe AS, mild AI, inc pul a pressure 89.9 c/w severe Pul HTN

## 2019-12-03 NOTE — PROGRESS NOTE ADULT - ASSESSMENT
· Assessment		  89 year old female with a significant PMHx of Warm Autoimmune Hemolytic Anemia who presented with a cc/o generalized myalgia. Her muscle aches are associated with generalized weakness and inability to ambulate. She is currently admitted for suspected steroid-induced myalgia    IMPRESSION:  SIRS secondary to aspiration with chemical pneumonitis with CT with scattered opacities  -clinically no bacterial PNA  -WBC 9.0  -carter CRAIG    RECOMMENDATIONS:  -off ABx since 12/2  -recall prn please  -needs rehab

## 2019-12-03 NOTE — PROGRESS NOTE ADULT - SUBJECTIVE AND OBJECTIVE BOX
Patient is a 89y old  Female who presents with a chief complaint of Fluid overload; steroid-induced myalgia (03 Dec 2019 10:06)      Subjective: Still on high flow oxygen. Unable to wean her off  No fever.chills       Vital Signs Last 24 Hrs  T(C): 35.6 (03 Dec 2019 05:19), Max: 36 (02 Dec 2019 14:29)  T(F): 96.1 (03 Dec 2019 05:19), Max: 96.8 (02 Dec 2019 14:29)  HR: 74 (03 Dec 2019 05:19) (74 - 86)  BP: 110/61 (03 Dec 2019 05:19) (106/67 - 117/56)  BP(mean): --  RR: 18 (03 Dec 2019 05:19) (17 - 18)  SpO2: 96% (03 Dec 2019 08:14) (79% - 96%)    PHYSICAL EXAM  GEN: In no acute distress.  LUNGS: Breath sounds clear. Decreased BS at bases   HEART: +S1,S2, RRR,  ABD: Bowel Sounds Present, Soft, non tender, non distended  EXT:  mild swelling B/L LE.   NEURO: AAOX3. No focal deficits.    MEDICATIONS  (STANDING):  apixaban 10 milliGRAM(s) Oral every 12 hours  chlorhexidine 4% Liquid 1 Application(s) Topical <User Schedule>  cyanocobalamin 1000 MICROGram(s) Oral daily  folic acid 1 milliGRAM(s) Oral daily  furosemide   Injectable 20 milliGRAM(s) IV Push daily  insulin lispro (HumaLOG) corrective regimen sliding scale   SubCutaneous three times a day before meals  NIFEdipine XL 30 milliGRAM(s) Oral daily  polyethylene glycol 3350 17 Gram(s) Oral two times a day  predniSONE   Tablet 40 milliGRAM(s) Oral daily  senna 2 Tablet(s) Oral at bedtime    MEDICATIONS  (PRN):      LABS:                          10.7   9.07  )-----------( 98       ( 03 Dec 2019 06:07 )             34.5         Mean Cell Volume : 93.5 fL  Mean Cell Hemoglobin : 29.0 pg  Mean Cell Hemoglobin Concentration : 31.0 g/dL  Auto Neutrophil # : 6.96 K/uL  Auto Lymphocyte # : 1.12 K/uL  Auto Monocyte # : 0.40 K/uL  Auto Eosinophil # : 0.07 K/uL  Auto Basophil # : 0.01 K/uL  Auto Neutrophil % : 76.8 %  Auto Lymphocyte % : 12.3 %  Auto Monocyte % : 4.4 %  Auto Eosinophil % : 0.8 %  Auto Basophil % : 0.1 %      Serial CBC's  12-03 @ 06:07  Hct-34.5 / Hgb-10.7 / Plat-98 / RBC-3.69 / WBC-9.07  Serial CBC's  12-02 @ 06:49  Hct-36.8 / Hgb-11.2 / Plat-82 / RBC-3.90 / WBC-8.59  Serial CBC's  12-01 @ 05:45  Hct-34.5 / Hgb-11.0 / Plat-70 / RBC-3.70 / WBC-9.24  Serial CBC's  11-30 @ 05:43  Hct-35.7 / Hgb-10.7 / Plat-60 / RBC-3.71 / WBC-8.68      12-03    139  |  98  |  36<H>  ----------------------------<  120<H>  3.3<L>   |  28  |  0.7    Ca    8.8      03 Dec 2019 06:07  Phos  2.1     12-03  Mg     2.2     12-03    TPro  4.3<L>  /  Alb  2.6<L>  /  TBili  1.0  /  DBili  x   /  AST  19  /  ALT  10  /  AlkPhos  60  12-02                                  BLOOD SMEAR INTERPRETATION:       RADIOLOGY & ADDITIONAL STUDIES:

## 2019-12-04 LAB
ANION GAP SERPL CALC-SCNC: 13 MMOL/L — SIGNIFICANT CHANGE UP (ref 7–14)
BASOPHILS # BLD AUTO: 0.02 K/UL — SIGNIFICANT CHANGE UP (ref 0–0.2)
BASOPHILS NFR BLD AUTO: 0.2 % — SIGNIFICANT CHANGE UP (ref 0–1)
BUN SERPL-MCNC: 39 MG/DL — HIGH (ref 10–20)
CALCIUM SERPL-MCNC: 9.2 MG/DL — SIGNIFICANT CHANGE UP (ref 8.5–10.1)
CHLORIDE SERPL-SCNC: 95 MMOL/L — LOW (ref 98–110)
CO2 SERPL-SCNC: 33 MMOL/L — HIGH (ref 17–32)
CREAT SERPL-MCNC: 0.8 MG/DL — SIGNIFICANT CHANGE UP (ref 0.7–1.5)
EOSINOPHIL # BLD AUTO: 0.06 K/UL — SIGNIFICANT CHANGE UP (ref 0–0.7)
EOSINOPHIL NFR BLD AUTO: 0.7 % — SIGNIFICANT CHANGE UP (ref 0–8)
GLUCOSE BLDC GLUCOMTR-MCNC: 113 MG/DL — HIGH (ref 70–99)
GLUCOSE BLDC GLUCOMTR-MCNC: 115 MG/DL — HIGH (ref 70–99)
GLUCOSE BLDC GLUCOMTR-MCNC: 200 MG/DL — HIGH (ref 70–99)
GLUCOSE BLDC GLUCOMTR-MCNC: 259 MG/DL — HIGH (ref 70–99)
GLUCOSE SERPL-MCNC: 104 MG/DL — HIGH (ref 70–99)
HCT VFR BLD CALC: 38.5 % — SIGNIFICANT CHANGE UP (ref 37–47)
HGB BLD-MCNC: 12 G/DL — SIGNIFICANT CHANGE UP (ref 12–16)
IMM GRANULOCYTES NFR BLD AUTO: 4.8 % — HIGH (ref 0.1–0.3)
LYMPHOCYTES # BLD AUTO: 1.08 K/UL — LOW (ref 1.2–3.4)
LYMPHOCYTES # BLD AUTO: 12.6 % — LOW (ref 20.5–51.1)
M PNEUMO IGG SER IA-ACNC: 0.1 INDEX — SIGNIFICANT CHANGE UP
M PNEUMO IGG SER IA-ACNC: 0.13 INDEX — SIGNIFICANT CHANGE UP
M PNEUMO IGG SER IA-ACNC: NEGATIVE — SIGNIFICANT CHANGE UP
M PNEUMO IGG SER IA-ACNC: NEGATIVE — SIGNIFICANT CHANGE UP
M PNEUMO IGM SER-ACNC: <20 UNITS/ML — SIGNIFICANT CHANGE UP
MAGNESIUM SERPL-MCNC: 2.4 MG/DL — SIGNIFICANT CHANGE UP (ref 1.8–2.4)
MCHC RBC-ENTMCNC: 28.8 PG — SIGNIFICANT CHANGE UP (ref 27–31)
MCHC RBC-ENTMCNC: 31.2 G/DL — LOW (ref 32–37)
MCV RBC AUTO: 92.3 FL — SIGNIFICANT CHANGE UP (ref 81–99)
MONOCYTES # BLD AUTO: 0.42 K/UL — SIGNIFICANT CHANGE UP (ref 0.1–0.6)
MONOCYTES NFR BLD AUTO: 4.9 % — SIGNIFICANT CHANGE UP (ref 1.7–9.3)
MYCOPLASMA AG SPEC QL: NEGATIVE — SIGNIFICANT CHANGE UP
NEUTROPHILS # BLD AUTO: 6.61 K/UL — HIGH (ref 1.4–6.5)
NEUTROPHILS NFR BLD AUTO: 76.8 % — HIGH (ref 42.2–75.2)
NRBC # BLD: 0 /100 WBCS — SIGNIFICANT CHANGE UP (ref 0–0)
PHOSPHATE SERPL-MCNC: 2.4 MG/DL — SIGNIFICANT CHANGE UP (ref 2.1–4.9)
PLATELET # BLD AUTO: 134 K/UL — SIGNIFICANT CHANGE UP (ref 130–400)
POTASSIUM SERPL-MCNC: 3.6 MMOL/L — SIGNIFICANT CHANGE UP (ref 3.5–5)
POTASSIUM SERPL-SCNC: 3.6 MMOL/L — SIGNIFICANT CHANGE UP (ref 3.5–5)
RBC # BLD: 4.17 M/UL — LOW (ref 4.2–5.4)
RBC # FLD: 15.3 % — HIGH (ref 11.5–14.5)
SODIUM SERPL-SCNC: 141 MMOL/L — SIGNIFICANT CHANGE UP (ref 135–146)
WBC # BLD: 8.6 K/UL — SIGNIFICANT CHANGE UP (ref 4.8–10.8)
WBC # FLD AUTO: 8.6 K/UL — SIGNIFICANT CHANGE UP (ref 4.8–10.8)

## 2019-12-04 PROCEDURE — 99232 SBSQ HOSP IP/OBS MODERATE 35: CPT

## 2019-12-04 RX ADMIN — Medication 1: at 17:51

## 2019-12-04 RX ADMIN — Medication 1 MILLIGRAM(S): at 11:52

## 2019-12-04 RX ADMIN — Medication 30 MILLIGRAM(S): at 05:44

## 2019-12-04 RX ADMIN — ATOVAQUONE 750 MILLIGRAM(S): 750 SUSPENSION ORAL at 17:52

## 2019-12-04 RX ADMIN — ATOVAQUONE 750 MILLIGRAM(S): 750 SUSPENSION ORAL at 05:44

## 2019-12-04 RX ADMIN — POLYETHYLENE GLYCOL 3350 17 GRAM(S): 17 POWDER, FOR SOLUTION ORAL at 05:44

## 2019-12-04 RX ADMIN — CHLORHEXIDINE GLUCONATE 1 APPLICATION(S): 213 SOLUTION TOPICAL at 05:44

## 2019-12-04 RX ADMIN — Medication 3: at 11:51

## 2019-12-04 RX ADMIN — POLYETHYLENE GLYCOL 3350 17 GRAM(S): 17 POWDER, FOR SOLUTION ORAL at 17:52

## 2019-12-04 RX ADMIN — Medication 40 MILLIGRAM(S): at 05:44

## 2019-12-04 RX ADMIN — Medication 20 MILLIGRAM(S): at 05:44

## 2019-12-04 RX ADMIN — APIXABAN 10 MILLIGRAM(S): 2.5 TABLET, FILM COATED ORAL at 22:15

## 2019-12-04 RX ADMIN — PREGABALIN 1000 MICROGRAM(S): 225 CAPSULE ORAL at 11:52

## 2019-12-04 RX ADMIN — APIXABAN 10 MILLIGRAM(S): 2.5 TABLET, FILM COATED ORAL at 09:49

## 2019-12-04 NOTE — PROGRESS NOTE ADULT - ASSESSMENT
#AIHA   HgB is normal,  haptoglobin   is normal she is now in CR, c/w  prednisone  40 mg daily for a week if still in remission will decrease dose next Tuesday to 30 mg.    -c/w folic acid and vit B12 PO  -LDH came back high but was hemolyzed,  but maybe elevated from PCP  -monitor CBC on occasio       #Acute mild thrombocytopenia   resolved  suspect was due to consumption from DVT possibly PE.  -Avoid PEPCID it most likely caused her thrombocytopenia in the past     #Hypoxia and Tachycardia now on high flow nasal canula  -suspect due  CHF and PE, PCP and possibly aspiration pneumonia  -Lasix   as needed  -She has been on high dose steroids and her Fungatil is high PCP would explain her degree of hypoxia and the LDH. She is now on Mepron, ID and Pulm are on board    #ANDREAS suspect was due to dehydration  -resolved    #Edema from fluid retention due to CHF or ANDREAS?  -resolved    #DVT and most likely PE  -c/w Apixiban  -was not due to HIT, PF4 was negative, EDWARD was negative  HIT was not  high on differential to begin with  -  #Constipation  -on bowel regiment had a BM

## 2019-12-04 NOTE — PROGRESS NOTE ADULT - SUBJECTIVE AND OBJECTIVE BOX
No new events. She has no complaints.  Remains on high flow oxygen. No fever. Denies dyspnea.      Vital Signs Last 24 Hrs  T(C): 36.2 (04 Dec 2019 13:00), Max: 36.3 (04 Dec 2019 05:05)  T(F): 97.1 (04 Dec 2019 13:00), Max: 97.3 (04 Dec 2019 05:05)  HR: 80 (04 Dec 2019 13:00) (73 - 80)  BP: 132/62 (04 Dec 2019 13:00) (116/56 - 151/68)  BP(mean): --  RR: 18 (04 Dec 2019 13:00) (18 - 18)  SpO2: 94% (04 Dec 2019 10:01) (93% - 94%)    Allergies:aspirin (Unknown)  Cipro (Unknown)  codeine (Unknown)  dicyclomine (Unknown)  Diovan (Unknown)  Flagyl (Unknown)  Librax (Unknown)  metronidazole (Unknown)  penicillin (Unknown)  Prevacid (Unknown)  trimethobenzamide (Unknown)  Intolerances  MEDICATIONS  (STANDING):  apixaban 10 milliGRAM(s) Oral every 12 hours  atovaquone Suspension 750 milliGRAM(s) Oral two times a day  chlorhexidine 4% Liquid 1 Application(s) Topical <User Schedule>  cyanocobalamin 1000 MICROGram(s) Oral daily  folic acid 1 milliGRAM(s) Oral daily  furosemide   Injectable 20 milliGRAM(s) IV Push daily  insulin lispro (HumaLOG) corrective regimen sliding scale   SubCutaneous three times a day before meals  NIFEdipine XL 30 milliGRAM(s) Oral daily  polyethylene glycol 3350 17 Gram(s) Oral two times a day  predniSONE   Tablet 40 milliGRAM(s) Oral daily  senna 2 Tablet(s) Oral at bedtime    MEDICATIONS  (PRN):    atovaquone Suspension   750 milliGRAM(s) Oral (12-04-19 @ 17:52)   750 milliGRAM(s) Oral (12-04-19 @ 05:44)   750 milliGRAM(s) Oral (12-03-19 @ 17:01)    azithromycin  IVPB   255 mL/Hr IV Intermittent (11-28-19 @ 22:30)   255 mL/Hr IV Intermittent (11-27-19 @ 23:02)    cefTRIAXone   IVPB   100 mL/Hr IV Intermittent (11-29-19 @ 05:23)   100 mL/Hr IV Intermittent (11-28-19 @ 05:26)    meropenem  IVPB   100 mL/Hr IV Intermittent (12-02-19 @ 05:58)   100 mL/Hr IV Intermittent (12-01-19 @ 17:44)   100 mL/Hr IV Intermittent (12-01-19 @ 05:16)   100 mL/Hr IV Intermittent (11-30-19 @ 17:17)   100 mL/Hr IV Intermittent (11-30-19 @ 05:41)   100 mL/Hr IV Intermittent (11-29-19 @ 17:36)   100 mL/Hr IV Intermittent (11-29-19 @ 12:14)        CBC Full  -  ( 04 Dec 2019 04:30 )  WBC Count : 8.60 K/uL  Hemoglobin : 12.0 g/dL  Hematocrit : 38.5 %  Platelet Count - Automated : 134 K/uL  Mean Cell Volume : 92.3 fL  Mean Cell Hemoglobin : 28.8 pg  Mean Cell Hemoglobin Concentration : 31.2 g/dL  Auto Neutrophil # : 6.61 K/uL  Auto Lymphocyte # : 1.08 K/uL  Auto Monocyte # : 0.42 K/uL  Auto Eosinophil # : 0.06 K/uL  Auto Basophil # : 0.02 K/uL  Auto Neutrophil % : 76.8 %  Auto Lymphocyte % : 12.6 %  Auto Monocyte % : 4.9 %  Auto Eosinophil % : 0.7 %  Auto Basophil % : 0.2 %               12.0   8.60  )-----------( 134      ( 12-04 @ 04:30 )             38.5                10.7   9.07  )-----------( 98       ( 12-03 @ 06:07 )             34.5                11.2   8.59  )-----------( 82       ( 12-02 @ 06:49 )             36.8                11.0   9.24  )-----------( 70       ( 12-01 @ 05:45 )             34.5                10.7   8.68  )-----------( 60       ( 11-30 @ 05:43 )             35.7       12-04    141  |  95<L>  |  39<H>  ----------------------------<  104<H>  3.6   |  33<H>  |  0.8    Ca    9.2      04 Dec 2019 04:30  Phos  2.4     12-04  Mg     2.4     12-04          CXR:      Path:    REVIEW OF SYSTEMS:    CONSTITUTIONAL: No weakness, fevers or chills  EYES/ENT: No visual changes;  No vertigo or throat pain   NECK: No pain or stiffness  RESPIRATORY: No cough, wheezing, hemoptysis; No shortness of breath  CARDIOVASCULAR: No chest pain or palpitations  GASTROINTESTINAL: No abdominal or epigastric pain. No nausea, vomiting, or hematemesis; No diarrhea or constipation. No melena or hematochezia.  GENITOURINARY: No dysuria, frequency or hematuria  NEUROLOGICAL: No numbness or weakness  SKIN: No itching, rashes      PHYSICAL EXAM:  GENERAL: NAD, well-developed  HEAD:  Atraumatic, Normocephalic  EYES: EOMI, PERRLA, conjunctiva and sclera clear  NECK: Supple, No JVD  CHEST/LUNG: Clear to auscultation bilaterally; No wheeze  HEART: Regular rate and rhythm; No murmurs, rubs, or gallops  ABDOMEN: Soft, Nontender, Nondistended; Bowel sounds present  EXTREMITIES:, No clubbing, cyanosis, or edema  PSYCH: AAOx3  NEUROLOGY: non-focal  SKIN: No rashes or lesions No new events. She has no complaints.  Remains on high flow oxygen. No fever. Denies dyspnea. Her Fungitil came back high and she is now on atovaquone for PCP.         Vital Signs Last 24 Hrs  T(C): 36.2 (04 Dec 2019 13:00), Max: 36.3 (04 Dec 2019 05:05)  T(F): 97.1 (04 Dec 2019 13:00), Max: 97.3 (04 Dec 2019 05:05)  HR: 80 (04 Dec 2019 13:00) (73 - 80)  BP: 132/62 (04 Dec 2019 13:00) (116/56 - 151/68)  BP(mean): --  RR: 18 (04 Dec 2019 13:00) (18 - 18)  SpO2: 94% (04 Dec 2019 10:01) (93% - 94%)    Allergies:aspirin (Unknown)  Cipro (Unknown)  codeine (Unknown)  dicyclomine (Unknown)  Diovan (Unknown)  Flagyl (Unknown)  Librax (Unknown)  metronidazole (Unknown)  penicillin (Unknown)  Prevacid (Unknown)  trimethobenzamide (Unknown)  Intolerances  MEDICATIONS  (STANDING):  apixaban 10 milliGRAM(s) Oral every 12 hours  atovaquone Suspension 750 milliGRAM(s) Oral two times a day  chlorhexidine 4% Liquid 1 Application(s) Topical <User Schedule>  cyanocobalamin 1000 MICROGram(s) Oral daily  folic acid 1 milliGRAM(s) Oral daily  furosemide   Injectable 20 milliGRAM(s) IV Push daily  insulin lispro (HumaLOG) corrective regimen sliding scale   SubCutaneous three times a day before meals  NIFEdipine XL 30 milliGRAM(s) Oral daily  polyethylene glycol 3350 17 Gram(s) Oral two times a day  predniSONE   Tablet 40 milliGRAM(s) Oral daily  senna 2 Tablet(s) Oral at bedtime    MEDICATIONS  (PRN):    atovaquone Suspension   750 milliGRAM(s) Oral (12-04-19 @ 17:52)   750 milliGRAM(s) Oral (12-04-19 @ 05:44)   750 milliGRAM(s) Oral (12-03-19 @ 17:01)    azithromycin  IVPB   255 mL/Hr IV Intermittent (11-28-19 @ 22:30)   255 mL/Hr IV Intermittent (11-27-19 @ 23:02)    cefTRIAXone   IVPB   100 mL/Hr IV Intermittent (11-29-19 @ 05:23)   100 mL/Hr IV Intermittent (11-28-19 @ 05:26)    meropenem  IVPB   100 mL/Hr IV Intermittent (12-02-19 @ 05:58)   100 mL/Hr IV Intermittent (12-01-19 @ 17:44)   100 mL/Hr IV Intermittent (12-01-19 @ 05:16)   100 mL/Hr IV Intermittent (11-30-19 @ 17:17)   100 mL/Hr IV Intermittent (11-30-19 @ 05:41)   100 mL/Hr IV Intermittent (11-29-19 @ 17:36)   100 mL/Hr IV Intermittent (11-29-19 @ 12:14)        CBC Full  -  ( 04 Dec 2019 04:30 )  WBC Count : 8.60 K/uL  Hemoglobin : 12.0 g/dL  Hematocrit : 38.5 %  Platelet Count - Automated : 134 K/uL  Mean Cell Volume : 92.3 fL  Mean Cell Hemoglobin : 28.8 pg  Mean Cell Hemoglobin Concentration : 31.2 g/dL  Auto Neutrophil # : 6.61 K/uL  Auto Lymphocyte # : 1.08 K/uL  Auto Monocyte # : 0.42 K/uL  Auto Eosinophil # : 0.06 K/uL  Auto Basophil # : 0.02 K/uL  Auto Neutrophil % : 76.8 %  Auto Lymphocyte % : 12.6 %  Auto Monocyte % : 4.9 %  Auto Eosinophil % : 0.7 %  Auto Basophil % : 0.2 %               12.0   8.60  )-----------( 134      ( 12-04 @ 04:30 )             38.5                10.7   9.07  )-----------( 98       ( 12-03 @ 06:07 )             34.5                11.2   8.59  )-----------( 82       ( 12-02 @ 06:49 )             36.8                11.0   9.24  )-----------( 70       ( 12-01 @ 05:45 )             34.5                10.7   8.68  )-----------( 60       ( 11-30 @ 05:43 )             35.7       12-04    141  |  95<L>  |  39<H>  ----------------------------<  104<H>  3.6   |  33<H>  |  0.8    Ca    9.2      04 Dec 2019 04:30  Phos  2.4     12-04  Mg     2.4     12-04    Haptoglobin, Serum: 85 mg/dL (12.03.19 @ 08:36)    Fungitell: >500: Interpretation: The Fungitell assay does not detect certain fungal  species such as the genus Cryptococcus (Jevon et al. 1991) which  produces very low levels of (1-3)-Beta-D-Glucan. The assay also does  not detect the Zygomycetes such as Absidia, Mucor and Rhizopus  (Luca et al. 1994) which are not known to produce  (1-3)-Beta-D-Glucan. In addition, the yeast phase of Blastomyces  dermatitidis produces little (1-3)-Beta-D-Glucan and may not be  detected by the assay (Rhina et al. 2007).  Reference Range:  Less than 60 pg/mL. Glucan values of less than 60 pg/mL are  interpreted as negative.  Glucan values of 60 to 79 pg/mL are interpreted as indeterminate,  and suggest a possible fungal infection. Additional sampling and  testing of sera is required to interpret the results.  Glucan values of greater than or equal to 80 pg/mL are interpreted  as positive.  Due to the potential for environmental contamination when  transferred to pour-off tubes, which can lead to false positive  results, interpret positive results from samples provided in  pour-off tubes with caution.  Results should be used in conjunction  with clinical findings, and should not form the sole basis for a  diagnosis or treatment decision. The Fungitell test is approved or  cleared for in vitro diagnostic use by the U.[place] Food and Drug  Administration. Modifications to the approved package insert have  been made and the performance characteristics for these  modifications were determined by [facility].  Ifsample result is greater than 500 pg/mL, physician may order a  titer of the sample. Please contact [employer] if you would  like to order a retest of this sample to obtain an actual value.  Samples are held for 1 week after initial testing date.  ____________________________________________________________  Performed at:  [facility]  [address] [place] [address]  Mary Mancilla PhD HCLD(ABB)  CLIA# [id number] pg/mL (12.01.19 @ 17:57)          CXR:      Path:    REVIEW OF SYSTEMS:    CONSTITUTIONAL: Has  weakness, no  fevers or chills  EYES/ENT: No visual changes;  No vertigo or throat pain   RESPIRATORY: No cough, wheezing, hemoptysis; No shortness of breath  CARDIOVASCULAR: No chest pain or palpitations  GASTROINTESTINAL: No abdominal or epigastric pain. No nausea, vomiting, or hematemesis; No diarrhea or constipation. No melena or hematochezia.  GENITOURINARY: No dysuria, frequency or hematuria  NEUROLOGICAL: No numbness or weakness  SKIN: No itching, rashes      PHYSICAL EXAM:  GENERAL: NAD, on high Flow o2  HEAD:  Atraumatic, Normocephalic  EYES: EOMI, PERRLA, conjunctiva and sclera clear  CHEST/LUNG: Decrease at bases bilaterally; No wheeze  HEART: Regular rate and rhythm;  ABDOMEN: Soft, Nontender, Nondistended; Bowel sounds present  EXTREMITIES:, No clubbing, cyanosis, or edema  PSYCH: AAOx3  NEUROLOGY: non-focal  SKIN: has ecchymosis on arms

## 2019-12-04 NOTE — PROGRESS NOTE ADULT - ASSESSMENT
Pt is a 89 year old female with a significant PMHx of Warm Autoimmune Hemolytic Anemia who presented with a cc/o generalized myalgia. Her muscle aches are associated with generalized weakness and inability to ambulate. She is currently admitted for suspected steroid-induced myalgia    Today's Events:  ID notes  anabelle,  Wean  O2 decreased High flow to venti mask ,  elevated LDH and fungitell  possible PCP c/w  mepron 750mg, decreased , not great  candidate for bronch, Clinically improved     IMPRESSION  Hypoxemic Respiratory Failure etiology Unknown  PNA, volume overload    Echo - 55-60% - G1DD - Severe AS transaortic gradient 37, sever PAH, sever TR      Plan  # Hypoxemic Respiratory Failure, immunocompromised,    - Possible PCP starting mepron 750mg q12  - component Fluid overload  - On High flow nasal cannula.  - BNP 4500 now 800's  - CXR repeat stable   - Lasix IV 20 daily for now   - Echo - 55-60% - G1DD - Severe AS  - Daily weight; fluid restriction; Low salt; I+O  - Considering PNA - Labs as per Pulm below - Discuss possibility of PCP as patient was immunocompromised with Chemotherapy and Steroids. - LDH elevated, Aa Gradient Increased  -  Fungitell elevated , LDH elevated   - Pulmonary Consult appreciated   --  mycoplasma titers and Legionella capsular Ag.  unremarkable   -- ID anabelle-  c/w mepron   -- Keep SaO2 >92% adjust O2 as needed  --The patient needs repeat CXR  in 3-4 months to document clearance      # LE DVT  provoked   - Originally started on Hep Drip - stopped due to Possible HIT  - Started on Argatroban -- switched to Eliquis 10mg  ending 12/06  - will need at least 6 months 5mg starting 12/07  - following  hem/onc      #Thrombocytopaenia improved likely secondary to AHA and DVT  - HIT less likely as per hem/Onc   - Recent Heparin Exposure  days - 4 t score around 6  - HIT ab - Negative   - Heme/Onc following   - DVT likely secondary to inactivity and AHA  - hold all heparin products   Given  Argatroban - low dose. 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 Orantes)  - Continue prednisone 40mg   - Monitor Hb, Keep active type and screen  - LE Duplex - Positive DVT R. PT / DVT L. Peroneal, soleus      #Hx of Low Vitamin B12 level with normal MMA - Continue B12 1000mcg daily  #HTN - Continue with Nifedipine 30 mg qd; Holding HCTZ/Triamterene, on lasix  #Diverticulosis - High fiber diet to avoid constipation    Electrolyte Imbalances: Correct as needed  []  Hyponatremia   /   Hypernatremia  []   []  Hypokalemia   /   Hyperkalemia  []   []  Hypochlorhydria   /    Hypochlorhydria  []   []  Hypomagnesemia   /   Hypermagnesemia  []   []  Hypophosphatemia   /   Hyperphosphatemia  []       GI ppx:                                   [] Not indicated   /   [] Pantoprazole 40mg PO Daily    DVT ppx:  [] Not indicated / [] Heparin 5000mg SubQ / [] Lovenox 40mg SubQ / [] SCDs    Fluids:   [] PO  |  [] IVF    Activity:  [X] Assisatnce needed   [X] Increase as Tolerated  /  [] OOB w/ assist  /  [] Bed Rest    BMI:  Height (cm): 149.86 (11-29)  Weight (kg): 50.802 (11-27)  BMI (kg/m2): 22.6 (11-29)        DISPO:  Patient to be discharged when condition(s) optimized.  [ x] From Home     [ ] NH/SNF   [ ] 4A Rehab  [ ] Detox Clinic  [X] Plan Discussion with patient and/or family.  [X] Discussed Case and Plan with the Medical Attending.    CODE STATUS  [X] FULL   /    [] DNR

## 2019-12-04 NOTE — PROGRESS NOTE ADULT - ASSESSMENT
Hypoxic Respiratory Failure with SOB, generalized weakness due to fluid overload, exacerbation of Odalis CHF, fluid retention due to long term steroid therapy, CKD and severe AS  BL DVT, Possible PE  Worsening myalgia, probably steroid induced with worsening mobility dysfunction  Thombocytopenia  Hx of Warm IGG Hemolytic Anemia ( IGG +, C3 -), chronic steroid therapy, sp Rituxan  therapy  Hx of HTN, ASHD, Odalis CHF, TIA  Hx of CKD III  Hx of OA, DDD, DJD, mobility dysfunction, ambulates with walker  Hx of GERD, diverticulosis, ch constipataion  Advanced age    pt has elevated BNP of 7,429 4,500, repeat 840 after several days of IV Lasix    pt has BL DVT with probable PE, on Eliquis 10 mg q 12 x 7 days then  5mg q 12 starting 12/6    O2 dec to 40% which is still high, to switch to venti mask, monitor pulse Ox  Etiology of hypoxia? multi factorial:  fluid overload, severe AS and Pul HTN, pul fibrosis, probable PE, probable PCP ( given immunosuppressive tx and inc Fungitel  of 500m )   pt on Mepro (atovaquone) 750mg q 12 for probable PCP, high risk for bronchoscopy    ECHO:  EF 55-60%, tr MR, severe AS/mild AI, GIDD, severe Pul HTN    monitor CBC, Plts 132, 90, 60, 70, 9 134    Hem--onc ff up and care appreciated,  dec prednisone to 40mg    Pul consult:, pt on Eliquis for BL DVT and possible PE,  will need at least 6mos of tx, check mycoplasma titers, urine Legionella Ag    If unable to dec O2 may  need will do CT of chest or VQ scan, GFR ok now 75    pt with  advanced age, frail state and multiple complex medical issues, but overall pt's clinical status is improving, pt is more alert and verbal with improving appetite    OOB to chair   fall precautions  aspiration precautions    goal of care;  pt is full code, stabilize pt, dec high flow O2 needs, cont OOB to chair and probable SNF once the O2 req dec

## 2019-12-04 NOTE — PROGRESS NOTE ADULT - SUBJECTIVE AND OBJECTIVE BOX
RITA RAMOS 89y Female from home brought by family to the ER for c/o generalized weakness, SOB and inc difficulty ambulating x 3 days du to generalized muscke pain.  The pt at baseline isable to ambulate with walker but in the last few days has been unable to get out of be due to severe musculoskeletal pain.  Of note the pt has Hx of  warm autoimmune hemlytic anemia and has been ofn prolonged steroid  tx  and has had  Rutixan tx as well.  The pt was noted to be fluid overloaded with a BNP of 4500. There is also the question of PNA  and PE.   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 There is also the ques of PNA and PE given the lower ext DVT.  The pt is being evaluated by Hem-Onc Dr Mejias.  The PMHx includes:  HTN, ASHD, TIA, CKD III, Warm IGG Hemolytic Anemia, OA, DDD, DJD, mobility dysfunction, GERD, diverticulosis, ch constipation.  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 possibility of PE given the SOB, quick desaturation off O2 and + DVT, ECHO shows EF 55-60%, GIDD, severe AS/mild AI, severe Pul HTN.  The pt cont to require high flow O2.    Overnight events:  dec O2 to 40 %, dec prednisone to 40mg, still req high flow O2, will change to venti mask, H/H stable , Plt returned to normal,  remains on Lasix 20mg IV, meropenem completed,  inc fungitell and LDH raises the possibility of PCP thus on Mepro ( Atovaquone), remains on apixiban for BL DVT and possible PE, improved appetite, OOB in chair, the possible PE and PCP may be the etiology of the cont need for high flow O2, pt a risk for bronchoscopy,     MEDICATIONS  (STANDING):  meropenem 1gm q 12 D/C  chlorhexidine 4% Liquid 1 Application(s) Topical <User Schedule>  cyanocobalamin 1000 MICROGram(s) Oral daily  folic acid 1 milliGRAM(s) Oral daily  furosemide   Injectable 20 milliGRAM(s) IV Push daily  NIFEdipine XL 30 milliGRAM(s) Oral daily  predniSONE   Tablet 50 milliGRAM(s) Oral daily  Eliquis 10mg q 12 x 7 days then 5mg q 12 on   MEDICATIONS  (PRN):      Allergies    aspirin (Unknown)  Cipro (Unknown)  codeine (Unknown)  dicyclomine (Unknown)  Diovan (Unknown)  Flagyl (Unknown)  Librax (Unknown)  metronidazole (Unknown)  penicillin (Unknown)  Prevacid (Unknown)  trimethobenzamide (Unknown)  	    Vital Signs Last 24 Hrs    T(F): 96.3  HR: 68  BP: 144/67    RR: 18  SpO2: 94% , 40%    PHYSICAL EXAM:      Constitutional:  elderly, pale,  frail and fragile, alert and oriented and verbal  + O2,  temporal wasting    Eyes:  nonicteric    ENMT:  dental defects    Neck:  supple, + JVD, no bruits    Back:  + Kyphosis    Respiratory:  dec BS, shallow respirations, bibasilar crackles    Cardiovascular:  S1S2 tachy    Gastrointestinal: globose soft and benign    Genitourinary:    Extremities: moves all ext, + arthritic changes, + edema        LABS:                       12  8.6 )-----------(134            38    141 |  95 |  39  ----------------------------<  104  3.6   |  33 |  0.8  GFR  28...49, 64  Ca    8.4       Phos  3.4      Mg     2.1, 2.0, 2.2, 2.4  troponin 0.02  BNP 7,429  4,500 dec to 840  , 404  Haptos 85    LA 4.0  Nare MRSA negativ  Fungitell  >500   Mycoplasma negative    TPro  4.6, 4.3 /  Alb  3.1, 2.8, 2.6  /  TBili  1.0  /  DBili  0.4<H>  /  AST  14  /  ALT  13  /  AlkPhos  73  11-    PT/INR - ( 2019 13:30 )   PT: 13.10 sec;   INR: 1.14 ratio         PTT - ( 2019 13:30 )  PTT:21.6 sec  Urinalysis Basic - ( 2019 16:20 )    Color: Yellow / Appearance: Clear / S.020 / pH: x  Gluc: x / Ketone: Negative  / Bili: Negative / Urobili: <2 mg/dL   Blood: x / Protein: 30 mg/dL / Nitrite: Negative   Leuk Esterase: Negative / RBC: 10 /HPF / WBC 6 /HPF   Sq Epi: x / Non Sq Epi: 5 /HPF / Bacteria: Few        RADIOLOGY & ADDITIONAL TESTS:  CXR  inc vasc markings, cardiomegaly  CXR :  stable BL space occupying  opacities and sm L pl effusion  EKG sinus tach 121/min QTc 462    CT of abd:  bibasilar atelectasis, stable hepatic cyst,  calcification n the sybil hepatis in the area of the GB neck, stable  side branch pancreatic IPMN, no pancreatic constanza dilatation    Sono of abd:  no cholelithiases    Venous doppler of lower ext:  + DVT of  R posterior tibial vein, + DVT of  L peroneal and soleal veins    ECHO:  LVEF 55-60%, , mild LV wall thickening, GIDD, moderate enlarged R ventricle,  trace MR, severe AS, mild AI, inc pul a pressure 89.9 c/w severe Pul HTN

## 2019-12-04 NOTE — PROGRESS NOTE ADULT - SUBJECTIVE AND OBJECTIVE BOX
LENGTH OF HOSPITAL STAY: 8d      CHIEF COMPLAINT:   Patient is a 89y old  Female who presents with a chief complaint of Fluid overload; steroid-induced myalgia (03 Dec 2019 23:09)      OVER Past 24hrs:  The patient was seen and examined at bedside there were no acute events during the night. Patient  states that she has no complaints.  she is very hesitant to try venti mask.      PAST MEDICAL & SURGICAL HISTORY  PAST MEDICAL & SURGICAL HISTORY:  Chronic kidney disease, unspecified CKD stage: Stage IIIB  TIA (transient ischemic attack)  Diverticulitis  Constipation  Hypertension  No significant past surgical history        REVIEW OF SYSTEMS  CONSTITUTIONAL: No fever  RESPIRATORY: No cough, wheezing, chills or hemoptysis; No shortness of breath on high flow  CARDIOVASCULAR: No chest pain, palpitations, dizziness, or leg swelling  GASTROINTESTINAL: No abdominal or epigastric pain. No nausea, vomiting, or hematemesis; No diarrhea or constipation  GENITOURINARY: No dysuria, frequency, hematuria, or incontinence  NEUROLOGICAL: No headaches, memory loss, loss of strength, numbness, or tremors  SKIN: No itching, burning, rashes, or lesions   LYMPH NODES: No enlarged glands  ENDOCRINE: No heat or cold intolerance; No hair loss  MUSCULOSKELETAL: No joint pain    ALLERGIES:  aspirin (Unknown)  Cipro (Unknown)  codeine (Unknown)  dicyclomine (Unknown)  Diovan (Unknown)  Flagyl (Unknown)  Librax (Unknown)  metronidazole (Unknown)  penicillin (Unknown)  Prevacid (Unknown)  trimethobenzamide (Unknown)    MEDICATIONS:  STANDING MEDICATIONS  apixaban 10 milliGRAM(s) Oral every 12 hours  atovaquone Suspension 750 milliGRAM(s) Oral two times a day  chlorhexidine 4% Liquid 1 Application(s) Topical <User Schedule>  cyanocobalamin 1000 MICROGram(s) Oral daily  folic acid 1 milliGRAM(s) Oral daily  furosemide   Injectable 20 milliGRAM(s) IV Push daily  insulin lispro (HumaLOG) corrective regimen sliding scale   SubCutaneous three times a day before meals  NIFEdipine XL 30 milliGRAM(s) Oral daily  polyethylene glycol 3350 17 Gram(s) Oral two times a day  predniSONE   Tablet 40 milliGRAM(s) Oral daily  senna 2 Tablet(s) Oral at bedtime    PRN MEDICATIONS    VITALS:   T(F): 97.1  HR: 80  BP: 132/62  RR: 18  SpO2: 94%    PHYSICAL EXAM:  General: No acute distress.  Alert, oriented, interactive, nonfocal. elderly   Female     HEENT: Pupils equal, reactive to light symmetrically.    PULM: Clear to auscultation bilaterally in upper lobes, Lower lobes crackles are herd  b/l and decreased breath sounds.  no significant sputum production.    CVS: Regular rate and rhythm, holosystolic murmured herd at sternal boarder murmurs, rubs, or gallops.    GI: Soft, nondistended, nontender, no masses.    MSK: No edema, nontender.    SKIN: Warm and well perfused, no rashes noted.    PSYCH: AAOx2-3    NEURO: NO focal       LABS:                        12.0   8.60  )-----------( 134      ( 04 Dec 2019 04:30 )             38.5     12-04    141  |  95<L>  |  39<H>  ----------------------------<  104<H>  3.6   |  33<H>  |  0.8    Ca    9.2      04 Dec 2019 04:30  Phos  2.4     12-04  Mg     2.4     12-04

## 2019-12-04 NOTE — PROGRESS NOTE ADULT - REASON FOR ADMISSION
Hypoxic Respiratory Failure :  multifactorial fluid overload,  CHF, possible infection, generalized weakness,  steroid-induced myalgia

## 2019-12-05 LAB
ANION GAP SERPL CALC-SCNC: 12 MMOL/L — SIGNIFICANT CHANGE UP (ref 7–14)
BASOPHILS # BLD AUTO: 0.02 K/UL — SIGNIFICANT CHANGE UP (ref 0–0.2)
BASOPHILS NFR BLD AUTO: 0.3 % — SIGNIFICANT CHANGE UP (ref 0–1)
BUN SERPL-MCNC: 34 MG/DL — HIGH (ref 10–20)
CALCIUM SERPL-MCNC: 9.2 MG/DL — SIGNIFICANT CHANGE UP (ref 8.5–10.1)
CHLORIDE SERPL-SCNC: 97 MMOL/L — LOW (ref 98–110)
CO2 SERPL-SCNC: 30 MMOL/L — SIGNIFICANT CHANGE UP (ref 17–32)
CREAT SERPL-MCNC: 0.8 MG/DL — SIGNIFICANT CHANGE UP (ref 0.7–1.5)
EOSINOPHIL # BLD AUTO: 0.05 K/UL — SIGNIFICANT CHANGE UP (ref 0–0.7)
EOSINOPHIL NFR BLD AUTO: 0.6 % — SIGNIFICANT CHANGE UP (ref 0–8)
GLUCOSE BLDC GLUCOMTR-MCNC: 121 MG/DL — HIGH (ref 70–99)
GLUCOSE BLDC GLUCOMTR-MCNC: 152 MG/DL — HIGH (ref 70–99)
GLUCOSE BLDC GLUCOMTR-MCNC: 226 MG/DL — HIGH (ref 70–99)
GLUCOSE BLDC GLUCOMTR-MCNC: 260 MG/DL — HIGH (ref 70–99)
GLUCOSE SERPL-MCNC: 113 MG/DL — HIGH (ref 70–99)
HCT VFR BLD CALC: 37.3 % — SIGNIFICANT CHANGE UP (ref 37–47)
HGB BLD-MCNC: 11.6 G/DL — LOW (ref 12–16)
IMM GRANULOCYTES NFR BLD AUTO: 2.3 % — HIGH (ref 0.1–0.3)
LEGIONELLA AB SER-ACNC: NEGATIVE — SIGNIFICANT CHANGE UP
LYMPHOCYTES # BLD AUTO: 0.91 K/UL — LOW (ref 1.2–3.4)
LYMPHOCYTES # BLD AUTO: 11.5 % — LOW (ref 20.5–51.1)
MAGNESIUM SERPL-MCNC: 2.2 MG/DL — SIGNIFICANT CHANGE UP (ref 1.8–2.4)
MCHC RBC-ENTMCNC: 28.9 PG — SIGNIFICANT CHANGE UP (ref 27–31)
MCHC RBC-ENTMCNC: 31.1 G/DL — LOW (ref 32–37)
MCV RBC AUTO: 92.8 FL — SIGNIFICANT CHANGE UP (ref 81–99)
MONOCYTES # BLD AUTO: 0.33 K/UL — SIGNIFICANT CHANGE UP (ref 0.1–0.6)
MONOCYTES NFR BLD AUTO: 4.2 % — SIGNIFICANT CHANGE UP (ref 1.7–9.3)
NEUTROPHILS # BLD AUTO: 6.39 K/UL — SIGNIFICANT CHANGE UP (ref 1.4–6.5)
NEUTROPHILS NFR BLD AUTO: 81.1 % — HIGH (ref 42.2–75.2)
NRBC # BLD: 0 /100 WBCS — SIGNIFICANT CHANGE UP (ref 0–0)
PHOSPHATE SERPL-MCNC: 2.1 MG/DL — SIGNIFICANT CHANGE UP (ref 2.1–4.9)
PLATELET # BLD AUTO: 137 K/UL — SIGNIFICANT CHANGE UP (ref 130–400)
POTASSIUM SERPL-MCNC: 4.8 MMOL/L — SIGNIFICANT CHANGE UP (ref 3.5–5)
POTASSIUM SERPL-SCNC: 4.8 MMOL/L — SIGNIFICANT CHANGE UP (ref 3.5–5)
RBC # BLD: 4.02 M/UL — LOW (ref 4.2–5.4)
RBC # FLD: 15.2 % — HIGH (ref 11.5–14.5)
SODIUM SERPL-SCNC: 139 MMOL/L — SIGNIFICANT CHANGE UP (ref 135–146)
WBC # BLD: 7.88 K/UL — SIGNIFICANT CHANGE UP (ref 4.8–10.8)
WBC # FLD AUTO: 7.88 K/UL — SIGNIFICANT CHANGE UP (ref 4.8–10.8)

## 2019-12-05 PROCEDURE — 99232 SBSQ HOSP IP/OBS MODERATE 35: CPT

## 2019-12-05 RX ORDER — APIXABAN 2.5 MG/1
2.5 TABLET, FILM COATED ORAL EVERY 12 HOURS
Refills: 0 | Status: DISCONTINUED | OUTPATIENT
Start: 2019-12-06 | End: 2019-12-06

## 2019-12-05 RX ORDER — APIXABAN 2.5 MG/1
10 TABLET, FILM COATED ORAL ONCE
Refills: 0 | Status: COMPLETED | OUTPATIENT
Start: 2019-12-05 | End: 2019-12-05

## 2019-12-05 RX ORDER — FUROSEMIDE 40 MG
20 TABLET ORAL DAILY
Refills: 0 | Status: DISCONTINUED | OUTPATIENT
Start: 2019-12-06 | End: 2019-12-06

## 2019-12-05 RX ADMIN — SENNA PLUS 2 TABLET(S): 8.6 TABLET ORAL at 21:33

## 2019-12-05 RX ADMIN — Medication 3: at 17:06

## 2019-12-05 RX ADMIN — POLYETHYLENE GLYCOL 3350 17 GRAM(S): 17 POWDER, FOR SOLUTION ORAL at 06:11

## 2019-12-05 RX ADMIN — Medication 1 MILLIGRAM(S): at 12:31

## 2019-12-05 RX ADMIN — ATOVAQUONE 750 MILLIGRAM(S): 750 SUSPENSION ORAL at 17:07

## 2019-12-05 RX ADMIN — Medication 2: at 12:30

## 2019-12-05 RX ADMIN — ATOVAQUONE 750 MILLIGRAM(S): 750 SUSPENSION ORAL at 06:11

## 2019-12-05 RX ADMIN — Medication 30 MILLIGRAM(S): at 06:11

## 2019-12-05 RX ADMIN — PREGABALIN 1000 MICROGRAM(S): 225 CAPSULE ORAL at 12:31

## 2019-12-05 RX ADMIN — APIXABAN 10 MILLIGRAM(S): 2.5 TABLET, FILM COATED ORAL at 21:33

## 2019-12-05 RX ADMIN — CHLORHEXIDINE GLUCONATE 1 APPLICATION(S): 213 SOLUTION TOPICAL at 06:28

## 2019-12-05 RX ADMIN — POLYETHYLENE GLYCOL 3350 17 GRAM(S): 17 POWDER, FOR SOLUTION ORAL at 17:08

## 2019-12-05 RX ADMIN — Medication 20 MILLIGRAM(S): at 06:10

## 2019-12-05 RX ADMIN — Medication 40 MILLIGRAM(S): at 06:11

## 2019-12-05 RX ADMIN — APIXABAN 10 MILLIGRAM(S): 2.5 TABLET, FILM COATED ORAL at 09:43

## 2019-12-05 RX ADMIN — CHLORHEXIDINE GLUCONATE 1 APPLICATION(S): 213 SOLUTION TOPICAL at 06:10

## 2019-12-05 NOTE — PROGRESS NOTE ADULT - SUBJECTIVE AND OBJECTIVE BOX
LENGTH OF HOSPITAL STAY: 9d      CHIEF COMPLAINT:   Patient is a 89y old  Female who presents with a chief complaint of Fluid overload; steroid-induced myalgia (05 Dec 2019 10:18)      OVER Past 24hrs:  The patient was seen and examined at bedside there were    HISTORY OF PRESENTING ILLNESS:    HPI:  This is a 89 year old female with a significant PMHx of Warm Autoimmune Hemolytic Anemia who presented with a cc/o generalized myalgia. Her muscle aches are associated with generalized weakness and inability to ambulate x3 days. At baseline, the patient is able to walk with a walker, however, was unable to get out of bed on the day of presentation. She was recently admitted for hemolytic anemia, requiring multiple transfusions. As a result, per the daughter, due to concern of recurrence given no recent blood work, she was brought to the ED for further evaluation. ROS was otherwise negative.     In the ED, the patient VS were WNL. Labs significant for leukocytosis and mildly decreased renal function from baseline. CXR was significant for pulmonary infiltrate BL, suspicious for fluid overload and cannot rule out PNA. CT A/P was performed with incidental finding of small calcification in the region of the sybil hepatis. (26 Nov 2019 18:04)    PAST MEDICAL & SURGICAL HISTORY  PAST MEDICAL & SURGICAL HISTORY:  Chronic kidney disease, unspecified CKD stage: Stage IIIB  TIA (transient ischemic attack)  Diverticulitis  Constipation  Hypertension  No significant past surgical history        REVIEW OF SYSTEMS  CONSTITUTIONAL: No fever, weight loss, or fatigue  EYES: No eye pain, visual disturbances, or discharge  ENMT:  No difficulty hearing, tinnitus, vertigo; No sinus or throat pain  NECK: No pain or stiffness  RESPIRATORY: No cough, wheezing, chills or hemoptysis; No shortness of breath  CARDIOVASCULAR: No chest pain, palpitations, dizziness, or leg swelling  GASTROINTESTINAL: No abdominal or epigastric pain. No nausea, vomiting, or hematemesis; No diarrhea or constipation. No melena or hematochezia.  GENITOURINARY: No dysuria, frequency, hematuria, or incontinence  NEUROLOGICAL: No headaches, memory loss, loss of strength, numbness, or tremors  SKIN: No itching, burning, rashes, or lesions   LYMPH NODES: No enlarged glands  ENDOCRINE: No heat or cold intolerance; No hair loss  MUSCULOSKELETAL: No joint pain or swelling; No muscle, back, or extremity pain  PSYCHIATRIC: No depression, anxiety, mood swings, or difficulty sleeping  HEME/LYMPH: No easy bruising, or bleeding gums  ALLERY AND IMMUNOLOGIC: No hives or eczema    ALLERGIES:  aspirin (Unknown)  Cipro (Unknown)  codeine (Unknown)  dicyclomine (Unknown)  Diovan (Unknown)  Flagyl (Unknown)  Librax (Unknown)  metronidazole (Unknown)  penicillin (Unknown)  Prevacid (Unknown)  trimethobenzamide (Unknown)    MEDICATIONS:  STANDING MEDICATIONS  apixaban 10 milliGRAM(s) Oral every 12 hours  atovaquone Suspension 750 milliGRAM(s) Oral two times a day  chlorhexidine 4% Liquid 1 Application(s) Topical <User Schedule>  cyanocobalamin 1000 MICROGram(s) Oral daily  folic acid 1 milliGRAM(s) Oral daily  furosemide   Injectable 20 milliGRAM(s) IV Push daily  insulin lispro (HumaLOG) corrective regimen sliding scale   SubCutaneous three times a day before meals  NIFEdipine XL 30 milliGRAM(s) Oral daily  polyethylene glycol 3350 17 Gram(s) Oral two times a day  predniSONE   Tablet 40 milliGRAM(s) Oral daily  senna 2 Tablet(s) Oral at bedtime    PRN MEDICATIONS    VITALS:   T(F): 97.2  HR: 91  BP: 164/74  RR: 18  SpO2: 96%    PHYSICAL EXAM:  General: No acute distress.  Alert, oriented, interactive, nonfocal. elderly   Female     HEENT: Pupils equal, reactive to light symmetrically.    PULM: Clear to auscultation bilaterally in upper lobes, Lower lobes crackles are herd  b/l and decreased breath sounds.  no significant sputum production.    CVS: Regular rate and rhythm, holosystolic murmured herd at sternal boarder murmurs, rubs, or gallops.    GI: Soft, nondistended, nontender, no masses.    MSK: No edema, nontender.    SKIN: Warm and well perfused, no rashes noted.    PSYCH: AAOx2-3    NEURO: NO focal     LABS:                        11.6   7.88  )-----------( 137      ( 05 Dec 2019 06:03 )             37.3     12-05    139  |  97<L>  |  34<H>  ----------------------------<  113<H>  4.8   |  30  |  0.8    Ca    9.2      05 Dec 2019 06:03  Phos  2.1     12-05  Mg     2.2     12-05                    RADIOLOGY:

## 2019-12-05 NOTE — PROGRESS NOTE ADULT - ASSESSMENT
This is a 89 year old female with a significant PMHx of Warm Autoimmune Hemolytic Anemia who presented with a cc/o generalized myalgia. Her muscle aches are associated with generalized weakness and inability to ambulate. She is currently admitted for suspected steroid-induced myalgia.       1) Weakness/myopathy/difficulty ambulation probably 2/2 to steroids   c/w prednisone for now (as previous quick taper/low dose caused hemolysis again)-Tapered down to 40 mg   PT eval while inpt       2) Warm IgG hemolytic anemia, IgG+, negative C3- Her Hb is stable   Work up:  Flow cytometry negative. CT imaging did not reveal malignancy. No folate deficiency.   Low vitamin B12 level with normal MMA level and is on PO B12 and her repeat B12 level is normal.   Hepatitis work up negative  c/w folic acid and Po Vit B12  Completed 4/4 dose of Rituxan  on Prednisone taper down to 40mg now - c/w same((as previous quick taper/low dose caused hemolysis again) and switch to 30 mg on 12/10- tuesday   LDH- 404. Haptoglobin normal   Her Hb is stable so far. Continue to monitor     3) New bilateral LE DVT : On eliquis BID 10mg bid for 7days and on Day 8 switch her to eliquis 5mg bid   Her hemolytic anemia makes her high risk for clots     4) Worsening thrombocytopenia:  Could be platelet consumption - improved  - HIT panel negative. EDWARD results pending   - She had low platelets outpt- that improved after stopping pepcid  No need for any further work up     5) Hypoxia and Tachycardia now on high flow nasal canula -suspect due  CHF and PE, PCP and possibly aspiration pneumonia  On NC now and was weaned off high flow O2  CT chest was not done as it would not   Echo showing severe pulHTN, Moderately enlarged right ventricle. Moderately reduced RV systolic function.  On Iv lasix    -She has been on high dose steroids and her Fungetill is high- on Mepron for coverage for  PCP  ID and Pulm are on board       6) ANDREAS(POA)- improved

## 2019-12-05 NOTE — PROGRESS NOTE ADULT - SUBJECTIVE AND OBJECTIVE BOX
Patient is a 89y old  Female who presents with a chief complaint of Hypoxic Respiratory Failure :  multifactorial fluid overload,  CHF, possible infection, generalized weakness,  steroid-induced myalgia (04 Dec 2019 23:16)      Subjective: She is off high flow oxygen and is on 4 liter NC  She was started on treatment for PCP      Vital Signs Last 24 Hrs  T(C): 36.2 (05 Dec 2019 05:05), Max: 36.2 (04 Dec 2019 13:00)  T(F): 97.2 (05 Dec 2019 05:05), Max: 97.2 (05 Dec 2019 05:05)  HR: 91 (05 Dec 2019 05:05) (68 - 91)  BP: 164/74 (05 Dec 2019 05:05) (132/62 - 164/74)  BP(mean): --  RR: 18 (05 Dec 2019 09:27) (18 - 18)  SpO2: 96% (05 Dec 2019 09:27) (96% - 96%)    PHYSICAL EXAM  GENERAL: NAD, on high Flow o2  CHEST/LUNG: Decrease at bases bilaterally; No wheeze  HEART: Regular rate and rhythm  ABDOMEN: Soft, Nontender, Nondistended  EXTREMITIES:, No clubbing, cyanosis, or edema  PSYCH: AAOx3  NEUROLOGY: non-focal      MEDICATIONS  (STANDING):  apixaban 10 milliGRAM(s) Oral every 12 hours  atovaquone Suspension 750 milliGRAM(s) Oral two times a day  chlorhexidine 4% Liquid 1 Application(s) Topical <User Schedule>  cyanocobalamin 1000 MICROGram(s) Oral daily  folic acid 1 milliGRAM(s) Oral daily  furosemide   Injectable 20 milliGRAM(s) IV Push daily  insulin lispro (HumaLOG) corrective regimen sliding scale   SubCutaneous three times a day before meals  NIFEdipine XL 30 milliGRAM(s) Oral daily  polyethylene glycol 3350 17 Gram(s) Oral two times a day  predniSONE   Tablet 40 milliGRAM(s) Oral daily  senna 2 Tablet(s) Oral at bedtime    MEDICATIONS  (PRN):      LABS:                          11.6   7.88  )-----------( 137      ( 05 Dec 2019 06:03 )             37.3         Mean Cell Volume : 92.8 fL  Mean Cell Hemoglobin : 28.9 pg  Mean Cell Hemoglobin Concentration : 31.1 g/dL  Auto Neutrophil # : 6.39 K/uL  Auto Lymphocyte # : 0.91 K/uL  Auto Monocyte # : 0.33 K/uL  Auto Eosinophil # : 0.05 K/uL  Auto Basophil # : 0.02 K/uL  Auto Neutrophil % : 81.1 %  Auto Lymphocyte % : 11.5 %  Auto Monocyte % : 4.2 %  Auto Eosinophil % : 0.6 %  Auto Basophil % : 0.3 %      Serial CBC's  12-05 @ 06:03  Hct-37.3 / Hgb-11.6 / Plat-137 / RBC-4.02 / WBC-7.88  Serial CBC's  12-04 @ 04:30  Hct-38.5 / Hgb-12.0 / Plat-134 / RBC-4.17 / WBC-8.60  Serial CBC's  12-03 @ 06:07  Hct-34.5 / Hgb-10.7 / Plat-98 / RBC-3.69 / WBC-9.07  Serial CBC's  12-02 @ 06:49  Hct-36.8 / Hgb-11.2 / Plat-82 / RBC-3.90 / WBC-8.59      12-05    139  |  97<L>  |  34<H>  ----------------------------<  113<H>  4.8   |  30  |  0.8    Ca    9.2      05 Dec 2019 06:03  Phos  2.1     12-05  Mg     2.2     12-05                                    BLOOD SMEAR INTERPRETATION:       RADIOLOGY & ADDITIONAL STUDIES:

## 2019-12-05 NOTE — PROGRESS NOTE ADULT - SUBJECTIVE AND OBJECTIVE BOX
RITA RAMOS 89y Female from home brought by family to the ER for c/o generalized weakness, SOB and inc difficulty ambulating x 3 days du to generalized muscke pain.  The pt at baseline isable to ambulate with walker but in the last few days has been unable to get out of be due to severe musculoskeletal pain.  Of note the pt has Hx of  warm autoimmune hemlytic anemia and has been ofn prolonged steroid  tx  and has had  Rutixan tx as well.  The pt was noted to be fluid overloaded with a BNP of 4500. There is also the question of PNA  and PE.   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 There is also the ques of PNA and PE given the lower ext DVT.  The pt is being evaluated by Hem-Onc Dr Mejias.  The PMHx includes:  HTN, ASHD, TIA, CKD III, Warm IGG Hemolytic Anemia, OA, DDD, DJD, mobility dysfunction, GERD, diverticulosis, ch constipation.  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 possibility of PE given the SOB, quick desaturation off O2 and + DVT, ECHO shows EF 55-60%, GIDD, severe AS/mild AI, severe Pul HTN.  The pt cont to require high flow O2.    Overnight events:  dec O2 to 40 %, dec prednisone to 40mg, still req high flow O2,H/H stable 11.6/37, Plt 137  returned to normal,  remains on Lasix 20mg IV,  inc fungitell and LDH raises the possibility of PCP thus on Mepro ( Atovaquone), remains on apixiban for BL DVT and possible PE, improved appetite, OOB in chair, the possible PE and PCP may be the etiology of the cont need for high flow O2, pt a risk for bronchoscopy, today pt is comfortable, offers no sig c/o    MEDICATIONS  (STANDING):  meropenem 1gm q 12 D/C  Mepron/atoquavone 750mg q12  chlorhexidine 4% Liquid 1 Application(s) Topical <User Schedule>  cyanocobalamin 1000 MICROGram(s) Oral daily  folic acid 1 milliGRAM(s) Oral daily  furosemide   Injectable 20 milliGRAM(s) IV Push daily  NIFEdipine XL 30 milliGRAM(s) Oral daily  predniSONE   Tablet 50 milliGRAM(s) Oral daily  Eliquis 10mg q 12 x 7 days then 5mg q 12 on   MEDICATIONS  (PRN):      Allergies    aspirin (Unknown)  Cipro (Unknown)  codeine (Unknown)  dicyclomine (Unknown)  Diovan (Unknown)  Flagyl (Unknown)  Librax (Unknown)  metronidazole (Unknown)  penicillin (Unknown)  Prevacid (Unknown)  trimethobenzamide (Unknown)  	    Vital Signs Last 24 Hrs    T(F): 96.9  HR: 94  BP: 112/67    RR: 18  SpO2: 96% , 4L    PHYSICAL EXAM:      Constitutional:  elderly, pale,  frail and fragile, alert and oriented and verbal  + O2,  temporal wasting    Eyes:  nonicteric    ENMT:  dental defects    Neck:  supple, + JVD, no bruits    Back:  + Kyphosis    Respiratory:  dec BS, shallow respirations, bibasilar crackles    Cardiovascular:  S1S2 tachy    Gastrointestinal: globose soft and benign    Genitourinary:    Extremities: moves all ext, + arthritic changes, + edema        LABS:                       11.6  7.8 )-----------(137            37    139 |  97 |  34  ----------------------------<  113  4.8  |  30 |  0.8  GFR  28...49, 64  Ca    8.4       Phos  3.4      Mg     2.1, 2.0, 2.2, 2.4  troponin 0.02  BNP 7,429  4,500 dec to 840  , 404  Haptos 85    LA 4.0  Nare MRSA negativ  Fungitell  >500   Mycoplasma negative    TPro  4.6, 4.3 /  Alb  3.1, 2.8, 2.6  /  TBili  1.0  /  DBili  0.4<H>  /  AST  14  /  ALT  13  /  AlkPhos  73  11-26    PT/INR - ( 2019 13:30 )   PT: 13.10 sec;   INR: 1.14 ratio         PTT - ( 2019 13:30 )  PTT:21.6 sec  Urinalysis Basic - ( 2019 16:20 )    Color: Yellow / Appearance: Clear / S.020 / pH: x  Gluc: x / Ketone: Negative  / Bili: Negative / Urobili: <2 mg/dL   Blood: x / Protein: 30 mg/dL / Nitrite: Negative   Leuk Esterase: Negative / RBC: 10 /HPF / WBC 6 /HPF   Sq Epi: x / Non Sq Epi: 5 /HPF / Bacteria: Few        RADIOLOGY & ADDITIONAL TESTS:  CXR  inc vasc markings, cardiomegaly  CXR :  stable BL space occupying  opacities and sm L pl effusion  EKG sinus tach 121/min QTc 462    CT of abd:  bibasilar atelectasis, stable hepatic cyst,  calcification n the sybil hepatis in the area of the GB neck, stable  side branch pancreatic IPMN, no pancreatic constanza dilatation    Sono of abd:  no cholelithiases    Venous doppler of lower ext:  + DVT of  R posterior tibial vein, + DVT of  L peroneal and soleal veins    ECHO:  LVEF 55-60%, , mild LV wall thickening, GIDD, moderate enlarged R ventricle,  trace MR, severe AS, mild AI, inc pul a pressure 89.9 c/w severe Pul HTN

## 2019-12-05 NOTE — PROGRESS NOTE ADULT - ASSESSMENT
Pt is a 89 year old female with a significant PMHx of Warm Autoimmune Hemolytic Anemia who presented with a cc/o generalized myalgia. Her muscle aches are associated with generalized weakness and inability to ambulate. She is currently admitted for suspected steroid-induced myalgia    Today's Events:  ID notes  anabelle,   now on NC 4L ,   c/w  mepron 750mg, decreased , not great  candidate for bronch, Clinically improved , DC planing to NH    IMPRESSION  Hypoxemic Respiratory Failure etiology Unknown  PNA, volume overload   Echo - 55-60% - G1DD - Severe AS transaortic gradient 37, sever PAH, sever TR      Plan  # Hypoxemic Respiratory Failure, immunocompromised,    - Possible PCP starting mepron 750mg q12  - component Fluid overload  - On High flow nasal cannula.  - BNP 4500 now 800's  - CXR repeat stable   - Lasix IV 20 daily for now   - Echo - 55-60% - G1DD - Severe AS  - Daily weight; fluid restriction; Low salt; I+O  - Considering PNA - Labs as per Pulm below - Discuss possibility of PCP as patient was immunocompromised with Chemotherapy and Steroids. - LDH elevated, Aa Gradient Increased  -  Fungitell elevated , LDH elevated   - Pulmonary Consult appreciated   - mycoplasma titers and Legionella capsular Ag.  unremarkable   -ID anabelle-  c/w mepron  750mg   -Keep SaO2 >92% adjust O2 as needed  -The patient needs repeat CXR  in 3-4 months to document clearance      # LE DVT  provoked   - Originally started on Hep Drip - stopped due to Possible HIT  - Started on Argatroban - switched to Eliquis 10mg  ending 12/06  - will need at least 6 months 5mg starting 12/07  - following  hem/onc      #Thrombocytopaenia improved likely secondary to AHA and DVT  - HIT less likely as per hem/Onc   - Recent Heparin Exposure  days - 4 t score around 6  - HIT ab - Negative   - Heme/Onc following   - DVT likely secondary to inactivity and AHA  - hold all heparin products   Given  Argatroban - low dose. 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 Orantes)  - Continue prednisone 40mg now day 3  - Monitor Hb, Keep active type and screen  - LE Duplex - Positive DVT R. PT / DVT L. Peroneal, soleus      #Hx of Low Vitamin B12 level with normal MMA - Continue B12 1000mcg daily    #HTN - Continue with Nifedipine 30 mg qd; Holding HCTZ/Triamterene    #Diverticulosis - High fiber diet to avoid constipation    Electrolyte Imbalances: Correct as needed  []  Hyponatremia   /   Hypernatremia  []   []  Hypokalemia   /   Hyperkalemia  []   []  Hypochlorhydria   /    Hypochlorhydria  []   []  Hypomagnesemia   /   Hypermagnesemia  []   []  Hypophosphatemia   /   Hyperphosphatemia  []       GI ppx:                                   [] Not indicated   /   [] Pantoprazole 40mg PO Daily    DVT ppx:  [] Not indicated / [] Heparin 5000mg SubQ / [] Lovenox 40mg SubQ / [] SCDs    Fluids:   [] PO  |  [] IVF    Activity:  [X] Assisatnce needed   [X] Increase as Tolerated  /  [] OOB w/ assist  /  [] Bed Rest    BMI:  Height (cm): 149.86 (11-29)  Weight (kg): 50.802 (11-27)  BMI (kg/m2): 22.6 (11-29)        DISPO:  Patient to be discharged when condition(s) optimized.  [ x] From Home     [ ] NH/SNF   [ ] 4A Rehab  [ ] Detox Clinic  [X] Plan Discussion with patient and/or family.  [X] Discussed Case and Plan with the Medical Attending.    CODE STATUS  [X] FULL   /    [] DNR Pt is a 89 year old female with a significant PMHx of Warm Autoimmune Hemolytic Anemia who presented with a cc/o generalized myalgia. Her muscle aches are associated with generalized weakness and inability to ambulate. She is currently admitted for suspected steroid-induced myalgia    Today's Events:  ID notes  anabelle,   now on NC 4L ,   c/w  mepron 750mg, decreased , not great  candidate for bronch, Clinically improved , DC planing to NH    IMPRESSION  Hypoxemic Respiratory Failure etiology Unknown  PNA, volume overload   Acute on Chronic Diastolic  HF Echo - 55-60% - G1DD - Severe AS transaortic gradient 37, sever PAH, sever TR      Plan  # Hypoxemic Respiratory Failure, immunocompromised,    - Possible PCP starting mepron 750mg q12  - component Fluid overload  - On High flow nasal cannula.  - BNP 4500 now 800's  - CXR repeat stable   - Lasix IV 20 daily for now   - Echo - 55-60% - G1DD - Severe AS  - Daily weight; fluid restriction; Low salt; I+O  - Considering PNA - Labs as per Pulm below - Discuss possibility of PCP as patient was immunocompromised with Chemotherapy and Steroids. - LDH elevated, Aa Gradient Increased  -  Fungitell elevated , LDH elevated   - Pulmonary Consult appreciated   - mycoplasma titers and Legionella capsular Ag.  unremarkable   -ID anabelle-  c/w mepron  750mg   -Keep SaO2 >92% adjust O2 as needed  -The patient needs repeat CXR  in 3-4 months to document clearance      # LE DVT  provoked   - Originally started on Hep Drip - stopped due to Possible HIT  - Started on Argatroban - switched to Eliquis 10mg  ending 12/06  - will need at least 6 months 5mg starting 12/07  - following  hem/onc      #Thrombocytopaenia improved likely secondary to AHA and DVT  - HIT less likely as per hem/Onc   - Recent Heparin Exposure  days - 4 t score around 6  - HIT ab - Negative   - Heme/Onc following   - DVT likely secondary to inactivity and AHA  - hold all heparin products   Given  Argatroban - low dose. 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 Orantes)  - Continue prednisone 40mg now day 3  - Monitor Hb, Keep active type and screen  - LE Duplex - Positive DVT R. PT / DVT L. Peroneal, soleus      #Hx of Low Vitamin B12 level with normal MMA - Continue B12 1000mcg daily    #HTN - Continue with Nifedipine 30 mg qd; Holding HCTZ/Triamterene    #Diverticulosis - High fiber diet to avoid constipation    Electrolyte Imbalances: Correct as needed  []  Hyponatremia   /   Hypernatremia  []   []  Hypokalemia   /   Hyperkalemia  []   []  Hypochlorhydria   /    Hypochlorhydria  []   []  Hypomagnesemia   /   Hypermagnesemia  []   []  Hypophosphatemia   /   Hyperphosphatemia  []       GI ppx:                                   [] Not indicated   /   [] Pantoprazole 40mg PO Daily    DVT ppx:  [] Not indicated / [] Heparin 5000mg SubQ / [] Lovenox 40mg SubQ / [] SCDs    Fluids:   [] PO  |  [] IVF    Activity:  [X] Assisatnce needed   [X] Increase as Tolerated  /  [] OOB w/ assist  /  [] Bed Rest    BMI:  Height (cm): 149.86 (11-29)  Weight (kg): 50.802 (11-27)  BMI (kg/m2): 22.6 (11-29)        DISPO:  Patient to be discharged when condition(s) optimized.  [ x] From Home     [ ] NH/SNF   [ ] 4A Rehab  [ ] Detox Clinic  [X] Plan Discussion with patient and/or family.  [X] Discussed Case and Plan with the Medical Attending.    CODE STATUS  [X] FULL   /    [] DNR

## 2019-12-05 NOTE — PROGRESS NOTE ADULT - ASSESSMENT
Hypoxic Respiratory Failure with SOB, generalized weakness due to fluid overload, exacerbation of Odalis CHF, fluid retention due to long term steroid therapy, CKD and severe AS  BL DVT, Possible PE  Worsening myalgia, probably steroid induced with worsening mobility dysfunction  Thombocytopenia  Hx of Warm IGG Hemolytic Anemia ( IGG +, C3 -), chronic steroid therapy, sp Rituxan  therapy  Hx of HTN, ASHD, Odalis CHF, TIA  Hx of CKD III  Hx of OA, DDD, DJD, mobility dysfunction, ambulates with walker  Hx of GERD, diverticulosis, ch constipataion  Advanced age    pt had elevated BNP of 7,429 4,500, repeat 840 after several days of IV Lasix    pt has BL DVT with probable PE, on Eliquis 10 mg q 12 x 7 days then  5mg q 12 starting 12/6    O2 dec to 4Lwhich is still high, to switch to venti mask, monitor pulse Ox  Etiology of hypoxia? multi factorial:  fluid overload, severe AS and Pul HTN, pul fibrosis, probable PE, probable PCP ( given immunosuppressive tx and inc Fungitel  of 500m )   pt on Mepro (atovaquone) 750mg q 12 for probable PCP, high risk for bronchoscopy    ECHO:  EF 55-60%, tr MR, severe AS/mild AI, GIDD, severe Pul HTN    monitor CBC, Plts 132, 90, 60, 70, 9 134    Hem--onc ff up and care appreciated,  dec prednisone to 40mg    Pul consult:, pt on Eliquis for BL DVT and possible PE,  will need at least 6mos of tx, check mycoplasma titers, urine Legionella Ag    pt with  advanced age, frail state and multiple complex medical issues, but overall pt's clinical status is improving, pt is more alert and verbal with improving appetite    OOB to chair   fall precautions  aspiration precautions    goal of care;  pt is full code, stabilize pt, dec high flow O2 needs, cont OOB to chair and probable SNF once the O2 req dec

## 2019-12-06 LAB
ANION GAP SERPL CALC-SCNC: 8 MMOL/L — SIGNIFICANT CHANGE UP (ref 7–14)
BASOPHILS # BLD AUTO: 0.02 K/UL — SIGNIFICANT CHANGE UP (ref 0–0.2)
BASOPHILS NFR BLD AUTO: 0.3 % — SIGNIFICANT CHANGE UP (ref 0–1)
BUN SERPL-MCNC: 36 MG/DL — HIGH (ref 10–20)
CALCIUM SERPL-MCNC: 9.1 MG/DL — SIGNIFICANT CHANGE UP (ref 8.5–10.1)
CHLORIDE SERPL-SCNC: 94 MMOL/L — LOW (ref 98–110)
CO2 SERPL-SCNC: 35 MMOL/L — HIGH (ref 17–32)
CREAT SERPL-MCNC: 0.7 MG/DL — SIGNIFICANT CHANGE UP (ref 0.7–1.5)
EOSINOPHIL # BLD AUTO: 0.09 K/UL — SIGNIFICANT CHANGE UP (ref 0–0.7)
EOSINOPHIL NFR BLD AUTO: 1.3 % — SIGNIFICANT CHANGE UP (ref 0–8)
GLUCOSE BLDC GLUCOMTR-MCNC: 139 MG/DL — HIGH (ref 70–99)
GLUCOSE BLDC GLUCOMTR-MCNC: 163 MG/DL — HIGH (ref 70–99)
GLUCOSE BLDC GLUCOMTR-MCNC: 238 MG/DL — HIGH (ref 70–99)
GLUCOSE BLDC GLUCOMTR-MCNC: 266 MG/DL — HIGH (ref 70–99)
GLUCOSE SERPL-MCNC: 119 MG/DL — HIGH (ref 70–99)
HCT VFR BLD CALC: 36.3 % — LOW (ref 37–47)
HGB BLD-MCNC: 11.2 G/DL — LOW (ref 12–16)
IMM GRANULOCYTES NFR BLD AUTO: 2 % — HIGH (ref 0.1–0.3)
LYMPHOCYTES # BLD AUTO: 1.09 K/UL — LOW (ref 1.2–3.4)
LYMPHOCYTES # BLD AUTO: 15.4 % — LOW (ref 20.5–51.1)
MAGNESIUM SERPL-MCNC: 2.3 MG/DL — SIGNIFICANT CHANGE UP (ref 1.8–2.4)
MCHC RBC-ENTMCNC: 28.8 PG — SIGNIFICANT CHANGE UP (ref 27–31)
MCHC RBC-ENTMCNC: 30.9 G/DL — LOW (ref 32–37)
MCV RBC AUTO: 93.3 FL — SIGNIFICANT CHANGE UP (ref 81–99)
MONOCYTES # BLD AUTO: 0.4 K/UL — SIGNIFICANT CHANGE UP (ref 0.1–0.6)
MONOCYTES NFR BLD AUTO: 5.6 % — SIGNIFICANT CHANGE UP (ref 1.7–9.3)
NEUTROPHILS # BLD AUTO: 5.34 K/UL — SIGNIFICANT CHANGE UP (ref 1.4–6.5)
NEUTROPHILS NFR BLD AUTO: 75.4 % — HIGH (ref 42.2–75.2)
NRBC # BLD: 0 /100 WBCS — SIGNIFICANT CHANGE UP (ref 0–0)
PHOSPHATE SERPL-MCNC: 2.6 MG/DL — SIGNIFICANT CHANGE UP (ref 2.1–4.9)
PLATELET # BLD AUTO: 143 K/UL — SIGNIFICANT CHANGE UP (ref 130–400)
POTASSIUM SERPL-MCNC: 3.8 MMOL/L — SIGNIFICANT CHANGE UP (ref 3.5–5)
POTASSIUM SERPL-SCNC: 3.8 MMOL/L — SIGNIFICANT CHANGE UP (ref 3.5–5)
RBC # BLD: 3.89 M/UL — LOW (ref 4.2–5.4)
RBC # FLD: 15.3 % — HIGH (ref 11.5–14.5)
SODIUM SERPL-SCNC: 137 MMOL/L — SIGNIFICANT CHANGE UP (ref 135–146)
WBC # BLD: 7.08 K/UL — SIGNIFICANT CHANGE UP (ref 4.8–10.8)
WBC # FLD AUTO: 7.08 K/UL — SIGNIFICANT CHANGE UP (ref 4.8–10.8)

## 2019-12-06 PROCEDURE — 99232 SBSQ HOSP IP/OBS MODERATE 35: CPT

## 2019-12-06 RX ORDER — APIXABAN 2.5 MG/1
5 TABLET, FILM COATED ORAL EVERY 12 HOURS
Refills: 0 | Status: DISCONTINUED | OUTPATIENT
Start: 2019-12-06 | End: 2019-12-11

## 2019-12-06 RX ADMIN — CHLORHEXIDINE GLUCONATE 1 APPLICATION(S): 213 SOLUTION TOPICAL at 05:12

## 2019-12-06 RX ADMIN — Medication 30 MILLIGRAM(S): at 05:12

## 2019-12-06 RX ADMIN — APIXABAN 2.5 MILLIGRAM(S): 2.5 TABLET, FILM COATED ORAL at 05:12

## 2019-12-06 RX ADMIN — Medication 3: at 17:28

## 2019-12-06 RX ADMIN — SENNA PLUS 2 TABLET(S): 8.6 TABLET ORAL at 22:16

## 2019-12-06 RX ADMIN — Medication 1 MILLIGRAM(S): at 11:43

## 2019-12-06 RX ADMIN — POLYETHYLENE GLYCOL 3350 17 GRAM(S): 17 POWDER, FOR SOLUTION ORAL at 17:29

## 2019-12-06 RX ADMIN — APIXABAN 5 MILLIGRAM(S): 2.5 TABLET, FILM COATED ORAL at 18:03

## 2019-12-06 RX ADMIN — Medication 40 MILLIGRAM(S): at 05:12

## 2019-12-06 RX ADMIN — Medication 2: at 11:42

## 2019-12-06 RX ADMIN — PREGABALIN 1000 MICROGRAM(S): 225 CAPSULE ORAL at 11:43

## 2019-12-06 RX ADMIN — Medication 20 MILLIGRAM(S): at 05:12

## 2019-12-06 RX ADMIN — POLYETHYLENE GLYCOL 3350 17 GRAM(S): 17 POWDER, FOR SOLUTION ORAL at 05:11

## 2019-12-06 RX ADMIN — ATOVAQUONE 750 MILLIGRAM(S): 750 SUSPENSION ORAL at 05:12

## 2019-12-06 RX ADMIN — ATOVAQUONE 750 MILLIGRAM(S): 750 SUSPENSION ORAL at 17:32

## 2019-12-06 NOTE — PROGRESS NOTE ADULT - ASSESSMENT
This is a 89 year old female with a significant PMHx of Warm Autoimmune Hemolytic Anemia who presented with a cc/o generalized myalgia. Her muscle aches are associated with generalized weakness and inability to ambulate. She is currently admitted for suspected steroid-induced myalgia.       1) Weakness/myopathy/difficulty ambulation probably 2/2 to steroids   c/w prednisone for now (as previous quick taper/low dose caused hemolysis again)-Tapered down to 40 mg   PT eval while inpt       2) Warm IgG hemolytic anemia, IgG+, negative C3- Her Hb is stable   Work up:  Flow cytometry negative. CT imaging did not reveal malignancy. No folate deficiency.   Low vitamin B12 level with normal MMA level and is on PO B12 and her repeat B12 level is normal.   Hepatitis work up negative  c/w folic acid and Po Vit B12  Completed 4/4 dose of Rituxan  on Prednisone taper down to 40mg now - c/w same((as previous quick taper/low dose caused hemolysis again) and switch to 30 mg on 12/10- tuesday   LDH- 404. Haptoglobin normal   Her Hb is stable so far. Continue to monitor     3) New bilateral LE DVT : On eliquis BID 10mg bid for 7days and on Day 8 switch her to eliquis 5mg bid   Switch her from 2.5 to 5 mg dose (2.5 mg is only Afib to prevent stroke)  Her hemolytic anemia makes her high risk for clots     4) Worsening thrombocytopenia:  Could be platelet consumption - improved  - HIT panel negative.  - She had low platelets outpt- that improved after stopping pepcid  No need for any further work up     5) Hypoxia and Tachycardia now on high flow nasal canula -suspect due  CHF and PE, PCP and possibly aspiration pneumonia  On NC now  CT chest was not done as it would not   Echo showing severe pulHTN, Moderately enlarged right ventricle. Moderately reduced RV systolic function.  On Iv lasix    -She has been on high dose steroids and her Fungetill is high- on Mepron for coverage for  PCP  ID and Pulm are on board       6) ANDREAS(POA)- improved This is a 89 year old female with a significant PMHx of Warm Autoimmune Hemolytic Anemia who presented with a cc/o generalized myalgia. Her muscle aches are associated with generalized weakness and inability to ambulate. She is currently admitted for suspected steroid-induced myalgia.       1) Weakness/myopathy/difficulty ambulation probably 2/2 to steroids   c/w prednisone for now (as previous quick taper/low dose caused hemolysis again)-Tapered down to 40 mg   PT eval while inpt       2) Warm IgG hemolytic anemia, IgG+, negative C3- Her Hb is stable   Work up:  Flow cytometry negative. CT imaging did not reveal malignancy. No folate deficiency.   Low vitamin B12 level with normal MMA level and is on PO B12 and her repeat B12 level is normal.   Hepatitis work up negative  c/w folic acid and Po Vit B12  Completed 4/4 dose of Rituxan  on Prednisone taper down to 40mg now - c/w same((as previous quick taper/low dose caused hemolysis again) and switch to 30 mg on 12/10- tuesday   LDH- 404. Haptoglobin normal   Her Hb is stable so far. Continue to monitor     3) New bilateral LE DVT : On eliquis BID 10mg bid for 7days and on Day 8 switch her to eliquis 5mg bid   Switch her from 2.5 to 5 mg dose (2.5 mg is only Afib to prevent stroke)  Her hemolytic anemia makes her high risk for clots     4) Worsening thrombocytopenia:  Could be platelet consumption -   resolved  - HIT panel negative.  - She had low platelets outpt- that improved after stopping pepcid  No need for any further work up     5) Hypoxia and Tachycardia now on high flow nasal canula -suspect due  CHF and PE, PCP and possibly aspiration pneumonia  On NC now  CT chest was not done as it would not   Echo showing severe pulHTN, Moderately enlarged right ventricle. Moderately reduced RV systolic function.  On Iv lasix    -She has been on high dose steroids and her Fungetill is high- on Mepron for coverage for  PCP  ID and Pulm are on board       6) ANDREAS(POA)- improved

## 2019-12-06 NOTE — PROGRESS NOTE ADULT - SUBJECTIVE AND OBJECTIVE BOX
LENGTH OF HOSPITAL STAY: 10d      CHIEF COMPLAINT:   Patient is a 89y old  Female who presents with a chief complaint of Fluid overload; steroid-induced myalgia (06 Dec 2019 09:39)      OVER Past 24hrs:  The patient was seen and examined at bedside there were no acute events during the night. Patient states that she feels better today.       PAST MEDICAL & SURGICAL HISTORY  PAST MEDICAL & SURGICAL HISTORY:  Chronic kidney disease, unspecified CKD stage: Stage IIIB  TIA (transient ischemic attack)  Diverticulitis  Constipation  Hypertension  No significant past surgical history        REVIEW OF SYSTEMS  CONSTITUTIONAL: No fever, weight loss, or fatigue  RESPIRATORY: No cough, wheezing, chills or hemoptysis; No shortness of breath  CARDIOVASCULAR: No chest pain, palpitations, dizziness, or leg swelling  GASTROINTESTINAL: No abdominal or epigastric pain. No nausea, vomiting, or hematemesis; No diarrhea or constipation. No melena or hematochezia.  GENITOURINARY: No dysuria, frequency, hematuria, or incontinence  NEUROLOGICAL: No headaches, memory loss, loss of strength, numbness, or tremors  SKIN: No itching, burning, rashes, or lesions   LYMPH NODES: No enlarged glands  ENDOCRINE: No heat or cold intolerance; No hair loss  MUSCULOSKELETAL: No joint pain or swelling; No muscle, back, or extremity pain      ALLERGIES:  aspirin (Unknown)  Cipro (Unknown)  codeine (Unknown)  dicyclomine (Unknown)  Diovan (Unknown)  Flagyl (Unknown)  Librax (Unknown)  metronidazole (Unknown)  penicillin (Unknown)  Prevacid (Unknown)  trimethobenzamide (Unknown)    MEDICATIONS:  STANDING MEDICATIONS  apixaban 5 milliGRAM(s) Oral every 12 hours  atovaquone Suspension 750 milliGRAM(s) Oral two times a day  chlorhexidine 4% Liquid 1 Application(s) Topical <User Schedule>  cyanocobalamin 1000 MICROGram(s) Oral daily  folic acid 1 milliGRAM(s) Oral daily  furosemide   Injectable 20 milliGRAM(s) IV Push daily  insulin lispro (HumaLOG) corrective regimen sliding scale   SubCutaneous three times a day before meals  NIFEdipine XL 30 milliGRAM(s) Oral daily  polyethylene glycol 3350 17 Gram(s) Oral two times a day  predniSONE   Tablet 40 milliGRAM(s) Oral daily  senna 2 Tablet(s) Oral at bedtime    PRN MEDICATIONS    VITALS:   T(F): 97.6  HR: 96  BP: 125/65  RR: 18  SpO2: 97%    PHYSICAL EXAM:  General: No acute distress.  Alert, oriented, interactive, nonfocal. elderly   Female     HEENT: Pupils equal, reactive to light symmetrically.    PULM: Clear to auscultation bilaterally in upper lobes, Lower lobes improved crackles are herd  b/l and decreased breath sounds.  no significant sputum production.    CVS: Regular rate and rhythm, holosystolic murmured herd at sternal boarder murmurs, rubs, or gallops.    GI: Soft, nondistended, nontender, no masses.    MSK: No edema, nontender.    SKIN: Warm and well perfused, no rashes noted.    PSYCH: AAOx2-3    NEURO: NO focal     LABS:                        11.2   7.08  )-----------( 143      ( 06 Dec 2019 06:18 )             36.3     12-06    137  |  94<L>  |  36<H>  ----------------------------<  119<H>  3.8   |  35<H>  |  0.7    Ca    9.1      06 Dec 2019 06:18  Phos  2.6     12-06  Mg     2.3     12-06

## 2019-12-06 NOTE — PROGRESS NOTE ADULT - ASSESSMENT
Hypoxic Respiratory Failure with SOB, Odalis CHF exacerbation, volume overload and PNA ( Aspiration PNA, possibble opportunistic inf given prolonged steroid use)  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, mobility dysfunction  BL DVT, Possible PE  ANDREAS  Thombocytopenia, possible HIT  Hx of Warm IGG Hemolytic Anemia ( IGG +, C3 -), chronic steroid therapy, sp Rituxan  therapy  Hx of HTN, ASHD, Odalis CHF, TIA  Hx of OA, DDD, DJD, mobility dysfunction, ambulates with walker  Hx of GERD, diverticulosis, ch constipation  Advanced age    pt had elevated BNP of 7,429 4,500, repeat 840 after several days of IV Lasix    pt has BL DVT with probable PE, on Eliquis 10 mg q 12 x 7 days then  5mg q 12 starting 12/6    Etiology of hypoxia? multi factorial:  fluid overload, severe AS and Pul HTN, pul fibrosis, probable PE, probable PCP ( given immunosuppressive tx and inc Fungitel  of 500 )   pt on Mepro (atovaquone) 750mg q 12 for probable PCP, high risk for bronchoscopy  O2 dec to 2l, monitor resp status and check pulse Ox    ECHO:  EF 55-60%, tr MR, severe AS/mild AI, GIDD, severe Pul HTN    monitor CBC, Plts 132, 90, 60, 70, 9 134, 143    Hem--onc ff up and care appreciated,  dec prednisone to 40mg, dec to 30 on 12/12 and weekly CBC once in the SNF    Pul consult:, pt on Eliquis for BL DVT and possible PE,  will need at least 6mos of tx, check mycoplasma titers, urine Legionella Ag/unremarkable    pt with  advanced age, frail state and multiple complex medical issues, but overall pt's clinical status is improving, pt is more alert and verbal with improving appetite    OOB to chair   fall precautions  aspiration precautions    goal of care;  pt is full code, medically improved, for SNF placement to con tx and PT of mobility dysfunction and myopathy    NB while in SNF:  cont atovaquone for at least 3 weeks,  for tx of probable PCP and further if pt remains on high dose of prednisone for prophlaxis, pt to remain on apixiban 5mg bid for tx of DVT and probable PE x 6mos, on 12/12 dec prednisone to 30mg, weekly CBC, ff up with Hem Dr Orantes

## 2019-12-06 NOTE — PROGRESS NOTE ADULT - SUBJECTIVE AND OBJECTIVE BOX
RITA RAMOS 89y Female from home brought by family to the ER for c/o generalized weakness, SOB and inc difficulty ambulating x 3 days du to generalized muscke pain.  The pt at baseline isable to ambulate with walker but in the last few days has been unable to get out of be due to severe musculoskeletal pain.  Of note the pt has Hx of  warm autoimmune hemlytic anemia and has been ofn prolonged steroid  tx  and has had  Rutixan tx as well.  The pt was noted to be fluid overloaded with a BNP of 4500. There is also the question of PNA  and PE.   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 There is also the ques of PNA and PE given the lower ext DVT.  The pt is being evaluated by Hem-Onc Dr Mejias.  The PMHx includes:  HTN, ASHD, TIA, CKD III, Warm IGG Hemolytic Anemia, OA, DDD, DJD, mobility dysfunction, GERD, diverticulosis, ch constipation.  Hospital Course:  The pt was treated for hypoxic RF with high flow O2,V diuretics and was already on steroids for hematologic issue.  The  pt had + BL DVT and was started on IV heparin, noted to have dec in PLTS ( she previously had dec in PLts which improved with D/C pepcid), concern over HIT, thus D/C all heparin products and started on  Eliquis.  The  pt had a high probability of  PE given the SOB, quick desaturation off O2 and + DVT. The pt had an  ECHO which  showed EF 55-60%, GIDD, severe AS/mild AI, severe Pul HTN.  The pt cont to require high flow O2 and given the prolonged immunosuppressive tx with prednisone the possibility of an opportunistic infection ( PCP) is high risk.  The Fungitell was + and the pt was started on Mepron/Atovaquone.      Overnight events:  pt clinically improved, OOB to chair, good appetite, O2 dec to 2 L, remains on prednisone 40mg, apixiban now 5mg bid, atovaquon  750 q12, goal is SNF placement for cont tx and mm strength and mobility reconditioning, Lasix may be transitione to po    MEDICATIONS  (STANDING):  meropenem 1gm q 12 D/C  Mepron/atovaquone 750mg q12  chlorhexidine 4% Liquid 1 Application(s) Topical <User Schedule>  cyanocobalamin 1000 MICROGram(s) Oral daily  folic acid 1 milliGRAM(s) Oral daily  furosemide   Injectable 20 milliGRAM(s) IV Push daily  NIFEdipine XL 30 milliGRAM(s) Oral daily  predniSONE   Tablet 50 milliGRAM(s) Oral daily  Eliquis 10mg q 12 x 7 days then 5mg q 12 on   MEDICATIONS  (PRN):      Allergies    aspirin (Unknown)  Cipro (Unknown)  codeine (Unknown)  dicyclomine (Unknown)  Diovan (Unknown)  Flagyl (Unknown)  Librax (Unknown)  metronidazole (Unknown)  penicillin (Unknown)  Prevacid (Unknown)  trimethobenzamide (Unknown)  	    Vital Signs Last 24 Hrs    T(F): 96.9  HR: 94  BP: 112/67    RR: 18  SpO2: 96% , 4L    PHYSICAL EXAM:      Constitutional:  elderly, pale,  frail and fragile, alert and oriented and verbal  + O2,  temporal wasting    Eyes:  nonicteric    ENMT:  dental defects    Neck:  supple, + JVD, no bruits    Back:  + Kyphosis    Respiratory:  dec BS, shallow respirations, bibasilar crackles    Cardiovascular:  S1S2 tachy    Gastrointestinal: globose soft and benign    Genitourinary:    Extremities: moves all ext, + arthritic changes, + edema        LABS:                       11.2  7 )-----------(143            36    137 |  94 |  36  ----------------------------<  119  3.8  |  35 |  0.7  GFR  28...49, 64  Ca    8.4       Phos  3.4      Mg     2.3  troponin 0.02  BNP 7,429  4,500 dec to 840  , 404  Haptos 85    LA 4.0  Nare MRSA negativ  Fungitell  >500   Mycoplasma negative    TPro  4.6, 4.3 /  Alb  3.1, 2.8, 2.6  /  TBili  1.0  /  DBili  0.4<H>  /  AST  14  /  ALT  13  /  AlkPhos  73  11-26    PT/INR - ( 2019 13:30 )   PT: 13.10 sec;   INR: 1.14 ratio         PTT - ( 2019 13:30 )  PTT:21.6 sec  Urinalysis Basic - ( 2019 16:20 )    Color: Yellow / Appearance: Clear / S.020 / pH: x  Gluc: x / Ketone: Negative  / Bili: Negative / Urobili: <2 mg/dL   Blood: x / Protein: 30 mg/dL / Nitrite: Negative   Leuk Esterase: Negative / RBC: 10 /HPF / WBC 6 /HPF   Sq Epi: x / Non Sq Epi: 5 /HPF / Bacteria: Few        RADIOLOGY & ADDITIONAL TESTS:  CXR  inc vasc markings, cardiomegaly  CXR :  stable BL space occupying  opacities and sm L pl effusion  EKG sinus tach 121/min QTc 462    CT of abd:  bibasilar atelectasis, stable hepatic cyst,  calcification n the sybil hepatis in the area of the GB neck, stable  side branch pancreatic IPMN, no pancreatic constanza dilatation    Sono of abd:  no cholelithiases    Venous doppler of lower ext:  + DVT of  R posterior tibial vein, + DVT of  L peroneal and soleal veins    ECHO:  LVEF 55-60%, , mild LV wall thickening, GIDD, moderate enlarged R ventricle,  trace MR, severe AS, mild AI, inc pul a pressure 89.9 c/w severe Pul HTN

## 2019-12-06 NOTE — PROGRESS NOTE ADULT - SUBJECTIVE AND OBJECTIVE BOX
Patient is a 89y old  Female who presents with a chief complaint of Fluid overload; steroid-induced myalgia (05 Dec 2019 16:52)      Subjective: no new events       Vital Signs Last 24 Hrs  T(C): 36.7 (06 Dec 2019 05:16), Max: 36.7 (06 Dec 2019 05:16)  T(F): 98 (06 Dec 2019 05:16), Max: 98 (06 Dec 2019 05:16)  HR: 61 (06 Dec 2019 05:16) (61 - 94)  BP: 138/64 (06 Dec 2019 05:16) (112/67 - 138/64)  BP(mean): --  RR: 18 (06 Dec 2019 05:16) (18 - 20)  SpO2: 97% (06 Dec 2019 05:16) (87% - 97%)    PHYSICAL EXAM  GENERAL: NAD, on 5 liter NC oxygen   CHEST/LUNG: Decrease at bases bilaterally; No wheeze  HEART: Regular rate and rhythm  ABDOMEN: Soft, Nontender, Nondistended  EXTREMITIES:, No clubbing, cyanosis, or edema  PSYCH: AAOx3  NEUROLOGY: non-focal      MEDICATIONS  (STANDING):  apixaban 2.5 milliGRAM(s) Oral every 12 hours  atovaquone Suspension 750 milliGRAM(s) Oral two times a day  chlorhexidine 4% Liquid 1 Application(s) Topical <User Schedule>  cyanocobalamin 1000 MICROGram(s) Oral daily  folic acid 1 milliGRAM(s) Oral daily  furosemide   Injectable 20 milliGRAM(s) IV Push daily  insulin lispro (HumaLOG) corrective regimen sliding scale   SubCutaneous three times a day before meals  NIFEdipine XL 30 milliGRAM(s) Oral daily  polyethylene glycol 3350 17 Gram(s) Oral two times a day  predniSONE   Tablet 40 milliGRAM(s) Oral daily  senna 2 Tablet(s) Oral at bedtime    MEDICATIONS  (PRN):      LABS:                          11.2   7.08  )-----------( 143      ( 06 Dec 2019 06:18 )             36.3         Mean Cell Volume : 93.3 fL  Mean Cell Hemoglobin : 28.8 pg  Mean Cell Hemoglobin Concentration : 30.9 g/dL  Auto Neutrophil # : 5.34 K/uL  Auto Lymphocyte # : 1.09 K/uL  Auto Monocyte # : 0.40 K/uL  Auto Eosinophil # : 0.09 K/uL  Auto Basophil # : 0.02 K/uL  Auto Neutrophil % : 75.4 %  Auto Lymphocyte % : 15.4 %  Auto Monocyte % : 5.6 %  Auto Eosinophil % : 1.3 %  Auto Basophil % : 0.3 %      Serial CBC's  12-06 @ 06:18  Hct-36.3 / Hgb-11.2 / Plat-143 / RBC-3.89 / WBC-7.08  Serial CBC's  12-05 @ 06:03  Hct-37.3 / Hgb-11.6 / Plat-137 / RBC-4.02 / WBC-7.88  Serial CBC's  12-04 @ 04:30  Hct-38.5 / Hgb-12.0 / Plat-134 / RBC-4.17 / WBC-8.60  Serial CBC's  12-03 @ 06:07  Hct-34.5 / Hgb-10.7 / Plat-98 / RBC-3.69 / WBC-9.07      12-06    137  |  94<L>  |  36<H>  ----------------------------<  119<H>  3.8   |  35<H>  |  0.7    Ca    9.1      06 Dec 2019 06:18  Phos  2.6     12-06  Mg     2.3     12-06                                    BLOOD SMEAR INTERPRETATION:       RADIOLOGY & ADDITIONAL STUDIES:

## 2019-12-06 NOTE — PROGRESS NOTE ADULT - ASSESSMENT
Pt is a 89 year old female with a significant PMHx of Warm Autoimmune Hemolytic Anemia who presented with a cc/o generalized myalgia. Her muscle aches are associated with generalized weakness and inability to ambulate. She is currently admitted for suspected steroid-induced myalgia    Today's Events:  ID notes  anabelle,   now on NC 4L ,   c/w  mepron 750mg, decreased , not great  candidate for bronch, Clinically improved , DC planing to NH    IMPRESSION  Hypoxemic Respiratory Failure etiology Unknown  PNA, volume overload   Acute on Chronic Diastolic  HF Echo - 55-60% - G1DD - Severe AS transaortic gradient 37, sever PAH, sever TR      Plan  # Hypoxemic Respiratory Failure, immunocompromised,   Possible PCP, vs Multifactorial  - Possible PCP starting mepron 750mg q12  - component Fluid overload  - On High flow nasal cannula.  - BNP 4500 now 800's  - CXR repeat stable   - Lasix IV 20 daily for now   - Echo - 55-60% - G1DD - Severe AS  - Daily weight; fluid restriction; Low salt; I+O  - Considering PNA - Labs as per Pulm below - Discuss possibility of PCP as patient was immunocompromised with Chemotherapy and Steroids. - LDH elevated, Aa Gradient Increased  -  Fungitell elevated , LDH elevated   - Pulmonary Consult appreciated   - mycoplasma titers and Legionella capsular Ag.  unremarkable   -ID anabelle-  c/w mepron  750mg   -Keep SaO2 >92% adjust O2 as needed  -The patient needs repeat CXR  in 3-4 months to document clearance      # LE DVT  provoked   - Originally started on Hep Drip - stopped due to Possible HIT  - Started on Argatroban - switched to Eliquis 10mg  ending 12/06  - will need at least 6 months 5mg starting 12/07  - following  hem/onc      #Thrombocytopaenia improved likely secondary to AHA and DVT  - HIT less likely as per hem/Onc   - Recent Heparin Exposure  days - 4 t score around 6  - HIT ab - Negative   - Heme/Onc following   - DVT likely secondary to inactivity and AHA  - hold all heparin products   Given  Argatroban - low dose. 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 Orantes)  - Continue prednisone 40mg now day 3 switch to 30 mg on 12/10- tuesday  - Monitor Hb, Keep active type and screen  - LE Duplex - Positive DVT R. PT / DVT L. Peroneal, soleus      #Hx of Low Vitamin B12 level with normal MMA - Continue B12 1000mcg daily    #HTN - Continue with Nifedipine 30 mg qd; Holding HCTZ/Triamterene    #Diverticulosis - High fiber diet to avoid constipation    Electrolyte Imbalances: Correct as needed  []  Hyponatremia   /   Hypernatremia  []   []  Hypokalemia   /   Hyperkalemia  []   []  Hypochlorhydria   /    Hypochlorhydria  []   []  Hypomagnesemia   /   Hypermagnesemia  []   []  Hypophosphatemia   /   Hyperphosphatemia  []       GI ppx:                                   [] Not indicated   /   [] Pantoprazole 40mg PO Daily    DVT ppx:  [] Not indicated / [] Heparin 5000mg SubQ / [] Lovenox 40mg SubQ / [] SCDs    Fluids:   [] PO  |  [] IVF    Activity:  [X] Assisatnce needed   [X] Increase as Tolerated  /  [] OOB w/ assist  /  [] Bed Rest    BMI:  Height (cm): 149.86 (11-29)  Weight (kg): 50.802 (11-27)  BMI (kg/m2): 22.6 (11-29)        DISPO:  Patient to be discharged when condition(s) optimized.  [ x] From Home     [ ] NH/SNF   [ ] 4A Rehab  [ ] Detox Clinic  [X] Plan Discussion with patient and/or family.  [X] Discussed Case and Plan with the Medical Attending.    CODE STATUS  [X] FULL   /    [] DNR

## 2019-12-06 NOTE — CHART NOTE - NSCHARTNOTEFT_GEN_A_CORE
Registered Dietitian Follow-Up     Patient Profile Reviewed                           Yes [x]   No []     Nutrition History Previously Obtained        Yes [x]  No []       Pertinent Medical Interventions: Pt noted admitted with fluid overload; steroid-induced myalgia noted. Hypoxemic Respiratory Failure, immunocompromised per progress notes. Component Fluid overload noted. LE DVT  provoked. Weakness/myopathy/difficulty ambulation noted probably 2/2 to steroids. ANDREAS noted improved.     Diet order: Dysphagia 2 Mechanical Soft diet with thin liquids + High Fiber & DASH/TLC diet modifiers & 1 L fluid restriction. There is a pending diet order awaiting verification by attending, which adds Ensure Compact q12hrs. Currently consuming 50% of meals at this time. Discussed with LIP to activate this pending diet order.     Anthropometrics:  - Ht. 149.86 cm  - Wt. 43.9 kg (12/5).  - wt change: actual wts this admit have ranged from latest wt (43.9 kg 12/5) to 46.4 kg (11/30). On lasix this admit, may be responsible for these wt changes.  - BMI 19.5 (latest wt 43.9 kg)  - IBW 44.3 kg.     Pertinent Lab Data: 12/6: RBC-3.89, H/H-11.2/36.3, Cl-94, BUN-36, gluc-119, POCT gluc-266 (16:42) vs. 238 (11:23) vs. 139 (7:44)     Pertinent Meds: insulin lispro, prednisone, miralax, senna, cyanocobalamin, folic acid, lasix     Physical Findings:  - Appearance: confused, disoriented at this time.  - GI function: last BM 12/5  - Tubes: no feeding tubes  - Oral/Mouth cavity: swallowing foods/liquids documented per RN flowsheet on 12/3.  - Skin: bruised, ecchymotic     Nutrition Requirements(from RD note on 11/29)   Weight Used: 50.8kg     Estimated Energy Needs    Continue [x]  Adjust []    996 - 1079 kcals/day (MSJ x 1.2 - 1.3 AF)        Estimated Protein Needs    Continue [x]  Adjust []   51 - 61 gms/day (1.0 - 1.2 gm/kg) *CKD considered         Estimated Fluid Needs        Continue [x]  Adjust []  1ml/kcal or per LIP       Nutrient Intake: Po intake fair at this time, however noted swallowing difficulty is a concern, especially given pt noted confused/disoriented at this time. Would benefit from SLP evaluation to assess swallow function.      [x] Previous Nutrition Diagnosis:  Inadequate Oral Intake             [x] Ongoing          [] Resolved     Nutrition Intervention: Meals & Snacks, Coordination of Care, Medical Food Supplements     Goal/Expected Outcome: Pt to demonstrate tolerance to diet order, ideally with at least 75% po intake maintained over next 4 days.     Indicator/Monitoring: Diet order, energy intake, glucose profile, renal profile, nutrition focused physical findings, body composition.    Recommendation: Consult SLP services to evaluate confused/disoriented patient with suboptimal po intake this admit. Swallowing difficulty documented on 12/3 RN flowsheet. Diet order per SLP. If no changes to diet texture/consistency made, please activate pending diet order awaiting verification by attending (12/3), which adds Ensure Compact q12hrs. Reviewed with LIP at [phone].

## 2019-12-07 LAB
BASOPHILS # BLD AUTO: 0.03 K/UL — SIGNIFICANT CHANGE UP (ref 0–0.2)
BASOPHILS NFR BLD AUTO: 0.3 % — SIGNIFICANT CHANGE UP (ref 0–1)
EOSINOPHIL # BLD AUTO: 0.07 K/UL — SIGNIFICANT CHANGE UP (ref 0–0.7)
EOSINOPHIL NFR BLD AUTO: 0.8 % — SIGNIFICANT CHANGE UP (ref 0–8)
GLUCOSE BLDC GLUCOMTR-MCNC: 140 MG/DL — HIGH (ref 70–99)
GLUCOSE BLDC GLUCOMTR-MCNC: 193 MG/DL — HIGH (ref 70–99)
GLUCOSE BLDC GLUCOMTR-MCNC: 217 MG/DL — HIGH (ref 70–99)
GLUCOSE BLDC GLUCOMTR-MCNC: 221 MG/DL — HIGH (ref 70–99)
GLUCOSE BLDC GLUCOMTR-MCNC: 317 MG/DL — HIGH (ref 70–99)
HCT VFR BLD CALC: 37.8 % — SIGNIFICANT CHANGE UP (ref 37–47)
HGB BLD-MCNC: 11.7 G/DL — LOW (ref 12–16)
IMM GRANULOCYTES NFR BLD AUTO: 1.5 % — HIGH (ref 0.1–0.3)
LYMPHOCYTES # BLD AUTO: 1.12 K/UL — LOW (ref 1.2–3.4)
LYMPHOCYTES # BLD AUTO: 12 % — LOW (ref 20.5–51.1)
MCHC RBC-ENTMCNC: 28.3 PG — SIGNIFICANT CHANGE UP (ref 27–31)
MCHC RBC-ENTMCNC: 31 G/DL — LOW (ref 32–37)
MCV RBC AUTO: 91.3 FL — SIGNIFICANT CHANGE UP (ref 81–99)
MONOCYTES # BLD AUTO: 0.44 K/UL — SIGNIFICANT CHANGE UP (ref 0.1–0.6)
MONOCYTES NFR BLD AUTO: 4.7 % — SIGNIFICANT CHANGE UP (ref 1.7–9.3)
NEUTROPHILS # BLD AUTO: 7.53 K/UL — HIGH (ref 1.4–6.5)
NEUTROPHILS NFR BLD AUTO: 80.7 % — HIGH (ref 42.2–75.2)
NRBC # BLD: 0 /100 WBCS — SIGNIFICANT CHANGE UP (ref 0–0)
PLATELET # BLD AUTO: 124 K/UL — LOW (ref 130–400)
RBC # BLD: 4.14 M/UL — LOW (ref 4.2–5.4)
RBC # FLD: 15.3 % — HIGH (ref 11.5–14.5)
WBC # BLD: 9.33 K/UL — SIGNIFICANT CHANGE UP (ref 4.8–10.8)
WBC # FLD AUTO: 9.33 K/UL — SIGNIFICANT CHANGE UP (ref 4.8–10.8)

## 2019-12-07 RX ADMIN — Medication 2: at 17:15

## 2019-12-07 RX ADMIN — Medication 30 MILLIGRAM(S): at 06:25

## 2019-12-07 RX ADMIN — Medication 20 MILLIGRAM(S): at 06:24

## 2019-12-07 RX ADMIN — SENNA PLUS 2 TABLET(S): 8.6 TABLET ORAL at 21:57

## 2019-12-07 RX ADMIN — Medication 40 MILLIGRAM(S): at 06:25

## 2019-12-07 RX ADMIN — PREGABALIN 1000 MICROGRAM(S): 225 CAPSULE ORAL at 11:13

## 2019-12-07 RX ADMIN — APIXABAN 5 MILLIGRAM(S): 2.5 TABLET, FILM COATED ORAL at 06:25

## 2019-12-07 RX ADMIN — Medication 4: at 13:42

## 2019-12-07 RX ADMIN — POLYETHYLENE GLYCOL 3350 17 GRAM(S): 17 POWDER, FOR SOLUTION ORAL at 06:26

## 2019-12-07 RX ADMIN — CHLORHEXIDINE GLUCONATE 1 APPLICATION(S): 213 SOLUTION TOPICAL at 07:15

## 2019-12-07 RX ADMIN — Medication 1 MILLIGRAM(S): at 11:13

## 2019-12-07 RX ADMIN — APIXABAN 5 MILLIGRAM(S): 2.5 TABLET, FILM COATED ORAL at 17:15

## 2019-12-07 RX ADMIN — ATOVAQUONE 750 MILLIGRAM(S): 750 SUSPENSION ORAL at 06:25

## 2019-12-07 RX ADMIN — ATOVAQUONE 750 MILLIGRAM(S): 750 SUSPENSION ORAL at 17:15

## 2019-12-07 RX ADMIN — POLYETHYLENE GLYCOL 3350 17 GRAM(S): 17 POWDER, FOR SOLUTION ORAL at 17:15

## 2019-12-07 NOTE — PROGRESS NOTE ADULT - SUBJECTIVE AND OBJECTIVE BOX
LENGTH OF HOSPITAL STAY: 11d      CHIEF COMPLAINT:   Patient is a 89y old  Female who presents with a chief complaint of Fluid overload; steroid-induced myalgia (07 Dec 2019 08:18)      OVER Past 24hrs:  The patient was seen and examined at bedside there were no acute events during the night. Patient complains of constipation.         PAST MEDICAL & SURGICAL HISTORY  PAST MEDICAL & SURGICAL HISTORY:  Chronic kidney disease, unspecified CKD stage: Stage IIIB  TIA (transient ischemic attack)  Diverticulitis  Constipation  Hypertension  No significant past surgical history        REVIEW OF SYSTEMS  CONSTITUTIONAL: No fever, weight loss, or fatigue  RESPIRATORY: No cough, wheezing, chills or hemoptysis; No shortness of breath  CARDIOVASCULAR: No chest pain, palpitations, dizziness, or leg swelling  GASTROINTESTINAL: No abdominal or epigastric pain. No nausea, vomiting, or hematemesis; No diarrhea or constipation. No melena or hematochezia. complains constipation  GENITOURINARY: No dysuria, frequency, hematuria, or incontinence  NEUROLOGICAL: No headaches, memory loss, loss of strength, numbness, or tremors  SKIN: No itching, burning, rashes, or lesions   LYMPH NODES: No enlarged glands  ENDOCRINE: No heat or cold intolerance; No hair loss  MUSCULOSKELETAL: No joint pain or swelling; No muscle, back, or extremity pain    ALLERGIES:  aspirin (Unknown)  Cipro (Unknown)  codeine (Unknown)  dicyclomine (Unknown)  Diovan (Unknown)  Flagyl (Unknown)  Librax (Unknown)  metronidazole (Unknown)  penicillin (Unknown)  Prevacid (Unknown)  trimethobenzamide (Unknown)    MEDICATIONS:  STANDING MEDICATIONS  apixaban 5 milliGRAM(s) Oral every 12 hours  atovaquone Suspension 750 milliGRAM(s) Oral two times a day  chlorhexidine 4% Liquid 1 Application(s) Topical <User Schedule>  cyanocobalamin 1000 MICROGram(s) Oral daily  folic acid 1 milliGRAM(s) Oral daily  furosemide   Injectable 20 milliGRAM(s) IV Push daily  insulin lispro (HumaLOG) corrective regimen sliding scale   SubCutaneous three times a day before meals  NIFEdipine XL 30 milliGRAM(s) Oral daily  polyethylene glycol 3350 17 Gram(s) Oral two times a day  predniSONE   Tablet 40 milliGRAM(s) Oral daily  senna 2 Tablet(s) Oral at bedtime    PRN MEDICATIONS    VITALS:   T(F): 97  HR: 86  BP: 100/56  RR: 18  SpO2: --    PHYSICAL EXAM:  General: No acute distress.  Alert, oriented, interactive, nonfocal. elderly   Female     HEENT: Pupils equal, reactive to light symmetrically.    PULM: Clear to auscultation bilaterally in upper lobes, Lower lobes improved crackles are herd  b/l and decreased breath sounds.  no significant sputum production.    CVS: Regular rate and rhythm, holosystolic murmured herd at sternal boarder murmurs, rubs, or gallops.    GI: Soft, nondistended, nontender, no masses.    MSK: No edema, nontender.    SKIN: Warm and well perfused, no rashes noted.    PSYCH: AAOx2-3    NEURO: NO focal     LABS:                        11.7   9.33  )-----------( 124      ( 07 Dec 2019 08:13 )             37.8     12-06    137  |  94<L>  |  36<H>  ----------------------------<  119<H>  3.8   |  35<H>  |  0.7    Ca    9.1      06 Dec 2019 06:18  Phos  2.6     12-06  Mg     2.3     12-06

## 2019-12-07 NOTE — PROGRESS NOTE ADULT - ASSESSMENT
Hypoxic Respiratory Failure with SOB, Odalis CHF exacerbation, volume overload and PNA ( Aspiration PNA, possibble opportunistic inf given prolonged steroid use)  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, mobility dysfunction  BL DVT, Possible PE  ANDREAS  Thombocytopenia, possible HIT  Hx of Warm IGG Hemolytic Anemia ( IGG +, C3 -), chronic steroid therapy, sp Rituxan  therapy  Hx of HTN, ASHD, Odalis CHF, TIA  Hx of OA, DDD, DJD, mobility dysfunction, ambulates with walker  Hx of GERD, diverticulosis, ch constipation  Advanced age    pt had elevated BNP of 7,429 4,500, repeat 840 after several days of IV Lasix    pt has BL DVT with probable PE, on Eliquis 10 mg q 12 x 7 days then  5mg q 12 starting 12/6    Etiology of hypoxia? multi factorial:  fluid overload, severe AS and Pul HTN, pul fibrosis, probable PE, probable PCP ( given immunosuppressive tx and inc Fungitel  of 500 )   pt on Mepro (atovaquone) 750mg q 12 for probable PCP, high risk for bronchoscopy  O2 dec to 2l, monitor resp status and check pulse Ox    ECHO:  EF 55-60%, tr MR, severe AS/mild AI, GIDD, severe Pul HTN    monitor CBC, Plts 132, 90, 60, 70, 9 134, 143    Hem--onc ff up and care appreciated,  dec prednisone to 40mg, dec to 30 on 12/12 and weekly CBC once in the SNF    Pul consult:, pt on Eliquis for BL DVT and possible PE,  will need at least 6mos of tx, check mycoplasma titers, urine Legionella Ag/unremarkable    pt with  advanced age, frail state and multiple complex medical issues, but overall pt's clinical status is improving, pt is more alert and verbal with improving appetite    OOB to chair   fall precautions  aspiration precautions    goal of care;  pt is full code, medically improved, for SNF placement d/c authorizaiton in place  to con tx and PT of mobility dysfunction and myopathy    NB while in SNF:  cont atovaquone for at least 3 weeks,  for tx of probable PCP and further if pt remains on high dose of prednisone for prophlaxis, pt to remain on apixiban 5mg bid for tx of DVT and probable PE x 6mos, on 12/12 dec prednisone to 30mg, weekly CBC, ff up with Hem Dr Orantes

## 2019-12-07 NOTE — PROGRESS NOTE ADULT - ASSESSMENT
Pt is a 89 year old female with a significant PMHx of Warm Autoimmune Hemolytic Anemia who presented with a cc/o generalized myalgia. Her muscle aches are associated with generalized weakness and inability to ambulate. She is currently admitted for suspected steroid-induced myalgia    Today's Events:  ID notes  anabelle,   now on NC 4L ,   c/w  mepron 750mg, decreased , , DC planing to NH    IMPRESSION  Hypoxemic Respiratory Failure etiology Unknown possible PCP  PNA,    Acute on Chronic Diastolic  HF Echo - 55-60% - G1DD - Severe AS transaortic gradient 37, sever PAH, sever TR      Plan  # Hypoxemic Respiratory Failure, immunocompromised,   Possible PCP, vs Multifactorial  - Possible PCP starting mepron 750mg q12  - component Fluid overload  - On High flow nasal cannula.  - BNP 4500 now 800's  - CXR repeat stable   - Lasix IV 20 daily for now   - Echo - 55-60% - G1DD - Severe AS  - Daily weight; fluid restriction; Low salt; I+O  - Considering PNA - Labs as per Pulm below - Discuss possibility of PCP as patient was immunocompromised with Chemotherapy and Steroids. - LDH elevated, Aa Gradient Increased  -  Fungitell elevated , LDH elevated   - Pulmonary Consult appreciated   - mycoplasma titers and Legionella capsular Ag.  unremarkable   -ID anabelle-  c/w mepron  750mg   -Keep SaO2 >92% adjust O2 as needed  -The patient needs repeat CXR  in 3-4 months to document clearance      # LE DVT  provoked   - Originally started on Hep Drip - stopped due to Possible HIT  - Started on Argatroban - switched to Eliquis 10mg  ending 12/06  - will need at least 6 months 5mg starting 12/07  - following  hem/onc      #Thrombocytopaenia improved likely secondary to AHA and DVT  - HIT less likely as per hem/Onc   - Recent Heparin Exposure  days - 4 t score around 6  - HIT ab - Negative   - Heme/Onc following   - DVT likely secondary to inactivity and AHA  - hold all heparin products   Given  Argatroban - low dose. 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 Orantes)  - Continue prednisone 40mg now day 3 switch to 30 mg on 12/10- tuesday  - Monitor Hb, Keep active type and screen  - LE Duplex - Positive DVT R. PT / DVT L. Peroneal, soleus      #Hx of Low Vitamin B12 level with normal MMA - Continue B12 1000mcg daily    #HTN - Continue with Nifedipine 30 mg qd; Holding HCTZ/Triamterene    #Diverticulosis - High fiber diet to avoid constipation    Electrolyte Imbalances: Correct as needed  []  Hyponatremia   /   Hypernatremia  []   []  Hypokalemia   /   Hyperkalemia  []   []  Hypochlorhydria   /    Hypochlorhydria  []   []  Hypomagnesemia   /   Hypermagnesemia  []   []  Hypophosphatemia   /   Hyperphosphatemia  []       GI ppx:                                   [x] Not indicated   /   [] Pantoprazole 40mg PO Daily    DVT ppx: eliquis   [] Not indicated / [] Heparin 5000mg SubQ / [] Lovenox 40mg SubQ / [] SCDs    Fluids:   [x] PO  |  [] IVF    Activity:  [X] Assisatnce needed   [X] Increase as Tolerated  /  [] OOB w/ assist  /  [] Bed Rest    BMI:  Height (cm): 149.86 (11-29)  Weight (kg): 50.802 (11-27)  BMI (kg/m2): 22.6 (11-29)        DISPO:  Patient to be discharged when condition(s) optimized.  [ x] From Home     [ ] NH/SNF   [ ] 4A Rehab  [ ] Detox Clinic  [X] Plan Discussion with patient and/or family.  [X] Discussed Case and Plan with the Medical Attending.    CODE STATUS  [X] FULL   /    [] DNR

## 2019-12-07 NOTE — PROGRESS NOTE ADULT - SUBJECTIVE AND OBJECTIVE BOX
89y Female from home brought by family to the ER for c/o generalized weakness, SOB and inc difficulty ambulating x 3 days du to generalized muscke pain.  The pt at baseline isable to ambulate with walker but in the last few days has been unable to get out of be due to severe musculoskeletal pain.  Of note the pt has Hx of  warm autoimmune hemlytic anemia and has been ofn prolonged steroid  tx  and has had  Rutixan tx as well.  The pt was noted to be fluid overloaded with a BNP of 4500. There is also the question of PNA  and PE.   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 There is also the ques of PNA and PE given the lower ext DVT.  The pt is being evaluated by Hem-Onc Dr Mejias.  The PMHx includes:  HTN, ASHD, TIA, CKD III, Warm IGG Hemolytic Anemia, OA, DDD, DJD, mobility dysfunction, GERD, diverticulosis, ch constipation.  Hospital Course:  The pt was treated for hypoxic RF with high flow O2,V diuretics and was already on steroids for hematologic issue.  The  pt had + BL DVT and was started on IV heparin, noted to have dec in PLTS ( she previously had dec in PLts which improved with D/C pepcid), concern over HIT, thus D/C all heparin products and started on  Eliquis.  The  pt had a high probability of  PE given the SOB, quick desaturation off O2 and + DVT. The pt had an  ECHO which  showed EF 55-60%, GIDD, severe AS/mild AI, severe Pul HTN.  The pt cont to require high flow O2 and given the prolonged immunosuppressive tx with prednisone the possibility of an opportunistic infection ( PCP) is high risk.  The Fungitell was + and the pt was started on Mepron/Atovaquone.      Overnight events:  pt clinically improved, OOB to chair, good appetite, O2 dec to 2 L, remains on prednisone 40mg, apixiban now 5mg bid, atovaquon  750 q12, goal is SNF placement for cont tx and mm strength and mobility reconditioning, Lasix may be transitione to po    PAST MEDICAL & SURGICAL HISTORY:  Chronic kidney disease, unspecified CKD stage: Stage IIIB  TIA (transient ischemic attack)  Diverticulitis  Constipation  Hypertension  No significant past surgical history    MEDICATIONS  (STANDING):  apixaban 5 milliGRAM(s) Oral every 12 hours  atovaquone Suspension 750 milliGRAM(s) Oral two times a day  chlorhexidine 4% Liquid 1 Application(s) Topical <User Schedule>  cyanocobalamin 1000 MICROGram(s) Oral daily  folic acid 1 milliGRAM(s) Oral daily  furosemide   Injectable 20 milliGRAM(s) IV Push daily  insulin lispro (HumaLOG) corrective regimen sliding scale   SubCutaneous three times a day before meals  NIFEdipine XL 30 milliGRAM(s) Oral daily  polyethylene glycol 3350 17 Gram(s) Oral two times a day  predniSONE   Tablet 40 milliGRAM(s) Oral daily  senna 2 Tablet(s) Oral at bedtime    MEDICATIONS  (PRN):    Vital Signs Last 24 Hrs  T(C): 36.2 (07 Dec 2019 05:11), Max: 36.4 (06 Dec 2019 13:00)  T(F): 97.2 (07 Dec 2019 05:11), Max: 97.6 (06 Dec 2019 13:00)  HR: 71 (07 Dec 2019 05:11) (71 - 96)  BP: 130/79 (07 Dec 2019 05:11) (110/59 - 130/79)  BP(mean): --  RR: 18 (07 Dec 2019 05:11) (18 - 18)  SpO2: --    General: No acute distress.  Alert, oriented, interactive, nonfocal. elderly   Female   HEENT: Pupils equal, reactive to light symmetrically.  PULM: Clear to auscultation bilaterally in upper lobes, Lower lobes improved crackles are herd  b/l and decreased breath sounds.  no significant sputum production.  CVS: Regular rate and rhythm, holosystolic murmured herd at sternal boarder murmurs, rubs, or gallops.  GI: Soft, nondistended, nontender, no masses.  MSK: No edema, nontender.  SKIN: Warm and well perfused, no rashes noted.  PSYCH: AAOx2-3  NEURO: NO focal                             11.2   7.08  )-----------( 143      ( 06 Dec 2019 06:18 )             36.3   12-06    137  |  94<L>  |  36<H>  ----------------------------<  119<H>  3.8   |  35<H>  |  0.7    Ca    9.1      06 Dec 2019 06:18  Phos  2.6     12-06  Mg     2.3     12-06

## 2019-12-08 LAB
GLUCOSE BLDC GLUCOMTR-MCNC: 123 MG/DL — HIGH (ref 70–99)
GLUCOSE BLDC GLUCOMTR-MCNC: 178 MG/DL — HIGH (ref 70–99)
GLUCOSE BLDC GLUCOMTR-MCNC: 209 MG/DL — HIGH (ref 70–99)
GLUCOSE BLDC GLUCOMTR-MCNC: 299 MG/DL — HIGH (ref 70–99)
HCT VFR BLD CALC: 35.7 % — LOW (ref 37–47)
HGB BLD-MCNC: 11.1 G/DL — LOW (ref 12–16)
MCHC RBC-ENTMCNC: 28.1 PG — SIGNIFICANT CHANGE UP (ref 27–31)
MCHC RBC-ENTMCNC: 31.1 G/DL — LOW (ref 32–37)
MCV RBC AUTO: 90.4 FL — SIGNIFICANT CHANGE UP (ref 81–99)
NRBC # BLD: 0 /100 WBCS — SIGNIFICANT CHANGE UP (ref 0–0)
PLATELET # BLD AUTO: 167 K/UL — SIGNIFICANT CHANGE UP (ref 130–400)
RBC # BLD: 3.95 M/UL — LOW (ref 4.2–5.4)
RBC # FLD: 14.9 % — HIGH (ref 11.5–14.5)
WBC # BLD: 7.39 K/UL — SIGNIFICANT CHANGE UP (ref 4.8–10.8)
WBC # FLD AUTO: 7.39 K/UL — SIGNIFICANT CHANGE UP (ref 4.8–10.8)

## 2019-12-08 RX ORDER — ACETAMINOPHEN 500 MG
650 TABLET ORAL ONCE
Refills: 0 | Status: COMPLETED | OUTPATIENT
Start: 2019-12-08 | End: 2019-12-08

## 2019-12-08 RX ADMIN — Medication 30 MILLIGRAM(S): at 06:15

## 2019-12-08 RX ADMIN — ATOVAQUONE 750 MILLIGRAM(S): 750 SUSPENSION ORAL at 06:15

## 2019-12-08 RX ADMIN — Medication 2: at 12:27

## 2019-12-08 RX ADMIN — Medication 650 MILLIGRAM(S): at 06:20

## 2019-12-08 RX ADMIN — Medication 40 MILLIGRAM(S): at 06:15

## 2019-12-08 RX ADMIN — Medication 650 MILLIGRAM(S): at 06:44

## 2019-12-08 RX ADMIN — Medication 1 MILLIGRAM(S): at 12:27

## 2019-12-08 RX ADMIN — APIXABAN 5 MILLIGRAM(S): 2.5 TABLET, FILM COATED ORAL at 17:24

## 2019-12-08 RX ADMIN — ATOVAQUONE 750 MILLIGRAM(S): 750 SUSPENSION ORAL at 17:24

## 2019-12-08 RX ADMIN — CHLORHEXIDINE GLUCONATE 1 APPLICATION(S): 213 SOLUTION TOPICAL at 06:15

## 2019-12-08 RX ADMIN — POLYETHYLENE GLYCOL 3350 17 GRAM(S): 17 POWDER, FOR SOLUTION ORAL at 06:15

## 2019-12-08 RX ADMIN — PREGABALIN 1000 MICROGRAM(S): 225 CAPSULE ORAL at 12:27

## 2019-12-08 RX ADMIN — Medication 20 MILLIGRAM(S): at 06:16

## 2019-12-08 RX ADMIN — Medication 3: at 16:23

## 2019-12-08 RX ADMIN — APIXABAN 5 MILLIGRAM(S): 2.5 TABLET, FILM COATED ORAL at 06:15

## 2019-12-08 NOTE — PROGRESS NOTE ADULT - SUBJECTIVE AND OBJECTIVE BOX
89y Female from home brought by family to the ER for c/o generalized weakness, SOB and inc difficulty ambulating x 3 days du to generalized muscke pain.  The pt at baseline isable to ambulate with walker but in the last few days has been unable to get out of be due to severe musculoskeletal pain.  Of note the pt has Hx of  warm autoimmune hemlytic anemia and has been ofn prolonged steroid  tx  and has had  Rutixan tx as well.  The pt was noted to be fluid overloaded with a BNP of 4500. There is also the question of PNA  and PE.   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 There is also the ques of PNA and PE given the lower ext DVT.  The pt is being evaluated by Hem-Onc Dr Mejias.  The PMHx includes:  HTN, ASHD, TIA, CKD III, Warm IGG Hemolytic Anemia, OA, DDD, DJD, mobility dysfunction, GERD, diverticulosis, ch constipation.  Hospital Course:  The pt was treated for hypoxic RF with high flow O2,V diuretics and was already on steroids for hematologic issue.  The  pt had + BL DVT and was started on IV heparin, noted to have dec in PLTS ( she previously had dec in PLts which improved with D/C pepcid), concern over HIT, thus D/C all heparin products and started on  Eliquis.  The  pt had a high probability of  PE given the SOB, quick desaturation off O2 and + DVT. The pt had an  ECHO which  showed EF 55-60%, GIDD, severe AS/mild AI, severe Pul HTN.  The pt cont to require high flow O2 and given the prolonged immunosuppressive tx with prednisone the possibility of an opportunistic infection ( PCP) is high risk.  The Fungitell was + and the pt was started on Mepron/Atovaquone.      Overnight events:  pt clinically improved, OOB to chair, good appetite, O2 dec to 2 L, remains on prednisone 40mg, apixiban now 5mg bid, atovaquon  750 q12, goal is SNF placement for cont tx and mm strength and mobility reconditioning, Lasix may be transitione to po    PAST MEDICAL & SURGICAL HISTORY:  Chronic kidney disease, unspecified CKD stage: Stage IIIB  TIA (transient ischemic attack)  Diverticulitis  Constipation  Hypertension  No significant past surgical history    MEDICATIONS  (STANDING):  apixaban 5 milliGRAM(s) Oral every 12 hours  atovaquone Suspension 750 milliGRAM(s) Oral two times a day  chlorhexidine 4% Liquid 1 Application(s) Topical <User Schedule>  cyanocobalamin 1000 MICROGram(s) Oral daily  folic acid 1 milliGRAM(s) Oral daily  furosemide   Injectable 20 milliGRAM(s) IV Push daily  insulin lispro (HumaLOG) corrective regimen sliding scale   SubCutaneous three times a day before meals  NIFEdipine XL 30 milliGRAM(s) Oral daily  polyethylene glycol 3350 17 Gram(s) Oral two times a day  predniSONE   Tablet 40 milliGRAM(s) Oral daily  senna 2 Tablet(s) Oral at bedtime    MEDICATIONS  (PRN):    Vital Signs Last 24 Hrs  T(C): 36.2 (07 Dec 2019 20:48), Max: 36.2 (07 Dec 2019 20:48)  T(F): 97.2 (07 Dec 2019 20:48), Max: 97.2 (07 Dec 2019 20:48)  HR: 81 (07 Dec 2019 20:48) (81 - 86)  BP: 130/63 (07 Dec 2019 20:48) (100/56 - 130/63)  BP(mean): --  RR: 18 (07 Dec 2019 20:48) (18 - 18)  SpO2: 94% (07 Dec 2019 20:00) (94% - 94%)    General: No acute distress.  Alert, oriented, interactive, nonfocal. elderly   Female   HEENT: Pupils equal, reactive to light symmetrically.  PULM: Clear to auscultation bilaterally in upper lobes, Lower lobes improved crackles are herd  b/l and decreased breath sounds.  no significant sputum production.  CVS: Regular rate and rhythm, holosystolic murmured herd at sternal boarder murmurs, rubs, or gallops.  GI: Soft, nondistended, nontender, no masses.  MSK: No edema, nontender.  SKIN: Warm and well perfused, no rashes noted.  PSYCH: AAOx2-3  NEURO: NO focal                           11.7   9.33  )-----------( 124      ( 07 Dec 2019 08:13 )             37.8   12-06    137  |  94<L>  |  36<H>  ----------------------------<  119<H>  3.8   |  35<H>  |  0.7    Ca    9.1      06 Dec 2019 06:18  Phos  2.6     12-06  Mg     2.3     12-06

## 2019-12-09 LAB
GLUCOSE BLDC GLUCOMTR-MCNC: 146 MG/DL — HIGH (ref 70–99)
GLUCOSE BLDC GLUCOMTR-MCNC: 209 MG/DL — HIGH (ref 70–99)
GLUCOSE BLDC GLUCOMTR-MCNC: 212 MG/DL — HIGH (ref 70–99)
GLUCOSE BLDC GLUCOMTR-MCNC: 224 MG/DL — HIGH (ref 70–99)
HCT VFR BLD CALC: 33.4 % — LOW (ref 37–47)
HGB BLD-MCNC: 10.7 G/DL — LOW (ref 12–16)
MCHC RBC-ENTMCNC: 28.6 PG — SIGNIFICANT CHANGE UP (ref 27–31)
MCHC RBC-ENTMCNC: 32 G/DL — SIGNIFICANT CHANGE UP (ref 32–37)
MCV RBC AUTO: 89.3 FL — SIGNIFICANT CHANGE UP (ref 81–99)
NRBC # BLD: 0 /100 WBCS — SIGNIFICANT CHANGE UP (ref 0–0)
PLATELET # BLD AUTO: 159 K/UL — SIGNIFICANT CHANGE UP (ref 130–400)
RBC # BLD: 3.74 M/UL — LOW (ref 4.2–5.4)
RBC # FLD: 15 % — HIGH (ref 11.5–14.5)
WBC # BLD: 7.75 K/UL — SIGNIFICANT CHANGE UP (ref 4.8–10.8)
WBC # FLD AUTO: 7.75 K/UL — SIGNIFICANT CHANGE UP (ref 4.8–10.8)

## 2019-12-09 PROCEDURE — 99232 SBSQ HOSP IP/OBS MODERATE 35: CPT

## 2019-12-09 RX ORDER — CLOTRIMAZOLE 10 MG
1 TROCHE MUCOUS MEMBRANE
Refills: 0 | Status: DISCONTINUED | OUTPATIENT
Start: 2019-12-09 | End: 2019-12-11

## 2019-12-09 RX ORDER — FUROSEMIDE 40 MG
40 TABLET ORAL
Refills: 0 | Status: DISCONTINUED | OUTPATIENT
Start: 2019-12-09 | End: 2019-12-11

## 2019-12-09 RX ORDER — DIPHENHYDRAMINE HYDROCHLORIDE AND LIDOCAINE HYDROCHLORIDE AND ALUMINUM HYDROXIDE AND MAGNESIUM HYDRO
10 KIT EVERY 6 HOURS
Refills: 0 | Status: DISCONTINUED | OUTPATIENT
Start: 2019-12-09 | End: 2019-12-11

## 2019-12-09 RX ADMIN — Medication 40 MILLIGRAM(S): at 17:31

## 2019-12-09 RX ADMIN — DIPHENHYDRAMINE HYDROCHLORIDE AND LIDOCAINE HYDROCHLORIDE AND ALUMINUM HYDROXIDE AND MAGNESIUM HYDRO 10 MILLILITER(S): KIT at 14:39

## 2019-12-09 RX ADMIN — Medication 40 MILLIGRAM(S): at 06:56

## 2019-12-09 RX ADMIN — PREGABALIN 1000 MICROGRAM(S): 225 CAPSULE ORAL at 11:37

## 2019-12-09 RX ADMIN — ATOVAQUONE 750 MILLIGRAM(S): 750 SUSPENSION ORAL at 17:31

## 2019-12-09 RX ADMIN — DIPHENHYDRAMINE HYDROCHLORIDE AND LIDOCAINE HYDROCHLORIDE AND ALUMINUM HYDROXIDE AND MAGNESIUM HYDRO 10 MILLILITER(S): KIT at 23:28

## 2019-12-09 RX ADMIN — Medication 1 MILLIGRAM(S): at 11:37

## 2019-12-09 RX ADMIN — APIXABAN 5 MILLIGRAM(S): 2.5 TABLET, FILM COATED ORAL at 17:31

## 2019-12-09 RX ADMIN — CHLORHEXIDINE GLUCONATE 1 APPLICATION(S): 213 SOLUTION TOPICAL at 06:57

## 2019-12-09 RX ADMIN — APIXABAN 5 MILLIGRAM(S): 2.5 TABLET, FILM COATED ORAL at 06:56

## 2019-12-09 RX ADMIN — DIPHENHYDRAMINE HYDROCHLORIDE AND LIDOCAINE HYDROCHLORIDE AND ALUMINUM HYDROXIDE AND MAGNESIUM HYDRO 10 MILLILITER(S): KIT at 17:31

## 2019-12-09 RX ADMIN — Medication 1 LOZENGE: at 23:28

## 2019-12-09 RX ADMIN — Medication 30 MILLIGRAM(S): at 06:56

## 2019-12-09 RX ADMIN — Medication 2: at 11:37

## 2019-12-09 RX ADMIN — ATOVAQUONE 750 MILLIGRAM(S): 750 SUSPENSION ORAL at 06:57

## 2019-12-09 RX ADMIN — Medication 20 MILLIGRAM(S): at 06:57

## 2019-12-09 RX ADMIN — Medication 2: at 16:56

## 2019-12-09 NOTE — PROGRESS NOTE ADULT - SUBJECTIVE AND OBJECTIVE BOX
RITA RAMOS 89y Female from home brought by family to the ER for c/o generalized weakness, SOB and inc difficulty ambulating x 3 days du to generalized muscke pain.  The pt at baseline isable to ambulate with walker but in the last few days has been unable to get out of be due to severe musculoskeletal pain.  Of note the pt has Hx of  warm autoimmune hemlytic anemia and has been ofn prolonged steroid  tx  and has had  Rutixan tx as well.  The pt was noted to be fluid overloaded with a BNP of 4500. There is also the question of PNA  and PE.   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 There is also the ques of PNA and PE given the lower ext DVT.  The pt is being evaluated by Hem-Onc Dr Mejias.  The PMHx includes:  HTN, ASHD, TIA, CKD III, Warm IGG Hemolytic Anemia, OA, DDD, DJD, mobility dysfunction, GERD, diverticulosis, ch constipation.  Hospital Course:  The pt was treated for hypoxic RF with high flow O2,V diuretics and was already on steroids for hematologic issue.  The  pt had + BL DVT and was started on IV heparin, noted to have dec in PLTS ( she previously had dec in PLts which improved with D/C pepcid), concern over HIT, thus D/C all heparin products and started on  Eliquis.  The  pt had a high probability of  PE given the SOB, quick desaturation off O2 and + DVT. The pt had an  ECHO which  showed EF 55-60%, GIDD, severe AS/mild AI, severe Pul HTN.  The pt cont to require high flow O2 and given the prolonged immunosuppressive tx with prednisone the possibility of an opportunistic infection ( PCP) is high risk.  The Fungitell was + and the pt was started on Mepron/Atovaquone.      Overnight events:  pt clinically improved, c/o oral/tongue pain +thrush,  OOB to chair, good appetite, off O2, prednisone 40mg, apixiban now 5mg bid, atovaquon  750 q12, goal is SNF placement for cont tx and mm strength and mobility improvement    MEDICATIONS  (STANDING):  Mepron/atovaquone 750mg q12  chlorhexidine 4% Liquid 1 Application(s) Topical <User Schedule>  cyanocobalamin 1000 MICROGram(s) Oral daily  folic acid 1 milliGRAM(s) Oral daily  furosemide   Injectable 20 milliGRAM(s) to po  NIFEdipine XL 30 milliGRAM(s) Oral daily  predniSONE   Tablet 40 milliGRAM(s) Oral daily  Eliquis 5mg q 12   MEDICATIONS  (PRN):      Allergies    aspirin (Unknown)  Cipro (Unknown)  codeine (Unknown)  dicyclomine (Unknown)  Diovan (Unknown)  Flagyl (Unknown)  Librax (Unknown)  metronidazole (Unknown)  penicillin (Unknown)  Prevacid (Unknown)  trimethobenzamide (Unknown)  	    Vital Signs Last 24 Hrs    T(F): 97.3  HR: 70  BP: 105/55    RR: 18  SpO2: 100%    PHYSICAL EXAM:    Constitutional:  elderly, pale,  frail and fragile, alert and oriented and verbal      Eyes:  nonicteric    ENMT:  dental defects, white patches    Neck:  supple, + JVD, no bruits    Back:  + Kyphosis    Respiratory:  dec BS, shallow respirations, bibasilar crackles    Cardiovascular:  S1S2 tachy    Gastrointestinal: globose soft and benign    Genitourinary:    Extremities: moves all ext, + arthritic changes, + edema        LABS:                       10.7  7 )-----------(159            33.4    137 |  94 |  36  ----------------------------<  119  3.8  |  35 |  0.7  GFR  28...49, 64  Ca    8.4       Phos  3.4      Mg     2.3  troponin 0.02  BNP 7,429  4,500 dec to 840  , 404  Haptos 85    LA 4.0  Nare MRSA negativ  Fungitell  >500   Mycoplasma negative    TPro  4.6, 4.3 /  Alb  3.1, 2.8, 2.6  /  TBili  1.0  /  DBili  0.4<H>  /  AST  14  /  ALT  13  /  AlkPhos  73  11-26    PT/INR - ( 2019 13:30 )   PT: 13.10 sec;   INR: 1.14 ratio         PTT - ( 2019 13:30 )  PTT:21.6 sec  Urinalysis Basic - ( 2019 16:20 )    Color: Yellow / Appearance: Clear / S.020 / pH: x  Gluc: x / Ketone: Negative  / Bili: Negative / Urobili: <2 mg/dL   Blood: x / Protein: 30 mg/dL / Nitrite: Negative   Leuk Esterase: Negative / RBC: 10 /HPF / WBC 6 /HPF   Sq Epi: x / Non Sq Epi: 5 /HPF / Bacteria: Few        RADIOLOGY & ADDITIONAL TESTS:  CXR  inc vasc markings, cardiomegaly  CXR :  stable BL space occupying  opacities and sm L pl effusion  EKG sinus tach 121/min QTc 462    CT of abd:  bibasilar atelectasis, stable hepatic cyst,  calcification n the sybil hepatis in the area of the GB neck, stable  side branch pancreatic IPMN, no pancreatic constanza dilatation    Sono of abd:  no cholelithiases    Venous doppler of lower ext:  + DVT of  R posterior tibial vein, + DVT of  L peroneal and soleal veins    ECHO:  LVEF 55-60%, , mild LV wall thickening, GIDD, moderate enlarged R ventricle,  trace MR, severe AS, mild AI, inc pul a pressure 89.9 c/w severe Pul HTN

## 2019-12-09 NOTE — PROGRESS NOTE ADULT - SUBJECTIVE AND OBJECTIVE BOX
Patient is a 89y old  Female who presents with a chief complaint of Fluid overload; steroid-induced myalgia (08 Dec 2019 05:56)      Subjective: Doing well. She is off oxygen and is comfortable on Rair  She c/o pain in her tongue       Vital Signs Last 24 Hrs  T(C): 36.7 (09 Dec 2019 05:59), Max: 36.7 (09 Dec 2019 05:59)  T(F): 98 (09 Dec 2019 05:59), Max: 98 (09 Dec 2019 05:59)  HR: 71 (09 Dec 2019 05:59) (71 - 87)  BP: 114/54 (09 Dec 2019 05:59) (114/54 - 130/56)  BP(mean): --  RR: 18 (09 Dec 2019 05:59) (18 - 18)  SpO2: 100% (08 Dec 2019 19:56) (100% - 100%)    PHYSICAL EXAM  General: adult in NAD  ORAL: whitish lesions/ulcers noted on the tongue   Neck: supple  CV: normal S1/S2   Lungs: positive air movement b/l ant lungs  Abdomen: soft non-tender  Ext: no clubbing cyanosis or edema  Neuro: alert and oriented X 4      MEDICATIONS  (STANDING):  apixaban 5 milliGRAM(s) Oral every 12 hours  atovaquone Suspension 750 milliGRAM(s) Oral two times a day  chlorhexidine 4% Liquid 1 Application(s) Topical <User Schedule>  cyanocobalamin 1000 MICROGram(s) Oral daily  FIRST- Mouthwash  BLM 10 milliLiter(s) Swish and Spit every 6 hours  folic acid 1 milliGRAM(s) Oral daily  furosemide   Injectable 20 milliGRAM(s) IV Push daily  insulin lispro (HumaLOG) corrective regimen sliding scale   SubCutaneous three times a day before meals  NIFEdipine XL 30 milliGRAM(s) Oral daily  polyethylene glycol 3350 17 Gram(s) Oral two times a day  predniSONE   Tablet 40 milliGRAM(s) Oral daily  senna 2 Tablet(s) Oral at bedtime    MEDICATIONS  (PRN):      LABS:                          10.7   7.75  )-----------( 159      ( 09 Dec 2019 05:53 )             33.4         Mean Cell Volume : 89.3 fL  Mean Cell Hemoglobin : 28.6 pg  Mean Cell Hemoglobin Concentration : 32.0 g/dL  Auto Neutrophil # : x  Auto Lymphocyte # : x  Auto Monocyte # : x  Auto Eosinophil # : x  Auto Basophil # : x  Auto Neutrophil % : x  Auto Lymphocyte % : x  Auto Monocyte % : x  Auto Eosinophil % : x  Auto Basophil % : x      Serial CBC's  12-09 @ 05:53  Hct-33.4 / Hgb-10.7 / Plat-159 / RBC-3.74 / WBC-7.75  Serial CBC's  12-08 @ 06:20  Hct-35.7 / Hgb-11.1 / Plat-167 / RBC-3.95 / WBC-7.39  Serial CBC's  12-07 @ 08:13  Hct-37.8 / Hgb-11.7 / Plat-124 / RBC-4.14 / WBC-9.33  Serial CBC's  12-06 @ 06:18  Hct-36.3 / Hgb-11.2 / Plat-143 / RBC-3.89 / WBC-7.08                                          BLOOD SMEAR INTERPRETATION:       RADIOLOGY & ADDITIONAL STUDIES:

## 2019-12-09 NOTE — PROGRESS NOTE ADULT - ASSESSMENT
This is a 89 year old female with a significant PMHx of Warm Autoimmune Hemolytic Anemia who presented with a cc/o generalized myalgia. Her muscle aches are associated with generalized weakness and inability to ambulate. She is currently admitted for suspected steroid-induced myalgia.       1) Weakness/myopathy/difficulty ambulation probably 2/2 to steroids   c/w prednisone for now (as previous quick taper/low dose caused hemolysis again)  PT eval while inpt       2) Warm IgG hemolytic anemia, IgG+, negative C3- Her Hb is stable   Work up:  Flow cytometry negative. CT imaging did not reveal malignancy. No folate deficiency.   Low vitamin B12 level with normal MMA level and is on PO B12 and her repeat B12 level is normal.   Hepatitis work up negative  c/w folic acid and Po Vit B12  Completed 4/4 dose of Rituxan  on Prednisone taper down to 40mg now -  previous quick taper/low dose caused hemolysis again) and switch to 30 mg tomorrow 12/10- tuesday   Repeat LDH- 404. Haptoglobin normal   Her Hb is stable so far. Continue to monitor     3) New bilateral LE DVT : s/p eliquis BID 10mg bid for 7days and is on 5 mg q12h now   Her hemolytic anemia makes her high risk for clots     4) Worsening thrombocytopenia:  Could be platelet consumption - resolved  - HIT panel negative.  - She had low platelets outpt- that improved after stopping pepcid  No need for any further work up     5) Hypoxia and Tachycardia now on high flow nasal canula -suspect due  CHF and PE, PCP and possibly aspiration pneumonia  On Room air now   CT chest was not done as it would not   Echo showing severe pulHTN, Moderately enlarged right ventricle. Moderately reduced RV systolic function.  She has been on high dose steroids and her Fungetill is high- on Mepron for coverage for  PCP. Her respiratory status improved after starting Mepron    6) New oral/tongue lesions/ulcers- ? to steroids   Start her magic mouth wash and nystatin  Please get ID to look at lesions- Pt is immunosuppressed from steroids     Discussed with med resident

## 2019-12-09 NOTE — PROGRESS NOTE ADULT - SUBJECTIVE AND OBJECTIVE BOX
LENGTH OF HOSPITAL STAY: 13d      CHIEF COMPLAINT:   Patient is a 89y old  Female who presents with a chief complaint of Fluid overload; steroid-induced myalgia (09 Dec 2019 10:15)      OVER Past 24hrs:  The patient was seen and examined at bedside there were no acute events during the night. patient complains of tongue  soars.       PAST MEDICAL & SURGICAL HISTORY  PAST MEDICAL & SURGICAL HISTORY:  Chronic kidney disease, unspecified CKD stage: Stage IIIB  TIA (transient ischemic attack)  Diverticulitis  Constipation  Hypertension  No significant past surgical history        REVIEW OF SYSTEMS  CONSTITUTIONAL: No fever  ENMT: tongue pain   NECK: No pain or stiffness  RESPIRATORY: No cough, wheezing, chills or hemoptysis; No shortness of breath  CARDIOVASCULAR: No chest pain, palpitations, dizziness, or leg swelling  GASTROINTESTINAL: No abdominal or epigastric pain. No nausea, vomiting, or hematemesis; No diarrhea or constipation. No melena or hematochezia. no  constipation  GENITOURINARY: No dysuria, frequency, hematuria, or incontinence  NEUROLOGICAL: No headaches, memory loss, loss of strength, numbness, or tremors  SKIN: No itching, burning, rashes, or lesions   LYMPH NODES: No enlarged glands  ENDOCRINE: No heat or cold intolerance; No hair loss  pain      ALLERGIES:  aspirin (Unknown)  Cipro (Unknown)  codeine (Unknown)  dicyclomine (Unknown)  Diovan (Unknown)  Flagyl (Unknown)  Librax (Unknown)  metronidazole (Unknown)  penicillin (Unknown)  Prevacid (Unknown)  trimethobenzamide (Unknown)    MEDICATIONS:  STANDING MEDICATIONS  apixaban 5 milliGRAM(s) Oral every 12 hours  atovaquone Suspension 750 milliGRAM(s) Oral two times a day  chlorhexidine 4% Liquid 1 Application(s) Topical <User Schedule>  cyanocobalamin 1000 MICROGram(s) Oral daily  FIRST- Mouthwash  BLM 10 milliLiter(s) Swish and Spit every 6 hours  folic acid 1 milliGRAM(s) Oral daily  furosemide   Injectable 20 milliGRAM(s) IV Push daily  insulin lispro (HumaLOG) corrective regimen sliding scale   SubCutaneous three times a day before meals  NIFEdipine XL 30 milliGRAM(s) Oral daily  polyethylene glycol 3350 17 Gram(s) Oral two times a day  predniSONE   Tablet 40 milliGRAM(s) Oral daily  senna 2 Tablet(s) Oral at bedtime    PRN MEDICATIONS    VITALS:   T(F): 98  HR: 71  BP: 114/54  RR: 18  SpO2: 100%    PHYSICAL EXAM:  General: No acute distress.  Alert, oriented, interactive, nonfocal. elderly   Female     HEENT: Pupils equal, reactive to light symmetrically. Oral Ulcers noted     PULM: Clear to auscultation bilaterally in upper lobes,  no significant sputum production.    CVS: Regular rate and rhythm, holosystolic murmured herd at sternal boarder murmurs, rubs, or gallops.    GI: Soft, nondistended, nontender, no masses.    MSK: No edema, nontender.    SKIN: Warm and well perfused, no rashes noted.    PSYCH: AAOx2-3    NEURO: NO focal       LABS:                        10.7   7.75  )-----------( 159      ( 09 Dec 2019 05:53 )             33.4

## 2019-12-09 NOTE — PROGRESS NOTE ADULT - ASSESSMENT
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, mobility dysfunction  BL DVT, Possible PE  ANDREAS resolved  Thombocytopenia, possible HIT, resolved  Thrush due to prolonged steroid use  Hx of Warm IGG Hemolytic Anemia ( IGG +, C3 -), chronic steroid therapy, sp Rituxan  therapy  Hx of HTN, ASHD, Odalis CHF, TIA  Hx of OA, DDD, DJD, mobility dysfunction, ambulates with walker  Hx of GERD, diverticulosis, ch constipation  Advanced age      pt has thrush start mycelex troce 5 x per day  pt had elevated BNP of 7,429 4,500, repeat 840 after several days of IV Lasix    pt has BL DVT with probable PE, on Eliquis 10 mg q 12 x 7 days then  5mg q 12 starting 12/6    Etiology of hypoxia? multi factorial:  fluid overload, severe AS and Pul HTN, pul fibrosis, probable PE, probable PCP ( given immunosuppressive tx and inc Fungitel  of 500 )   pt on Mepro (atovaquone) 750mg q 12 for probable PCP, high risk for bronchoscopy  O2 dec to 2l, monitor resp status and check pulse Ox    ECHO:  EF 55-60%, tr MR, severe AS/mild AI, GIDD, severe Pul HTN    monitor CBC, Plts 132, 90, 60, 70, 9 134, 143    Hem--onc ff up and care appreciated,  dec prednisone to 40mg, dec to 30 on 12/12 and weekly CBC once in the SNF    Pul consult:, pt on Eliquis for BL DVT and possible PE,  will need at least 6mos of tx, check mycoplasma titers, urine Legionella Ag/unremarkable    pt with  advanced age, frail state and multiple complex medical issues, but overall pt's clinical status is improving, pt is more alert and verbal with improving appetite    OOB to chair  Rehab consult    goal of care;  pt is full code, medically improved, for SNF placement to con tx and PT of mobility dysfunction and myopathy    NB while in SNF:  cont atovaquone for at least 3 weeks,  for tx of probable PCP and further if pt remains on high dose of prednisone for prophlaxis, pt to remain on apixiban 5mg bid for tx of DVT and probable PE x 6mos, on 12/12 dec prednisone to 30mg, weekly CBC, ff up with Hem Dr Orantes

## 2019-12-09 NOTE — PROGRESS NOTE ADULT - ASSESSMENT
Pt is a 89 year old female with a significant PMHx of Warm Autoimmune Hemolytic Anemia who presented with a cc/o generalized myalgia. Her muscle aches are associated with generalized weakness and inability to ambulate. She is currently admitted for suspected steroid-induced myalgia    Today's Events:  New Oral Ulcers f/u ID, switch to PO lasix,  DC planing , continue mepron, patient now on RA    IMPRESSION  Hypoxemic Respiratory Failure etiology Unknown possible PCP  PNA,  - Now resolved   Acute on Chronic Diastolic  HF Echo - 55-60% - G1DD - Severe AS transaortic gradient 37, sever PAH, sever TR      Plan  # Hypoxemic Respiratory Failure, immunocompromised,   Possible PCP, vs Multifactorial  - Possible PCP starting mepron 750mg q12  - component Fluid overload  - On High flow nasal cannula.  - BNP 4500 now 800's  - CXR repeat stable   - Lasix I40mg BID  PO   - Echo - 55-60% - G1DD - Severe AS  - Daily weight; fluid restriction; Low salt; I+O  - Considering PNA - Labs as per Pulm below - Discuss possibility of PCP as patient was immunocompromised with Chemotherapy and Steroids. - LDH elevated, Aa Gradient Increased -  cannot confirm given patient poor candidate for bronch   -  Fungitell elevated , LDH elevated   - Pulmonary Consult appreciated   - mycoplasma titers and Legionella capsular Ag.  unremarkable   -ID anabelle-  c/w mepron  750mg   -Keep SaO2 >92% adjust O2 as needed  -The patient needs repeat CXR  in 3-4 months to document clearance      # LE DVT  provoked   - Originally started on Hep Drip - stopped due to Possible HIT  - Started on Argatroban - switched to Eliquis 10mg  ending 12/06  -  6 months 5mg starting 12/07  - following  hem/onc      #Thrombocytopaenia improved likely secondary to AHA and DVT  - HIT less likely as per hem/Onc   - Recent Heparin Exposure  days - 4 t score around 6  - HIT ab - Negative   - Heme/Onc following   - DVT likely secondary to inactivity and AHA  - hold all heparin products   Given  Argatroban - low dose. 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 Orantes)  - Continue prednisone 40mg now day 3 switch to 30 mg on 12/10- tuesday  - Monitor Hb, Keep active type and screen  - LE Duplex - Positive DVT R. PT / DVT L. Peroneal, soleus      #Hx of Low Vitamin B12 level with normal MMA - Continue B12 1000mcg daily    #HTN - Continue with Nifedipine 30 mg qd; Holding HCTZ/Triamterene    #Diverticulosis - High fiber diet to avoid constipation    Electrolyte Imbalances: Correct as needed  []  Hyponatremia   /   Hypernatremia  []   []  Hypokalemia   /   Hyperkalemia  []   []  Hypochlorhydria   /    Hypochlorhydria  []   []  Hypomagnesemia   /   Hypermagnesemia  []   []  Hypophosphatemia   /   Hyperphosphatemia  []       GI ppx:                                   [x] Not indicated   /   [] Pantoprazole 40mg PO Daily    DVT ppx: eliquis   [] Not indicated / [] Heparin 5000mg SubQ / [] Lovenox 40mg SubQ / [] SCDs    Fluids:   [x] PO  |  [] IVF    Activity:  [X] Assisatnce needed   [X] Increase as Tolerated  /  [] OOB w/ assist  /  [] Bed Rest    BMI:  Height (cm): 149.86 (11-29)  Weight (kg): 50.802 (11-27)  BMI (kg/m2): 22.6 (11-29)        DISPO:  Patient to be discharged when condition(s) optimized.  [ x] From Home     [ ] NH/SNF   [ ] 4A Rehab  [ ] Detox Clinic  [X] Plan Discussion with patient and/or family.  [X] Discussed Case and Plan with the Medical Attending.    CODE STATUS  [X] FULL   /    [] DNR

## 2019-12-10 ENCOUNTER — APPOINTMENT (OUTPATIENT)
Dept: HEMATOLOGY ONCOLOGY | Facility: CLINIC | Age: 84
End: 2019-12-10

## 2019-12-10 LAB
ALBUMIN SERPL ELPH-MCNC: 2.7 G/DL — LOW (ref 3.5–5.2)
ALP SERPL-CCNC: 69 U/L — SIGNIFICANT CHANGE UP (ref 30–115)
ALT FLD-CCNC: 15 U/L — SIGNIFICANT CHANGE UP (ref 0–41)
ANION GAP SERPL CALC-SCNC: 15 MMOL/L — HIGH (ref 7–14)
AST SERPL-CCNC: 20 U/L — SIGNIFICANT CHANGE UP (ref 0–41)
BASOPHILS # BLD AUTO: 0.02 K/UL — SIGNIFICANT CHANGE UP (ref 0–0.2)
BASOPHILS NFR BLD AUTO: 0.3 % — SIGNIFICANT CHANGE UP (ref 0–1)
BILIRUB SERPL-MCNC: 1.4 MG/DL — HIGH (ref 0.2–1.2)
BUN SERPL-MCNC: 29 MG/DL — HIGH (ref 10–20)
CALCIUM SERPL-MCNC: 8.7 MG/DL — SIGNIFICANT CHANGE UP (ref 8.5–10.1)
CHLORIDE SERPL-SCNC: 98 MMOL/L — SIGNIFICANT CHANGE UP (ref 98–110)
CO2 SERPL-SCNC: 28 MMOL/L — SIGNIFICANT CHANGE UP (ref 17–32)
CREAT SERPL-MCNC: 0.8 MG/DL — SIGNIFICANT CHANGE UP (ref 0.7–1.5)
EOSINOPHIL # BLD AUTO: 0.05 K/UL — SIGNIFICANT CHANGE UP (ref 0–0.7)
EOSINOPHIL NFR BLD AUTO: 0.7 % — SIGNIFICANT CHANGE UP (ref 0–8)
GLUCOSE BLDC GLUCOMTR-MCNC: 106 MG/DL — HIGH (ref 70–99)
GLUCOSE BLDC GLUCOMTR-MCNC: 126 MG/DL — HIGH (ref 70–99)
GLUCOSE BLDC GLUCOMTR-MCNC: 171 MG/DL — HIGH (ref 70–99)
GLUCOSE BLDC GLUCOMTR-MCNC: 243 MG/DL — HIGH (ref 70–99)
GLUCOSE SERPL-MCNC: 106 MG/DL — HIGH (ref 70–99)
HAPTOGLOB SERPL-MCNC: <20 MG/DL — LOW (ref 34–200)
HCT VFR BLD CALC: 32.4 % — LOW (ref 37–47)
HGB BLD-MCNC: 10.3 G/DL — LOW (ref 12–16)
IMM GRANULOCYTES NFR BLD AUTO: 1.9 % — HIGH (ref 0.1–0.3)
LDH SERPL L TO P-CCNC: 413 — HIGH (ref 50–242)
LYMPHOCYTES # BLD AUTO: 1.45 K/UL — SIGNIFICANT CHANGE UP (ref 1.2–3.4)
LYMPHOCYTES # BLD AUTO: 19.7 % — LOW (ref 20.5–51.1)
MCHC RBC-ENTMCNC: 28.7 PG — SIGNIFICANT CHANGE UP (ref 27–31)
MCHC RBC-ENTMCNC: 31.8 G/DL — LOW (ref 32–37)
MCV RBC AUTO: 90.3 FL — SIGNIFICANT CHANGE UP (ref 81–99)
MONOCYTES # BLD AUTO: 0.39 K/UL — SIGNIFICANT CHANGE UP (ref 0.1–0.6)
MONOCYTES NFR BLD AUTO: 5.3 % — SIGNIFICANT CHANGE UP (ref 1.7–9.3)
NEUTROPHILS # BLD AUTO: 5.31 K/UL — SIGNIFICANT CHANGE UP (ref 1.4–6.5)
NEUTROPHILS NFR BLD AUTO: 72.1 % — SIGNIFICANT CHANGE UP (ref 42.2–75.2)
NRBC # BLD: 0 /100 WBCS — SIGNIFICANT CHANGE UP (ref 0–0)
PLATELET # BLD AUTO: 137 K/UL — SIGNIFICANT CHANGE UP (ref 130–400)
POTASSIUM SERPL-MCNC: 3.3 MMOL/L — LOW (ref 3.5–5)
POTASSIUM SERPL-SCNC: 3.3 MMOL/L — LOW (ref 3.5–5)
PROT SERPL-MCNC: 4.4 G/DL — LOW (ref 6–8)
RBC # BLD: 3.59 M/UL — LOW (ref 4.2–5.4)
RBC # BLD: 3.59 M/UL — LOW (ref 4.2–5.4)
RBC # FLD: 14.8 % — HIGH (ref 11.5–14.5)
RETICS #: 67.1 K/UL — SIGNIFICANT CHANGE UP (ref 25–125)
RETICS/RBC NFR: 1.9 % — HIGH (ref 0.5–1.5)
SODIUM SERPL-SCNC: 141 MMOL/L — SIGNIFICANT CHANGE UP (ref 135–146)
WBC # BLD: 7.36 K/UL — SIGNIFICANT CHANGE UP (ref 4.8–10.8)
WBC # FLD AUTO: 7.36 K/UL — SIGNIFICANT CHANGE UP (ref 4.8–10.8)

## 2019-12-10 RX ORDER — POTASSIUM CHLORIDE 20 MEQ
40 PACKET (EA) ORAL ONCE
Refills: 0 | Status: DISCONTINUED | OUTPATIENT
Start: 2019-12-10 | End: 2019-12-10

## 2019-12-10 RX ORDER — POTASSIUM CHLORIDE 20 MEQ
40 PACKET (EA) ORAL ONCE
Refills: 0 | Status: COMPLETED | OUTPATIENT
Start: 2019-12-10 | End: 2019-12-10

## 2019-12-10 RX ADMIN — Medication 1 MILLIGRAM(S): at 12:08

## 2019-12-10 RX ADMIN — APIXABAN 5 MILLIGRAM(S): 2.5 TABLET, FILM COATED ORAL at 06:36

## 2019-12-10 RX ADMIN — Medication 40 MILLIGRAM(S): at 06:36

## 2019-12-10 RX ADMIN — DIPHENHYDRAMINE HYDROCHLORIDE AND LIDOCAINE HYDROCHLORIDE AND ALUMINUM HYDROXIDE AND MAGNESIUM HYDRO 10 MILLILITER(S): KIT at 12:08

## 2019-12-10 RX ADMIN — Medication 1 LOZENGE: at 17:47

## 2019-12-10 RX ADMIN — Medication 40 MILLIEQUIVALENT(S): at 17:49

## 2019-12-10 RX ADMIN — Medication 30 MILLIGRAM(S): at 06:36

## 2019-12-10 RX ADMIN — Medication 1 LOZENGE: at 22:18

## 2019-12-10 RX ADMIN — Medication 1 LOZENGE: at 09:04

## 2019-12-10 RX ADMIN — DIPHENHYDRAMINE HYDROCHLORIDE AND LIDOCAINE HYDROCHLORIDE AND ALUMINUM HYDROXIDE AND MAGNESIUM HYDRO 10 MILLILITER(S): KIT at 17:48

## 2019-12-10 RX ADMIN — DIPHENHYDRAMINE HYDROCHLORIDE AND LIDOCAINE HYDROCHLORIDE AND ALUMINUM HYDROXIDE AND MAGNESIUM HYDRO 10 MILLILITER(S): KIT at 06:36

## 2019-12-10 RX ADMIN — SENNA PLUS 2 TABLET(S): 8.6 TABLET ORAL at 22:19

## 2019-12-10 RX ADMIN — ATOVAQUONE 750 MILLIGRAM(S): 750 SUSPENSION ORAL at 06:36

## 2019-12-10 RX ADMIN — PREGABALIN 1000 MICROGRAM(S): 225 CAPSULE ORAL at 12:08

## 2019-12-10 RX ADMIN — Medication 1 TABLET(S): at 17:47

## 2019-12-10 RX ADMIN — Medication 1: at 17:30

## 2019-12-10 RX ADMIN — Medication 40 MILLIGRAM(S): at 17:49

## 2019-12-10 RX ADMIN — Medication 1 TABLET(S): at 22:19

## 2019-12-10 RX ADMIN — ATOVAQUONE 750 MILLIGRAM(S): 750 SUSPENSION ORAL at 17:48

## 2019-12-10 RX ADMIN — CHLORHEXIDINE GLUCONATE 1 APPLICATION(S): 213 SOLUTION TOPICAL at 06:36

## 2019-12-10 RX ADMIN — APIXABAN 5 MILLIGRAM(S): 2.5 TABLET, FILM COATED ORAL at 17:47

## 2019-12-10 RX ADMIN — Medication 1 LOZENGE: at 13:57

## 2019-12-10 RX ADMIN — Medication 2: at 12:09

## 2019-12-10 NOTE — PROGRESS NOTE ADULT - SUBJECTIVE AND OBJECTIVE BOX
RICHARD RITA  89y, Female    All available historical data reviewed    OVERNIGHT EVENTS:  no fevers, no cough  no dysphagia    ROS:  General: Denies rigors, nightsweats  HEENT: Denies headache, rhinorrhea, sore throat, eye pain  CV: Denies CP, palpitations  PULM: Denies wheezing, hemoptysis  GI: Denies hematemesis, hematochezia, melena  : Denies discharge, hematuria  MSK: Denies arthralgias, myalgias  SKIN: Denies rash, lesions  NEURO: Denies paresthesias, weakness  PSYCH: Denies depression, anxiety    VITALS:  T(F): 97, Max: 97.3 (12-09-19 @ 13:41)  HR: 68  BP: 125/59  RR: 18Vital Signs Last 24 Hrs  T(C): 36.1 (10 Dec 2019 05:48), Max: 36.3 (09 Dec 2019 13:41)  T(F): 97 (10 Dec 2019 05:48), Max: 97.3 (09 Dec 2019 13:41)  HR: 68 (10 Dec 2019 05:48) (67 - 70)  BP: 125/59 (10 Dec 2019 05:48) (105/55 - 131/61)  BP(mean): --  RR: 18 (10 Dec 2019 05:48) (18 - 18)  SpO2: 87% (10 Dec 2019 07:49) (87% - 87%)    TESTS & MEASUREMENTS:                        10.3   7.36  )-----------( 137      ( 10 Dec 2019 06:50 )             32.4     12-10    141  |  98  |  29<H>  ----------------------------<  106<H>  3.3<L>   |  28  |  0.8    Ca    8.7      10 Dec 2019 06:50    TPro  4.4<L>  /  Alb  2.7<L>  /  TBili  1.4<H>  /  DBili  x   /  AST  20  /  ALT  15  /  AlkPhos  69  12-10    LIVER FUNCTIONS - ( 10 Dec 2019 06:50 )  Alb: 2.7 g/dL / Pro: 4.4 g/dL / ALK PHOS: 69 U/L / ALT: 15 U/L / AST: 20 U/L / GGT: x                   RADIOLOGY & ADDITIONAL TESTS:  Personal review of radiological diagnostics performed  Echo and EKG results noted when applicable.     ANTIBIOTICS:  atovaquone Suspension 750 milliGRAM(s) Oral two times a day  clotrimazole Lozenge 1 Lozenge Oral five times a day

## 2019-12-10 NOTE — PROGRESS NOTE ADULT - ASSESSMENT
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, mobility dysfunction  BL DVT, Possible PE  ANDREAS resolved  Thombocytopenia, possible HIT, resolved  Thrush due to prolonged steroid use  Hx of Warm IGG Hemolytic Anemia ( IGG +, C3 -), chronic steroid therapy, sp Rituxan  therapy  Hx of HTN, ASHD, Odalis CHF, TIA  Hx of OA, DDD, DJD, mobility dysfunction, ambulates with walker  Hx of GERD, diverticulosis, ch constipation  Advanced age      pt has thrush start mycelex troce 5 x per day  pt had elevated BNP of 7,429 4,500, repeat 840 after several days of IV Lasix    pt has BL DVT with probable PE, on Eliquis 10 mg q 12 x 7 days then  5mg q 12 starting 12/6    Etiology of hypoxia? multi factorial:  fluid overload, severe AS and Pul HTN, pul fibrosis, probable PE, probable PCP ( given immunosuppressive tx and inc Fungitel  of 500 )   pt on Mepro (atovaquone) 750mg q 12 for probable PCP, high risk for bronchoscopy  O2 dec to 2l, monitor resp status and check pulse Ox    ECHO:  EF 55-60%, tr MR, severe AS/mild AI, GIDD, severe Pul HTN    monitor CBC, Plts 132, 90, 60, 70, 9 134, 143    Hem--onc ff up and care appreciated,  dec prednisone to 40mg, dec to 30 on 12/12 and weekly CBC once in the SNF    Pul consult:, pt on Eliquis for BL DVT and possible PE,  will need at least 6mos of tx, check mycoplasma titers, urine Legionella Ag/unremarkable    pt with  advanced age, frail state and multiple complex medical issues, but overall pt's clinical status is improving, pt is more alert and verbal with improving appetite    OOB to chair  Rehab consult    goal of care;  pt is full code, medically improved, for SNF placement to con tx and PT of mobility dysfunction and myopathy    NB while in SNF:  cont atovaquone for at least 3 weeks,  for tx of probable PCP and further if pt remains on high dose of prednisone for prophlaxis, pt to remain on apixiban 5mg bid for tx of DVT and probable PE x 6mos, on 12/12 dec prednisone to 30mg, weekly CBC, ff up with Hem Dr Orantes Hypoxic Respiratory Failure with SOB, Odalis CHF exacerbation, volume overload and PNA ( Aspiration PNA, possible PCP inf given prolonged steroid use)  SIRS, resolved  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, mobility dysfunction  BL DVT, Possible PE  ANDREAS resolved  Thombocytopenia, possible HIT, resolved  Thrush due to prolonged steroid use  Hx of Warm IGG Hemolytic Anemia ( IGG +, C3 -), chronic steroid therapy, sp Rituxan  therapy  Hx of HTN, ASHD, Odalis CHF, TIA  Hx of OA, DDD, DJD, mobility dysfunction, ambulates with walker  Hx of GERD, diverticulosis, ch constipation  Advanced age      pt has thrush start mycelex troce 5 x per day  pt had elevated BNP of 7,429 4,500, repeat 840 after several days of IV Lasix    pt has BL DVT with probable PE, on Eliquis 10 mg q 12 x 7 days then  5mg q 12 starting 12/6    Etiology of hypoxia? multi factorial:  fluid overload, severe AS and Pul HTN, pul fibrosis, probable PE, probable PCP ( given immunosuppressive tx and inc Fungitel  of 500 )   pt on Mepro (atovaquone) 750mg q 12 for probable PCP, high risk for bronchoscopy, switche to Bactrim SS 1tab q 8 x 2 weeks  O2 dec to 2L,     ECHO:  EF 55-60%, tr MR, severe AS/mild AI, GIDD, severe Pul HTN    monitor CBC, Plts 132, 90, 60, 70, 9 134, 143    Hem--onc ff up and care appreciated,  dec prednisone to 40mg, dec to 30 on 12/12 and weekly CBC once in the SNF    Pul consult:, pt on Eliquis for BL DVT and possible PE,  will need at least 6mos of tx, check mycoplasma titers, urine Legionella Ag/unremarkable    pt with  advanced age, frail state and multiple complex medical issues, but overall pt's clinical status is improving, pt is more alert and verbal with improving appetite    OOB to chair  Rehab consult    goal of care;  pt is full code, medically improved, for SNF placement to con tx and PT of mobility dysfunction and myopathy    pt medically stable to D/C to SNF when bed availabel

## 2019-12-10 NOTE — PROGRESS NOTE ADULT - SUBJECTIVE AND OBJECTIVE BOX
LENGTH OF HOSPITAL STAY: 14d      CHIEF COMPLAINT:   Patient is a 89y old  Female who presents with a chief complaint of Fluid overload; steroid-induced myalgia (10 Dec 2019 13:27)      OVER Past 24hrs:  The patient was seen and examined at bedside there were no acute events during the night.         PAST MEDICAL & SURGICAL HISTORY  PAST MEDICAL & SURGICAL HISTORY:  Chronic kidney disease, unspecified CKD stage: Stage IIIB  TIA (transient ischemic attack)  Diverticulitis  Constipation  Hypertension  No significant past surgical history        REVIEW OF SYSTEMS  CONSTITUTIONAL: No fever, weight loss, or fatigue  Mouth: tongue soars  RESPIRATORY: No cough, wheezing, chills or hemoptysis; No shortness of breath  CARDIOVASCULAR: No chest pain, palpitations, dizziness, or leg swelling  GASTROINTESTINAL: No abdominal or epigastric pain. No nausea, vomiting, or hematemesis; No diarrhea or constipation. No melena or hematochezia. GENITOURINARY: No dysuria, frequency, hematuria, or incontinence  NEUROLOGICAL: No headaches, memory loss, loss of strength, numbness, or tremors  SKIN: No itching, burning, rashes, or lesions   LYMPH NODES: No enlarged glands  ENDOCRINE: No heat or cold intolerance; No hair loss  MUSCULOSKELETAL: No joint pain or swelling; No muscle, back, or extremity pain    ALLERGIES:  aspirin (Unknown)  Cipro (Unknown)  codeine (Unknown)  dicyclomine (Unknown)  Diovan (Unknown)  Flagyl (Unknown)  Librax (Unknown)  metronidazole (Unknown)  penicillin (Unknown)  Prevacid (Unknown)  trimethobenzamide (Unknown)    MEDICATIONS:  STANDING MEDICATIONS  apixaban 5 milliGRAM(s) Oral every 12 hours  atovaquone Suspension 750 milliGRAM(s) Oral two times a day  chlorhexidine 4% Liquid 1 Application(s) Topical <User Schedule>  clotrimazole Lozenge 1 Lozenge Oral five times a day  cyanocobalamin 1000 MICROGram(s) Oral daily  FIRST- Mouthwash  BLM 10 milliLiter(s) Swish and Spit every 6 hours  folic acid 1 milliGRAM(s) Oral daily  furosemide    Tablet 40 milliGRAM(s) Oral two times a day  insulin lispro (HumaLOG) corrective regimen sliding scale   SubCutaneous three times a day before meals  NIFEdipine XL 30 milliGRAM(s) Oral daily  polyethylene glycol 3350 17 Gram(s) Oral two times a day  potassium chloride   Powder 40 milliEquivalent(s) Oral once  predniSONE   Tablet 40 milliGRAM(s) Oral daily  senna 2 Tablet(s) Oral at bedtime    PRN MEDICATIONS    VITALS:   T(F): 96  HR: 77  BP: 116/56  RR: 18  SpO2: 87%    PHYSICAL EXAM:  General: No acute distress.  Alert, oriented, interactive, nonfocal. elderly   Female     HEENT: Pupils equal, reactive to light symmetrically.    PULM: Clear to auscultation bilaterally in upper lobes, Lower lobes improved crackles are herd  b/l and decreased breath sounds.  no significant sputum production.    CVS: Regular rate and rhythm, holosystolic murmured herd at sternal boarder murmurs, rubs, or gallops.    GI: Soft, nondistended, nontender, no masses.    MSK: No edema, nontender.    SKIN: Warm and well perfused, no rashes noted.    PSYCH: AAOx2-3    NEURO: NO focal     LABS:                        10.3   7.36  )-----------( 137      ( 10 Dec 2019 06:50 )             32.4     12-10    141  |  98  |  29<H>  ----------------------------<  106<H>  3.3<L>   |  28  |  0.8    Ca    8.7      10 Dec 2019 06:50    TPro  4.4<L>  /  Alb  2.7<L>  /  TBili  1.4<H>  /  DBili  x   /  AST  20  /  ALT  15  /  AlkPhos  69  12-10

## 2019-12-10 NOTE — PROGRESS NOTE ADULT - SUBJECTIVE AND OBJECTIVE BOX
LENGTH OF HOSPITAL STAY: 14d      CHIEF COMPLAINT:   Patient is a 89y old  Female who presents with a chief complaint of Fluid overload; steroid-induced myalgia (10 Dec 2019 11:27)      OVER Past 24hrs:  The patient was seen and examined at bedside there were no acute events during the night.         PAST MEDICAL & SURGICAL HISTORY  PAST MEDICAL & SURGICAL HISTORY:  Chronic kidney disease, unspecified CKD stage: Stage IIIB  TIA (transient ischemic attack)  Diverticulitis  Constipation  Hypertension  No significant past surgical history        REVIEW OF SYSTEMS  CONSTITUTIONAL: No fever, weight loss, or fatigue  EYES: No eye pain, visual disturbances, or discharge  ENMT:  No difficulty hearing, tinnitus, vertigo; No sinus or throat pain  NECK: No pain or stiffness  RESPIRATORY: No cough, wheezing, chills or hemoptysis; No shortness of breath  CARDIOVASCULAR: No chest pain, palpitations, dizziness, or leg swelling  GASTROINTESTINAL: No abdominal or epigastric pain. No nausea, vomiting, or hematemesis; No diarrhea or constipation. No melena or hematochezia.  GENITOURINARY: No dysuria, frequency, hematuria, or incontinence  NEUROLOGICAL: No headaches, memory loss, loss of strength, numbness, or tremors  SKIN: No itching, burning, rashes, or lesions   LYMPH NODES: No enlarged glands  ENDOCRINE: No heat or cold intolerance; No hair loss  MUSCULOSKELETAL: No joint pain or swelling; No muscle, back, or extremity pain  PSYCHIATRIC: No depression, anxiety, mood swings, or difficulty sleeping  HEME/LYMPH: No easy bruising, or bleeding gums  ALLERY AND IMMUNOLOGIC: No hives or eczema    ALLERGIES:  aspirin (Unknown)  Cipro (Unknown)  codeine (Unknown)  dicyclomine (Unknown)  Diovan (Unknown)  Flagyl (Unknown)  Librax (Unknown)  metronidazole (Unknown)  penicillin (Unknown)  Prevacid (Unknown)  trimethobenzamide (Unknown)    MEDICATIONS:  STANDING MEDICATIONS  apixaban 5 milliGRAM(s) Oral every 12 hours  atovaquone Suspension 750 milliGRAM(s) Oral two times a day  chlorhexidine 4% Liquid 1 Application(s) Topical <User Schedule>  clotrimazole Lozenge 1 Lozenge Oral five times a day  cyanocobalamin 1000 MICROGram(s) Oral daily  FIRST- Mouthwash  BLM 10 milliLiter(s) Swish and Spit every 6 hours  folic acid 1 milliGRAM(s) Oral daily  furosemide    Tablet 40 milliGRAM(s) Oral two times a day  insulin lispro (HumaLOG) corrective regimen sliding scale   SubCutaneous three times a day before meals  NIFEdipine XL 30 milliGRAM(s) Oral daily  polyethylene glycol 3350 17 Gram(s) Oral two times a day  potassium chloride    Tablet ER 40 milliEquivalent(s) Oral once  predniSONE   Tablet 40 milliGRAM(s) Oral daily  senna 2 Tablet(s) Oral at bedtime    PRN MEDICATIONS    VITALS:   T(F): 96  HR: 77  BP: 116/56  RR: 18  SpO2: 87%    PHYSICAL EXAM:  General: No acute distress.  Alert, oriented, interactive, nonfocal.    HEENT: Pupils equal, reactive to light symmetrically.    PULM: Clear to auscultation bilaterally, no significant sputum production.    CVS: Regular rate and rhythm, no murmurs, rubs, or gallops.    GI: Soft, nondistended, nontender, no masses.    MSK: No edema, nontender.    SKIN: Warm and well perfused, no rashes noted.    PSYCH:     NEURO:    LABS:                        10.3   7.36  )-----------( 137      ( 10 Dec 2019 06:50 )             32.4     12-10    141  |  98  |  29<H>  ----------------------------<  106<H>  3.3<L>   |  28  |  0.8    Ca    8.7      10 Dec 2019 06:50    TPro  4.4<L>  /  Alb  2.7<L>  /  TBili  1.4<H>  /  DBili  x   /  AST  20  /  ALT  15  /  AlkPhos  69  12-10                  RADIOLOGY:

## 2019-12-10 NOTE — PROGRESS NOTE ADULT - ASSESSMENT
· Assessment		  89 year old female with a significant PMHx of Warm Autoimmune Hemolytic Anemia who presented with a cc/o generalized myalgia. Her muscle aches are associated with generalized weakness and inability to ambulate. She is currently admitted for suspected steroid-induced myalgia    IMPRESSION:  SIRS secondary to aspiration with chemical pneumonitis with CT with scattered opacities  -clinically no bacterial PNA but on treatment fot presumed PCP  -risk factors of steroids, High LDH/Fungitel  -I doubt pt has PCP  -would not expect this significant an improvemet  -pulmonary ; no need for a diagnostic bronchoscopy    Oral candidiasis      RECOMMENDATIONS:  responding to clotrimazole lozenges  po Bactrim 1 SS tablet q8h for 2 more weeks  recall prn please

## 2019-12-10 NOTE — PROGRESS NOTE ADULT - ASSESSMENT
Pt is a 89 year old female with a significant PMHx of Warm Autoimmune Hemolytic Anemia who presented with a cc/o generalized myalgia. Her muscle aches are associated with generalized weakness and inability to ambulate. She is currently admitted for suspected steroid-induced myalgia    Today's Events:  New Oral Ulcers f/u ID, switch to PO lasix,  DC planing , continue mepron, patient now on RA    IMPRESSION  Hypoxemic Respiratory Failure etiology Unknown possible PCP  PNA,  - Now resolved   Acute on Chronic Diastolic  HF Echo - 55-60% - G1DD - Severe AS transaortic gradient 37, sever PAH, sever TR  Oral Candidiasis       Plan    Oral candidiasis   - responding to clotrimazole lozenges  - ID recs appreciated  -po Bactrim 1 SS tablet q8h for 2 more weeks    # Hypoxemic Respiratory Failure, immunocompromised,   Possible PCP, vs Multifactorial  - Possible PCP starting mepron 750mg q12  - component Fluid overload  - On High flow nasal cannula.  - BNP 4500 now 800's  - CXR repeat stable   - Lasix I40mg BID  PO   - Echo - 55-60% - G1DD - Severe AS  - Daily weight; fluid restriction; Low salt; I+O  - Considering PNA - Labs as per Pulm below - Discuss possibility of PCP as patient was immunocompromised with Chemotherapy and Steroids. - LDH elevated, Aa Gradient Increased -  cannot confirm given patient poor candidate for bronch   -  Fungitell elevated , LDH elevated   - Pulmonary Consult appreciated   - mycoplasma titers and Legionella capsular Ag.  unremarkable   -ID anabelle-  c/w mepron  750mg   -Keep SaO2 >92% adjust O2 as needed  -The patient needs repeat CXR  in 3-4 months to document clearance      # LE DVT  provoked   - Originally started on Hep Drip - stopped due to Possible HIT  - Started on Argatroban - switched to Eliquis 10mg  ending 12/06  -  6 months 5mg starting 12/07  - following  hem/onc      #Thrombocytopaenia improved likely secondary to AHA and DVT  - HIT less likely as per hem/Onc   - Recent Heparin Exposure  days - 4 t score around 6  - HIT ab - Negative   - Heme/Onc following   - DVT likely secondary to inactivity and AHA  - hold all heparin products   Given  Argatroban - low dose. 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 Orantes)  - DC on 30mg of prednisone Daily   - Monitor Hb, Keep active type and screen  - LE Duplex - Positive DVT R. PT / DVT L. Peroneal, soleus      #Hx of Low Vitamin B12 level with normal MMA - Continue B12 1000mcg daily    #HTN - Continue with Nifedipine 30 mg qd; Holding HCTZ/Triamterene    #Diverticulosis - High fiber diet to avoid constipation    Electrolyte Imbalances: Correct as needed  []  Hyponatremia   /   Hypernatremia  []   []  Hypokalemia   /   Hyperkalemia  []   []  Hypochlorhydria   /    Hypochlorhydria  []   []  Hypomagnesemia   /   Hypermagnesemia  []   []  Hypophosphatemia   /   Hyperphosphatemia  []       GI ppx:                                   [x] Not indicated   /   [] Pantoprazole 40mg PO Daily    DVT ppx: eliquis   [] Not indicated / [] Heparin 5000mg SubQ / [] Lovenox 40mg SubQ / [] SCDs    Fluids:   [x] PO  |  [] IVF    Activity:  [X] Assisatnce needed   [X] Increase as Tolerated  /  [] OOB w/ assist  /  [] Bed Rest    BMI:  Height (cm): 149.86 (11-29)  Weight (kg): 50.802 (11-27)  BMI (kg/m2): 22.6 (11-29)        DISPO:  Patient to be discharged when condition(s) optimized.  [ x] From Home     [ ] NH/SNF   [ ] 4A Rehab  [ ] Detox Clinic  [X] Plan Discussion with patient and/or family.  [X] Discussed Case and Plan with the Medical Attending.    CODE STATUS  [X] FULL   /    [] DNR

## 2019-12-10 NOTE — PROGRESS NOTE ADULT - REASON FOR ADMISSION
Fluid overload; steroid-induced myalgia Hypoxemia, SIRS due to Asp PNA, Fluid overload; steroid-induced myalgia

## 2019-12-10 NOTE — CHART NOTE - NSCHARTNOTEFT_GEN_A_CORE
Registered Dietitian Follow-Up     Patient Profile Reviewed                           Yes [x]   No []     Nutrition History Previously Obtained        Yes [x]  No []       Pertinent Subjective Information: Pt. more alert&oriented today than during previous visits. Pt. reports good PO intake at this time, however concerned with weight change, Bed scale weight 97lbs today pt. reports being >105lbs. Pt. on Lasix during LOS, discussed with pt. weight fluctuations possibly d/t diuretics. Encouraged pt. to maintain good PO intake to maintain lean body mass.      Pertinent Medical Interventions: Oral candidiasis: ID following.  Hypoxemic Respiratory Failure, immunocompromised,   Possible PCP, vs Multifactorial: on nasal canula. Pulm following.  LE DVT  provoked: heme/onco following. Hx of CKD Stage IIIB - resolving.        Diet order: dysphagia 2 mech soft thin liq, high fiber, DASH/TLC, 1L fluid restr      Anthropometrics:  - Ht. 149.8cm  - Wt. 44kg on 12/10 vs. 50.8kg on 11/27- pt. on Lasix will continue to monitor wt trends   - %wt change  - BMI  - IBW     Pertinent Lab Data: (12/10) RBC 3.59, Hg 10.3, Hct 32.4, K 3.3, BUN 29, glu 106, bilirubin 1.4     Pertinent Meds: Prednisone, Lasix, Miralax, Senna, Cyanocobalamin, Folic acid      Physical Findings:  - Appearance: alert&oriented  - GI function: frequent BM's today  - Tubes: none noted  - Oral/Mouth cavity: denies symptoms   - Skin: bruised (BS 17)      Nutrition Requirements (from RD note on 11/27)  Weight Used:     Estimated Energy Needs    Continue [x]  Adjust [] 996 - 1079 kcals/day (MSJ x 1.2 - 1.3 AF)  Adjusted Energy Recommendations:   kcal/day        Estimated Protein Needs    Continue [x]  Adjust [] 51 - 61 gms/day (1.0 - 1.2 gm/kg) *CKD considered   Adjusted Protein Recommendations:   gm/day        Estimated Fluid Needs        Continue [x]  Adjust [] 1ml/kcal or per LIP   Adjusted Fluid Recommendations:   mL/day     Nutrient Intake: ~75% PO at meals         [] Previous Nutrition Diagnosis:  Inadequate Oral Intake             [] Ongoing          [x] Resolved    [x] No active nutrition diagnosis identified at this time        Nutrition Intervention: meals and snacks, nutrition related medication management     Rec: Continue dysphagia 2 mech soft thin liq, DASH/TLC diet discontinue high fiber modifier as pt. with frequent BM's. Adjust bowel regimen. Replete K.      Goal/Expected Outcome: In 7 days pt. to continue to consume ~75% PO at meals     Indicator/Monitoring: diet order, energy intake, body composition, NFPF

## 2019-12-11 ENCOUNTER — TRANSCRIPTION ENCOUNTER (OUTPATIENT)
Age: 84
End: 2019-12-11

## 2019-12-11 VITALS
TEMPERATURE: 96 F | DIASTOLIC BLOOD PRESSURE: 58 MMHG | SYSTOLIC BLOOD PRESSURE: 92 MMHG | HEART RATE: 86 BPM | RESPIRATION RATE: 18 BRPM

## 2019-12-11 PROBLEM — N18.9 CHRONIC KIDNEY DISEASE, UNSPECIFIED: Chronic | Status: ACTIVE | Noted: 2019-11-26

## 2019-12-11 LAB
GLUCOSE BLDC GLUCOMTR-MCNC: 117 MG/DL — HIGH (ref 70–99)
GLUCOSE BLDC GLUCOMTR-MCNC: 292 MG/DL — HIGH (ref 70–99)
GLUCOSE BLDC GLUCOMTR-MCNC: 336 MG/DL — HIGH (ref 70–99)
HCT VFR BLD CALC: 33.2 % — LOW (ref 37–47)
HGB BLD-MCNC: 10.4 G/DL — LOW (ref 12–16)
MCHC RBC-ENTMCNC: 28.3 PG — SIGNIFICANT CHANGE UP (ref 27–31)
MCHC RBC-ENTMCNC: 31.3 G/DL — LOW (ref 32–37)
MCV RBC AUTO: 90.2 FL — SIGNIFICANT CHANGE UP (ref 81–99)
NRBC # BLD: 0 /100 WBCS — SIGNIFICANT CHANGE UP (ref 0–0)
PLATELET # BLD AUTO: 138 K/UL — SIGNIFICANT CHANGE UP (ref 130–400)
RBC # BLD: 3.68 M/UL — LOW (ref 4.2–5.4)
RBC # FLD: 15 % — HIGH (ref 11.5–14.5)
WBC # BLD: 7.76 K/UL — SIGNIFICANT CHANGE UP (ref 4.8–10.8)
WBC # FLD AUTO: 7.76 K/UL — SIGNIFICANT CHANGE UP (ref 4.8–10.8)

## 2019-12-11 PROCEDURE — 99233 SBSQ HOSP IP/OBS HIGH 50: CPT

## 2019-12-11 RX ORDER — NIFEDIPINE 30 MG
1 TABLET, EXTENDED RELEASE 24 HR ORAL
Qty: 0 | Refills: 0 | DISCHARGE
Start: 2019-12-11

## 2019-12-11 RX ORDER — POLYETHYLENE GLYCOL 3350 17 G/17G
17 POWDER, FOR SOLUTION ORAL
Qty: 0 | Refills: 0 | DISCHARGE
Start: 2019-12-11

## 2019-12-11 RX ORDER — NIFEDIPINE 30 MG
1 TABLET, EXTENDED RELEASE 24 HR ORAL
Qty: 0 | Refills: 0 | DISCHARGE

## 2019-12-11 RX ORDER — FUROSEMIDE 40 MG
1 TABLET ORAL
Qty: 0 | Refills: 0 | DISCHARGE
Start: 2019-12-11

## 2019-12-11 RX ORDER — CLOTRIMAZOLE 10 MG
1 TROCHE MUCOUS MEMBRANE
Qty: 0 | Refills: 0 | DISCHARGE
Start: 2019-12-11

## 2019-12-11 RX ORDER — SENNA PLUS 8.6 MG/1
2 TABLET ORAL
Qty: 0 | Refills: 0 | DISCHARGE
Start: 2019-12-11

## 2019-12-11 RX ORDER — FOLIC ACID 0.8 MG
1 TABLET ORAL
Qty: 0 | Refills: 0 | DISCHARGE
Start: 2019-12-11

## 2019-12-11 RX ORDER — PREGABALIN 225 MG/1
1 CAPSULE ORAL
Qty: 0 | Refills: 0 | DISCHARGE
Start: 2019-12-11

## 2019-12-11 RX ORDER — TRIAMTERENE/HYDROCHLOROTHIAZID 75 MG-50MG
1 TABLET ORAL
Qty: 0 | Refills: 0 | DISCHARGE

## 2019-12-11 RX ORDER — APIXABAN 2.5 MG/1
1 TABLET, FILM COATED ORAL
Qty: 0 | Refills: 0 | DISCHARGE
Start: 2019-12-11

## 2019-12-11 RX ORDER — ATOVAQUONE 750 MG/5ML
5 SUSPENSION ORAL
Qty: 0 | Refills: 0 | DISCHARGE
Start: 2019-12-11

## 2019-12-11 RX ADMIN — Medication 20 MILLIGRAM(S): at 15:33

## 2019-12-11 RX ADMIN — DIPHENHYDRAMINE HYDROCHLORIDE AND LIDOCAINE HYDROCHLORIDE AND ALUMINUM HYDROXIDE AND MAGNESIUM HYDRO 10 MILLILITER(S): KIT at 06:48

## 2019-12-11 RX ADMIN — Medication 40 MILLIGRAM(S): at 06:34

## 2019-12-11 RX ADMIN — Medication 30 MILLIGRAM(S): at 06:34

## 2019-12-11 RX ADMIN — APIXABAN 5 MILLIGRAM(S): 2.5 TABLET, FILM COATED ORAL at 06:34

## 2019-12-11 RX ADMIN — PREGABALIN 1000 MICROGRAM(S): 225 CAPSULE ORAL at 11:07

## 2019-12-11 RX ADMIN — ATOVAQUONE 750 MILLIGRAM(S): 750 SUSPENSION ORAL at 06:34

## 2019-12-11 RX ADMIN — Medication 1 LOZENGE: at 15:10

## 2019-12-11 RX ADMIN — Medication 1 TABLET(S): at 06:34

## 2019-12-11 RX ADMIN — DIPHENHYDRAMINE HYDROCHLORIDE AND LIDOCAINE HYDROCHLORIDE AND ALUMINUM HYDROXIDE AND MAGNESIUM HYDRO 10 MILLILITER(S): KIT at 11:07

## 2019-12-11 RX ADMIN — CHLORHEXIDINE GLUCONATE 1 APPLICATION(S): 213 SOLUTION TOPICAL at 06:47

## 2019-12-11 RX ADMIN — Medication 1 MILLIGRAM(S): at 11:07

## 2019-12-11 RX ADMIN — DIPHENHYDRAMINE HYDROCHLORIDE AND LIDOCAINE HYDROCHLORIDE AND ALUMINUM HYDROXIDE AND MAGNESIUM HYDRO 10 MILLILITER(S): KIT at 00:51

## 2019-12-11 RX ADMIN — Medication 1 TABLET(S): at 15:10

## 2019-12-11 RX ADMIN — Medication 1 LOZENGE: at 04:48

## 2019-12-11 RX ADMIN — POLYETHYLENE GLYCOL 3350 17 GRAM(S): 17 POWDER, FOR SOLUTION ORAL at 06:35

## 2019-12-11 RX ADMIN — Medication 4: at 12:56

## 2019-12-11 RX ADMIN — Medication 1 LOZENGE: at 09:40

## 2019-12-11 RX ADMIN — Medication 1 LOZENGE: at 11:07

## 2019-12-11 NOTE — DISCHARGE NOTE NURSING/CASE MANAGEMENT/SOCIAL WORK - PATIENT PORTAL LINK FT
You can access the FollowMyHealth Patient Portal offered by Health system by registering at the following website: [email or web address]. By joining [email or web address]’s FollowMyHealth portal, you will also be able to view your health information using other applications (apps) compatible with our system.

## 2019-12-11 NOTE — DISCHARGE NOTE PROVIDER - NSDCFUADDAPPT_GEN_ALL_CORE_FT
Please, on friday 12/13 follow up with Dr. Orantes  Please, with in two weeks of discharge follow up with Dr. Santillan   Please, with in two weeks of discharge follow up with Dr. Bell

## 2019-12-11 NOTE — DISCHARGE NOTE NURSING/CASE MANAGEMENT/SOCIAL WORK - NSDCPEELIQUIS_GEN_ALL_CORE
Apixaban/Eliquis - Follow up monitoring/Apixaban/Eliquis - Potential for adverse drug reactions and interactions/Apixaban/Eliquis - Dietary Advice/Apixaban/Eliquis - Compliance

## 2019-12-11 NOTE — DISCHARGE NOTE PROVIDER - NSDCMRMEDTOKEN_GEN_ALL_CORE_FT
apixaban 5 mg oral tablet: 1 tab(s) orally every 12 hours  atovaquone 750 mg/5 mL oral suspension: 5 milliliter(s) orally 2 times a day ending 12/24  clotrimazole 10 mg oral lozenge: 1 lozenge orally 5 times a day  cyanocobalamin 1000 mcg oral tablet: 1 tab(s) orally once a day  folic acid 1 mg oral tablet: 1 tab(s) orally once a day  furosemide 40 mg oral tablet: 1 tab(s) orally 2 times a day  NIFEdipine 30 mg oral tablet, extended release: 1 tab(s) orally once a day  polyethylene glycol 3350 oral powder for reconstitution: 17 gram(s) orally 2 times a day  predniSONE 50 mg oral tablet: 1 tab(s) orally once a day  senna oral tablet: 2 tab(s) orally once a day (at bedtime)  sulfamethoxazole-trimethoprim 400 mg-80 mg oral tablet: 1 tab(s) orally every 8 hours for  13 more days  ending 12/23 apixaban 5 mg oral tablet: 1 tab(s) orally every 12 hours  clotrimazole 10 mg oral lozenge: 1 lozenge orally 5 times a day  cyanocobalamin 1000 mcg oral tablet: 1 tab(s) orally once a day  folic acid 1 mg oral tablet: 1 tab(s) orally once a day  furosemide 40 mg oral tablet: 1 tab(s) orally once a day (at bedtime)  NIFEdipine 30 mg oral tablet, extended release: 1 tab(s) orally once a day  polyethylene glycol 3350 oral powder for reconstitution: 17 gram(s) orally 2 times a day  predniSONE 50 mg oral tablet: 1 tab(s) orally once a day  senna oral tablet: 2 tab(s) orally once a day (at bedtime)  sulfamethoxazole-trimethoprim 400 mg-80 mg oral tablet: 1 tab(s) orally every 8 hours for  13 more days  ending 12/23

## 2019-12-11 NOTE — DISCHARGE NOTE PROVIDER - HOSPITAL COURSE
Pt is a 89 year old female with a significant PMHx of Warm Autoimmune Hemolytic Anemia who presented with a cc/o generalized myalgia. Her muscle aches are associated with generalized weakness and inability to ambulate. She is currently admitted for suspected steroid-induced myalgia        Today's Events: Patient is hemodynamiclly stable with no systemic signs of infection and stable for discharge today.         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 Orantes)    - DC on 50mg of prednisone Daily     - Monitor Hb, Keep active type and screen    - LE Duplex - Positive DVT R. PT / DVT L. Peroneal, soleus        # Hypoxemic Respiratory Failure, immunocompromised,   Possible PCP, vs Multifactorial    - Possible PCP starting mepron 750mg q12    - component Fluid overload    - On High flow nasal cannula.    - BNP 4500 now 800's    - CXR repeat stable     - Lasix I40mg BID  PO     - Echo - 55-60% - G1DD - Severe AS    - Daily weight; fluid restriction; Low salt; I+O    - Considering PNA - Labs as per Pulm below - Discuss possibility of PCP as patient was immunocompromised with Chemotherapy and Steroids. - LDH elevated, Aa Gradient Increased -  cannot confirm given patient poor candidate for bronch     -  Fungitell elevated , LDH elevated     - Pulmonary Consult appreciated     - mycoplasma titers and Legionella capsular Ag.  unremarkable     -ID anabelle-  c/w mepron  750mg     -Keep SaO2 >92% adjust O2 as needed    -The patient needs repeat CXR  in 3-4 months to document clearance            # LE DVT  provoked     - Originally started on Hep Drip - stopped due to Possible HIT    - Started on Argatroban - switched to Eliquis 10mg  ending 12/06    -  6 months 5mg starting 12/07    - following  hem/onc            #Thrombocytopaenia improved likely secondary to AHA and DVT    - HIT less likely as per hem/Onc     - HIT ab - Negative     - Heme/Onc following     - DVT likely secondary to inactivity and AHA    - hold all heparin products     Given  Argatroban - low dose. 1mcg/kg/min      - c/w  Eliquis  10 mg BID 7 days (Nov 29th - Dec 5th)     -Then Dec 7h 5 mg BID - starting 12/06 Will need at least 6 months            Hx of CKD Stage IIIB - stable     - Baseline 1.3     - Monitor            #Hx of Low Vitamin B12 level with normal MMA - Continue B12 1000mcg daily        #HTN  stable - Continue with Nifedipine 30 mg qd;  DC  CTZ/Triamterene        #Diverticulosis - High fiber diet to avoid constipation Pt is a 89 year old female with a significant PMHx of Warm Autoimmune Hemolytic Anemia who presented with a cc/o generalized myalgia. Her muscle aches are associated with generalized weakness and inability to ambulate. She is currently admitted for suspected steroid-induced myalgia        Today's Events: Patient is hemodynamiclly stable with no systemic signs of infection and stable for discharge today.         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 Orantes)    - DC on 50mg of prednisone Daily     - Monitor Hb, Keep active type and screen    - LE Duplex - Positive DVT R. PT / DVT L. Peroneal, soleus        # Hypoxemic Respiratory Failure, immunocompromised,   Possible PCP, vs Multifactorial    - Possible PCP starting mepron 750mg q12    - component Fluid overload    - On High flow nasal cannula.    - BNP 4500 now 800's    - CXR repeat stable     - Lasix I40mg BID  PO     - Echo - 55-60% - G1DD - Severe AS    - Daily weight; fluid restriction; Low salt; I+O    - Considering PNA - Labs as per Pulm below - Discuss possibility of PCP as patient was immunocompromised with Chemotherapy and Steroids. - LDH elevated, Aa Gradient Increased -  cannot confirm given patient poor candidate for bronch     -  Fungitell elevated , LDH elevated     - Pulmonary Consult appreciated     - mycoplasma titers and Legionella capsular Ag.  unremarkable     -ID anabelle-  DC mepron  750mg  switch  -po Bactrim 1 SS tablet q8h for 2 more weeks ending  12/23    -Keep SaO2 >92% adjust O2 as needed    -The patient needs repeat CXR  in 3-4 months to document clearance            # LE DVT  provoked     - Originally started on Hep Drip - stopped due to Possible HIT    - Started on Argatroban - switched to Eliquis 10mg  ending 12/06    -  6 months 5mg starting 12/07    - following  hem/onc            #Thrombocytopaenia improved likely secondary to AHA and DVT    - HIT less likely as per hem/Onc     - HIT ab - Negative     - Heme/Onc following     - DVT likely secondary to inactivity and AHA    - hold all heparin products     Given  Argatroban - low dose. 1mcg/kg/min      - c/w  Eliquis  10 mg BID 7 days (Nov 29th - Dec 5th)     -Then Dec 7h 5 mg BID - starting 12/06 Will need at least 6 months            Hx of CKD Stage IIIB - stable     - Baseline 1.3     - Monitor            #Hx of Low Vitamin B12 level with normal MMA - Continue B12 1000mcg daily        #HTN  stable - Continue with Nifedipine 30 mg qd;  DC  CTZ/Triamterene        #Diverticulosis - High fiber diet to avoid constipation

## 2019-12-11 NOTE — DISCHARGE NOTE PROVIDER - NSDCFUSCHEDAPPT_GEN_ALL_CORE_FT
RITA RAMOS ; 12/18/2019 ; [facility] [address] RITA RAMOS ; 12/16/2019 ; [facility] [address] RITA Frey ; 12/18/2019 ; [facility] [address] RITA RAMOS ; 12/16/2019 ; [facility] [address] RITA Frey ; 12/18/2019 ; [facility] [address] RITA RAMOS ; 12/16/2019 ; [facility] [address] RITA Frey ; 12/18/2019 ; [facility] [address]

## 2019-12-11 NOTE — PROGRESS NOTE ADULT - ATTENDING COMMENTS
Please obtain CTA chest.
She is doing well. She is now on NC 4.5L. She has no complaints. She did get out of bed and walked a bit with PT.    #AIHA   HgB is stable she is now in CR, c/w  prednisone  40 mg daily for a week if still in remission will decrease dose next Tuesday to 30 mg.    -c/w folic acid and vit B12 PO  -LDH came back high but was hemolyzed,  but maybe elevated from PCP  -monitor CBC on occasion       #Acute mild thrombocytopenia   resolved  suspect was due to consumption from DVT possibly PE.  -Avoid PEPCID it most likely caused her thrombocytopenia in the past     #Hypoxia and Tachycardia was on high flow nasal canula now on NC 4.5L   -suspect due  CHF and PE, PCP and possibly aspiration pneumonia  -Lasix   as needed  -She has been on high dose steroids and her Fungatil is high PCP would explain her degree of hypoxia and the LDH. She is now on Mepron, ID and Pulm are on board    #ANDREAS suspect was due to dehydration  -resolved    #Edema from fluid retention due to CHF or ANDREAS?  -resolved    #DVT and most likely PE  -c/w Apixiban would anticogualte for at least 3 month than will see. Its possible DVT was provoked by AIHA thus will consider stopping after 3 months  -was not due to HIT, PF4 was negative, EDWARD was negative       #Constipation  -on bowel regiment had a BM
She was eating dinner when I saw her approximately 4:30 PM. She denies any pain in her mouth are doing swallowing. She has no complaints.     On examination she does have bilateral ulcers on her tongue etiology of which are unclear.    Her hemoglobin is 10.7 today with a platelet count of 159,000    She is now on room air    Plan:  #Warm autoimmune hemolytic anemia  -Status post 4 doses of Rituxan now on prednisone taper, hemoglobin is stable  -Please recheck CBC, CMP, LDH, reticulocyte count  -If no evidence of hemolysis. Please decrease prednisone to 30 mg for one week then 20 mg then 10 mg and then we will stop    #Thrombocytopenia, most likely was due to consumption from venous thromboembolism  -on apixaban     #PCP  -Continue with Mepron    #New ulcer on the tongue unclear cause  -Can have her be seen by ID    #Respiratory failure was multifactorial and due to CHF, PCP, Aspiration/GN pneumonia  -resolved
She was eating lunch. On High flow NC. She told me she went to a chair yesterday. She is constipated. She had no complaints.        #AIHA   HgB is stable, small drop from yesterday may not be significant on prednisone  40 mg daily for a week if still in remission will decrease dose next Tuesday to 30 mg.    -c/w folic acid and vit B12 PO  -LDH came back high but was hemolyzed, her bili was normal yesterday  -monitor CBC,  haptoglobin is pending    #Acute mild thrombocytopenia   trending up suspect was due to consumption from DVT possibly PE.  -just monitor at this point  -Avoid PEPCID it most likely caused her thrombocytopenia in the past     #Hypoxia and Tachycardia now on high flow nasal canula  -suspect due  CHF and PE  -Lasix   as needed  -was seen by Pulm  -wean of oxygen as tolerated    #ANDREAS suspect was due to dehydration  -resolved    #Edema from fluid retention due to CHF or ANDREAS?  -resolved    #DVT and most likley PE  -c/w Apixiban  -was not due to HIT, PF4 was negative, HIT was not  high on differential to begin with  -Heparin-PF4 AB Interp:   Negative (11.29.19 @ 11:00) .    #Constipation  -on Senna if does not help can add on MIralex or anything else as needed
She was seen and examined. She has more strength now was easily franky to move her limbs. She denied SOB     #AIHA on  prednisone 50 mg  HgB is stable please decrease to 40 mg daily for a week starting tomorrow . She had a relapse when I tried to taper of steroids quicker last time   -c/w folic acid and vit B12 PO  -LDH came back high but was hemolyzed, her bili is normal   -monitor CBC, check CMP, LDH and haptoglobin tomorrow    #Acute mild thrombocytopenia   trending up suspect was due to consumption from DVT possibly PE.  -just monitor at this point  -Avoid PEPCID it most likely caused her thrombocytopenia in the past     #Hypoxia and Tachycardia now on high flow nasal canula  -suspect due  CHF and PE  -Lasix   as needed  -was seen by Pulm  -wean of oxygen as tolerated    #ANDREAS suspect was due to dehydration  -resolved    #Edema from fluid retention due to CHF or ANDREAS?  -resolved    #DVT and most likley PE  -c/w Apixiban  -was not due to HIT, PF4 was negative, HIT was not  high on differential to begin with  -Heparin-PF4 AB Interp:   Negative (11.29.19 @ 11:00)
seen/examined  in view of worsened hemolysis/anemia, increase prednisone to 1mg/kg  if medically optimized for d/c home, f/u in [place] in 2-3 days
She was seen and examined and she is doing well. She is now on 2 L of oxygen. She informed me that she got out of bed. Today and was in the chair. No additional complaints.    We are planning to discharge her.    Continue with all current medications CBC is stable.    Please use 5 mg of apixiban b.i.d. since we are treating a deep vein thrombosis.    She will need her prednisone to be decreased to 30 mg per CBC is stable. On December 12. She will need a CBC checked in her rehabilitation weekly . If discharged, over the weekend.
Attending Statement: I have personally performed a face to face diagnostic evaluation on this patient. The patient is suffering from hypoxia due to aspiration pnumonia/pneumonitis. I have reviewed the above note and agree with the history, exam and plan of care, except as I have noted in the text.
Patient admitted with b/l DVT now also with SOB, recommend CTA to r/o PE.   Briefly was managed with Argatroban in the setting of prior Heparin exposure and thrombocytopenia, switch over to Eliquis, discussed with Dr. Orantes.   Continue with Prednisone, currently on 50mg daily.

## 2019-12-11 NOTE — CHART NOTE - NSCHARTNOTEFT_GEN_A_CORE
<<<RESIDENT DISCHARGE NOTE>>>     RITA RAMOS  [id number]  Pt is a 89 year old female with a significant PMHx of Warm Autoimmune Hemolytic Anemia who presented with a cc/o generalized myalgia. Her muscle aches are associated with generalized weakness and inability to ambulate. She is currently admitted for suspected steroid-induced myalgia    Today's Events: Patient is hemodynamiclly stable with no systemic signs of infection and stable for discharge today.     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 Orantes)  - DC on 50mg of prednisone Daily   - Monitor Hb, Keep active type and screen  - LE Duplex - Positive DVT R. PT / DVT L. Peroneal, soleus    # Hypoxemic Respiratory Failure, immunocompromised,   Possible PCP, vs Multifactorial  - Possible PCP starting mepron 750mg q12  - component Fluid overload  - On High flow nasal cannula.  - BNP 4500 now 800's  - CXR repeat stable   - Lasix I40mg BID  PO   - Echo - 55-60% - G1DD - Severe AS  - Daily weight; fluid restriction; Low salt; I+O  - Considering PNA - Labs as per Pulm below - Discuss possibility of PCP as patient was immunocompromised with Chemotherapy and Steroids. - LDH elevated, Aa Gradient Increased -  cannot confirm given patient poor candidate for bronch   -  Fungitell elevated , LDH elevated   - Pulmonary Consult appreciated   - mycoplasma titers and Legionella capsular Ag.  unremarkable   -ID anabelle-  DC mepron  750mg  switch  -po Bactrim 1 SS tablet q8h for 2 more weeks ending  12/23  -Keep SaO2 >92% adjust O2 as needed  -The patient needs repeat CXR  in 3-4 months to document clearance      # LE DVT  provoked   - Originally started on Hep Drip - stopped due to Possible HIT  - Started on Argatroban - switched to Eliquis 10mg  ending 12/06  -  6 months 5mg starting 12/07  - following  hem/onc      #Thrombocytopaenia improved likely secondary to AHA and DVT  - HIT less likely as per hem/Onc   - HIT ab - Negative   - Heme/Onc following   - DVT likely secondary to inactivity and AHA  - hold all heparin products   Given  Argatroban - low dose. 1mcg/kg/min    - c/w  Eliquis  10 mg BID 7 days (Nov 29th - Dec 5th)   -Then Dec 7h 5 mg BID - starting 12/06 Will need at least 6 months      Hx of CKD Stage IIIB - stable   - Baseline 1.3   - Monitor      #Hx of Low Vitamin B12 level with normal MMA - Continue B12 1000mcg daily    #HTN  stable - Continue with Nifedipine 30 mg qd;  DC  CTZ/Triamterene    #Diverticulosis - High fiber diet to avoid constipation      VITAL SIGNS:  T(F): 96 (12-11-19 @ 14:01), Max: 96.1 (12-10-19 @ 20:43)  HR: 86 (12-11-19 @ 14:01)  BP: 92/58 (12-11-19 @ 14:01)  SpO2: --      PHYSICAL EXAMINATION:  General: No acute distress.  Alert, oriented, interactive, nonfocal. elderly   Female     HEENT: Pupils equal, reactive to light symmetrically.    PULM: Clear to auscultation bilaterally in upper lobes, Lower lobes improved crackles are herd  b/l and decreased breath sounds.  no significant sputum production.    CVS: Regular rate and rhythm, holosystolic murmured herd at sternal boarder murmurs, rubs, or gallops.    GI: Soft, nondistended, nontender, no masses.    MSK: No edema, nontender.    SKIN: Warm and well perfused, no rashes noted.    PSYCH: AAOx2-3    NEURO: NO focal     TEST RESULTS:                        10.4   7.76  )-----------( 138      ( 11 Dec 2019 07:05 )             33.2       12-10    141  |  98  |  29<H>  ----------------------------<  106<H>  3.3<L>   |  28  |  0.8    Ca    8.7      10 Dec 2019 06:50    TPro  4.4<L>  /  Alb  2.7<L>  /  TBili  1.4<H>  /  DBili  x   /  AST  20  /  ALT  15  /  AlkPhos  69  12-10      FINAL DISCHARGE INTERVIEW:  Resident(s) Present: (Name:____Juliet Skinner_________), RN Present: (Name:  ___________)    DISCHARGE MEDICATION RECONCILIATION  reviewed with Attending (Name:_______Dr. Amezcua____)    DISPOSITION:   [  ] Home,    [  ] Home with Visiting Nursing Services,   [  x  ]  SNF/ NH,    [   ] Acute Rehab (4A),   [   ] Other (Specify:_________)

## 2019-12-11 NOTE — DISCHARGE NOTE PROVIDER - NSDCCPCAREPLAN_GEN_ALL_CORE_FT
PRINCIPAL DISCHARGE DIAGNOSIS  Diagnosis: PCP (pneumocystis jiroveci pneumonia)  Assessment and Plan of Treatment: Please continue mepron 750mg  BID ending  on 12/24. F/u Pulmonology  for repeat chest xray.      SECONDARY DISCHARGE DIAGNOSES  Diagnosis: HTN (hypertension)  Assessment and Plan of Treatment: Please continue  all medications as prescibed.    Diagnosis: Autoimmune hemolytic anemia  Assessment and Plan of Treatment: Please  Follow up with  Hematology   with repeat CBC on friday.  Please continue  prednisone 50mg  daily.    Diagnosis: Heart failure  Assessment and Plan of Treatment: Please continue all medication as directed.    Diagnosis: Candidiasis, mouth  Assessment and Plan of Treatment: Please continue Bactrim 1 tab q8 for 13 more days ending on 12/23.  Oral wash as needed.    Diagnosis: DVT, lower extremity  Assessment and Plan of Treatment: Please continue apixaban as prescibed.  PLease follow  up with PMD. PRINCIPAL DISCHARGE DIAGNOSIS  Diagnosis: PCP (pneumocystis jiroveci pneumonia)  Assessment and Plan of Treatment: Please continue -po Bactrim 1 SS tablet q8h for 2 more weeks ending 12/23. F/u Pulmonology  for repeat chest xray.      SECONDARY DISCHARGE DIAGNOSES  Diagnosis: HTN (hypertension)  Assessment and Plan of Treatment: Please continue  all medications as prescibed.    Diagnosis: Autoimmune hemolytic anemia  Assessment and Plan of Treatment: Please  Follow up with  Hematology   with repeat CBC on friday.  Please continue  prednisone 50mg  daily.    Diagnosis: Heart failure  Assessment and Plan of Treatment: Please continue all medication as directed.    Diagnosis: Candidiasis, mouth  Assessment and Plan of Treatment: Please continue antifungal oral solusion .  Oral wash as needed.    Diagnosis: DVT, lower extremity  Assessment and Plan of Treatment: Please continue apixaban as prescibed.  PLease follow  up with PMD. PRINCIPAL DISCHARGE DIAGNOSIS  Diagnosis: PCP (pneumocystis jiroveci pneumonia)  Assessment and Plan of Treatment: Please continue -po Bactrim 1 SS tablet q8h for 2 more weeks ending 12/23. F/u Pulmonology  for repeat chest xray.      SECONDARY DISCHARGE DIAGNOSES  Diagnosis: HTN (hypertension)  Assessment and Plan of Treatment: Please continue  all medications as prescibed.    Diagnosis: Autoimmune hemolytic anemia  Assessment and Plan of Treatment: Please  Follow up with  Hematology   with repeat CBC on friday.  Please continue  prednisone 50mg  daily. follow up on monday for appointment  with hematology.    Diagnosis: Heart failure  Assessment and Plan of Treatment: Please continue all medication as directed.    Diagnosis: Candidiasis, mouth  Assessment and Plan of Treatment: Please continue antifungal oral solusion .  Oral wash as needed.    Diagnosis: DVT, lower extremity  Assessment and Plan of Treatment: Please continue apixaban as prescibed.  PLease follow  up with PMD. PRINCIPAL DISCHARGE DIAGNOSIS  Diagnosis: PCP (pneumocystis jiroveci pneumonia)  Assessment and Plan of Treatment: Please continue -po Bactrim 1 SS tablet q8h for 2 more weeks ending 12/23. F/u Pulmonology  for repeat chest xray.      SECONDARY DISCHARGE DIAGNOSES  Diagnosis: HTN (hypertension)  Assessment and Plan of Treatment: Please continue  all medications as prescibed.    Diagnosis: Autoimmune hemolytic anemia  Assessment and Plan of Treatment: Please  Follow up with  Hematology   with repeat CBC on friday.  Please continue  prednisone 50mg  daily. follow up on monday for appointment  with hematology (Dr. Orantes) on Monday 12/16,  at 9am. Please fax cbc labs  to [phone].    Diagnosis: Heart failure  Assessment and Plan of Treatment: Please continue all medication as directed.    Diagnosis: Candidiasis, mouth  Assessment and Plan of Treatment: Please continue antifungal oral solusion .  Oral wash as needed.    Diagnosis: DVT, lower extremity  Assessment and Plan of Treatment: Please continue apixaban as prescibed.  PLease follow  up with PMD.

## 2019-12-11 NOTE — PROGRESS NOTE ADULT - ASSESSMENT
This is a 89 year old female with a significant PMHx of Warm Autoimmune Hemolytic Anemia who presented with a cc/o generalized myalgia. Her muscle aches are associated with generalized weakness and inability to ambulate. She is currently admitted for suspected steroid-induced myalgia.       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 CT imaging did not reveal malignancy. No folate deficiency.   Low vitamin B12 level with normal MMA level and is on PO B12 and her repeat B12 level is normal.   Hepatitis work up negative  c/w folic acid and Po Vit B12  Completed 4/4 dose of Rituxan  Elevated LDH and low haptoglobin - suggestive of hemolysis   Will increase prednisone back to 1mg/kg - to 50 mg daily   Monitor CBC daily for now       3) New bilateral LE DVT : c/w eliquis 5 mg q12h  Her hemolytic anemia makes her high risk for clots     4) Worsening thrombocytopenia:  Could be platelet consumption - resolved  - HIT panel negative.  - She had low platelets outpt- that improved after stopping pepcid  No need for any further work up     5) Hypoxia and Tachycardia now on high flow nasal canula -suspect due  CHF and PE, PCP and possibly aspiration pneumonia- Resolved and back to baseline   CT chest was not done as it would not   Echo showing severe pulHTN, Moderately enlarged right ventricle. Moderately reduced RV systolic function.  She has been on high dose steroids and her Fungetill is high- on Mepron for coverage for  PCP.       6) Oral candidiasis - better after starting clotrimazole lozenges       Discussed with med resident

## 2019-12-11 NOTE — PROGRESS NOTE ADULT - SUBJECTIVE AND OBJECTIVE BOX
Patient is a 89y old  Female who presents with a chief complaint of Hypoxemia, SIRS due to Asp PNA, Fluid overload; steroid-induced myalgia (10 Dec 2019 23:36)      Subjective: No overnight events  She is doing well, off oxygen   No new complaints       Vital Signs Last 24 Hrs  T(C): 35.6 (11 Dec 2019 05:45), Max: 35.6 (10 Dec 2019 13:18)  T(F): 96 (11 Dec 2019 05:45), Max: 96.1 (10 Dec 2019 20:43)  HR: 63 (11 Dec 2019 05:45) (63 - 77)  BP: 128/59 (11 Dec 2019 05:45) (116/56 - 128/59)  BP(mean): --  RR: 17 (11 Dec 2019 05:45) (17 - 18)  SpO2: --    PHYSICAL EXAM  General: adult in NAD  Oral cavity: Thrush noted (better than prior)  Neck: supple  CV: normal S1/S2 with no murmur rubs or gallops  Lungs: positive air movement b/l ant lungs  Abdomen: soft non-tender   Ext: no clubbing cyanosis or edema  Neuro: alert and oriented X 4    MEDICATIONS  (STANDING):  apixaban 5 milliGRAM(s) Oral every 12 hours  atovaquone Suspension 750 milliGRAM(s) Oral two times a day  chlorhexidine 4% Liquid 1 Application(s) Topical <User Schedule>  clotrimazole Lozenge 1 Lozenge Oral five times a day  cyanocobalamin 1000 MICROGram(s) Oral daily  FIRST- Mouthwash  BLM 10 milliLiter(s) Swish and Spit every 6 hours  folic acid 1 milliGRAM(s) Oral daily  furosemide    Tablet 40 milliGRAM(s) Oral two times a day  insulin lispro (HumaLOG) corrective regimen sliding scale   SubCutaneous three times a day before meals  NIFEdipine XL 30 milliGRAM(s) Oral daily  polyethylene glycol 3350 17 Gram(s) Oral two times a day  predniSONE   Tablet 20 milliGRAM(s) Oral once  senna 2 Tablet(s) Oral at bedtime  trimethoprim   80 mG/sulfamethoxazole 400 mG 1 Tablet(s) Oral every 8 hours    MEDICATIONS  (PRN):      LABS:                          10.4   7.76  )-----------( 138      ( 11 Dec 2019 07:05 )             33.2         Mean Cell Volume : 90.2 fL  Mean Cell Hemoglobin : 28.3 pg  Mean Cell Hemoglobin Concentration : 31.3 g/dL  Auto Neutrophil # : x  Auto Lymphocyte # : x  Auto Monocyte # : x  Auto Eosinophil # : x  Auto Basophil # : x  Auto Neutrophil % : x  Auto Lymphocyte % : x  Auto Monocyte % : x  Auto Eosinophil % : x  Auto Basophil % : x      Serial CBC's  12-11 @ 07:05  Hct-33.2 / Hgb-10.4 / Plat-138 / RBC-3.68 / WBC-7.76  Serial CBC's  12-10 @ 06:50  Hct-32.4 / Hgb-10.3 / Plat-137 / RBC-3.59 / WBC-7.36  Serial CBC's  12-09 @ 05:53  Hct-33.4 / Hgb-10.7 / Plat-159 / RBC-3.74 / WBC-7.75  Serial CBC's  12-08 @ 06:20  Hct-35.7 / Hgb-11.1 / Plat-167 / RBC-3.95 / WBC-7.39      12-10    141  |  98  |  29<H>  ----------------------------<  106<H>  3.3<L>   |  28  |  0.8    Ca    8.7      10 Dec 2019 06:50    TPro  4.4<L>  /  Alb  2.7<L>  /  TBili  1.4<H>  /  DBili  x   /  AST  20  /  ALT  15  /  AlkPhos  69  12-10          Reticulocyte Percent: 1.9 % (12-10 @ 06:50)                          BLOOD SMEAR INTERPRETATION:       RADIOLOGY & ADDITIONAL STUDIES:

## 2019-12-13 ENCOUNTER — OUTPATIENT (OUTPATIENT)
Dept: OUTPATIENT SERVICES | Facility: HOSPITAL | Age: 84
LOS: 1 days | Discharge: HOME | End: 2019-12-13

## 2019-12-13 DIAGNOSIS — R79.9 ABNORMAL FINDING OF BLOOD CHEMISTRY, UNSPECIFIED: ICD-10-CM

## 2019-12-16 ENCOUNTER — OUTPATIENT (OUTPATIENT)
Dept: OUTPATIENT SERVICES | Facility: HOSPITAL | Age: 84
LOS: 1 days | Discharge: HOME | End: 2019-12-16

## 2019-12-16 ENCOUNTER — APPOINTMENT (OUTPATIENT)
Dept: HEMATOLOGY ONCOLOGY | Facility: CLINIC | Age: 84
End: 2019-12-16
Payer: MEDICARE

## 2019-12-16 ENCOUNTER — LABORATORY RESULT (OUTPATIENT)
Age: 84
End: 2019-12-16

## 2019-12-16 VITALS
DIASTOLIC BLOOD PRESSURE: 90 MMHG | HEART RATE: 110 BPM | RESPIRATION RATE: 14 BRPM | HEIGHT: 59 IN | SYSTOLIC BLOOD PRESSURE: 130 MMHG

## 2019-12-16 DIAGNOSIS — I50.9 HEART FAILURE, UNSPECIFIED: ICD-10-CM

## 2019-12-16 DIAGNOSIS — R79.9 ABNORMAL FINDING OF BLOOD CHEMISTRY, UNSPECIFIED: ICD-10-CM

## 2019-12-16 DIAGNOSIS — I82.409 ACUTE EMBOLISM AND THROMBOSIS OF UNSPECIFIED DEEP VEINS OF UNSPECIFIED LOWER EXTREMITY: ICD-10-CM

## 2019-12-16 DIAGNOSIS — D69.6 THROMBOCYTOPENIA, UNSPECIFIED: ICD-10-CM

## 2019-12-16 DIAGNOSIS — D59.1 OTHER AUTOIMMUNE HEMOLYTIC ANEMIAS: ICD-10-CM

## 2019-12-16 DIAGNOSIS — B59 PNEUMOCYSTOSIS: ICD-10-CM

## 2019-12-16 LAB
RETICS # AUTO: 2.4 %
RETICS AGGREG/RBC NFR: 89.4 K/UL

## 2019-12-16 PROCEDURE — 99214 OFFICE O/P EST MOD 30 MIN: CPT

## 2019-12-16 RX ORDER — NIFEDIPINE 30 MG/1
30 TABLET, EXTENDED RELEASE ORAL DAILY
Qty: 30 | Refills: 0 | Status: ACTIVE | COMMUNITY
Start: 2019-12-16

## 2019-12-16 RX ORDER — FUROSEMIDE 40 MG/1
40 TABLET ORAL DAILY
Qty: 90 | Refills: 3 | Status: ACTIVE | COMMUNITY
Start: 2019-12-16

## 2019-12-16 RX ORDER — APIXABAN 5 MG/1
5 TABLET, FILM COATED ORAL
Qty: 60 | Refills: 5 | Status: ACTIVE | COMMUNITY
Start: 2019-12-16 | End: 1900-01-01

## 2019-12-17 LAB
ALBUMIN SERPL ELPH-MCNC: 3.1 G/DL
ALP BLD-CCNC: 82 U/L
ALT SERPL-CCNC: 17 U/L
ANION GAP SERPL CALC-SCNC: 19 MMOL/L
AST SERPL-CCNC: 16 U/L
BILIRUB SERPL-MCNC: 0.7 MG/DL
BUN SERPL-MCNC: 46 MG/DL
CALCIUM SERPL-MCNC: 9.2 MG/DL
CHLORIDE SERPL-SCNC: 95 MMOL/L
CO2 SERPL-SCNC: 23 MMOL/L
CREAT SERPL-MCNC: 1.7 MG/DL
GLUCOSE SERPL-MCNC: 164 MG/DL
HAPTOGLOB SERPL-MCNC: 56 MG/DL
HCT VFR BLD CALC: 32.7 %
HGB BLD-MCNC: 10.6 G/DL
LDH SERPL-CCNC: 369
MCHC RBC-ENTMCNC: 28.6 PG
MCHC RBC-ENTMCNC: 32.4 G/DL
MCV RBC AUTO: 88.1 FL
PLATELET # BLD AUTO: 189 K/UL
PMV BLD: 12 FL
POTASSIUM SERPL-SCNC: 3.7 MMOL/L
PROT SERPL-MCNC: 4.9 G/DL
RBC # BLD: 3.71 M/UL
RBC # FLD: 15.8 %
SODIUM SERPL-SCNC: 137 MMOL/L
WBC # FLD AUTO: 18.45 K/UL

## 2019-12-18 ENCOUNTER — APPOINTMENT (OUTPATIENT)
Dept: HEMATOLOGY ONCOLOGY | Facility: CLINIC | Age: 84
End: 2019-12-18

## 2019-12-19 PROBLEM — B59 PCP (PNEUMOCYSTIS CARINII PNEUMONIA): Status: ACTIVE | Noted: 2019-12-16

## 2019-12-19 NOTE — PHYSICAL EXAM
[Capable of only limited self care, confined to bed or chair more than 50% of waking hours] : Status 3- Capable of only limited self care, confined to bed or chair more than 50% of waking hours [Normal] : affect appropriate [de-identified] :  oral ulcer on tounge healing well, not bothersome and healing as per patient [de-identified] : seated in wheelchair, frail

## 2019-12-19 NOTE — HISTORY OF PRESENT ILLNESS
[Therapy: ___] : Therapy: [unfilled] [Cycle: ___] : Cycle: [unfilled] [de-identified] : Ms. RITA RAMOS is a 89 year old female here today for evaluation of Hemolytic Anemia.  \par \par 89 year old female with history of HTN, diverticulosis, and B12 deficiency presented with anemia on 2 occasion in [facility]. I saw her on her second admission.  The patient had been having weakness, loss of appetite, headache worsening for 5 days prior to presentation.  She was admitted to [facility] for similar presentation, found to be anemic, received transfusion and discharged on B12 injections. She had been doing relatively well until 5 days prior to most recent hospitalization, when she stared to have similar symptoms again with jaundice this time. Workup revealed warm autoimmune hemolytic anemia with (+)IgG and (-)C3. The patient received 2 units of PRBCs for hb 4.7 (10/4), and 1 unit PRBC for Hb 5.9 (10/6). Hemoglobin and LDH levels stabilized with steroid treatment.  The patient received her first dose of rituximab on 10/10, which she tolerated well. She will receive 3 additional doses, once weekly. The patient is now stable and is here for transition of care to receive remainder of rituxan and monitoring.  She has been taking Folic Acid and B12 PO.  Today she is seated in wheelchair with family member, hgb today is 8.7g/dL.   She is on prednisone 40 mg now but was on higher doses initially. [FreeTextEntry1] : Started Rituxumab weekly 10.10.19 in-house and completed last dose on 10/31/19 Has been on Prednisone since 10/5/19 [de-identified] : 11/4/19\par She is here for follow up. Her Hgb has been trending down with last hemolysis panel positive again. She never went bellow 40 mg of prednisone. Last week in 10/3/19 I went up to 80 mg of prednisone and she had her 4th dose of Rituxan. Her hgb was coming down and she developed acute severe thrombocytopenia maybe Kuo syndrome. I reviewed her smear there were no platelet clumps, no schistocytes, no microspherocytes.   Todays cbc showed increase in hgb, fall in mcv and same retic. her platelets are coming down. She was set up for IVIG but will hold off. \par \par 11/18/19\par Patient is here for a follow-up visit for Hemolytic Anemia and thrombocytopenia with her daughter.  She is feeling well with no complaints.  Most recent CBC  from todays visit Hgb of 11.4, Plts of 153,  however her retic is still elevated suggesting some remaining hemolysis.  Patient denies fever, chills, nausea, vomiting, bleeding or jaundice.  \par \par 12/16/19\par Patient is here for a follow-up visit for hemolytic anemia.  Since last visit she was evaluated and admitted into hospital for suspected steroid induced myalgia and found to have PCP and aspiration pneumonia and suspected PE but had bilateral DVT.  Patient denies fever, chills, nausea, vomiting, new pain or bleeding.  She is tolerating the eliquis 5 mg q12h.  She was in CR and we were tapering her steroids but   just prior to dischrage her hgb went down and hemolysis labs came up  ( they were hemolized) but she was discharged on 50mg prednisone tapered dose.  She reports feeling very deconditioned and weak.  At discharge her haptoglobin level was low and LDH was elevated but specimen was hemolyzed so may not be accurate.\par US Doppler (11.27.19)  Impression:Deep venous thrombosis right posterior tibial vein.Deep venous thrombosis left peroneal and soleal veins. \par CXR (11.26.19) Impression:  Bilateral predominantly central patchy opacities appear new when compared to prior 2018 study and may reflect edema or infection in the appropriate clinical setting. Questionable trace left pleural effusion. \par She was also treated for PCP, her fungetil came back high and she had hypoxia and was on high flow NC for some time. She  responeded to Mepron but for some reason went to rehab on Bactrim.\par She has been in poor spirits since her admission and not doing much.\par She also developed an aphthous ulcer in her tongue  not crear why. Its not hurting her and she is eating.

## 2019-12-20 ENCOUNTER — OUTPATIENT (OUTPATIENT)
Dept: OUTPATIENT SERVICES | Facility: HOSPITAL | Age: 84
LOS: 1 days | Discharge: HOME | End: 2019-12-20

## 2019-12-20 DIAGNOSIS — I26.99 OTHER PULMONARY EMBOLISM WITHOUT ACUTE COR PULMONALE: ICD-10-CM

## 2019-12-20 DIAGNOSIS — Z88.5 ALLERGY STATUS TO NARCOTIC AGENT: ICD-10-CM

## 2019-12-20 DIAGNOSIS — T38.0X5A ADVERSE EFFECT OF GLUCOCORTICOIDS AND SYNTHETIC ANALOGUES, INITIAL ENCOUNTER: ICD-10-CM

## 2019-12-20 DIAGNOSIS — Z88.1 ALLERGY STATUS TO OTHER ANTIBIOTIC AGENTS STATUS: ICD-10-CM

## 2019-12-20 DIAGNOSIS — M79.10 MYALGIA, UNSPECIFIED SITE: ICD-10-CM

## 2019-12-20 DIAGNOSIS — N18.3 CHRONIC KIDNEY DISEASE, STAGE 3 (MODERATE): ICD-10-CM

## 2019-12-20 DIAGNOSIS — K57.30 DIVERTICULOSIS OF LARGE INTESTINE WITHOUT PERFORATION OR ABSCESS WITHOUT BLEEDING: ICD-10-CM

## 2019-12-20 DIAGNOSIS — B59 PNEUMOCYSTOSIS: ICD-10-CM

## 2019-12-20 DIAGNOSIS — R13.10 DYSPHAGIA, UNSPECIFIED: ICD-10-CM

## 2019-12-20 DIAGNOSIS — D59.1 OTHER AUTOIMMUNE HEMOLYTIC ANEMIAS: ICD-10-CM

## 2019-12-20 DIAGNOSIS — J96.91 RESPIRATORY FAILURE, UNSPECIFIED WITH HYPOXIA: ICD-10-CM

## 2019-12-20 DIAGNOSIS — I27.20 PULMONARY HYPERTENSION, UNSPECIFIED: ICD-10-CM

## 2019-12-20 DIAGNOSIS — R26.89 OTHER ABNORMALITIES OF GAIT AND MOBILITY: ICD-10-CM

## 2019-12-20 DIAGNOSIS — Z88.0 ALLERGY STATUS TO PENICILLIN: ICD-10-CM

## 2019-12-20 DIAGNOSIS — I35.0 NONRHEUMATIC AORTIC (VALVE) STENOSIS: ICD-10-CM

## 2019-12-20 DIAGNOSIS — I82.452 ACUTE EMBOLISM AND THROMBOSIS OF LEFT PERONEAL VEIN: ICD-10-CM

## 2019-12-20 DIAGNOSIS — K59.00 CONSTIPATION, UNSPECIFIED: ICD-10-CM

## 2019-12-20 DIAGNOSIS — G72.0 DRUG-INDUCED MYOPATHY: ICD-10-CM

## 2019-12-20 DIAGNOSIS — K12.1 OTHER FORMS OF STOMATITIS: ICD-10-CM

## 2019-12-20 DIAGNOSIS — R53.1 WEAKNESS: ICD-10-CM

## 2019-12-20 DIAGNOSIS — I50.33 ACUTE ON CHRONIC DIASTOLIC (CONGESTIVE) HEART FAILURE: ICD-10-CM

## 2019-12-20 DIAGNOSIS — J69.0 PNEUMONITIS DUE TO INHALATION OF FOOD AND VOMIT: ICD-10-CM

## 2019-12-20 DIAGNOSIS — I36.1 NONRHEUMATIC TRICUSPID (VALVE) INSUFFICIENCY: ICD-10-CM

## 2019-12-20 DIAGNOSIS — D69.59 OTHER SECONDARY THROMBOCYTOPENIA: ICD-10-CM

## 2019-12-20 DIAGNOSIS — I82.441 ACUTE EMBOLISM AND THROMBOSIS OF RIGHT TIBIAL VEIN: ICD-10-CM

## 2019-12-20 DIAGNOSIS — B37.0 CANDIDAL STOMATITIS: ICD-10-CM

## 2019-12-20 DIAGNOSIS — N17.9 ACUTE KIDNEY FAILURE, UNSPECIFIED: ICD-10-CM

## 2019-12-20 DIAGNOSIS — Z88.6 ALLERGY STATUS TO ANALGESIC AGENT: ICD-10-CM

## 2019-12-20 DIAGNOSIS — R79.9 ABNORMAL FINDING OF BLOOD CHEMISTRY, UNSPECIFIED: ICD-10-CM

## 2019-12-20 DIAGNOSIS — I82.462 ACUTE EMBOLISM AND THROMBOSIS OF LEFT CALF MUSCULAR VEIN: ICD-10-CM

## 2019-12-20 DIAGNOSIS — Z88.8 ALLERGY STATUS TO OTHER DRUGS, MEDICAMENTS AND BIOLOGICAL SUBSTANCES STATUS: ICD-10-CM

## 2019-12-20 DIAGNOSIS — I13.0 HYPERTENSIVE HEART AND CHRONIC KIDNEY DISEASE WITH HEART FAILURE AND STAGE 1 THROUGH STAGE 4 CHRONIC KIDNEY DISEASE, OR UNSPECIFIED CHRONIC KIDNEY DISEASE: ICD-10-CM

## 2019-12-23 ENCOUNTER — OUTPATIENT (OUTPATIENT)
Dept: OUTPATIENT SERVICES | Facility: HOSPITAL | Age: 84
LOS: 1 days | Discharge: HOME | End: 2019-12-23

## 2019-12-23 ENCOUNTER — APPOINTMENT (OUTPATIENT)
Dept: HEMATOLOGY ONCOLOGY | Facility: CLINIC | Age: 84
End: 2019-12-23

## 2019-12-23 DIAGNOSIS — R79.9 ABNORMAL FINDING OF BLOOD CHEMISTRY, UNSPECIFIED: ICD-10-CM

## 2019-12-23 DIAGNOSIS — B59 PNEUMOCYSTOSIS: ICD-10-CM

## 2019-12-23 DIAGNOSIS — I82.409 ACUTE EMBOLISM AND THROMBOSIS OF UNSPECIFIED DEEP VEINS OF UNSPECIFIED LOWER EXTREMITY: ICD-10-CM

## 2019-12-24 ENCOUNTER — OUTPATIENT (OUTPATIENT)
Dept: OUTPATIENT SERVICES | Facility: HOSPITAL | Age: 84
LOS: 1 days | Discharge: HOME | End: 2019-12-24

## 2019-12-26 DIAGNOSIS — R79.9 ABNORMAL FINDING OF BLOOD CHEMISTRY, UNSPECIFIED: ICD-10-CM

## 2019-12-26 DIAGNOSIS — D64.9 ANEMIA, UNSPECIFIED: ICD-10-CM

## 2019-12-27 ENCOUNTER — OUTPATIENT (OUTPATIENT)
Dept: OUTPATIENT SERVICES | Facility: HOSPITAL | Age: 84
LOS: 1 days | Discharge: HOME | End: 2019-12-27

## 2019-12-27 DIAGNOSIS — R79.9 ABNORMAL FINDING OF BLOOD CHEMISTRY, UNSPECIFIED: ICD-10-CM

## 2019-12-31 ENCOUNTER — OUTPATIENT (OUTPATIENT)
Dept: OUTPATIENT SERVICES | Facility: HOSPITAL | Age: 84
LOS: 1 days | Discharge: HOME | End: 2019-12-31

## 2019-12-31 DIAGNOSIS — R94.5 ABNORMAL RESULTS OF LIVER FUNCTION STUDIES: ICD-10-CM

## 2020-02-18 NOTE — CDI QUERY NOTE - NSCDIOTHERTXTBX_GEN_ALL_CORE_HH
Query:  1.	“Fluid overload,” “CHF exacerbation” and “Hx of HTN, ASHD, Jonathon CHF” are documented.  Pt is currently receiving IV Lasix.  Please clarify the acuity of documented “jonathon CHF.”          a.	Acute on chronic diastolic CHF       b.	Acute diastolic CHF       c.	Diastolic CHF, unknown acuity       d.	Other (please specify)       Evidence:  Med PN (11/28):  •	Subjective and Objective:   •	RITA RAMOS 89y Female from home brought by family to the ER for c/o generalized weakness, SOB and inc difficulty ambulating x 3 days du to generalized muscle pain.  The pt at baseline is able to ambulate with walker but in the last few days has been unable to get out of be due to severe musculoskeletal pain.  Of note the pt has Hx of  warm autoimmune hemolytic anemia and has been off prolonged steroid  tx  and has had  Rutixan tx as well.  The pt was noted to be fluid overloaded with a BNP of 4500.  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 In addition question of PNA infiltrate and if lower ext doppler + may have PE given the cont low pulse ox.  o	Hx of HTN, ASHD, Jonathon CHF, TIA  Med PN (12/2):  •	Hypoxemic Respiratory Failure etiology Unknown  PNA, volume overload   •	 Echo - 55-60% - G1DD - Severe AS.   o	Lasix IV 20 daily for now
Statement Selected

## 2020-02-19 ENCOUNTER — APPOINTMENT (OUTPATIENT)
Dept: HEMATOLOGY ONCOLOGY | Facility: CLINIC | Age: 85
End: 2020-02-19

## 2020-04-17 NOTE — ED ADULT TRIAGE NOTE - PRO INTERPRETER NEED 2
Patient discussed with nurse practitioner, she is tachycardic, febrile, has flank pain that did not respond to Toradol, has been nauseous (improved with ondansetron), also noted to have hyponatremia.  She does have a history of kidney stones and this feels similar.  She did not respond to IV fluids and remained tachycardic and in pain, patient sent to the emergency room for further evaluation.   English

## 2021-06-15 NOTE — PATIENT PROFILE ADULT - NSPROPTRIGHTBILLOFRIGHTS_GEN_A_NUR
Procedure:  IR SUPRAPUBIC CATHETER PLACEMENT    Relevant Problems   CARDIO   (+) CAD (coronary artery disease)   (+) HTN (hypertension)      ENDO   (+) Type 2 diabetes mellitus with diabetic polyneuropathy, with long-term current use of insulin (HCC)      /RENAL   (+) Acute renal failure superimposed on stage 4 chronic kidney disease (HCC)   (+) CKD (chronic kidney disease) stage 3, GFR 30-59 ml/min ([facility])   (+) Chronic renal disease, stage IV ([facility])      HEMATOLOGY   (+) Normochromic normocytic anemia      MUSCULOSKELETAL   (+) Osteoarthritis of left knee      NEURO/PSYCH   (+) Anxiety   (+) Major depressive disorder   (+) Severe episode of recurrent major depressive disorder, without psychotic features [facility] )      PULMONARY   (+) Chronic bronchitis (HCC)      Other   (+) Morbid obesity with BMI of 45 0-49 9, adult ([facility] )        Physical Exam    Airway    Mallampati score: III  TM Distance: >3 FB  Neck ROM: full     Dental   Comment: Edentulous on top  8 teeth on bottom,     Cardiovascular  Rhythm: regular, Rate: normal, Cardiovascular exam normal    Pulmonary  Breath sounds clear to auscultation,     Other Findings        Anesthesia Plan  ASA Score- 3     Anesthesia Type- IV sedation with anesthesia and general with ASA Monitors  Additional Monitors:   Airway Plan:           Plan Factors-    Chart reviewed  Existing labs reviewed  Patient summary reviewed  Patient is not a current smoker  Patient instructed to abstain from smoking on day of procedure  Patient did not smoke on day of surgery  Induction- intravenous  Postoperative Plan-     Informed Consent- Anesthetic plan and risks discussed with patient and daughter Karinaaaron Dalton)  patient representative

## 2022-05-14 NOTE — ASSESSMENT
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  Flow cytometry negative.   CT imaging did not reveal malignancy.  No folate or vitamin B12 deficiency,  low vitamin B12 level with normal MMA level. Macrocytosis secondary to elevated retic count.  Started on prednisone 1mg/kg daily with folic acid daily as inpatient. Patient experienced worsening hemolysis so now back on 80 mg daily and responding.  She was in CR and steroids were being tapered and her anemia is now multifactorial including anemia of inflimation given her most recent admission. . She is still in CR \par -blood work from today did not support hemolysis so will  decrease prednisone from 50mg to 20mg daily starting tomorrow and in one week if blood work is ok will go to 10 mg since she had PCP and possibly steroids idunced myoopathy\par - completed 4/4 doses of  Rituxan in October\par - c/w calcium and vit d\par \par #PCP pneumonia likely 2/2 repeated high dose steroid tx\par - she will complete abx from hospitalization in 1 week; she was originally on Mepron during hospitalization but it appears she was switched to Bactrim upon discharge; unclear if it is due to oral ulcer but as she is tolerating OK will finish this course\par \par #Bilateral DVT with suspected PE, most likley due to AIHA and since she was not ambulating.\par - c/w eliquis 5mg BiD for 3 months. If starts walking around will stop than\par \par #Acute Thrombocytopenia not sure why, Kuo?  \par - suspect was due to Pepcid sine it resolved after we stopped it \par \par # Low Vitamin B12 level with normal MMA : was on  vitamin b12 IM \par - c/w folic acid and oral B12 for now\par \par #Aphthous ulcer, resolving not sure the cause, medications?\par \par RTC in 1 week  I called the daughter with the results. \par \par [phone]\par (Jasmine Bailey - daughter) 100

## 2024-02-08 NOTE — PROGRESS NOTE ADULT - ASSESSMENT
----- Message from Gayla Goff sent at 2/8/2024 10:56 AM CST -----  Name of Who is Calling:ZAY MERAZ [[id number]]                What is the request in detail: Pt calling to reschedule her pre admit visit.Please call back to further assist.                 Can the clinic reply by [facility]: No                What Number to Call Back if not in [facility]: [phone]     SOB, generalized weakness due to fluid overload, exacerbation of Odalis CHF, fluid retention due to long term steroid therapy, CKD    Worsening myalgia, probably steroid induced with worsening mobility dysfunction    Hx of Warm IGG Hemolytic Anemia ( IGG +, C3 -), chronic steroid therapy, sp Rituxan  therapy  Hx of HTN, ASHD, Odalis CHF, TIA  Hx of CKD III  Hx of OA, DDD, DJD, mobility dysfunction, ambulates with walker  Hx of GERD, diverticulosis, ch constipataion  Advanced age    pt has elevated BNP of 4500 with GFR of 28, CXR shows inc vascular markings,  start Lasix 20mg IV daily, O2 2L, NC, check pulse Ox  monitor electrolytes  ECHO  trend troponins , 0.02  monitor CBC, Plts 90, 64  cont prednisone 50mg  Hem-Onc consult Dr Mejias  fall precautions SOB, generalized weakness due to fluid overload, exacerbation of Odalis CHF, fluid retention due to long term steroid therapy, CKD    Worsening myalgia, probably steroid induced with worsening mobility dysfunction    Hx of Warm IGG Hemolytic Anemia ( IGG +, C3 -), chronic steroid therapy, sp Rituxan  therapy  Hx of HTN, ASHD, Odalis CHF, TIA  Hx of CKD III  Hx of OA, DDD, DJD, mobility dysfunction, ambulates with walker  Hx of GERD, diverticulosis, ch constipataion  Advanced age    pt has elevated BNP of 4500 with GFR of 28, CXR shows inc vascular markings,  start Lasix 20mg IV daily, O2 2L, NC, check pulse Ox  monitor electrolytes  Venous doppler of lower ext:  preliminary report +,   pt started on IV Heparin, with low PLTS will try to keep PTT lower limits of therapeutic  ECHO P  trend troponins , 0.02  monitor CBC, Plts 90, 64  cont prednisone 50mg  Hem-Onc consult Dr Mejias  fall precautions  aspiration precautions  guarded state    addendum:  had a long discussion of pt's dx, investigative results and care with family/son, SOB, generalized weakness due to fluid overload, exacerbation of Odalis CHF, fluid retention due to long term steroid therapy, CKD  R/O PNA, R/O PE  BL Lower Ext DVT  Thombocytopenia R/O HIT  Worsening myalgia, probably steroid induced with worsening mobility dysfunction    Hx of Warm IGG Hemolytic Anemia ( IGG +, C3 -), chronic steroid therapy, sp Rituxan  therapy  Hx of HTN, ASHD, Odalis CHF, TIA  Hx of CKD III  Hx of OA, DDD, DJD, mobility dysfunction, ambulates with walker  Hx of GERD, diverticulosis, ch constipataion  Advanced age    pt has elevated BNP of 4500 with GFR of 28, CXR shows inc vascular markings and opacities, started Lasix 20mg IV daily, O2 2L, NC, check pulse Ox  monitor electrolytes  Venous doppler of lower ext:  preliminary report +,   pt started on IV Heparin, with low PLTS will try to keep PTT lower limits of therapeutic, if cont to dec will need to consider HIT   ECHO P  trend troponins , 0.02  monitor CBC, Plts 90, 64  cont prednisone 50mg  Hem-Onc consult Dr Mejias  fall precautions  aspiration precautions  guarded state    addendum:  had a long tense discussion of pt's dx, investigative results and care with family/son/Lucian

## 2024-12-05 NOTE — ED ADULT NURSE NOTE - NSFALLRSKINDICTYPE_ED_ALL_ED
Onset: on going       Location / description: seen for breathing issues and wheezing on 11/10 in  and rechecked on 11/27 with PCP- was waiting for xrays - rechecking for pneumonia- symptoms improved with inhalers- denies difficulty breathing now / denies chest pain - no new or worsening symptoms - reason for call is results- also looking for refill for ALPRAZolam (XANAX) 0.5 MG tablet           PLAN:  [facility] [address] [place] [address] - [address] [place], [place] [address]  Phone: [phone]  Fax: [phone]     Advised to call back with new/ worsening symptoms/ questions/ concerns    Reason for Disposition   Caller requesting routine or non-urgent lab result   Caller requesting a CONTROLLED substance prescription refill (e.g., narcotics, ADHD medicines)    Protocols used: PCP Call - No Triage-A-AH, Medication Refill and Renewal Call-A-OH    
Regarding: Trouble breathing and wheezing  ----- Message from Soraya BERRIOS sent at 12/5/2024  9:32 AM CST -----  Patient Name: Jessie Espinoza    Specialist or PCP Name: Timbo Altamirano MDPCP - General    Symptoms: Trouble breathing and wheezing    Pregnant (females aged 13-60. If Yes, how long?) : n/a    Call Back # : [phone]    Which State are you currently located in?: [place]    Name of Clinic Site / Acct# : [place] [id number]      Call arrived during: Work Hours    
Xanax refilled   
Impaired Gait